# Patient Record
Sex: FEMALE | Race: BLACK OR AFRICAN AMERICAN | Employment: FULL TIME | URBAN - METROPOLITAN AREA
[De-identification: names, ages, dates, MRNs, and addresses within clinical notes are randomized per-mention and may not be internally consistent; named-entity substitution may affect disease eponyms.]

---

## 2017-07-03 ENCOUNTER — OFFICE VISIT (OUTPATIENT)
Dept: PRIMARY CARE CLINIC | Age: 37
End: 2017-07-03

## 2017-07-03 VITALS
WEIGHT: 202.6 LBS | HEART RATE: 107 BPM | OXYGEN SATURATION: 98 % | BODY MASS INDEX: 35.9 KG/M2 | HEIGHT: 63 IN | TEMPERATURE: 98.4 F | RESPIRATION RATE: 16 BRPM | SYSTOLIC BLOOD PRESSURE: 123 MMHG | DIASTOLIC BLOOD PRESSURE: 85 MMHG

## 2017-07-03 DIAGNOSIS — J01.10 ACUTE FRONTAL SINUSITIS, RECURRENCE NOT SPECIFIED: Primary | ICD-10-CM

## 2017-07-03 DIAGNOSIS — J01.00 ACUTE MAXILLARY SINUSITIS, RECURRENCE NOT SPECIFIED: ICD-10-CM

## 2017-07-03 RX ORDER — AMOXICILLIN AND CLAVULANATE POTASSIUM 875; 125 MG/1; MG/1
1 TABLET, FILM COATED ORAL EVERY 12 HOURS
Qty: 14 TAB | Refills: 0 | Status: SHIPPED | OUTPATIENT
Start: 2017-07-03 | End: 2017-07-10

## 2017-07-03 NOTE — MR AVS SNAPSHOT
Visit Information Date & Time Provider Department Dept. Phone Encounter #  
 7/3/2017  1:45 PM Eben Oneil NP [address] [facility] [id number] Follow-up Instructions Return if symptoms worsen or fail to improve. Your Appointments 9/26/2017  9:30 AM  
Any with Christine Sinha, DO [employer] (MABEL Marina) Appt Note: Routine f/up check. , weight loss, thyroid meds. (30 min.) - lp  
 [address] 
[address] [facility] [address]  
[phone]  
  
   
 [address] [facility] [address] [facility] [address] [facility] Upcoming Health Maintenance Date Due DTaP/Tdap/Td series (1 - Tdap) 1/5/2001 PAP AKA CERVICAL CYTOLOGY 2/1/2016 INFLUENZA AGE 9 TO ADULT 8/1/2017 Allergies as of 7/3/2017  Review Complete On: 7/3/2017 By: Eben Oneil NP Severity Noted Reaction Type Reactions Skelaxin [Metaxalone]  11/25/2014    Other (comments) Too groggy Current Immunizations  Reviewed on 11/28/2016 Name Date Hep B Vaccine 3/10/2014 Not reviewed this visit You Were Diagnosed With   
  
 Codes Comments Acute frontal sinusitis, recurrence not specified    -  Primary ICD-10-CM: J01.10 ICD-9-CM: 121.4 Acute maxillary sinusitis, recurrence not specified     ICD-10-CM: J01.00 ICD-9-CM: 461.0 Vitals BP Pulse Temp Resp Height(growth percentile) Weight(growth percentile) 123/85 (BP 1 Location: Left arm, BP Patient Position: Sitting) (!) 107 98.4 °F (36.9 °C) (Oral) 16 5' 3\" (1.6 m) 202 lb 9.6 oz (91.9 kg) SpO2 BMI OB Status Smoking Status 98% 35.89 kg/m2 Having regular periods Passive Smoke Exposure - Never Smoker BMI and BSA Data Body Mass Index Body Surface Area  
 35.89 kg/m 2 2.02 m 2 Preferred Pharmacy Pharmacy Name Phone [address] [place] [address], [address] [place] [address] [address] [phone] Your Updated Medication List  
 This list is accurate as of: 7/3/17  2:02 PM.  Always use your most recent med list.  
  
  
  
  
 albuterol 90 mcg/actuation inhaler Commonly known as:  PROVENTIL HFA, VENTOLIN HFA, PROAIR HFA Take 2 puffs by inhalation every four (4) hours as needed for Wheezing or Shortness of Breath. amoxicillin-clavulanate 875-125 mg per tablet Commonly known as:  AUGMENTIN Take 1 Tab by mouth every twelve (12) hours for 7 days. BIOTIN PO Take  by mouth. cholecalciferol 1,000 unit tablet Commonly known as:  VITAMIN D3 Take  by mouth daily. cyclobenzaprine 10 mg tablet Commonly known as:  FLEXERIL Take 1 Tab by mouth three (3) times daily as needed for Muscle Spasm(s). esomeprazole 40 mg capsule Commonly known as:  NexIUM Take one capsule by mouth daily for Heartburn  Indications: HEARTBURN  
  
 fluticasone 50 mcg/actuation nasal spray Commonly known as:  Marcine Infield 2 Sprays by Both Nostrils route daily. folic acid 1 mg tablet Commonly known as:  FOLVITE  
  
 linaclotide 145 mcg Cap capsule Commonly known as:  Michaela Edgarstein Take 1 Cap by mouth Daily (before breakfast). Indications: Chronic Idiopathic Constipation  
  
 phentermine 15 mg capsule Commonly known as:  ADIPEX_P Take 1 Cap by mouth every morning. Max Daily Amount: 15 mg. VITAMIN B12 PO Take  by mouth. Prescriptions Sent to Pharmacy Refills  
 amoxicillin-clavulanate (AUGMENTIN) 875-125 mg per tablet 0 Sig: Take 1 Tab by mouth every twelve (12) hours for 7 days. Class: Normal  
 Pharmacy: Alejandro Valadez at [address] #: [phone] Route: Oral  
  
Follow-up Instructions Return if symptoms worsen or fail to improve. Patient Instructions Sinusitis: Care Instructions Your Care Instructions Sinusitis is an infection of the lining of the sinus cavities in your head. Sinusitis often follows a cold. It causes pain and pressure in your head and face. In most cases, sinusitis gets better on its own in 1 to 2 weeks. But some mild symptoms may last for several weeks. Sometimes antibiotics are needed. Follow-up care is a key part of your treatment and safety. Be sure to make and go to all appointments, and call your doctor if you are having problems. It's also a good idea to know your test results and keep a list of the medicines you take. How can you care for yourself at home? · Take an over-the-counter pain medicine, such as acetaminophen (Tylenol), ibuprofen (Advil, Motrin), or naproxen (Aleve). Read and follow all instructions on the label. · If the doctor prescribed antibiotics, take them as directed. Do not stop taking them just because you feel better. You need to take the full course of antibiotics. · Be careful when taking over-the-counter cold or flu medicines and Tylenol at the same time. Many of these medicines have acetaminophen, which is Tylenol. Read the labels to make sure that you are not taking more than the recommended dose. Too much acetaminophen (Tylenol) can be harmful. · Breathe warm, moist air from a steamy shower, a hot bath, or a sink filled with hot water. Avoid cold, dry air. Using a humidifier in your home may help. Follow the directions for cleaning the machine. · Use saline (saltwater) nasal washes to help keep your nasal passages open and wash out mucus and bacteria. You can buy saline nose drops at a grocery store or drugstore. Or you can make your own at home by adding 1 teaspoon of salt and 1 teaspoon of baking soda to 2 cups of distilled water. If you make your own, fill a bulb syringe with the solution, insert the tip into your nostril, and squeeze gently. Chalo Grammes your nose. · Put a hot, wet towel or a warm gel pack on your face 3 or 4 times a day for 5 to 10 minutes each time. · Try a decongestant nasal spray like oxymetazoline (Afrin). Do not use it for more than 3 days in a row. Using it for more than 3 days can make your congestion worse. When should you call for help? Call your doctor now or seek immediate medical care if: 
· You have new or worse swelling or redness in your face or around your eyes. · You have a new or higher fever. Watch closely for changes in your health, and be sure to contact your doctor if: 
· You have new or worse facial pain. · The mucus from your nose becomes thicker (like pus) or has new blood in it. · You are not getting better as expected. Where can you learn more? Go to http://weston-karel.info/. Enter R679 in the search box to learn more about \"Sinusitis: Care Instructions. \" Current as of: July 29, 2016 Content Version: 11.3 © 5177-1271 [email or web address]. Care instructions adapted under license by [employer] (which disclaims liability or warranty for this information). If you have questions about a medical condition or this instruction, always ask your healthcare professional. [place] [address] any warranty or liability for your use of this information. Introducing [facility] & HEALTH SERVICES! Dear Patricia Gleason: Thank you for requesting a YouHelp account. Our records indicate that you already have an active YouHelp account. You can access your account anytime at [email or web address] Did you know that you can access your hospital and ER discharge instructions at any time in YouHelp? You can also review all of your test results from your hospital stay or ER visit. Additional Information If you have questions, please visit the Frequently Asked Questions section of the YouHelp website at [email or web address] Remember, YouHelp is NOT to be used for urgent needs. For medical emergencies, dial 911. Now available from your iPhone and Android! Please provide this summary of care documentation to your next provider. Your primary care clinician is listed as Vince Hill. If you have any questions after today's visit, please call [phone].

## 2017-07-03 NOTE — PATIENT INSTRUCTIONS
Sinusitis: Care Instructions  Your Care Instructions    Sinusitis is an infection of the lining of the sinus cavities in your head. Sinusitis often follows a cold. It causes pain and pressure in your head and face. In most cases, sinusitis gets better on its own in 1 to 2 weeks. But some mild symptoms may last for several weeks. Sometimes antibiotics are needed. Follow-up care is a key part of your treatment and safety. Be sure to make and go to all appointments, and call your doctor if you are having problems. It's also a good idea to know your test results and keep a list of the medicines you take. How can you care for yourself at home? · Take an over-the-counter pain medicine, such as acetaminophen (Tylenol), ibuprofen (Advil, Motrin), or naproxen (Aleve). Read and follow all instructions on the label. · If the doctor prescribed antibiotics, take them as directed. Do not stop taking them just because you feel better. You need to take the full course of antibiotics. · Be careful when taking over-the-counter cold or flu medicines and Tylenol at the same time. Many of these medicines have acetaminophen, which is Tylenol. Read the labels to make sure that you are not taking more than the recommended dose. Too much acetaminophen (Tylenol) can be harmful. · Breathe warm, moist air from a steamy shower, a hot bath, or a sink filled with hot water. Avoid cold, dry air. Using a humidifier in your home may help. Follow the directions for cleaning the machine. · Use saline (saltwater) nasal washes to help keep your nasal passages open and wash out mucus and bacteria. You can buy saline nose drops at a grocery store or drugstore. Or you can make your own at home by adding 1 teaspoon of salt and 1 teaspoon of baking soda to 2 cups of distilled water. If you make your own, fill a bulb syringe with the solution, insert the tip into your nostril, and squeeze gently. Deangelo Shy your nose.   · Put a hot, wet towel or a warm gel pack on your face 3 or 4 times a day for 5 to 10 minutes each time. · Try a decongestant nasal spray like oxymetazoline (Afrin). Do not use it for more than 3 days in a row. Using it for more than 3 days can make your congestion worse. When should you call for help? Call your doctor now or seek immediate medical care if:  · You have new or worse swelling or redness in your face or around your eyes. · You have a new or higher fever. Watch closely for changes in your health, and be sure to contact your doctor if:  · You have new or worse facial pain. · The mucus from your nose becomes thicker (like pus) or has new blood in it. · You are not getting better as expected. Where can you learn more? Go to http://weston-karel.info/. Enter B305 in the search box to learn more about \"Sinusitis: Care Instructions. \"  Current as of: July 29, 2016  Content Version: 11.3  © 5974-3121 Healthwise, Incorporated. Care instructions adapted under license by [employer] (which disclaims liability or warranty for this information). If you have questions about a medical condition or this instruction, always ask your healthcare professional. [place] [address] any warranty or liability for your use of this information.

## 2017-07-03 NOTE — PROGRESS NOTES
Subjective:   Rene Agudelo is a 40 y.o. female who complains of congestion, cough, headache, left sinus pain and yellow nasal discharge for 7 days, gradually worsening since that time. She denies a history of fevers, shortness of breath and wheezing. Evaluation to date: none. Treatment to date: OTC products - Sudafed, Mucinex, Theraflu, Flonase. Patient does not smoke cigarettes. Relevant PMH:   Past Medical History:   Diagnosis Date    Abnormal Pap smear of cervix 1990s    Breast cyst     benign. Left. 1.7 cm and multiple others. Dr. Neelima Rivera Chicken pox childhood    Chronic idiopathic constipation 2015    Dr. Micha Son.  HELM (dyspnea on exertion) 2015    Dr. Perla Appiah EBV positive mononucleosis syndrome 10/2013    chronic or reactivated    Enlarged thyroid 2010, 2015    with Right tracheal deviation. Retrosternal goiter. Dr. Pete Chavarria.  Enlarged tonsils and adenoids 2010    Iodine-deficiency related goiter     Left. Dr. Pete Chavarria.  YUNIER (obstructive sleep apnea) 02/25/15    Dr. Gypsy Devries, uses cpap. Dr. Jacobson Fearing.  Thyroid nodule 2014    Dr. Baudilio Melchor. Dr. Virgil Riddle     Past Surgical History:   Procedure Laterality Date    HX  SECTION      [facility].  HX HERNIA REPAIR  9265    umbilical.    HX OTHER SURGICAL  2014    thyroid biopsy. Dr. Baudilio Melchor.  HX THYROIDECTOMY Left 2015    due to [facility]. Dr. Pete Chavarria. benign. Allergies   Allergen Reactions    Skelaxin [Metaxalone] Other (comments)     Too groggy     PCP - Dr. Miles Melendez       Review of Systems  Pertinent items are noted in HPI.     Objective:     Visit Vitals    /85 (BP 1 Location: Left arm, BP Patient Position: Sitting)    Pulse (!) 107    Temp 98.4 °F (36.9 °C) (Oral)    Resp 16    Ht 5' 3\" (1.6 m)    Wt 202 lb 9.6 oz (91.9 kg)    SpO2 98%    BMI 35.89 kg/m2     General:  alert, cooperative, no distress   Eyes: negative   Ears: normal TM's and external ear canals AU   Sinuses: tenderness over bilateral maxillary and frontal   Mouth:  Lips, mucosa, and tongue normal. Teeth and gums normal and normal findings: oropharynx pink & moist without lesions or evidence of thrush   Neck: supple, symmetrical, trachea midline and no adenopathy. Heart: S1 and S2 normal, no murmurs noted. Lungs: clear to auscultation bilaterally        Assessment/Plan:       ICD-10-CM ICD-9-CM    1. Acute frontal sinusitis, recurrence not specified J01.10 461.1    2. Acute maxillary sinusitis, recurrence not specified J01.00 461.0      Augmentin as directed  Discussed the dx and tx of sinusitis. Suggested symptomatic OTC remedies. Nasal saline sprays for congestion. Antibiotics per orders. RTC prn. Preet Hernandez NP  This note will not be viewable in [address]

## 2017-07-03 NOTE — PROGRESS NOTES
Chief Complaint   Patient presents with    Sinus Pain     c/o sinus pressure, sinus headache and some coughing x 1 week, states she has taken flonase and sudafed to help     Headache     This note will not be viewable in MyChart.

## 2017-09-15 ENCOUNTER — OFFICE VISIT (OUTPATIENT)
Dept: SURGERY | Age: 37
End: 2017-09-15

## 2017-09-15 VITALS
SYSTOLIC BLOOD PRESSURE: 123 MMHG | HEART RATE: 91 BPM | DIASTOLIC BLOOD PRESSURE: 79 MMHG | HEIGHT: 63 IN | BODY MASS INDEX: 35.08 KG/M2 | WEIGHT: 198 LBS

## 2017-09-15 DIAGNOSIS — N60.01 CYST OF RIGHT BREAST: Primary | ICD-10-CM

## 2017-09-15 NOTE — MR AVS SNAPSHOT
Visit Information Date & Time Provider Department Dept. Phone Encounter #  
 9/15/2017  9:20  Tete Valencia  [address]  [id number] Your Appointments 9/26/2017  9:30 AM  
Any with DO Kimber Bullock 74 (MABEL Marina) Appt Note: Routine f/up check. , weight loss, thyroid meds. (30 min.) - lp  
 [address] 
[address] [place] [address]  
[phone]  
  
   
 [address]  
  
    
 3/16/2018  8:45 AM  
Follow Up with 81MD Malik Morocho 22 [facility]) Appt Note: 6 MONTH FOLLOW UP/ CP$30 9-15-17 [place] 9 [facility] 1 [address] [address]  
[address] [facility] [address] Upcoming Health Maintenance Date Due DTaP/Tdap/Td series (1 - Tdap) 1/5/2001 PAP AKA CERVICAL CYTOLOGY 2/1/2016 INFLUENZA AGE 9 TO ADULT 8/1/2017 Allergies as of 9/15/2017  Review Complete On: 9/15/2017 By: Zarina Lopez RN Severity Noted Reaction Type Reactions Skelaxin [Metaxalone]  11/25/2014    Other (comments) Too groggy Current Immunizations  Reviewed on 11/28/2016 Name Date Hep B Vaccine 3/10/2014 Not reviewed this visit Vitals BP Pulse Height(growth percentile) Weight(growth percentile) LMP BMI  
 123/79 91 5' 3\" (1.6 m) 198 lb (89.8 kg) 08/31/2017 35.07 kg/m2 OB Status Smoking Status Having regular periods Passive Smoke Exposure - Never Smoker Vitals History BMI and BSA Data Body Mass Index Body Surface Area 35.07 kg/m 2 2 m 2 Preferred Pharmacy Pharmacy Name Phone [address] [place] [address], [address] [place] [address] [address] [phone] Your Updated Medication List  
  
   
This list is accurate as of: 9/15/17 11:25 AM.  Always use your most recent med list.  
  
  
  
  
 albuterol 90 mcg/actuation inhaler Commonly known as:  PROVENTIL HFA, VENTOLIN HFA, PROAIR HFA Take 2 puffs by inhalation every four (4) hours as needed for Wheezing or Shortness of Breath. BIOTIN PO Take  by mouth. cholecalciferol 1,000 unit tablet Commonly known as:  VITAMIN D3 Take  by mouth daily. esomeprazole 40 mg capsule Commonly known as:  NexIUM Take one capsule by mouth daily for Heartburn  Indications: HEARTBURN To-Do List   
 09/28/2017 1:00 PM  
  Appointment with [facility] JENNIFER 1 at [address] [place] [address] ([phone]) Shower or bathe using soap and water. Do not use deodorant, powder, perfumes, or lotion the day of your exam.  If your prior mammograms were not performed at [place] [address] please bring films with you or forward prior images 2 days before your procedure. If patient is not a callback diagnostic, the patient must have an order/script from the physician for the diagnostic. Please bring it on the day of the mammogram or have it faxed to the department. [facility]  [phone] [facility] [phone] KARINA  [phone] [facility] [phone] [facility] [phone] [facility] [phone] [facility] [phone] Simeon Finnegan [phone]  
  
 09/28/2017 1:30 PM  
  Appointment with Vick Melchor 2 at [facility] Ultrasound ([phone]) Shower or bathe using soap and water. Do not use deodorant, powder, perfumes, or lotion. If your prior films were not performed at a local [facility] facility please bring or forward prior images 2 days before procedure. Patient Instructions Breast Lumps (Noncancerous): Care Instructions Your Care Instructions Breast lumps or changes are a common health worry for most women. Women may have many kinds of breast lumps and other breast changes throughout their lives, including ones that occur with menstrual periods, pregnancy, and aging. Most breast lumps are harmless and are not cancer. Many womens breasts feel lumpy and tender before their menstrual periods. Women also may have lumps when they are breastfeeding. Breast lumps may go away after menopause. Common noncancerous breast lumps include: · Cysts, or sacs filled with fluid. · Skin cysts in the breast area. · Fatty lumps, which may be firm. These may or may not cause pain. · Fibroadenomas, growths that are round and firm with smooth edges. · Abscesses, which are pockets of infection within the breast. 
Follow-up care is a key part of your treatment and safety. Be sure to make and go to all appointments, and call your doctor if you are having problems. It's also a good idea to know your test results and keep a list of the medicines you take. How can you care for yourself at home? · Take an over-the-counter pain medicine, such as acetaminophen (Tylenol), ibuprofen (Advil, Motrin), or naproxen (Aleve). Read and follow all instructions on the label. · Do not take two or more pain medicines at the same time unless the doctor told you to. Many pain medicines have acetaminophen, which is Tylenol. Too much acetaminophen (Tylenol) can be harmful. · If you are pregnant, do not take any medicine other than acetaminophen unless your doctor has told you to. · Wear a supportive bra, such as a sports bra or jog bra. · You may want to limit caffeine. Some women say that cutting back on caffeine reduces their breast tenderness. · A diet very low in fat (about 15% of daily diet) may reduce breast tenderness. Talk to your doctor about whether you should try a very low-fat diet. · A diet low in salt (sodium) also may reduce breast tenderness. When should you call for help? Watch closely for changes in your health, and be sure to contact your doctor if you: 
· Have a fever. · Have swelling, redness, or pain. · Have a new breast lump that does not go away with your menstrual cycle. · Notice that a breast lump has changed. Where can you learn more? Go to http://weston-karel.info/. Enter 95 [id number] in the search box to learn more about \"Breast Lumps (Noncancerous): Care Instructions. \" Current as of: October 13, 2016 Content Version: 11.3 © 6588-8144 Cosential. Care instructions adapted under license by [employer] (which disclaims liability or warranty for this information). If you have questions about a medical condition or this instruction, always ask your healthcare professional. [address] any warranty or liability for your use of this information. Introducing [facility] & HEALTH SERVICES! Dear Khoa Mukherjee: Thank you for requesting a Citizinvestor account. Our records indicate that you already have an active Citizinvestor account. You can access your account anytime at [email or web address] Did you know that you can access your hospital and ER discharge instructions at any time in Citizinvestor? You can also review all of your test results from your hospital stay or ER visit. Additional Information If you have questions, please visit the Frequently Asked Questions section of the Citizinvestor website at [email or web address] Remember, Citizinvestor is NOT to be used for urgent needs. For medical emergencies, dial 911. Now available from your iPhone and Android! Please provide this summary of care documentation to your next provider. Your primary care clinician is listed as Camelia Trevino. If you have any questions after today's visit, please call [phone].

## 2017-09-15 NOTE — PATIENT INSTRUCTIONS
Breast Lumps (Noncancerous): Care Instructions  Your Care Instructions  Breast lumps or changes are a common health worry for most women. Women may have many kinds of breast lumps and other breast changes throughout their lives, including ones that occur with menstrual periods, pregnancy, and aging. Most breast lumps are harmless and are not cancer. Many womens breasts feel lumpy and tender before their menstrual periods. Women also may have lumps when they are breastfeeding. Breast lumps may go away after menopause. Common noncancerous breast lumps include:  · Cysts, or sacs filled with fluid. · Skin cysts in the breast area. · Fatty lumps, which may be firm. These may or may not cause pain. · Fibroadenomas, growths that are round and firm with smooth edges. · Abscesses, which are pockets of infection within the breast.  Follow-up care is a key part of your treatment and safety. Be sure to make and go to all appointments, and call your doctor if you are having problems. It's also a good idea to know your test results and keep a list of the medicines you take. How can you care for yourself at home? · Take an over-the-counter pain medicine, such as acetaminophen (Tylenol), ibuprofen (Advil, Motrin), or naproxen (Aleve). Read and follow all instructions on the label. · Do not take two or more pain medicines at the same time unless the doctor told you to. Many pain medicines have acetaminophen, which is Tylenol. Too much acetaminophen (Tylenol) can be harmful. · If you are pregnant, do not take any medicine other than acetaminophen unless your doctor has told you to. · Wear a supportive bra, such as a sports bra or jog bra. · You may want to limit caffeine. Some women say that cutting back on caffeine reduces their breast tenderness. · A diet very low in fat (about 15% of daily diet) may reduce breast tenderness. Talk to your doctor about whether you should try a very low-fat diet.   · A diet low in salt (sodium) also may reduce breast tenderness. When should you call for help? Watch closely for changes in your health, and be sure to contact your doctor if you:  · Have a fever. · Have swelling, redness, or pain. · Have a new breast lump that does not go away with your menstrual cycle. · Notice that a breast lump has changed. Where can you learn more? Go to http://weston-karel.info/. Enter [id number] in the search box to learn more about \"Breast Lumps (Noncancerous): Care Instructions. \"  Current as of: October 13, 2016  Content Version: 11.3  © 6450-7120 Likelii. Care instructions adapted under license by Maimaibao (which disclaims liability or warranty for this information). If you have questions about a medical condition or this instruction, always ask your healthcare professional. [address] any warranty or liability for your use of this information.

## 2017-09-18 PROBLEM — N60.01 CYST OF RIGHT BREAST: Status: ACTIVE | Noted: 2017-09-18

## 2017-09-26 ENCOUNTER — OFFICE VISIT (OUTPATIENT)
Dept: FAMILY MEDICINE CLINIC | Age: 37
End: 2017-09-26

## 2017-09-26 VITALS
SYSTOLIC BLOOD PRESSURE: 138 MMHG | BODY MASS INDEX: 35.35 KG/M2 | HEART RATE: 83 BPM | WEIGHT: 199.5 LBS | DIASTOLIC BLOOD PRESSURE: 86 MMHG | HEIGHT: 63 IN | TEMPERATURE: 97.8 F | OXYGEN SATURATION: 97 % | RESPIRATION RATE: 16 BRPM

## 2017-09-26 DIAGNOSIS — J01.10 ACUTE NON-RECURRENT FRONTAL SINUSITIS: ICD-10-CM

## 2017-09-26 DIAGNOSIS — E66.9 OBESITY, CLASS I, BMI 30-34.9: ICD-10-CM

## 2017-09-26 DIAGNOSIS — R06.02 SOB (SHORTNESS OF BREATH) ON EXERTION: ICD-10-CM

## 2017-09-26 DIAGNOSIS — E01.0 THYROMEGALY: ICD-10-CM

## 2017-09-26 DIAGNOSIS — Z80.3 FAMILY HISTORY OF BREAST CANCER: ICD-10-CM

## 2017-09-26 DIAGNOSIS — K59.04 CHRONIC IDIOPATHIC CONSTIPATION: ICD-10-CM

## 2017-09-26 DIAGNOSIS — J45.990 EXERCISE-INDUCED ASTHMA: ICD-10-CM

## 2017-09-26 DIAGNOSIS — Z83.3 FAMILY HISTORY OF DIABETES MELLITUS (DM): ICD-10-CM

## 2017-09-26 DIAGNOSIS — Z86.39 HISTORY OF THYROID NODULE: ICD-10-CM

## 2017-09-26 DIAGNOSIS — G47.33 OSA (OBSTRUCTIVE SLEEP APNEA): ICD-10-CM

## 2017-09-26 DIAGNOSIS — Z82.49 FAMILY HISTORY OF HEART DISEASE: ICD-10-CM

## 2017-09-26 DIAGNOSIS — R53.82 CHRONIC FATIGUE: Primary | ICD-10-CM

## 2017-09-26 DIAGNOSIS — E55.9 VITAMIN D DEFICIENCY: ICD-10-CM

## 2017-09-26 DIAGNOSIS — E66.9 OBESITY, CLASS II, BMI 35-39.9: ICD-10-CM

## 2017-09-26 DIAGNOSIS — N60.01 CYST OF RIGHT BREAST: ICD-10-CM

## 2017-09-26 RX ORDER — PHENTERMINE HYDROCHLORIDE 15 MG/1
15 CAPSULE ORAL
Qty: 30 CAP | Refills: 0 | Status: SHIPPED | OUTPATIENT
Start: 2017-09-26 | End: 2017-11-28 | Stop reason: SDUPTHER

## 2017-09-26 NOTE — PATIENT INSTRUCTIONS
Starting a Weight Loss Plan: Care Instructions  Your Care Instructions  If you are thinking about losing weight, it can be hard to know where to start. Your doctor can help you set up a weight loss plan that best meets your needs. You may want to take a class on nutrition or exercise, or join a weight loss support group. If you have questions about how to make changes to your eating or exercise habits, ask your doctor about seeing a registered dietitian or an exercise specialist.  It can be a big challenge to lose weight. But you do not have to make huge changes at once. Make small changes, and stick with them. When those changes become habit, add a few more changes. If you do not think you are ready to make changes right now, try to pick a date in the future. Make an appointment to see your doctor to discuss whether the time is right for you to start a plan. Follow-up care is a key part of your treatment and safety. Be sure to make and go to all appointments, and call your doctor if you are having problems. Its also a good idea to know your test results and keep a list of the medicines you take. How can you care for yourself at home? · Set realistic goals. Many people expect to lose much more weight than is likely. A weight loss of 5% to 10% of your body weight may be enough to improve your health. · Get family and friends involved to provide support. Talk to them about why you are trying to lose weight, and ask them to help. They can help by participating in exercise and having meals with you, even if they may be eating something different. · Find what works best for you. If you do not have time or do not like to cook, a program that offers meal replacement bars or shakes may be better for you. Or if you like to prepare meals, finding a plan that includes daily menus and recipes may be best.  · Ask your doctor about other health professionals who can help you achieve your weight loss goals.   ¨ A dietitian can help you make healthy changes in your diet. ¨ An exercise specialist or  can help you develop a safe and effective exercise program.  ¨ A counselor or psychiatrist can help you cope with issues such as depression, anxiety, or family problems that can make it hard to focus on weight loss. · Consider joining a support group for people who are trying to lose weight. Your doctor can suggest groups in your area. Where can you learn more? Go to http://weston-karel.info/. Enter R967 in the search box to learn more about \"Starting a Weight Loss Plan: Care Instructions. \"  Current as of: October 13, 2016  Content Version: 11.3  © 9581-6981 Barafon. Care instructions adapted under license by norin.tv (which disclaims liability or warranty for this information). If you have questions about a medical condition or this instruction, always ask your healthcare professional. [place] [address] any warranty or liability for your use of this information. Starting a Weight Loss Plan: Care Instructions  Your Care Instructions  If you are thinking about losing weight, it can be hard to know where to start. Your doctor can help you set up a weight loss plan that best meets your needs. You may want to take a class on nutrition or exercise, or join a weight loss support group. If you have questions about how to make changes to your eating or exercise habits, ask your doctor about seeing a registered dietitian or an exercise specialist.  It can be a big challenge to lose weight. But you do not have to make huge changes at once. Make small changes, and stick with them. When those changes become habit, add a few more changes. If you do not think you are ready to make changes right now, try to pick a date in the future. Make an appointment to see your doctor to discuss whether the time is right for you to start a plan.   Follow-up care is a key part of your treatment and safety. Be sure to make and go to all appointments, and call your doctor if you are having problems. Its also a good idea to know your test results and keep a list of the medicines you take. How can you care for yourself at home? · Set realistic goals. Many people expect to lose much more weight than is likely. A weight loss of 5% to 10% of your body weight may be enough to improve your health. · Get family and friends involved to provide support. Talk to them about why you are trying to lose weight, and ask them to help. They can help by participating in exercise and having meals with you, even if they may be eating something different. · Find what works best for you. If you do not have time or do not like to cook, a program that offers meal replacement bars or shakes may be better for you. Or if you like to prepare meals, finding a plan that includes daily menus and recipes may be best.  · Ask your doctor about other health professionals who can help you achieve your weight loss goals. ¨ A dietitian can help you make healthy changes in your diet. ¨ An exercise specialist or  can help you develop a safe and effective exercise program.  ¨ A counselor or psychiatrist can help you cope with issues such as depression, anxiety, or family problems that can make it hard to focus on weight loss. · Consider joining a support group for people who are trying to lose weight. Your doctor can suggest groups in your area. Where can you learn more? Go to http://weston-karel.info/. Enter Y663 in the search box to learn more about \"Starting a Weight Loss Plan: Care Instructions. \"  Current as of: October 13, 2016  Content Version: 11.3  © 8972-8483 Healthwise, Incorporated. Care instructions adapted under license by Reviews42 (which disclaims liability or warranty for this information).  If you have questions about a medical condition or this instruction, always ask your healthcare professional. [place] [address] any warranty or liability for your use of this information.

## 2017-09-26 NOTE — MR AVS SNAPSHOT
Visit Information Date & Time Provider Department Dept. Phone Encounter #  
 9/26/2017  9:30 AM Anaya Schilling DO [facility] 74 [phone] [id number] Follow-up Instructions Return in about 1 month (around 10/26/2017) for weight, results, bp. Routing History Your Appointments 3/16/2018  8:45 AM  
Follow Up with Yamil Ansari MD  
[address] Appt Note: 6 MONTH FOLLOW UP/ CP$30 9-15-17 [address] [address]  
[address] [place] [address] Upcoming Health Maintenance Date Due  
 PAP AKA CERVICAL CYTOLOGY 10/13/2017* DTaP/Tdap/Td series (1 - Tdap) 9/30/2021* *Topic was postponed. The date shown is not the original due date. Allergies as of 9/26/2017  Review Complete On: 9/26/2017 By: Jese Morgan Severity Noted Reaction Type Reactions Skelaxin [Metaxalone]  11/25/2014    Other (comments) Too groggy Current Immunizations  Reviewed on 9/26/2017 Name Date Hep B Vaccine 3/10/2014 Influenza Vaccine 9/21/2017 TB Skin Test (PPD) 9/19/2017 Reviewed by Anaya Schilling DO on 9/26/2017 at 11:13 AM  
You Were Diagnosed With   
  
 Codes Comments Chronic fatigue    -  Primary ICD-10-CM: R53.82 
ICD-9-CM: 780.79   
 YUNIER (obstructive sleep apnea)     ICD-10-CM: G47.33 
ICD-9-CM: 327.23 stable on CPAP Vitamin D deficiency     ICD-10-CM: E55.9 ICD-9-CM: 268.9 Chronic idiopathic constipation     ICD-10-CM: K59.04 
ICD-9-CM: 564.00 stable History of thyroid nodule     ICD-10-CM: Z86.39 
ICD-9-CM: V12.29 resolved after L thyroidectomy Cyst of right breast     ICD-10-CM: N60.01 
ICD-9-CM: 610.0 resolved since drained by Dr. Candy Birmingham  
 SOB (shortness of breath) on exertion     ICD-10-CM: R06.02 
ICD-9-CM: 786.05 resolved Obesity, Class II, BMI 35-39.9     ICD-10-CM: E66.9 ICD-9-CM: 278.00 Exercise-induced asthma     ICD-10-CM: J45.990 ICD-9-CM: 493.81 stable Family history of breast cancer     ICD-10-CM: Z80.3 ICD-9-CM: V16.3 Family history of diabetes mellitus (DM)     ICD-10-CM: Z83.3 ICD-9-CM: V18.0 Acute non-recurrent frontal sinusitis     ICD-10-CM: J01.10 ICD-9-CM: 461.1 and maxillary, resolved after Augmentin Family history of heart disease     ICD-10-CM: Z82.49 
ICD-9-CM: V17.49 Obesity, Class I, BMI 30-34.9     ICD-10-CM: E66.9 ICD-9-CM: 278.00 Vitals BP Pulse Temp Resp Height(growth percentile) Weight(growth percentile) (!) 125/92 (BP 1 Location: Right arm, BP Patient Position: Sitting) 83 97.8 °F (36.6 °C) (Oral) 16 5' 3\" (1.6 m) 199 lb 8 oz (90.5 kg) LMP SpO2 BMI OB Status Smoking Status 08/31/2017 97% 35.34 kg/m2 Having regular periods Passive Smoke Exposure - Never Smoker Vitals History BMI and BSA Data Body Mass Index Body Surface Area  
 35.34 kg/m 2 2.01 m 2 Preferred Pharmacy Pharmacy Name Phone [address] [place] [address], [address] [place] [address] Bhumi Botello Said [phone] Your Updated Medication List  
  
   
This list is accurate as of: 9/26/17 11:30 AM.  Always use your most recent med list.  
  
  
  
  
 albuterol 90 mcg/actuation inhaler Commonly known as:  PROVENTIL HFA, VENTOLIN HFA, PROAIR HFA Take 2 puffs by inhalation every four (4) hours as needed for Wheezing or Shortness of Breath. BIOTIN PO Take  by mouth. cholecalciferol 1,000 unit tablet Commonly known as:  VITAMIN D3 Take  by mouth daily. esomeprazole 40 mg capsule Commonly known as:  NexIUM Take one capsule by mouth daily for Heartburn  Indications: HEARTBURN phentermine 15 mg capsule Commonly known as:  ADIPEX_P Take 1 Cap by mouth every morning. Max Daily Amount: 15 mg.   
  
 VITAMIN C PO  
 Take  by mouth. Prescriptions Printed Refills  
 phentermine (ADIPEX_P) 15 mg capsule 0 Sig: Take 1 Cap by mouth every morning. Max Daily Amount: 15 mg.  
 Class: Print Route: Oral  
  
We Performed the Following CBC W/O DIFF [04784 CPT(R)] HCG QL SERUM [49578 CPT(R)] HEMOGLOBIN A1C WITH EAG [85257 CPT(R)] LIPID PANEL [65418 CPT(R)] METABOLIC PANEL, COMPREHENSIVE [84075 CPT(R)] REFERRAL TO PULMONARY DISEASE [AVK61 Custom] T4, FREE [id number] CPT(R)] TSH 3RD GENERATION [87608 CPT(R)] URINALYSIS W/ RFLX MICROSCOPIC [39434 CPT(R)] VITAMIN D, 25 HYDROXY [id number] CPT(R)] Follow-up Instructions Return in about 1 month (around 10/26/2017) for weight, results, bp. To-Do List   
 09/28/2017 1:00 PM  
  Appointment with [facility] JENNIFER 1 at [address] [place] [address] ([phone]) Shower or bathe using soap and water. Do not use deodorant, powder, perfumes, or lotion the day of your exam.  If your prior mammograms were not performed at [place] [address] please bring films with you or forward prior images 2 days before your procedure. If patient is not a callback diagnostic, the patient must have an order/script from the physician for the diagnostic. Please bring it on the day of the mammogram or have it faxed to the department. [facility] [place]  [phone] [facility] [phone] [facility]  [phone] [facility] [phone] [facility] [phone] [address] [place] [address] [phone] [facility] [phone] [facility] [phone]  
  
 09/28/2017 1:30 PM  
  Appointment with Vick Melchor 2 at [facility] Ultrasound ([phone]) Shower or bathe using soap and water. Do not use deodorant, powder, perfumes, or lotion. If your prior films were not performed at a local [facility] facility please bring or forward prior images 2 days before procedure. Referral Information  Referral ID Referred By Referred To  
  
 [id number] [facility].   
 [address], [address] [facility] Visits Status Start Date End Date 1 New Request 9/26/17 9/26/18 If your referral has a status of pending review or denied, additional information will be sent to support the outcome of this decision. Patient Instructions Starting a Weight Loss Plan: Care Instructions Your Care Instructions If you are thinking about losing weight, it can be hard to know where to start. Your doctor can help you set up a weight loss plan that best meets your needs. You may want to take a class on nutrition or exercise, or join a weight loss support group. If you have questions about how to make changes to your eating or exercise habits, ask your doctor about seeing a registered dietitian or an exercise specialist. 
It can be a big challenge to lose weight. But you do not have to make huge changes at once. Make small changes, and stick with them. When those changes become habit, add a few more changes. If you do not think you are ready to make changes right now, try to pick a date in the future. Make an appointment to see your doctor to discuss whether the time is right for you to start a plan. Follow-up care is a key part of your treatment and safety. Be sure to make and go to all appointments, and call your doctor if you are having problems. Its also a good idea to know your test results and keep a list of the medicines you take. How can you care for yourself at home? · Set realistic goals. Many people expect to lose much more weight than is likely. A weight loss of 5% to 10% of your body weight may be enough to improve your health. · Get family and friends involved to provide support. Talk to them about why you are trying to lose weight, and ask them to help. They can help by participating in exercise and having meals with you, even if they may be eating something different. · Find what works best for you. If you do not have time or do not like to cook, a program that offers meal replacement bars or shakes may be better for you. Or if you like to prepare meals, finding a plan that includes daily menus and recipes may be best. 
· Ask your doctor about other health professionals who can help you achieve your weight loss goals. ¨ A dietitian can help you make healthy changes in your diet. ¨ An exercise specialist or  can help you develop a safe and effective exercise program. 
¨ A counselor or psychiatrist can help you cope with issues such as depression, anxiety, or family problems that can make it hard to focus on weight loss. · Consider joining a support group for people who are trying to lose weight. Your doctor can suggest groups in your area. Where can you learn more? Go to http://westonTechnoVaxkarel.info/. Enter J781 in the search box to learn more about \"Starting a Weight Loss Plan: Care Instructions. \" Current as of: October 13, 2016 Content Version: 11.3 © 6920-9681 WordSentry. Care instructions adapted under license by Aiming (which disclaims liability or warranty for this information). If you have questions about a medical condition or this instruction, always ask your healthcare professional. [address] any warranty or liability for your use of this information. Starting a Weight Loss Plan: Care Instructions Your Care Instructions If you are thinking about losing weight, it can be hard to know where to start. Your doctor can help you set up a weight loss plan that best meets your needs. You may want to take a class on nutrition or exercise, or join a weight loss support group.  If you have questions about how to make changes to your eating or exercise habits, ask your doctor about seeing a registered dietitian or an exercise specialist. 
 It can be a big challenge to lose weight. But you do not have to make huge changes at once. Make small changes, and stick with them. When those changes become habit, add a few more changes. If you do not think you are ready to make changes right now, try to pick a date in the future. Make an appointment to see your doctor to discuss whether the time is right for you to start a plan. Follow-up care is a key part of your treatment and safety. Be sure to make and go to all appointments, and call your doctor if you are having problems. Its also a good idea to know your test results and keep a list of the medicines you take. How can you care for yourself at home? · Set realistic goals. Many people expect to lose much more weight than is likely. A weight loss of 5% to 10% of your body weight may be enough to improve your health. · Get family and friends involved to provide support. Talk to them about why you are trying to lose weight, and ask them to help. They can help by participating in exercise and having meals with you, even if they may be eating something different. · Find what works best for you. If you do not have time or do not like to cook, a program that offers meal replacement bars or shakes may be better for you. Or if you like to prepare meals, finding a plan that includes daily menus and recipes may be best. 
· Ask your doctor about other health professionals who can help you achieve your weight loss goals. ¨ A dietitian can help you make healthy changes in your diet. ¨ An exercise specialist or  can help you develop a safe and effective exercise program. 
¨ A counselor or psychiatrist can help you cope with issues such as depression, anxiety, or family problems that can make it hard to focus on weight loss. · Consider joining a support group for people who are trying to lose weight. Your doctor can suggest groups in your area. Where can you learn more? Go to http://weston-karel.info/. Enter Q017 in the search box to learn more about \"Starting a Weight Loss Plan: Care Instructions. \" Current as of: October 13, 2016 Content Version: 11.3 © 1915-5586 Fromlab. Care instructions adapted under license by HearMeOut (which disclaims liability or warranty for this information). If you have questions about a medical condition or this instruction, always ask your healthcare professional. [address] any warranty or liability for your use of this information. Introducing [address]! Dear Olive Mckay: Thank you for requesting a AthleteTrax account. Our records indicate that you already have an active AthleteTrax account. You can access your account anytime at [email or web address] Did you know that you can access your hospital and ER discharge instructions at any time in AthleteTrax? You can also review all of your test results from your hospital stay or ER visit. Additional Information If you have questions, please visit the Frequently Asked Questions section of the AthleteTrax website at [email or web address] Remember, AthleteTrax is NOT to be used for urgent needs. For medical emergencies, dial 911. Now available from your iPhone and Android! Please provide this summary of care documentation to your next provider. Your primary care clinician is listed as Chayo Casarez. If you have any questions after today's visit, please call [phone].

## 2017-09-26 NOTE — ACP (ADVANCE CARE PLANNING)
Discussed ACP with patient. Gave pt an Honoring Choices folder. Accepts referral to Honoring Choices team.  ACP team notified by staff message for follow up.

## 2017-09-26 NOTE — PROGRESS NOTES
HISTORY OF PRESENT ILLNESS  Andree Kanner is a 40 y.o. female presents with Weight Management; Blood Pressure Check; Medication Refill; Referral Follow Up; and Referral Request    Agree with nurse note. Pt with Vit D deficiency, chronic fatigue, and family hx of DM and heart disease presents to the office with a BP of 125/92. She drinks 16 oz of coffee every morning. 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 Ed Parker. She previously followed with endocrinologist, Dr. Ginny Michael but has not seen her recently. Previous labs ordered by Dr. Deneen Rivera do not show evidence of thyroid disease. She weighs 199 lbs, lost 2 lbs since 11/2016. She cut out sodas and was drinking up to 60 oz of Pepsi daily. She and her son are participating in a weight loss class. She drinks at least 64 oz of water daily and sometimes mixes water with flavoring packets. She took Adipex-P 37.5 mg in 2016 and would like to restart. Pt with family hx of breast cancer. She saw breast surgeon, Dr. Avery Duron on 09/15/17 for painful R breast cyst. Previously had seen Dr. Theresa Brock in 2016 for L breast cyst which was aspirated. Dr. Eric Vargas aspirated the R breast cyst. Resolved. She was dx'd with YUNIER and exercise induced asthma by pulmonologist, Dr. Jerris Homans and has missed the last couple of appointments with him. HELM has resolved. She does tolerate and use CPAP. Pt with chronic idiopathic constipation. She has previously seen GI, Dr. Elinor Opitz but has not needed to see him recently. She saw NP Swapna Rolon on 07/03/17 for congestion, cough, headache, L sinus pain, and yellow nasal discharge x7 days. Dx'd acute frontal sinusitis and acute maxillary sinusitis. Tx'd with Augmentin 875-125 mg BID x7 days. Resolved. She saw optometrist, Dr. Kacey Mays on 01/09/17. Renewed Rx for contact lenses.     Health Maintenance    She sees GYN,  Melissa Alicia at [facility] for paps and reports last pap was in 01/2017 or 12/2016. Written by tali Elliott, as dictated by Dr. Dedra Guardado DO.    ROS    Review of Systems negative except as noted above in HPI. ALLERGIES:    Allergies   Allergen Reactions    Skelaxin [Metaxalone] Other (comments)     Too groggy       CURRENT MEDICATIONS:    Outpatient Prescriptions Marked as Taking for the 9/26/17 encounter (Office Visit) with Humberto Farley DO   Medication Sig Dispense Refill    ASCORBATE CALCIUM (VITAMIN C PO) Take  by mouth.  phentermine (ADIPEX_P) 15 mg capsule Take 1 Cap by mouth every morning. Max Daily Amount: 15 mg. 30 Cap 0    BIOTIN PO Take  by mouth.  cholecalciferol (VITAMIN D3) 1,000 unit tablet Take  by mouth daily.  esomeprazole (NEXIUM) 40 mg capsule Take one capsule by mouth daily for Heartburn  Indications: HEARTBURN 30 Cap 5    albuterol (PROVENTIL HFA, VENTOLIN HFA, PROAIR HFA) 90 mcg/actuation inhaler Take 2 puffs by inhalation every four (4) hours as needed for Wheezing or Shortness of Breath. 1 Inhaler 1       PAST MEDICAL HISTORY:    Past Medical History:   Diagnosis Date    Abnormal Pap smear of cervix 1990s    Breast cyst 2009    benign. Left. 1.7 cm and multiple others. Dr. Joleen Ma Chicken pox childhood    Chronic idiopathic constipation 07/2015    Dr. Lev Garcia.  HELM (dyspnea on exertion) 02/2015    Dr. Ritika Fonseca EBV positive mononucleosis syndrome 10/2013, 2014    chronic or reactivated    Enlarged thyroid 09/2010, 06/2015    with Right tracheal deviation. Retrosternal goiter. Dr. Rolan Villalobos.  Enlarged tonsils and adenoids 09/2010    Iodine-deficiency related goiter     Left. Dr. Rolan Villalobos.  YUNIER (obstructive sleep apnea) 02/25/15    Dr. Rishi Maldonado, uses cpap. Dr. Charmaine Melendrez.  Thyroid nodule 5/2014    Dr. Kain Blevins.   Dr. Michelle Gao       PAST SURGICAL HISTORY:    Past Surgical History:   Procedure Laterality Date    HX  SECTION      [facility].  HX CYST INCISION AND DRAINAGE Left 2016    benign. Dr. Maude Galvan Right 09/15/2017    benign. Dr. Quang Ferguson.  HX HERNIA REPAIR  3329    umbilical.    HX OTHER SURGICAL  2014    thyroid biopsy. Dr. Myrna Arshad.  HX THYROIDECTOMY Left 2015    due to [address] Dr. Meli Muñoz. benign. FAMILY HISTORY:    Family History   Problem Relation Age of Onset    Hypertension Mother     Lung Disease Mother      chronic bronchitis    Sleep Apnea Mother     Hypertension Father     Diabetes Paternal Grandmother     Breast Cancer Maternal Aunt     Other Sister      severe scoliosis. txd with back brace.     Breast Cancer Maternal Aunt     Breast Cancer Maternal Aunt        SOCIAL HISTORY:    Social History     Social History    Marital status: SINGLE     Spouse name: N/A    Number of children: 1    Years of education: N/A     Occupational History     [employer]     Social History Main Topics    Smoking status: Passive Smoke Exposure - Never Smoker    Smokeless tobacco: Never Used      Comment: lives with a smoker dad x 18 yrs and now with smoker boyfriend x 2 years    Alcohol use No    Drug use: No    Sexual activity: Yes     Partners: Male     Birth control/ protection: None     Other Topics Concern    None     Social History Narrative    ** Merged History Encounter **            IMMUNIZATIONS:    Immunization History   Administered Date(s) Administered    Hep B Vaccine 03/10/2014    Influenza Vaccine 2017    TB Skin Test (PPD) 2017         PHYSICAL EXAMINATION    Vital Signs    Visit Vitals    /86    Pulse 83    Temp 97.8 °F (36.6 °C) (Oral)    Resp 16    Ht 5' 3\" (1.6 m)    Wt 199 lb 8 oz (90.5 kg)    LMP 2017    SpO2 97%    BMI 35.34 kg/m2       Weight Metrics 2017 9/15/2017 7/3/2017 2016 4/25/2016 4/14/2016 2/29/2016   Weight 199 lb 8 oz 198 lb 202 lb 9.6 oz 201 lb 189 lb 191 lb 8 oz -   Waist Measure Inches - - - - - - 40   Body Fat % - - - - - - -   BMI 35.34 kg/m2 35.07 kg/m2 35.89 kg/m2 35.61 kg/m2 33.49 kg/m2 33.93 kg/m2 -       General appearance - Well nourished. Well appearing. Well developed. No acute distress. Obese. Head - Normocephalic. Atraumatic. Eyes - pupils equal and reactive. Extraocular eye movements intact. Sclera anicteric. Mildly injected sclera. Ears - Hearing is grossly normal bilaterally. Nose - normal and patent. No polyps noted. No erythema. No discharge. Mouth - mucous membranes with adequate moisture. Posterior pharynx normal with cobblestone appearance. No erythema, white exudate or obstruction. Neck - supple. Midline trachea. No carotid bruits noted bilaterally. Linear incision in her crease at base of neck. Palpable L thyroid. Nontender. Chest - clear to auscultation bilaterally anteriorly and posteriorly. No wheezes. No rales or rhonchi. Breath sounds are symmetrical bilaterally. Unlabored respirations. Heart - normal rate. Regular rhythm. Normal S1, S2. No murmur noted. No rubs, clicks or gallops noted. Abdomen - soft and distended. No masses or organomegaly. No rebound, rigidity or guarding. Bowel sounds normal x 4 quadrants. No tenderness noted. Neurological - awake, alert and oriented to person, place, and time and event. Cranial nerves II through XII intact. Clear speech. Muscle strength is +5/5 x 4 extremities. Sensation is intact to light touch bilaterally. Steady gait. Heme/Lymph - peripheral pulses normal x 4 extremities. No peripheral edema is noted. Musculoskeletal - Intact x 4 extremities. Full ROM x 4 extremities. No pain with movement. Back exam - normal range of motion. No pain on palpation of the spinous processes in the cervical, thoracic, lumbar, sacral regions. No CVA tenderness. Skin - no rashes, erythema, ecchymosis, lacerations, abrasions, suspicious moles noted. Psychological -   normal behavior, dress and thought processes. Good insight. Good eye contact. Normal affect. Appropriate mood. Normal speech. DATA REVIEWED    Lab Results   Component Value Date/Time    WBC 6.5 10/04/2013 10:44 AM    Hemoglobin (POC) 12.3 06/08/2015 11:44 AM    HGB 12.1 10/04/2013 10:44 AM    HCT 39.1 10/04/2013 10:44 AM    PLATELET 760 68/60/6647 10:44 AM    MCV 84 10/04/2013 10:44 AM     Lab Results   Component Value Date/Time    Sodium 134 10/04/2013 10:44 AM    Potassium 4.3 10/04/2013 10:44 AM    Chloride 99 10/04/2013 10:44 AM    CO2 22 10/04/2013 10:44 AM    Anion gap 2 09/18/2010 02:50 AM    Glucose 86 10/04/2013 10:44 AM    BUN 16 10/04/2013 10:44 AM    Creatinine 0.72 10/04/2013 10:44 AM    BUN/Creatinine ratio 22 10/04/2013 10:44 AM    GFR est  10/04/2013 10:44 AM    GFR est non- 10/04/2013 10:44 AM    Calcium 9.8 10/04/2013 10:44 AM    Bilirubin, total 0.3 10/04/2013 10:44 AM    AST (SGOT) 13 10/04/2013 10:44 AM    Alk. phosphatase 89 10/04/2013 10:44 AM    Protein, total 7.3 10/04/2013 10:44 AM    Albumin 4.3 10/04/2013 10:44 AM    Globulin 3.7 09/18/2010 02:50 AM    A-G Ratio 1.4 10/04/2013 10:44 AM    ALT (SGPT) 11 10/04/2013 10:44 AM     Lab Results   Component Value Date/Time    Cholesterol, total 104 10/04/2013 10:44 AM    HDL Cholesterol 51 10/04/2013 10:44 AM    LDL, calculated 47 10/04/2013 10:44 AM    VLDL, calculated 6 10/04/2013 10:44 AM    Triglyceride 30 10/04/2013 10:44 AM     Lab Results   Component Value Date/Time    VITAMIN D, 25-HYDROXY 11.0 10/04/2013 10:44 AM       Lab Results   Component Value Date/Time    Hemoglobin A1c 6.0 10/04/2013 10:44 AM     Lab Results   Component Value Date/Time    TSH 1.680 10/04/2013 10:44 AM       ASSESSMENT and PLAN      ICD-10-CM ICD-9-CM    1.  Chronic fatigue A46.24 239.44 METABOLIC PANEL, COMPREHENSIVE      URINALYSIS W/ RFLX MICROSCOPIC      HEMOGLOBIN A1C WITH EAG      CBC W/O DIFF      HCG QL SERUM   2. YUNIER (obstructive sleep apnea) G47.33 327.23 REFERRAL TO PULMONARY DISEASE    stable on CPAP   3. Vitamin D deficiency E55.9 268.9 VITAMIN D, 25 HYDROXY   4. Chronic idiopathic constipation K59.04 564.00     stable   5. History of thyroid nodule Z86.39 V12.29 T4, FREE      TSH 3RD GENERATION      US THYROID/PARATHYROID/SOFT TISS    resolved after L thyroidectomy   6. Cyst of right breast N60.01 610.0     resolved since drained by Dr. Rakel Zuñiga   7. SOB (shortness of breath) on exertion R06.02 786.05     resolved   8. Obesity, Class II, BMI 35-39.9 E66.9 278.00 phentermine (ADIPEX_P) 15 mg capsule   9. Exercise-induced asthma J45.990 493.81 REFERRAL TO PULMONARY DISEASE    stable   10. Family history of breast cancer Z80.3 V16.3    11. Family history of diabetes mellitus (DM) Z83.3 V18.0 HEMOGLOBIN A1C WITH EAG   12. Acute non-recurrent frontal sinusitis J01.10 461.1     and maxillary, resolved after Augmentin   13. Family history of heart disease Z82.49 V17.49 LIPID PANEL   14. Obesity, Class I, BMI 30-34.9 E66.9 278.00    15. Thyromegaly E01.0 240.9 US THYROID/PARATHYROID/SOFT TISS       Discussed the patient's BMI with her. The BMI follow up plan is as follows: I have counseled this patient on diet and exercise regimens. Decrease carbohydrates (white foods, sweet foods, sweet drinks and alcohol), increase green leafy vegetables and protein (lean meats and beans) with each meal.  Avoid fried foods. Eat 3-5 small meals daily. Do not skip meals. Increase water intake. Increase physical activity to 30 minutes daily for health benefit or 60 minutes daily to prevent weight regain, as tolerated. Get 7-8 hours uninterrupted sleep nightly. Chart reviewed and updated. Continue current medications and care. Prescriptions given to patient today. Adipex-P 37.5 mg, month 1 of 3. Thyroid US ordered.    Most recent tests reviewed. Recheck pertinent labs when fasting. Recent office visit notes from Dr. Rosa Chapman, Dr. Espinosa Ba, NP Jaimee Urbina, and Dr. Anuja Rojas reviewed. Get recent office visit notes from Dr. Durga Hernandez. Advised pt to sign release. Referrals given; patient urged to keep appointments with specialists. Pulmonology. Counseled patient on health concerns:  BP, weight management, Vit D deficiency, and YUNIER. Relevant handouts given and discussed with patient. Immunizations noted. Offered empathy, support, legitimation, prayers, partnership to patient. Praised patient for progress. Advance Care Booklet given at office visit. Discussed with pt today. Referred to [facility]. Staff message sent to [facility] facilitor. Follow-up Disposition:  Return in about 1 month (around 10/26/2017) for weight, results, bp. Patient was offered a choice/choices in the treatment plan today. Patient expresses understanding of the plan and agrees with recommendations. Written by tali Melvin, as dictated by Dr. Yao Denney DO. Documentation True and Accepted by Kennedi Patel. Bruno Standard. Patient Instructions        Starting a Weight Loss Plan: Care Instructions  Your Care Instructions  If you are thinking about losing weight, it can be hard to know where to start. Your doctor can help you set up a weight loss plan that best meets your needs. You may want to take a class on nutrition or exercise, or join a weight loss support group. If you have questions about how to make changes to your eating or exercise habits, ask your doctor about seeing a registered dietitian or an exercise specialist.  It can be a big challenge to lose weight. But you do not have to make huge changes at once. Make small changes, and stick with them. When those changes become habit, add a few more changes. If you do not think you are ready to make changes right now, try to pick a date in the future.  Make an appointment to see your doctor to discuss whether the time is right for you to start a plan. Follow-up care is a key part of your treatment and safety. Be sure to make and go to all appointments, and call your doctor if you are having problems. Its also a good idea to know your test results and keep a list of the medicines you take. How can you care for yourself at home? · Set realistic goals. Many people expect to lose much more weight than is likely. A weight loss of 5% to 10% of your body weight may be enough to improve your health. · Get family and friends involved to provide support. Talk to them about why you are trying to lose weight, and ask them to help. They can help by participating in exercise and having meals with you, even if they may be eating something different. · Find what works best for you. If you do not have time or do not like to cook, a program that offers meal replacement bars or shakes may be better for you. Or if you like to prepare meals, finding a plan that includes daily menus and recipes may be best.  · Ask your doctor about other health professionals who can help you achieve your weight loss goals. ¨ A dietitian can help you make healthy changes in your diet. ¨ An exercise specialist or  can help you develop a safe and effective exercise program.  ¨ A counselor or psychiatrist can help you cope with issues such as depression, anxiety, or family problems that can make it hard to focus on weight loss. · Consider joining a support group for people who are trying to lose weight. Your doctor can suggest groups in your area. Where can you learn more? Go to http://weston-karel.info/. Enter A812 in the search box to learn more about \"Starting a Weight Loss Plan: Care Instructions. \"  Current as of: October 13, 2016  Content Version: 11.3  © 2094-9179 [employer].  Care instructions adapted under license by [employer] (which disclaims liability or warranty for this information). If you have questions about a medical condition or this instruction, always ask your healthcare professional. [address] any warranty or liability for your use of this information. Starting a Weight Loss Plan: Care Instructions  Your Care Instructions  If you are thinking about losing weight, it can be hard to know where to start. Your doctor can help you set up a weight loss plan that best meets your needs. You may want to take a class on nutrition or exercise, or join a weight loss support group. If you have questions about how to make changes to your eating or exercise habits, ask your doctor about seeing a registered dietitian or an exercise specialist.  It can be a big challenge to lose weight. But you do not have to make huge changes at once. Make small changes, and stick with them. When those changes become habit, add a few more changes. If you do not think you are ready to make changes right now, try to pick a date in the future. Make an appointment to see your doctor to discuss whether the time is right for you to start a plan. Follow-up care is a key part of your treatment and safety. Be sure to make and go to all appointments, and call your doctor if you are having problems. Its also a good idea to know your test results and keep a list of the medicines you take. How can you care for yourself at home? · Set realistic goals. Many people expect to lose much more weight than is likely. A weight loss of 5% to 10% of your body weight may be enough to improve your health. · Get family and friends involved to provide support. Talk to them about why you are trying to lose weight, and ask them to help. They can help by participating in exercise and having meals with you, even if they may be eating something different. · Find what works best for you.  If you do not have time or do not like to cook, a program that offers meal replacement bars or shakes may be better for you. Or if you like to prepare meals, finding a plan that includes daily menus and recipes may be best.  · Ask your doctor about other health professionals who can help you achieve your weight loss goals. ¨ A dietitian can help you make healthy changes in your diet. ¨ An exercise specialist or  can help you develop a safe and effective exercise program.  ¨ A counselor or psychiatrist can help you cope with issues such as depression, anxiety, or family problems that can make it hard to focus on weight loss. · Consider joining a support group for people who are trying to lose weight. Your doctor can suggest groups in your area. Where can you learn more? Go to http://weston-karel.info/. Enter Z089 in the search box to learn more about \"Starting a Weight Loss Plan: Care Instructions. \"  Current as of: October 13, 2016  Content Version: 11.3  © 3072-9595 [employer]. Care instructions adapted under license by [employer] (which disclaims liability or warranty for this information). If you have questions about a medical condition or this instruction, always ask your healthcare professional. [address] any warranty or liability for your use of this information.

## 2017-09-26 NOTE — PROGRESS NOTES
Chief Complaint   Patient presents with    Weight Management    Blood Pressure Check    Medication Refill     1. Have you been to the ER, urgent care clinic since your last visit? Hospitalized since your last visit? No    2. Have you seen or consulted any other health care providers outside of the [address] [place] [address] since your last visit? Include any pap smears or colon screening. Yes, pt saw OBGYN for annual at [facility]    In the event something were to happen to you and you were unable to speak on your behalf, do you have an Advance Directive/ Living Will in place stating your wishes? NO    If yes, do we have a copy on file NO    If no, would you like information:   Pt offered and accepted.

## 2017-09-28 ENCOUNTER — HOSPITAL ENCOUNTER (OUTPATIENT)
Dept: MAMMOGRAPHY | Age: 37
Discharge: HOME | End: 2017-09-28
Attending: SURGERY
Payer: COMMERCIAL

## 2017-09-28 ENCOUNTER — HOSPITAL ENCOUNTER (OUTPATIENT)
Dept: ULTRASOUND IMAGING | Age: 37
Discharge: HOME | End: 2017-09-28
Attending: SURGERY
Payer: COMMERCIAL

## 2017-09-28 DIAGNOSIS — N63.10 MASSES OF BOTH BREASTS: ICD-10-CM

## 2017-09-28 DIAGNOSIS — N63.20 MASSES OF BOTH BREASTS: ICD-10-CM

## 2017-09-28 PROCEDURE — 77066 DX MAMMO INCL CAD BI: CPT

## 2017-09-28 PROCEDURE — 76642 ULTRASOUND BREAST LIMITED: CPT

## 2017-10-26 ENCOUNTER — OFFICE VISIT (OUTPATIENT)
Dept: FAMILY MEDICINE CLINIC | Age: 37
End: 2017-10-26

## 2017-10-26 VITALS
OXYGEN SATURATION: 99 % | SYSTOLIC BLOOD PRESSURE: 122 MMHG | HEART RATE: 78 BPM | BODY MASS INDEX: 35.44 KG/M2 | RESPIRATION RATE: 18 BRPM | TEMPERATURE: 98.6 F | DIASTOLIC BLOOD PRESSURE: 83 MMHG | HEIGHT: 63 IN | WEIGHT: 200 LBS

## 2017-10-26 VITALS
OXYGEN SATURATION: 99 % | DIASTOLIC BLOOD PRESSURE: 83 MMHG | BODY MASS INDEX: 35.44 KG/M2 | HEIGHT: 63 IN | WEIGHT: 200 LBS | HEART RATE: 78 BPM | RESPIRATION RATE: 18 BRPM | TEMPERATURE: 98.6 F | SYSTOLIC BLOOD PRESSURE: 122 MMHG

## 2017-10-26 DIAGNOSIS — K59.04 CHRONIC IDIOPATHIC CONSTIPATION: Primary | ICD-10-CM

## 2017-10-26 DIAGNOSIS — E55.9 VITAMIN D DEFICIENCY: ICD-10-CM

## 2017-10-26 DIAGNOSIS — R73.9 HYPERGLYCEMIA: ICD-10-CM

## 2017-10-26 DIAGNOSIS — G47.33 OSA (OBSTRUCTIVE SLEEP APNEA): ICD-10-CM

## 2017-10-26 DIAGNOSIS — E66.9 OBESITY, CLASS II, BMI 35-39.9: ICD-10-CM

## 2017-10-26 DIAGNOSIS — J45.990 EXERCISE-INDUCED ASTHMA: ICD-10-CM

## 2017-10-26 RX ORDER — PHENTERMINE HYDROCHLORIDE 15 MG/1
15 CAPSULE ORAL
Qty: 30 CAP | Refills: 0 | Status: CANCELLED | OUTPATIENT
Start: 2017-10-26

## 2017-10-26 NOTE — MR AVS SNAPSHOT
Visit Information Date & Time Provider Department Dept. Phone Encounter #  
 10/26/2017 10:30 AM Tala Lovell DO [facility] [phone] [id number] Follow-up Instructions Return in about 1 month (around 11/26/2017) for weight, bp. Your Appointments 3/16/2018  8:45 AM  
Follow Up with MD Malik Burnett [address] [place] [address] Appt Note: 6 MONTH FOLLOW UP/ CP$30 9-15-17 [phone]  
[address] Upcoming Health Maintenance Date Due Pneumococcal 19-64 Medium Risk (1 of 1 - PPSV23) 12/29/2017* PAP AKA CERVICAL CYTOLOGY 2/22/2018* DTaP/Tdap/Td series (1 - Tdap) 9/30/2021* *Topic was postponed. The date shown is not the original due date. Allergies as of 10/26/2017  Review Complete On: 10/26/2017 By: Tala Lovell DO Severity Noted Reaction Type Reactions Skelaxin [Metaxalone]  11/25/2014    Other (comments) Too groggy Current Immunizations  Reviewed on 9/26/2017 Name Date Hep B Vaccine 3/10/2014 Influenza Vaccine 9/21/2017 TB Skin Test (PPD) 9/19/2017 Not reviewed this visit You Were Diagnosed With   
  
 Codes Comments Chronic idiopathic constipation    -  Primary ICD-10-CM: K59.04 
ICD-9-CM: 564.00 stable YUNIER (obstructive sleep apnea)     ICD-10-CM: G47.33 
ICD-9-CM: 327.23 stable off CPAP Exercise-induced asthma     ICD-10-CM: J45.990 ICD-9-CM: 493.81 stable Obesity, Class II, BMI 35-39.9     ICD-10-CM: E66.9 ICD-9-CM: 278.00 Vitamin D deficiency     ICD-10-CM: E55.9 ICD-9-CM: 268.9 Hyperglycemia     ICD-10-CM: R73.9 ICD-9-CM: 790.29 Vitals BP Pulse Temp Resp Height(growth percentile) Weight(growth percentile) 122/83 78 98.6 °F (37 °C) (Oral) 18 5' 3\" (1.6 m) 200 lb (90.7 kg) LMP SpO2 BMI OB Status Smoking Status 10/12/2017 99% 35.43 kg/m2 Having regular periods Passive Smoke Exposure - Never Smoker BMI and BSA Data Body Mass Index Body Surface Area  
 35.43 kg/m 2 2.01 m 2 Preferred Pharmacy Pharmacy Name Phone [facility], [address] [place] [address] Bhumi Botello Said [phone] Your Updated Medication List  
  
   
This list is accurate as of: 10/26/17 11:56 AM.  Always use your most recent med list.  
  
  
  
  
 albuterol 90 mcg/actuation inhaler Commonly known as:  PROVENTIL HFA, VENTOLIN HFA, PROAIR HFA Take 2 puffs by inhalation every four (4) hours as needed for Wheezing or Shortness of Breath. BIOTIN PO Take  by mouth. cholecalciferol 1,000 unit tablet Commonly known as:  VITAMIN D3 Take  by mouth daily. esomeprazole 40 mg capsule Commonly known as:  NexIUM Take one capsule by mouth daily for Heartburn  Indications: HEARTBURN phentermine 15 mg capsule Commonly known as:  ADIPEX_P Take 1 Cap by mouth every morning. Max Daily Amount: 15 mg. VITAMIN C PO Take  by mouth. Follow-up Instructions Return in about 1 month (around 11/26/2017) for weight, bp. Patient Instructions Starting a Weight Loss Plan: Care Instructions Your Care Instructions If you are thinking about losing weight, it can be hard to know where to start. Your doctor can help you set up a weight loss plan that best meets your needs. You may want to take a class on nutrition or exercise, or join a weight loss support group. If you have questions about how to make changes to your eating or exercise habits, ask your doctor about seeing a registered dietitian or an exercise specialist. 
It can be a big challenge to lose weight. But you do not have to make huge changes at once. Make small changes, and stick with them.  When those changes become habit, add a few more changes. If you do not think you are ready to make changes right now, try to pick a date in the future. Make an appointment to see your doctor to discuss whether the time is right for you to start a plan. Follow-up care is a key part of your treatment and safety. Be sure to make and go to all appointments, and call your doctor if you are having problems. It's also a good idea to know your test results and keep a list of the medicines you take. How can you care for yourself at home? · Set realistic goals. Many people expect to lose much more weight than is likely. A weight loss of 5% to 10% of your body weight may be enough to improve your health. · Get family and friends involved to provide support. Talk to them about why you are trying to lose weight, and ask them to help. They can help by participating in exercise and having meals with you, even if they may be eating something different. · Find what works best for you. If you do not have time or do not like to cook, a program that offers meal replacement bars or shakes may be better for you. Or if you like to prepare meals, finding a plan that includes daily menus and recipes may be best. 
· Ask your doctor about other health professionals who can help you achieve your weight loss goals. ¨ A dietitian can help you make healthy changes in your diet. ¨ An exercise specialist or  can help you develop a safe and effective exercise program. 
¨ A counselor or psychiatrist can help you cope with issues such as depression, anxiety, or family problems that can make it hard to focus on weight loss. · Consider joining a support group for people who are trying to lose weight. Your doctor can suggest groups in your area. Where can you learn more? Go to http://weston-karel.info/. Enter W432 in the search box to learn more about \"Starting a Weight Loss Plan: Care Instructions. \" 
 Current as of: October 13, 2016 Content Version: 11.4 © 4354-3810 Healthwise, [employer]. Care instructions adapted under license by [employer] (which disclaims liability or warranty for this information). If you have questions about a medical condition or this instruction, always ask your healthcare professional. [address] any warranty or liability for your use of this information. WEIGHT LOSS PLAN 1. Read food labels and count calories. Goal 4616-5641 calories daily for women and 5810-0430 calories daily for men. Achieve this by decreasing caloric intake by 500 calories by weekly increments. NOTE:  1 pound = 3500 calories! Www.loseit. [employer]Art[employer] Www.Jobrnesspal.GenArts Www.Own Products. GenArts Www.NewHound. gov Weight watchers mobile Calories needed to lose 1-2 pounds a week = 10 x your weight in pounds 2. Increase water intake. Per [facility] Weight loss Program, it is important to drink 1/2 your body weight in ounces of water daily. Decrease your water consumption 2-3 hours before bedtime to prevent sleep disturbance from frequent urination. 3.  Decrease sugary beverages. Each can or glass of soda increases your risk of obesity by 60%. Can lose 10 pounds in a month by avoiding any soda. 12 oz can of soda = 140 calories, 16 oz cup of sweet tea = 200 calories 16 oz orange juice = 200 calories, 10 oz apple juice = 150 calories 32 oz sports drink = 200 calories, 16 oz punch = 240 calories 3.5 oz alcohol = 100-150 calories 4. Avoid High Fructose Corn Syrup products. This ingredient makes products highly addictive! 5. Exercise 30 minutes daily 5 days weekly, minimally. If you burn 3500 calories that equals a pound! Use a pedometer to count steps. Visit www. [employer]. GenArts for a free pedometer and diet recommendations. Your maximum heart rate = 220 - your age Never exercise at your maximum heart rate. See handout for target heart rate. 6.  Decrease carbohydrates (white bread, pasta, rice, potatoes, sweet foods and sweet drinks like soda, tea, coffee, juice and sports drinks). Increase fiber and protein. Goal:   calories daily of carbohydrates Try CHROMIUM PICONATE 200 MG THREE TIMES DAILY,  
 to decrease Premenstrual carbohydrate cravings. 7.  Eat 3-5 small meals daily, include lots of protein (beans/legumes, nuts, lean meat, eggs) and green vegetables with each. (Breakfast, lunch, dinner with 2 healthy snacks) 8. Get proper rest 7-8 hours uninterrupted. When you get less than 6 hours, it triggers hunger by affecting your Grehlin:Leptin ratio and this results in weight gain. 9.  Watch your food portions. Green leafy vegetables should cover 1/2 of the plate, lean meat 1/4 of the plate, starchy vegetable 1/4 of the plate. Use smaller plates. 10.  Do not eat until you are full. Eat until you are no longer hungry. If you are not sure, try drinking a glass of water before getting your second serving of food. 11.  Do not weigh yourself daily. Wait until your next office visit. Use how you feel and  how your clothes fit as measurements of success. 12.  Address your spirituality to draw strength from above during your journey. Remember \"I am fearfully and wonderfully made. Marvelous in His eyes. \" 
 
13. Set realistic, appropriate and achievable weight loss goals: 
 
 RECOMMENDED TARGET WEIGHT LOSS:  Initial weight loss of 5-10% of your initial body weight achieved over 6 months or a decrease of 2 BMI units. MINIMUM GOAL OF WEIGHT LOSS:  Reduce body weight and maintain a lower body weight. Prevent weight gain. RELATED WEBSITES:     
Www.obesityaction. org 
(and consider joining the Obesity Action Coalition for $25/year. The [address] mission is to elevated and empower those affected by obesity through education, advocacy and support.   Quarterly journals included in membership fee. ) Www.Vidatronic 
www.[employer] RELATED DIETS:  Dr. Perla De Anda Weight loss challenge, Mediterranean diet, [place] Diet, [employer] Watchers, Paleo Diet (anti-inflammatory diet) EXERCISE 150 MINUTES WEEKLY. THIS CAN BE ACHIEVED BY WORKING OUT OR WALKING A MINIMUM OF 30 MINUTES FOR 5 DAYS WEEKLY. YOU CAN EXERCISE IN INCREMENTS OF 10-15 MINUTES UP TO 3 TIMES A DAY. Consider performing \"brainless exercise. \"  Choose your favorite tv program.  Five minutes before and for 5 minutes after the tv program, stretch your body. While the program is on, walk in place watching the show. When commercials come on, rest or walk around the house to do other things. When the program begins, return to walking in place. When you are able keep walking during the commercials and add light weights to your ankles or hands. By the end of the show, you would have walked 30 minutes. If you need shorter spurts of exercise, walk during the commercials and rest during the show. Drink to glasses of water prior to any exercise to prevent dehydration and to improve the results of the work out. Your RESTING HEART RATE is the number of times your heart beats per minute when you are not exerting yourself. The more fit you are, the lower your resting heart rate will be. Your MAXIMUM HEART RATE is the number of times per minute your heart pumps when it is working at 100% capacity. NEVER EXERCISE AT YOUR MAXIMUM HEART RATE. MAX HEART RATE = 220 - your age For example, in a 48year old, the maximum heart rate is 220-50 = 170 beats per minute Your TARGET HEART RATE is the number of beats per minute our heart should pump during aerobic exercise. Reaching your target heart rate indicates that your body is receiving maximum cardiovascular and fat burning benefits. If you are fit, your TARGET HEART RATE = 70-85% of your maximum Heart rate For a 48year old with vigorous intensity physical activity, Target heart rate= 170 bpm x .70 = 119 bpm 
   Target heart rate = 170 bpm x .85=  145 bpm 
 
    Therefore, your target heart rate during physical activity is 119-145 If you are not fit, TARGET HEART RATE = 50-70% of your maximum Heart rate MODERATE CONSISTENT AEROBIC EXERCISE OR WALKING FOR AT LEAST 150 MINUTES WEEKLY IS AN ESSENTIAL PART OF ANY WEIGHT LOSS OF WEIGHT MAINTENANCE PROGRAM. During WEIGHT LOSS PHASE, at least 75% of your exercise needs to be walking or moderate aerobic activity and 25% or 0% can be Isometric or Resistance type exercises (the latter can be deferred to the weight maintenance phase.) During WEIGHT MAINTENANCE PHASE, at least 50% of your exercise needs to be aerobic and 50% Isometric or Resistance type exercises. BENEFITS OF MODERATE INTENSITY EXERCISE MOST DAYS OF THE WEEK: 
 
 1. Insulin Resistance improves 30-85% 2. Abdominal Fat decreases by 30% 3. Inflammatory Markers decrease by 30% (therefore pain decreases) 4. Systolic and Diastolic Blood Pressure decrease by 4 mmHg 5. HDL improves by 5% 6. Triglycerides decrease by 15% 7. Shift from small dense LDL to large dense LDL (therefore decrease Insulin Resistance) 8. Decrease coagulability Introducing [facility]! Dear Lisa Hathaway: Thank you for requesting a Mibio account. Our records indicate that you already have an active Mibio account. You can access your account anytime at [email or web address] [email or web address] Did you know that you can access your hospital and ER discharge instructions at any time in Mibio? You can also review all of your test results from your hospital stay or ER visit. Additional Information If you have questions, please visit the Frequently Asked Questions section of the Mibio website at [email or web address] [email or web address] Remember, iota Computinghart is NOT to be used for urgent needs. For medical emergencies, dial 911. Now available from your iPhone and Android! Please provide this summary of care documentation to your next provider. Your primary care clinician is listed as Kale Parker. If you have any questions after today's visit, please call [phone].

## 2017-10-26 NOTE — PROGRESS NOTES
Chief Complaint   Patient presents with    Weight Management    Hypertension    Results     1. Have you been to the ER, urgent care clinic since your last visit? Hospitalized since your last visit? No    2. Have you seen or consulted any other health care providers outside of the [address] [place] [address] since your last visit? Include any pap smears or colon screening.  No

## 2017-10-26 NOTE — PROGRESS NOTES
HISTORY OF PRESENT ILLNESS  Anna Silverio is a 40 y.o. female presents with Weight Management; Blood Pressure Check; Labs; Thyroid Problem; and Referral Follow Up      Agree with nurse note. Hyperglycemic pt with chronic constipation, Vit D deficiency, and exercise induced asthma presents to the office with a BP of 122/83. She lost the Rx for Adipex-P 15 mg prescribed on 09/26/17. She just found it and asks if she can still fill it. She gained 1 pound since the last visit. She either skips breakfast or eats toast with PB. For lunch, she eats [employer] fried chicken nugget meal, [employer] spicy chicken sandwich combo or other fast foods. For dinner, she eats baked pork chops, chicken, potatoes, rice, or broccoli. She drinks at least 64 oz of water daily. She typically drinks a medium size soda when she eats fast food. She has not been able to add exercise to her routine. She had her Biometric Screening at work dated 10/23/17. BMI was 29.8. Waist 36\". /69. HDL 51. Trigs 62. LDL 58. Fasting glucose 114. She has a hx of YUNIER and does not use CPAP. Written by tali Kinney, as dictated by Dr. Evelin Todd DO.    ROS    Review of Systems negative except as noted above in HPI. ALLERGIES:    Allergies   Allergen Reactions    Skelaxin [Metaxalone] Other (comments)     Too groggy       CURRENT MEDICATIONS:    Outpatient Prescriptions Marked as Taking for the 10/26/17 encounter (Office Visit) with Alma Ashby DO   Medication Sig Dispense Refill    ASCORBATE CALCIUM (VITAMIN C PO) Take  by mouth.  BIOTIN PO Take  by mouth.  cholecalciferol (VITAMIN D3) 1,000 unit tablet Take  by mouth daily.       esomeprazole (NEXIUM) 40 mg capsule Take one capsule by mouth daily for Heartburn  Indications: HEARTBURN 30 Cap 5    albuterol (PROVENTIL HFA, VENTOLIN HFA, PROAIR HFA) 90 mcg/actuation inhaler Take 2 puffs by inhalation every four (4) hours as needed for Wheezing or Shortness of Breath. 1 Inhaler 1       PAST MEDICAL HISTORY:    Past Medical History:   Diagnosis Date    Abnormal Pap smear of cervix 1990s    Breast cyst     benign. Left. 1.7 cm and multiple others. Dr. Uyen Weaver Chicken pox childhood    Chronic idiopathic constipation 2015    Dr. Denise Holloway.  HELM (dyspnea on exertion) 2015    Dr. Sravan Quan EBV positive mononucleosis syndrome 10/2013,     chronic or reactivated    Enlarged thyroid 2010, 2015    with Right tracheal deviation. Retrosternal goiter. Dr. Rhina Reyes.  Enlarged tonsils and adenoids 2010    Iodine-deficiency related goiter     Left. Dr. Rhina Reyes.  YUNIER (obstructive sleep apnea) 02/25/15    Dr. Max Main, uses cpap. Dr. Yoni Erwin.  Thyroid nodule 2014    Dr. Brayden Rosenthal. Dr. Mckayla Aguilar HISTORY:    Past Surgical History:   Procedure Laterality Date    HX  SECTION  2004    [facility].  HX CYST INCISION AND DRAINAGE Left 2016    benign. Dr. Serrano Carrier Right 09/15/2017    benign. Dr. Bruno Goodell.  HX HERNIA REPAIR      umbilical.    HX OTHER SURGICAL  2014    thyroid biopsy. Dr. Brayden Rosenthal.  HX THYROIDECTOMY Left 2015    due to [address] Dr. Rhina Reyes. benign. FAMILY HISTORY:    Family History   Problem Relation Age of Onset    Hypertension Mother     Lung Disease Mother      chronic bronchitis    Sleep Apnea Mother     Hypertension Father     Diabetes Paternal Grandmother     Breast Cancer Maternal Aunt     Other Sister      severe scoliosis. txd with back brace.     Breast Cancer Maternal Aunt     Breast Cancer Maternal Aunt        SOCIAL HISTORY:    Social History     Social History    Marital status: SINGLE     Spouse name: N/A    Number of children: 1    Years of education: N/A     Occupational History     [facility] Social History Main Topics    Smoking status: Passive Smoke Exposure - Never Smoker    Smokeless tobacco: Never Used      Comment: lives with a smoker dad x 18 yrs and now with smoker boyfriend x 2 years    Alcohol use No    Drug use: No    Sexual activity: Yes     Partners: Male     Birth control/ protection: None     Other Topics Concern    None     Social History Narrative    ** Merged History Encounter **            IMMUNIZATIONS:    Immunization History   Administered Date(s) Administered    Hep B Vaccine 03/10/2014    Influenza Vaccine 09/21/2017    TB Skin Test (PPD) 09/19/2017       PHYSICAL EXAMINATION    Vital Signs    Visit Vitals    /83    Pulse 78    Temp 98.6 °F (37 °C) (Oral)    Resp 18    Ht 5' 3\" (1.6 m)    Wt 200 lb (90.7 kg)    LMP 10/12/2017    SpO2 99%    BMI 35.43 kg/m2       Weight Metrics 10/26/2017 10/26/2017 9/26/2017 9/15/2017 7/3/2017 11/28/2016 4/25/2016   Weight 200 lb 200 lb 199 lb 8 oz 198 lb 202 lb 9.6 oz 201 lb 189 lb   Waist Measure Inches - - - - - - -   Body Fat % - - - - - - -   BMI 35.43 kg/m2 35.43 kg/m2 35.34 kg/m2 35.07 kg/m2 35.89 kg/m2 35.61 kg/m2 33.49 kg/m2       General appearance - Well nourished. Well appearing. Well developed. No acute distress. Obese. Head - Normocephalic. Atraumatic. Eyes - pupils equal and reactive, extraocular eye movements intact, sclera anicteric  Ears - Hearing is grossly normal bilaterally. Nose - normal and patent, no erythema, discharge or polyps   Mouth - mucous membranes dry, pharynx normal with cobblestone appearance. No erythema, white exudate or obstruction. Neck - supple. Midline trachea. No carotid bruits are noted. No thyromegaly noted. Chest - clear to auscultation bilaterally anterriorly and posteriorly. No wheezes, rales or rhonchi. Breath sounds are symmetrical and unlabored bilaterally. Heart - normal rate. Regular rhythm, normal S1, S2. No murmur.   No rubs, clicks or gallops noted.  Abdomen - soft and distended. No masses or organomegaly. No rebound, rigidity or guarding. Bowel sounds normal x 4 quadrants. No tenderness noted. Neurological - awake, alert and oriented to person, place, and time and event. Cranial nerves II through XII intact. Muscle strength is +5/5 x 4 extremities. Sensation is intact to light touch bilaterally. Steady gait. Heme/Lymph - peripheral pulses normal x 4 extremities. No peripheral edema is noted. No cervical adenopathy noted. Skin - no rashes, erythema, ecchymosis, lacerations, abrasions, suspicious moles noted. No skin tags. No acanthosis nigricans noted in the axilla or neck. Psychological -   normal behavior, speech, dress and thought processes. Good insight. Good eye contact. Normal affect. Appropriate mood. DATA REVIEWED  Lab Results   Component Value Date/Time    WBC 6.5 10/04/2013 10:44 AM    Hemoglobin (POC) 12.3 06/08/2015 11:44 AM    HGB 12.1 10/04/2013 10:44 AM    HCT 39.1 10/04/2013 10:44 AM    PLATELET 610 76/24/4758 10:44 AM    MCV 84 10/04/2013 10:44 AM     Lab Results   Component Value Date/Time    Sodium 134 10/04/2013 10:44 AM    Potassium 4.3 10/04/2013 10:44 AM    Chloride 99 10/04/2013 10:44 AM    CO2 22 10/04/2013 10:44 AM    Anion gap 2 09/18/2010 02:50 AM    Glucose 86 10/04/2013 10:44 AM    BUN 16 10/04/2013 10:44 AM    Creatinine 0.72 10/04/2013 10:44 AM    BUN/Creatinine ratio 22 10/04/2013 10:44 AM    GFR est  10/04/2013 10:44 AM    GFR est non- 10/04/2013 10:44 AM    Calcium 9.8 10/04/2013 10:44 AM    Bilirubin, total 0.3 10/04/2013 10:44 AM    AST (SGOT) 13 10/04/2013 10:44 AM    Alk.  phosphatase 89 10/04/2013 10:44 AM    Protein, total 7.3 10/04/2013 10:44 AM    Albumin 4.3 10/04/2013 10:44 AM    Globulin 3.7 09/18/2010 02:50 AM    A-G Ratio 1.4 10/04/2013 10:44 AM    ALT (SGPT) 11 10/04/2013 10:44 AM     Lab Results   Component Value Date/Time    Cholesterol, total 104 10/04/2013 10:44 AM HDL Cholesterol 51 10/04/2013 10:44 AM    LDL, calculated 47 10/04/2013 10:44 AM    VLDL, calculated 6 10/04/2013 10:44 AM    Triglyceride 30 10/04/2013 10:44 AM     Lab Results   Component Value Date/Time    VITAMIN D, 25-HYDROXY 11.0 10/04/2013 10:44 AM       Lab Results   Component Value Date/Time    Hemoglobin A1c 6.0 10/04/2013 10:44 AM     Lab Results   Component Value Date/Time    TSH 1.680 10/04/2013 10:44 AM       ASSESSMENT and PLAN    ICD-10-CM ICD-9-CM    1. Chronic idiopathic constipation K59.04 564.00     stable   2. YUNIER (obstructive sleep apnea) G47.33 327.23     stable off CPAP   3. Exercise-induced asthma J45.990 493.81     stable   4. Obesity, Class II, BMI 35-39.9 E66.9 278.00    5. Vitamin D deficiency E55.9 268.9    6. Hyperglycemia R73.9 790.29        Continue current medications and care. Prescriptions written and given to patient (Adipex-P 15 mg, month 1 of 3.)  Medication side effects discussed. Discussed the patient's BMI with her. The BMI follow up plan is as follows: I have counseled this patient on diet and exercise regimens. Diet recommendations:  Decrease carbohydrates (white foods including flour, white bread, white rice, white pasta, white potatoes; corn, sweet foods, sweet drinks and alcohol), increase green leafy vegetables and protein with each meal.  Avoid fried foods. Eat 3-5 small meals daily. Do not skip meals. Ok to use meal replacements up to twice daily with protein goal of 60 mg per meal.   Recommend OTC Premiere Protein bars or shakes instead of skipping breakfast.  Increase water intake. Increase physical activity to 30 minutes daily for health benefit or 60 minutes daily to prevent weight regain, as tolerated. Physical Activity prescription:  A goal of 30 minutes physical activity daily is recommended for health benefit and at least 60 minutes daily to prevent weight regain.   For weight loss, no less than 75% needs to be aerobic (i.e. Walking) and no more than 25% resistance exercising (i.e. Weight lifting). For weight maintenance phase, 50% aerobic and 50% resistance exercises. Sleep prescription:    A goal of 7-8 hours of uninterrupted sleep is recommended to turn off the Grehlin hormone to be released from the stomach and triggers appetite while promoting weight gain. Proper rest turns on Leptin hormone to be released from white adipose tissue and promotes weight loss. Counseled patient on: weight loss goals, emphasizing a 5-10% weight loss in 6-12 months and strategies. Relevant handouts given and discussed with patient. Immunizations noted. Praised pt for weight loss efforts and progress. Patient was offered a choice/choices in the treatment plan today. Patient expresses understanding of the plan and agrees with recommendations. Follow-up Disposition:  Return in about 1 month (around 11/26/2017) for weight, bp. Written by tali Fuller, as dictated by Dr. Patti Love DO. Documentation True and Accepted by Alexander Cortes. Veverly Lock. Patient Instructions        Starting a Weight Loss Plan: Care Instructions  Your Care Instructions    If you are thinking about losing weight, it can be hard to know where to start. Your doctor can help you set up a weight loss plan that best meets your needs. You may want to take a class on nutrition or exercise, or join a weight loss support group. If you have questions about how to make changes to your eating or exercise habits, ask your doctor about seeing a registered dietitian or an exercise specialist.  It can be a big challenge to lose weight. But you do not have to make huge changes at once. Make small changes, and stick with them. When those changes become habit, add a few more changes. If you do not think you are ready to make changes right now, try to pick a date in the future.  Make an appointment to see your doctor to discuss whether the time is right for you to start a plan.  Follow-up care is a key part of your treatment and safety. Be sure to make and go to all appointments, and call your doctor if you are having problems. It's also a good idea to know your test results and keep a list of the medicines you take. How can you care for yourself at home? · Set realistic goals. Many people expect to lose much more weight than is likely. A weight loss of 5% to 10% of your body weight may be enough to improve your health. · Get family and friends involved to provide support. Talk to them about why you are trying to lose weight, and ask them to help. They can help by participating in exercise and having meals with you, even if they may be eating something different. · Find what works best for you. If you do not have time or do not like to cook, a program that offers meal replacement bars or shakes may be better for you. Or if you like to prepare meals, finding a plan that includes daily menus and recipes may be best.  · Ask your doctor about other health professionals who can help you achieve your weight loss goals. ¨ A dietitian can help you make healthy changes in your diet. ¨ An exercise specialist or  can help you develop a safe and effective exercise program.  ¨ A counselor or psychiatrist can help you cope with issues such as depression, anxiety, or family problems that can make it hard to focus on weight loss. · Consider joining a support group for people who are trying to lose weight. Your doctor can suggest groups in your area. Where can you learn more? Go to http://weston-karel.info/. Enter V332 in the search box to learn more about \"Starting a Weight Loss Plan: Care Instructions. \"  Current as of: October 13, 2016  Content Version: 11.4  © 2005-1558 Healthwise, Incorporated. Care instructions adapted under license by [employer] (which disclaims liability or warranty for this information).  If you have questions about a medical condition or this instruction, always ask your healthcare professional. [place] [address] any warranty or liability for your use of this information. WEIGHT LOSS PLAN    1. Read food labels and count calories. Goal 8958-8014 calories daily for women and 2244-8734 calories daily for men. Achieve this by decreasing caloric intake by 500 calories by weekly increments. NOTE:  1 pound = 3500 calories! Www.loseit. [employer]  Www.Walmoopal.[employer]  Www.RaveMobileSafety.com  Www.Ratify. gov  Weight watchers mobile    Calories needed to lose 1-2 pounds a week = 10 x your weight in pounds    2. Increase water intake. Per [facility], it is important to drink 1/2 your body weight in ounces of water daily. Decrease your water consumption 2-3 hours before bedtime to prevent sleep disturbance from frequent urination. 3.  Decrease sugary beverages. Each can or glass of soda increases your risk of obesity by 60%. Can lose 10 pounds in a month by avoiding any soda. 12 oz can of soda = 140 calories, 16 oz cup of sweet tea = 200 calories    16 oz orange juice = 200 calories, 10 oz apple juice = 150 calories   32 oz sports drink = 200 calories, 16 oz punch = 240 calories   3.5 oz alcohol = 100-150 calories     4. Avoid High Fructose Corn Syrup products. This ingredient makes products highly addictive! 5. Exercise 30 minutes daily 5 days weekly, minimally. If you burn 3500 calories that equals a pound! Use a pedometer to count steps. Visit www. WolfGIS. [employer] for a free pedometer and diet recommendations. Your maximum heart rate = 220 - your age    Never exercise at your maximum heart rate. See handout for target heart rate. 6.  Decrease carbohydrates (white bread, pasta, rice, potatoes, sweet foods and sweet drinks like soda, tea, coffee, juice and sports drinks). Increase fiber and protein.     Goal:   calories daily of carbohydrates     Try CHROMIUM PICONATE 200 MG THREE TIMES DAILY,    to decrease Premenstrual carbohydrate cravings. 7.  Eat 3-5 small meals daily, include lots of protein (beans/legumes, nuts, lean meat, eggs) and green vegetables with each. (Breakfast, lunch, dinner with 2 healthy snacks)    8. Get proper rest 7-8 hours uninterrupted. When you get less than 6 hours, it triggers hunger by affecting your Grehlin:Leptin ratio and this results in weight gain. 9.  Watch your food portions. Green leafy vegetables should cover 1/2 of the plate, lean meat 1/4 of the plate, starchy vegetable 1/4 of the plate. Use smaller plates. 10.  Do not eat until you are full. Eat until you are no longer hungry. If you are not sure, try drinking a glass of water before getting your second serving of food. 11.  Do not weigh yourself daily. Wait until your next office visit. Use how you feel and  how your clothes fit as measurements of success. 12.  Address your spirituality to draw strength from above during your journey. Remember \"I am fearfully and wonderfully made. Marvelous in His eyes. \"    13. Set realistic, appropriate and achievable weight loss goals:     RECOMMENDED TARGET WEIGHT LOSS:  Initial weight loss of 5-10% of your initial body weight achieved over 6 months or a decrease of 2 BMI units. MINIMUM GOAL OF WEIGHT LOSS:  Reduce body weight and maintain a lower body weight. Prevent weight gain. RELATED WEBSITES:      Www.obesityaction. org  (and consider joining the Obesity Action Coalition for $25/year. The [facility] mission is to elevated and empower those affected by obesity through education, advocacy and support. Quarterly journals included in membership fee. )    Www.Patronpath. [employer]  www.Justin.TV.com     RELATED DIETS:  Dr. Ta Pickard Weight loss challenge, Mediterranean diet, [address] [place] [address] Watchers, Beto Diet (anti-inflammatory diet)        EXERCISE 150 MINUTES WEEKLY.   THIS CAN BE ACHIEVED BY WORKING OUT OR WALKING A MINIMUM OF 30 MINUTES FOR 5 DAYS WEEKLY. YOU CAN EXERCISE IN INCREMENTS OF 10-15 MINUTES UP TO 3 TIMES A DAY. Consider performing \"brainless exercise. \"  Choose your favorite tv program.  Five minutes before and for 5 minutes after the tv program, stretch your body. While the program is on, walk in place watching the show. When commercials come on, rest or walk around the house to do other things. When the program begins, return to walking in place. When you are able keep walking during the commercials and add light weights to your ankles or hands. By the end of the show, you would have walked 30 minutes. If you need shorter spurts of exercise, walk during the commercials and rest during the show. Drink to glasses of water prior to any exercise to prevent dehydration and to improve the results of the work out. Your RESTING HEART RATE is the number of times your heart beats per minute when you are not exerting yourself. The more fit you are, the lower your resting heart rate will be. Your MAXIMUM HEART RATE is the number of times per minute your heart pumps when it is working at 100% capacity. NEVER EXERCISE AT YOUR MAXIMUM HEART RATE. MAX HEART RATE = 220 - your age    For example, in a 48year old, the maximum heart rate is 220-50 = 170 beats per minute    Your [place] is the number of beats per minute our heart should pump during aerobic exercise. Reaching your target heart rate indicates that your body is receiving maximum cardiovascular and fat burning benefits.      If you are fit, your TARGET HEART RATE = 70-85% of your maximum Heart rate      For a 48year old with vigorous intensity physical activity,         Target heart rate= 170 bpm x .70 = 119 bpm     Target heart rate = 170 bpm x .85=  145 bpm        Therefore, your target heart rate during physical activity is 119-145      If you are not fit, TARGET HEART RATE = 50-70% of your maximum Heart rate    MODERATE CONSISTENT AEROBIC EXERCISE OR WALKING FOR AT LEAST 150 MINUTES WEEKLY IS AN ESSENTIAL PART OF ANY WEIGHT LOSS OF WEIGHT MAINTENANCE PROGRAM.     During WEIGHT LOSS PHASE, at least 75% of your exercise needs to be walking or moderate aerobic activity and 25% or 0% can be Isometric or Resistance type exercises (the latter can be deferred to the weight maintenance phase.)     During WEIGHT MAINTENANCE PHASE, at least 50% of your exercise needs to be aerobic and 50% Isometric or Resistance type exercises. BENEFITS OF MODERATE INTENSITY EXERCISE MOST DAYS OF THE WEEK:     1. Insulin Resistance improves 30-85%   2. Abdominal Fat decreases by 30%   3. Inflammatory Markers decrease by 30% (therefore pain decreases)   4. Systolic and Diastolic Blood Pressure decrease by 4 mmHg   5. HDL improves by 5%   6. Triglycerides decrease by 15%   7. Shift from small dense LDL to large dense LDL (therefore decrease Insulin Resistance)   8.   Decrease coagulability

## 2017-10-26 NOTE — PROGRESS NOTES
Chief Complaint   Patient presents with    Weight Management    Hypertension    Results     1. Have you been to the ER, urgent care clinic since your last visit? Hospitalized since your last visit? No    2. Have you seen or consulted any other health care providers outside of the [address] [place] [address] since your last visit? Include any pap smears or colon screening.  No

## 2017-10-26 NOTE — PATIENT INSTRUCTIONS
Starting a Weight Loss Plan: Care Instructions  Your Care Instructions    If you are thinking about losing weight, it can be hard to know where to start. Your doctor can help you set up a weight loss plan that best meets your needs. You may want to take a class on nutrition or exercise, or join a weight loss support group. If you have questions about how to make changes to your eating or exercise habits, ask your doctor about seeing a registered dietitian or an exercise specialist.  It can be a big challenge to lose weight. But you do not have to make huge changes at once. Make small changes, and stick with them. When those changes become habit, add a few more changes. If you do not think you are ready to make changes right now, try to pick a date in the future. Make an appointment to see your doctor to discuss whether the time is right for you to start a plan. Follow-up care is a key part of your treatment and safety. Be sure to make and go to all appointments, and call your doctor if you are having problems. It's also a good idea to know your test results and keep a list of the medicines you take. How can you care for yourself at home? · Set realistic goals. Many people expect to lose much more weight than is likely. A weight loss of 5% to 10% of your body weight may be enough to improve your health. · Get family and friends involved to provide support. Talk to them about why you are trying to lose weight, and ask them to help. They can help by participating in exercise and having meals with you, even if they may be eating something different. · Find what works best for you. If you do not have time or do not like to cook, a program that offers meal replacement bars or shakes may be better for you. Or if you like to prepare meals, finding a plan that includes daily menus and recipes may be best.  · Ask your doctor about other health professionals who can help you achieve your weight loss goals.   ¨ A dietitian can help you make healthy changes in your diet. ¨ An exercise specialist or  can help you develop a safe and effective exercise program.  ¨ A counselor or psychiatrist can help you cope with issues such as depression, anxiety, or family problems that can make it hard to focus on weight loss. · Consider joining a support group for people who are trying to lose weight. Your doctor can suggest groups in your area. Where can you learn more? Go to http://weston-karel.info/. Enter F960 in the search box to learn more about \"Starting a Weight Loss Plan: Care Instructions. \"  Current as of: October 13, 2016  Content Version: 11.4  © 6898-2457 [employer]. Care instructions adapted under license by [employer] (which disclaims liability or warranty for this information). If you have questions about a medical condition or this instruction, always ask your healthcare professional. [place] [address] any warranty or liability for your use of this information. WEIGHT LOSS PLAN    1. Read food labels and count calories. Goal 2939-0040 calories daily for women and 4882-6500 calories daily for men. Achieve this by decreasing caloric intake by 500 calories by weekly increments. NOTE:  1 pound = 3500 calories! Www.loseit. com  Www.myBook of Oddsnesspal.com  Www.Twelve. [employer]  Www.Welltokder. gov  Weight watchers mobile    Calories needed to lose 1-2 pounds a week = 10 x your weight in pounds    2. Increase water intake. Per [employer] Weight loss Program, it is important to drink 1/2 your body weight in ounces of water daily. Decrease your water consumption 2-3 hours before bedtime to prevent sleep disturbance from frequent urination. 3.  Decrease sugary beverages. Each can or glass of soda increases your risk of obesity by 60%. Can lose 10 pounds in a month by avoiding any soda.      12 oz can of soda = 140 calories, 16 oz cup of sweet tea = 200 calories    16 oz orange juice = 200 calories, 10 oz apple juice = 150 calories   32 oz sports drink = 200 calories, 16 oz punch = 240 calories   3.5 oz alcohol = 100-150 calories     4. Avoid High Fructose Corn Syrup products. This ingredient makes products highly addictive! 5. Exercise 30 minutes daily 5 days weekly, minimally. If you burn 3500 calories that equals a pound! Use a pedometer to count steps. Visit www. InSample for a free pedometer and diet recommendations. Your maximum heart rate = 220 - your age    Never exercise at your maximum heart rate. See handout for target heart rate. 6.  Decrease carbohydrates (white bread, pasta, rice, potatoes, sweet foods and sweet drinks like soda, tea, coffee, juice and sports drinks). Increase fiber and protein. Goal:   calories daily of carbohydrates     Try CHROMIUM PICONATE 200 MG THREE TIMES DAILY,    to decrease Premenstrual carbohydrate cravings. 7.  Eat 3-5 small meals daily, include lots of protein (beans/legumes, nuts, lean meat, eggs) and green vegetables with each. (Breakfast, lunch, dinner with 2 healthy snacks)    8. Get proper rest 7-8 hours uninterrupted. When you get less than 6 hours, it triggers hunger by affecting your Grehlin:Leptin ratio and this results in weight gain. 9.  Watch your food portions. Green leafy vegetables should cover 1/2 of the plate, lean meat 1/4 of the plate, starchy vegetable 1/4 of the plate. Use smaller plates. 10.  Do not eat until you are full. Eat until you are no longer hungry. If you are not sure, try drinking a glass of water before getting your second serving of food. 11.  Do not weigh yourself daily. Wait until your next office visit. Use how you feel and  how your clothes fit as measurements of success. 12.  Address your spirituality to draw strength from above during your journey. Remember \"I am fearfully and wonderfully made. Jose in His eyes. \"    13. Set realistic, appropriate and achievable weight loss goals:     RECOMMENDED TARGET WEIGHT LOSS:  Initial weight loss of 5-10% of your initial body weight achieved over 6 months or a decrease of 2 BMI units. MINIMUM GOAL OF WEIGHT LOSS:  Reduce body weight and maintain a lower body weight. Prevent weight gain. RELATED WEBSITES:      Www.obesityaction. org  (and consider joining the Obesity Action Coalition for $25/year. The [address] mission is to elevated and empower those affected by obesity through education, advocacy and support. Quarterly journals included in membership fee. )    Www.[employer]  www.MoneyMan.com     RELATED DIETS:  Dr. Perla De Anda Weight loss challenge, Mediterranean diet, [address] [place] [address] Watchers, Paleo Diet (anti-inflammatory diet)        EXERCISE 150 MINUTES WEEKLY. THIS CAN BE ACHIEVED BY WORKING OUT OR WALKING A MINIMUM OF 30 MINUTES FOR 5 DAYS WEEKLY. YOU CAN EXERCISE IN INCREMENTS OF 10-15 MINUTES UP TO 3 TIMES A DAY. Consider performing \"brainless exercise. \"  Choose your favorite tv program.  Five minutes before and for 5 minutes after the tv program, stretch your body. While the program is on, walk in place watching the show. When commercials come on, rest or walk around the house to do other things. When the program begins, return to walking in place. When you are able keep walking during the commercials and add light weights to your ankles or hands. By the end of the show, you would have walked 30 minutes. If you need shorter spurts of exercise, walk during the commercials and rest during the show. Drink to glasses of water prior to any exercise to prevent dehydration and to improve the results of the work out. Your RESTING HEART RATE is the number of times your heart beats per minute when you are not exerting yourself. The more fit you are, the lower your resting heart rate will be.     Your MAXIMUM HEART RATE is the number of times per minute your heart pumps when it is working at 100% capacity. NEVER EXERCISE AT YOUR MAXIMUM HEART RATE. MAX HEART RATE = 220 - your age    For example, in a 48year old, the maximum heart rate is 220-50 = 170 beats per minute    Your [place] is the number of beats per minute our heart should pump during aerobic exercise. Reaching your target heart rate indicates that your body is receiving maximum cardiovascular and fat burning benefits. If you are fit, your TARGET HEART RATE = 70-85% of your maximum Heart rate      For a 48year old with vigorous intensity physical activity,         Target heart rate= 170 bpm x .70 = 119 bpm     Target heart rate = 170 bpm x .85=  145 bpm        Therefore, your target heart rate during physical activity is 119-145      If you are not fit, TARGET HEART RATE = 50-70% of your maximum Heart rate    MODERATE CONSISTENT AEROBIC EXERCISE OR WALKING FOR AT LEAST 150 MINUTES WEEKLY IS AN ESSENTIAL PART OF ANY WEIGHT LOSS OF WEIGHT MAINTENANCE PROGRAM.     During WEIGHT LOSS PHASE, at least 75% of your exercise needs to be walking or moderate aerobic activity and 25% or 0% can be Isometric or Resistance type exercises (the latter can be deferred to the weight maintenance phase.)     During WEIGHT MAINTENANCE PHASE, at least 50% of your exercise needs to be aerobic and 50% Isometric or Resistance type exercises. BENEFITS OF MODERATE INTENSITY EXERCISE MOST DAYS OF THE WEEK:     1. Insulin Resistance improves 30-85%   2. Abdominal Fat decreases by 30%   3. Inflammatory Markers decrease by 30% (therefore pain decreases)   4. Systolic and Diastolic Blood Pressure decrease by 4 mmHg   5. HDL improves by 5%   6. Triglycerides decrease by 15%   7. Shift from small dense LDL to large dense LDL (therefore decrease Insulin Resistance)   8.   Decrease coagulability

## 2017-10-27 LAB
25(OH)D3+25(OH)D2 SERPL-MCNC: 27.5 NG/ML (ref 30–100)
ALBUMIN SERPL-MCNC: 4 G/DL (ref 3.5–5.5)
ALBUMIN/GLOB SERPL: 1.4 {RATIO} (ref 1.2–2.2)
ALP SERPL-CCNC: 84 IU/L (ref 39–117)
ALT SERPL-CCNC: 9 IU/L (ref 0–32)
APPEARANCE UR: CLEAR
AST SERPL-CCNC: 10 IU/L (ref 0–40)
B-HCG SERPL QL: NEGATIVE MIU/ML
BACTERIA #/AREA URNS HPF: ABNORMAL /[HPF]
BILIRUB SERPL-MCNC: 0.4 MG/DL (ref 0–1.2)
BILIRUB UR QL STRIP: NEGATIVE
BUN SERPL-MCNC: 10 MG/DL (ref 6–20)
BUN/CREAT SERPL: 14 (ref 9–23)
CALCIUM SERPL-MCNC: 9.4 MG/DL (ref 8.7–10.2)
CASTS URNS QL MICRO: ABNORMAL /LPF
CHLORIDE SERPL-SCNC: 102 MMOL/L (ref 96–106)
CHOLEST SERPL-MCNC: 114 MG/DL (ref 100–199)
CO2 SERPL-SCNC: 27 MMOL/L (ref 18–29)
COLOR UR: YELLOW
CREAT SERPL-MCNC: 0.69 MG/DL (ref 0.57–1)
EPI CELLS #/AREA URNS HPF: >10 /HPF
ERYTHROCYTE [DISTWIDTH] IN BLOOD BY AUTOMATED COUNT: 13.1 % (ref 12.3–15.4)
EST. AVERAGE GLUCOSE BLD GHB EST-MCNC: 111 MG/DL
GFR SERPLBLD CREATININE-BSD FMLA CKD-EPI: 112 ML/MIN/1.73
GFR SERPLBLD CREATININE-BSD FMLA CKD-EPI: 129 ML/MIN/1.73
GLOBULIN SER CALC-MCNC: 2.9 G/DL (ref 1.5–4.5)
GLUCOSE SERPL-MCNC: 102 MG/DL (ref 65–99)
GLUCOSE UR QL: NEGATIVE
HBA1C MFR BLD: 5.5 % (ref 4.8–5.6)
HCT VFR BLD AUTO: 36.1 % (ref 34–46.6)
HDLC SERPL-MCNC: 44 MG/DL
HGB BLD-MCNC: 11.9 G/DL (ref 11.1–15.9)
HGB UR QL STRIP: NEGATIVE
INTERPRETATION, 910389: NORMAL
KETONES UR QL STRIP: NEGATIVE
LDLC SERPL CALC-MCNC: 61 MG/DL (ref 0–99)
LEUKOCYTE ESTERASE UR QL STRIP: ABNORMAL
MCH RBC QN AUTO: 26.6 PG (ref 26.6–33)
MCHC RBC AUTO-ENTMCNC: 33 G/DL (ref 31.5–35.7)
MCV RBC AUTO: 81 FL (ref 79–97)
MICRO URNS: ABNORMAL
MUCOUS THREADS URNS QL MICRO: PRESENT
NITRITE UR QL STRIP: NEGATIVE
PH UR STRIP: 7 [PH] (ref 5–7.5)
PLATELET # BLD AUTO: 389 X10E3/UL (ref 150–379)
POTASSIUM SERPL-SCNC: 4.6 MMOL/L (ref 3.5–5.2)
PROT SERPL-MCNC: 6.9 G/DL (ref 6–8.5)
PROT UR QL STRIP: NEGATIVE
RBC # BLD AUTO: 4.47 X10E6/UL (ref 3.77–5.28)
RBC #/AREA URNS HPF: ABNORMAL /HPF
SODIUM SERPL-SCNC: 140 MMOL/L (ref 134–144)
SP GR UR: 1.02 (ref 1–1.03)
T4 FREE SERPL-MCNC: 1.21 NG/DL (ref 0.82–1.77)
TRIGL SERPL-MCNC: 47 MG/DL (ref 0–149)
TSH SERPL DL<=0.005 MIU/L-ACNC: 1.77 UIU/ML (ref 0.45–4.5)
UROBILINOGEN UR STRIP-MCNC: 1 MG/DL (ref 0.2–1)
VLDLC SERPL CALC-MCNC: 9 MG/DL (ref 5–40)
WBC # BLD AUTO: 6.4 X10E3/UL (ref 3.4–10.8)
WBC #/AREA URNS HPF: ABNORMAL /HPF

## 2017-11-28 ENCOUNTER — OFFICE VISIT (OUTPATIENT)
Dept: FAMILY MEDICINE CLINIC | Age: 37
End: 2017-11-28

## 2017-11-28 VITALS
WEIGHT: 199 LBS | DIASTOLIC BLOOD PRESSURE: 84 MMHG | HEART RATE: 85 BPM | TEMPERATURE: 98 F | HEIGHT: 63 IN | SYSTOLIC BLOOD PRESSURE: 121 MMHG | OXYGEN SATURATION: 97 % | BODY MASS INDEX: 35.26 KG/M2 | RESPIRATION RATE: 16 BRPM

## 2017-11-28 DIAGNOSIS — G47.33 OSA (OBSTRUCTIVE SLEEP APNEA): ICD-10-CM

## 2017-11-28 DIAGNOSIS — K59.04 CHRONIC IDIOPATHIC CONSTIPATION: Primary | ICD-10-CM

## 2017-11-28 DIAGNOSIS — E55.9 VITAMIN D DEFICIENCY: ICD-10-CM

## 2017-11-28 DIAGNOSIS — E66.9 OBESITY, CLASS II, BMI 35-39.9: ICD-10-CM

## 2017-11-28 RX ORDER — PHENTERMINE HYDROCHLORIDE 15 MG/1
15 CAPSULE ORAL
Qty: 30 CAP | Refills: 0 | Status: SHIPPED | OUTPATIENT
Start: 2017-11-28 | End: 2018-04-26 | Stop reason: SDUPTHER

## 2017-11-28 NOTE — PROGRESS NOTES
HISTORY OF PRESENT ILLNESS  Cruzito Appiah is a 40 y.o. female presents with Weight Management; Blood Pressure Check; and Medication Refill      Agree with nurse note. Pt with vit d deficiency and YUNIER presents to the office with a BP of 121/84. She does not use CPAP and sleeps about 8 hours nightly. She lost the Rx for Adipex-P 15 mg prescribed on 10/26/17. She lost 1 pound since the last visit. Percent body fat is 41.6%, waist circumference measurement is 36.5\". Requests new Rx for Adipex-P. Constipation has been stable. Patient denies fatigue, sleep disturbance, chest pain, heart palpitations, nausea, dry mouth, signs of depression. Written by tali Branch, as dictated by Dr. Karl Germain DO.    ROS    Review of Systems negative except as noted above in HPI. ALLERGIES:    Allergies   Allergen Reactions    Skelaxin [Metaxalone] Other (comments)     Too groggy       CURRENT MEDICATIONS:    Outpatient Prescriptions Marked as Taking for the 11/28/17 encounter (Office Visit) with Gini Campos DO   Medication Sig Dispense Refill    phentermine (ADIPEX_P) 15 mg capsule Take 1 Cap by mouth every morning. Max Daily Amount: 15 mg. 30 Cap 0    ASCORBATE CALCIUM (VITAMIN C PO) Take  by mouth.  BIOTIN PO Take  by mouth.  cholecalciferol (VITAMIN D3) 1,000 unit tablet Take  by mouth daily.  esomeprazole (NEXIUM) 40 mg capsule Take one capsule by mouth daily for Heartburn  Indications: HEARTBURN 30 Cap 5    albuterol (PROVENTIL HFA, VENTOLIN HFA, PROAIR HFA) 90 mcg/actuation inhaler Take 2 puffs by inhalation every four (4) hours as needed for Wheezing or Shortness of Breath. 1 Inhaler 1       PAST MEDICAL HISTORY:    Past Medical History:   Diagnosis Date    Abnormal Pap smear of cervix 1990s    Breast cyst 2009    benign. Left. 1.7 cm and multiple others.   Dr. Gael Munroe Chicken pox childhood    Chronic idiopathic constipation 07/2015    Dr. Celso Dao Atul Nowak HELM (dyspnea on exertion) 2015    Dr. 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 Retrosternal goiter. Dr. Kate Talley.  Enlarged tonsils and adenoids 2010    Iodine-deficiency related goiter     Left. Dr. Kate Talley.  YUNIER (obstructive sleep apnea) 02/25/15    Dr. Ryley Brooks, uses cpap. Dr. Malathi Jasso.  Thyroid nodule 2014    Dr. Xin Girard. Dr. Luis Angel Larkin HISTORY:    Past Surgical History:   Procedure Laterality Date    HX  SECTION  2004    [facility].  HX CYST INCISION AND DRAINAGE Left 2016    benign. Dr. Charlene Fuller Right 09/15/2017    benign. Dr. Bernice Haley.  HX HERNIA REPAIR  1295    umbilical.    HX OTHER SURGICAL  2014    thyroid biopsy. Dr. Xin Girard.  HX THYROIDECTOMY Left 2015    due to [address] Dr. Kate Talley. benign. FAMILY HISTORY:    Family History   Problem Relation Age of Onset    Hypertension Mother     Lung Disease Mother      chronic bronchitis    Sleep Apnea Mother     Hypertension Father     Diabetes Paternal Grandmother     Breast Cancer Maternal Aunt     Other Sister      severe scoliosis. txd with back brace.     Breast Cancer Maternal Aunt     Breast Cancer Maternal Aunt        SOCIAL HISTORY:    Social History     Social History    Marital status: SINGLE     Spouse name: N/A    Number of children: 1    Years of education: N/A     Occupational History     [employer]     Social History Main Topics    Smoking status: Passive Smoke Exposure - Never Smoker    Smokeless tobacco: Never Used      Comment: lives with a smoker dad x 18 yrs and now with smoker boyfriend x 2 years    Alcohol use No    Drug use: No    Sexual activity: Yes     Partners: Male     Birth control/ protection: None     Other Topics Concern    None     Social History Narrative    ** Merged History Encounter **            IMMUNIZATIONS:    Immunization History   Administered Date(s) Administered    Hep B Vaccine 03/10/2014    Influenza Vaccine 09/21/2017    TB Skin Test (PPD) 09/19/2017       PHYSICAL EXAMINATION    Vital Signs    Visit Vitals    /84 (BP 1 Location: Left arm, BP Patient Position: Sitting)    Pulse 85    Temp 98 °F (36.7 °C) (Oral)    Resp 16    Ht 5' 3\" (1.6 m)    Wt 199 lb (90.3 kg)    SpO2 97%    BMI 35.25 kg/m2       Weight Metrics 11/28/2017 11/28/2017 10/26/2017 10/26/2017 9/26/2017 9/15/2017 7/3/2017   Weight - 199 lb 200 lb 200 lb 199 lb 8 oz 198 lb 202 lb 9.6 oz   Waist Measure Inches 36.5 - - - - - -   Body Fat % 41.6 - - - - - -   BMI - 35.25 kg/m2 35.43 kg/m2 35.43 kg/m2 35.34 kg/m2 35.07 kg/m2 35.89 kg/m2       General appearance - Well nourished. Well appearing. Well developed. No acute distress. Obese. Head - Normocephalic. Atraumatic. Eyes - pupils equal and reactive, extraocular eye movements intact, sclera anicteric  Ears - Hearing is grossly normal bilaterally. Nose - normal and patent, no erythema, discharge or polyps   Mouth - mucous membranes moist, pharynx normal with cobblestone appearance. No erythema, white exudate or obstruction. Neck - supple. Midline trachea. No carotid bruits are noted. No thyromegaly noted. Chest - clear to auscultation bilaterally anterriorly and posteriorly. No wheezes, rales or rhonchi. Breath sounds are symmetrical and unlabored bilaterally. Heart - normal rate. Regular rhythm, normal S1, S2. No murmur. No rubs, clicks or gallops noted. Abdomen - soft and distended. No masses or organomegaly. No rebound, rigidity or guarding. Bowel sounds normal x 4 quadrants. No tenderness noted. Neurological - awake, alert and oriented to person, place, and time and event. Cranial nerves II through XII intact. Muscle strength is +5/5 x 4 extremities. Sensation is intact to light touch bilaterally. Steady gait. Heme/Lymph - peripheral pulses normal x 4 extremities. No peripheral edema is noted. No cervical adenopathy noted. Skin - no rashes, erythema, ecchymosis, lacerations, abrasions, suspicious moles noted. No skin tags. No acanthosis nigricans noted in the axilla or neck. Psychological -   normal behavior, speech, dress and thought processes. Good insight. Good eye contact. Normal affect. Appropriate mood. DATA REVIEWED  Lab Results   Component Value Date/Time    WBC 6.4 10/26/2017 10:22 AM    Hemoglobin (POC) 12.3 06/08/2015 11:44 AM    HGB 11.9 10/26/2017 10:22 AM    HCT 36.1 10/26/2017 10:22 AM    PLATELET 133 20/87/0969 10:22 AM    MCV 81 10/26/2017 10:22 AM     Lab Results   Component Value Date/Time    Sodium 140 10/26/2017 10:22 AM    Potassium 4.6 10/26/2017 10:22 AM    Chloride 102 10/26/2017 10:22 AM    CO2 27 10/26/2017 10:22 AM    Anion gap 2 09/18/2010 02:50 AM    Glucose 102 10/26/2017 10:22 AM    BUN 10 10/26/2017 10:22 AM    Creatinine 0.69 10/26/2017 10:22 AM    BUN/Creatinine ratio 14 10/26/2017 10:22 AM    GFR est  10/26/2017 10:22 AM    GFR est non- 10/26/2017 10:22 AM    Calcium 9.4 10/26/2017 10:22 AM    Bilirubin, total 0.4 10/26/2017 10:22 AM    AST (SGOT) 10 10/26/2017 10:22 AM    Alk.  phosphatase 84 10/26/2017 10:22 AM    Protein, total 6.9 10/26/2017 10:22 AM    Albumin 4.0 10/26/2017 10:22 AM    Globulin 3.7 09/18/2010 02:50 AM    A-G Ratio 1.4 10/26/2017 10:22 AM    ALT (SGPT) 9 10/26/2017 10:22 AM     Lab Results   Component Value Date/Time    Cholesterol, total 114 10/26/2017 10:22 AM    HDL Cholesterol 44 10/26/2017 10:22 AM    LDL, calculated 61 10/26/2017 10:22 AM    VLDL, calculated 9 10/26/2017 10:22 AM    Triglyceride 47 10/26/2017 10:22 AM     Lab Results   Component Value Date/Time    VITAMIN D, 25-HYDROXY 27.5 10/26/2017 10:22 AM       Lab Results Component Value Date/Time    Hemoglobin A1c 5.5 10/26/2017 10:22 AM     Lab Results   Component Value Date/Time    TSH 1.770 10/26/2017 10:22 AM       ASSESSMENT and PLAN    ICD-10-CM ICD-9-CM    1. Chronic idiopathic constipation K59.04 564.00     stable   2. Vitamin D deficiency E55.9 268.9    3. YUNIER (obstructive sleep apnea) G47.33 327.23     stable off CPAP   4. Obesity, Class II, BMI 35-39.9 E66.9 278.00 phentermine (ADIPEX_P) 15 mg capsule       Continue current medications and care. Prescriptions written and given to patient (Adipex-P 15 mg, month 1 of 3.)  Medication side effects discussed. Discussed the patient's BMI with her. The BMI follow up plan is as follows: I have counseled this patient on diet and exercise regimens. Diet recommendations:  Decrease carbohydrates (white foods including flour, white bread, white rice, white pasta, white potatoes; corn, sweet foods, sweet drinks and alcohol), increase green leafy vegetables and protein with each meal.  Avoid fried foods. Eat 3-5 small meals daily. Do not skip meals. Ok to use meal replacements up to twice daily with protein goal of 60 mg per meal.   Recommend OTC Premiere Protein bars or shakes. Increase water intake. Increase physical activity to 30 minutes daily for health benefit or 60 minutes daily to prevent weight regain, as tolerated. Physical Activity prescription:  A goal of 30 minutes physical activity daily is recommended for health benefit and at least 60 minutes daily to prevent weight regain. For weight loss, no less than 75% needs to be aerobic (i.e. Walking) and no more than 25% resistance exercising (i.e. Weight lifting). For weight maintenance phase, 50% aerobic and 50% resistance exercises. Sleep prescription:    A goal of 7-8 hours of uninterrupted sleep is recommended to turn off the Grehlin hormone to be released from the stomach and triggers appetite while promoting weight gain.   Proper rest turns on Leptin hormone to be released from white adipose tissue and promotes weight loss. Counseled patient on: weight loss goals, emphasizing a 5-10% weight loss in 6-12 months and strategies. Immunizations noted. Praised pt for weight loss efforts and progress. Patient was offered a choice/choices in the treatment plan today. Patient expresses understanding of the plan and agrees with recommendations. Patient declines any additional handouts. Patient is satisfied with previous handouts received from our office    Follow-up Disposition:  Return in about 1 month (around 12/28/2017) for weight . Written by tali Hewitt, as dictated by Dr. Jyoti Obrien DO. Documentation True and Accepted by [employer] [place].  Iram Pagan.

## 2017-11-28 NOTE — MR AVS SNAPSHOT
Visit Information Date & Time Provider Department Dept. Phone Encounter #  
 11/28/2017  7:50 AM [phone] [id number] Follow-up Instructions Return in about 1 month (around 12/28/2017) for weight . Your Appointments 3/16/2018  8:45 AM  
Follow Up with Denny Arreola MD  
[address] Appt Note: 6 MONTH FOLLOW UP/ CP$30 9-15-17 [address] [address]  
[address] Upcoming Health Maintenance Date Due Pneumococcal 19-64 Medium Risk (1 of 1 - PPSV23) 12/29/2017* PAP AKA CERVICAL CYTOLOGY 2/22/2018* DTaP/Tdap/Td series (1 - Tdap) 9/30/2021* *Topic was postponed. The date shown is not the original due date. Allergies as of 11/28/2017  Review Complete On: 11/28/2017 By: Angelica Tsang DO Severity Noted Reaction Type Reactions Skelaxin [Metaxalone]  11/25/2014    Other (comments) Too groggy Current Immunizations  Reviewed on 11/28/2017 Name Date Hep B Vaccine 3/10/2014 Influenza Vaccine 9/21/2017 TB Skin Test (PPD) 9/19/2017 Reviewed by Angelica Tsang DO on 11/28/2017 at  8:25 AM  
You Were Diagnosed With   
  
 Codes Comments Chronic idiopathic constipation    -  Primary ICD-10-CM: K59.04 
ICD-9-CM: 564.00 stable Vitamin D deficiency     ICD-10-CM: E55.9 ICD-9-CM: 268.9 YUNIER (obstructive sleep apnea)     ICD-10-CM: G47.33 
ICD-9-CM: 327.23 stable off CPAP Obesity, Class II, BMI 35-39.9     ICD-10-CM: E66.9 ICD-9-CM: 278.00 Vitals BP Pulse Temp Resp Height(growth percentile) Weight(growth percentile) 121/84 (BP 1 Location: Left arm, BP Patient Position: Sitting) 85 98 °F (36.7 °C) (Oral) 16 5' 3\" (1.6 m) 199 lb (90.3 kg) SpO2 BMI OB Status Smoking Status 97% 35.25 kg/m2 Having regular periods Passive Smoke Exposure - Never Smoker Vitals History BMI and BSA Data Body Mass Index Body Surface Area  
 35.25 kg/m 2 2 m 2 Preferred Pharmacy Pharmacy Name Phone [facility] [address] [place] [address] Bhumi Botello Said [phone] Your Updated Medication List  
  
   
This list is accurate as of: 11/28/17  8:29 AM.  Always use your most recent med list.  
  
  
  
  
 albuterol 90 mcg/actuation inhaler Commonly known as:  PROVENTIL HFA, VENTOLIN HFA, PROAIR HFA Take 2 puffs by inhalation every four (4) hours as needed for Wheezing or Shortness of Breath. BIOTIN PO Take  by mouth. cholecalciferol 1,000 unit tablet Commonly known as:  VITAMIN D3 Take  by mouth daily. esomeprazole 40 mg capsule Commonly known as:  NexIUM Take one capsule by mouth daily for Heartburn  Indications: HEARTBURN phentermine 15 mg capsule Commonly known as:  ADIPEX_P Take 1 Cap by mouth every morning. Max Daily Amount: 15 mg. VITAMIN C PO Take  by mouth. Prescriptions Printed Refills  
 phentermine (ADIPEX_P) 15 mg capsule 0 Sig: Take 1 Cap by mouth every morning. Max Daily Amount: 15 mg.  
 Class: Print Route: Oral  
  
Follow-up Instructions Return in about 1 month (around 12/28/2017) for weight . Introducing [facility]! Dear Ivelisse Hein: Thank you for requesting a Scrip-t account. Our records indicate that you already have an active Scrip-t account. You can access your account anytime at [email or web address] Did you know that you can access your hospital and ER discharge instructions at any time in Scrip-t? You can also review all of your test results from your hospital stay or ER visit. Additional Information If you have questions, please visit the Frequently Asked Questions section of the [employer] website at [email or web address] Remember, [employer]Hog is NOT to be used for urgent needs. For medical emergencies, dial 911. Now available from your iPhone and Android! Please provide this summary of care documentation to your next provider. Your primary care clinician is listed as Vinicius Levin. If you have any questions after today's visit, please call [phone].

## 2017-11-28 NOTE — PROGRESS NOTES
Chief Complaint   Patient presents with    Weight Management    Blood Pressure Check    Medication Refill     1. Have you been to the ER, urgent care clinic since your last visit? Hospitalized since your last visit? No    2. Have you seen or consulted any other health care providers outside of the [address] [place] [address] since your last visit? Include any pap smears or colon screening. No    In the event something were to happen to you and you were unable to speak on your behalf, do you have an Advance Directive/ Living Will in place stating your wishes? NO    If yes, do we have a copy on file NO    If no, would you like information:    Pt offered and declined.

## 2017-12-14 ENCOUNTER — OFFICE VISIT (OUTPATIENT)
Dept: PRIMARY CARE CLINIC | Age: 37
End: 2017-12-14

## 2017-12-14 VITALS
DIASTOLIC BLOOD PRESSURE: 83 MMHG | SYSTOLIC BLOOD PRESSURE: 133 MMHG | OXYGEN SATURATION: 97 % | WEIGHT: 201.4 LBS | TEMPERATURE: 98.1 F | BODY MASS INDEX: 35.68 KG/M2 | RESPIRATION RATE: 17 BRPM | HEIGHT: 63 IN | HEART RATE: 91 BPM

## 2017-12-14 DIAGNOSIS — J01.00 ACUTE MAXILLARY SINUSITIS, RECURRENCE NOT SPECIFIED: Primary | ICD-10-CM

## 2017-12-14 NOTE — MR AVS SNAPSHOT
Visit Information Date & Time Provider Department Dept. Phone Encounter #  
 12/14/2017  6:00  Mike Viveros [id number] Your Appointments 1/30/2018  7:45 AM  
Any with Vena Blush, DO [address] ([facility] [place]) Appt Note: 1 MO Weight & BP  
 [address] 
[address] [place] [address] [facility] [address]  
[phone]  
  
   
 [address] [place] [address]  
  
    
 2/27/2018  7:45 AM  
Any with Vena Blush, DO [address] ([facility] [place]) Appt Note: 1 MO Weight & BP  
 [address] 
[address] [place] [address]  
[phone]  
  
    
 3/16/2018  8:45 AM  
Follow Up with MD Malik Richey [address] ([facility]) Appt Note: 6 MONTH FOLLOW UP/ CP$30 9-15-17 [address] [place] [address]  
  
    
 3/27/2018  7:45 AM  
Any with Vena Blush, DO [address] ([facility] [place]) Appt Note: 1 MO Weight & BP  
 [address] 
[address] [place] [address]  
[phone]  
  
    
 4/24/2018  7:45 AM  
Any with Vena Blush, DO [address] ([facility] [place]) Appt Note: 1 MO Weight & BP  
 [address] 
[address] [place] [address]  
[phone]  
  
    
 5/22/2018  7:45 AM  
Any with Vena Blush, DO [address] ([facility] [place]) Appt Note: 1 MO Weight & BP  
 [address] 
[address] [place] [address]  
[phone]  
  
    
 6/19/2018  7:45 AM  
Any with Vena Blush, DO [address] ([facility] [place]) Appt Note: 1 MO Weight & BP  
 [address] 
[address] [place] [address]  
[phone] Upcoming Health Maintenance  Date Due  
 Pneumococcal 19-64 Medium Risk (1 of 1 - PPSV23) 12/29/2017* PAP AKA CERVICAL CYTOLOGY 2/22/2018* DTaP/Tdap/Td series (1 - Tdap) 9/30/2021* *Topic was postponed. The date shown is not the original due date. Allergies as of 12/14/2017  Review Complete On: 12/14/2017 By: Adrien Clark MD  
  
 Severity Noted Reaction Type Reactions Skelaxin [Metaxalone]  11/25/2014    Other (comments) Too groggy Current Immunizations  Reviewed on 11/28/2017 Name Date Hep B Vaccine 3/10/2014 Influenza Vaccine 9/21/2017 TB Skin Test (PPD) 9/19/2017 Not reviewed this visit You Were Diagnosed With   
  
 Codes Comments Acute maxillary sinusitis, recurrence not specified    -  Primary ICD-10-CM: J01.00 ICD-9-CM: 461.0 Vitals BP Pulse Temp Resp Height(growth percentile) Weight(growth percentile) 133/83 (BP 1 Location: Left arm, BP Patient Position: Sitting) 91 98.1 °F (36.7 °C) (Oral) 17 5' 3\" (1.6 m) 201 lb 6.4 oz (91.4 kg) SpO2 BMI OB Status Smoking Status 97% 35.68 kg/m2 Having regular periods Passive Smoke Exposure - Never Smoker BMI and BSA Data Body Mass Index Body Surface Area  
 35.68 kg/m 2 2.02 m 2 Preferred Pharmacy Pharmacy Name Phone [address] [place] [address], [address] [place] [address] Bhumi Botello Said [phone] Your Updated Medication List  
  
   
This list is accurate as of: 12/14/17  6:16 PM.  Always use your most recent med list.  
  
  
  
  
 albuterol 90 mcg/actuation inhaler Commonly known as:  PROVENTIL HFA, VENTOLIN HFA, PROAIR HFA Take 2 puffs by inhalation every four (4) hours as needed for Wheezing or Shortness of Breath. BIOTIN PO Take  by mouth. cholecalciferol 1,000 unit tablet Commonly known as:  VITAMIN D3 Take  by mouth daily. esomeprazole 40 mg capsule Commonly known as:  NexIUM Take one capsule by mouth daily for Heartburn  Indications: HEARTBURN  
  
 phentermine 15 mg capsule Commonly known as:  ADIPEX_P Take 1 Cap by mouth every morning. Max Daily Amount: 15 mg. VITAMIN C PO Take  by mouth. Patient Instructions Discharge instructions: 1. Combination cough and could medicine such as Mucinex DM 2. Salt water gargle. 3. Plenty of fluids. 4. Ibuprofen (Motrin, Advil):  200mg - take 1-4 tables three times as needed for pain and fever 
 -OR-  
 Naproxin (Aleve):  220mg 1-2 tablets twice as needed for pain and fever 5. Acetaminophen (Tylenol):  500mg 1-2 tablets every 6 hours as needed for pain and fever. 6. Throat lozenges such as Halls as needed. 7. Humidifier as needed. Follow Up:  Get re-examined if not improved in  5-7 days or if symptoms worsen. If you get suddenly worse, go to the nearest hospital Emergency Room Sinusitis: Care Instructions Your Care Instructions Sinusitis is an infection of the lining of the sinus cavities in your head. Sinusitis often follows a cold. It causes pain and pressure in your head and face. In most cases, sinusitis gets better on its own in 1 to 2 weeks. But some mild symptoms may last for several weeks. Sometimes antibiotics are needed. Follow-up care is a key part of your treatment and safety. Be sure to make and go to all appointments, and call your doctor if you are having problems. It's also a good idea to know your test results and keep a list of the medicines you take. How can you care for yourself at home? · Take an over-the-counter pain medicine, such as acetaminophen (Tylenol), ibuprofen (Advil, Motrin), or naproxen (Aleve). Read and follow all instructions on the label. · If the doctor prescribed antibiotics, take them as directed. Do not stop taking them just because you feel better. You need to take the full course of antibiotics.  
· Be careful when taking over-the-counter cold or flu medicines and Tylenol at the same time. Many of these medicines have acetaminophen, which is Tylenol. Read the labels to make sure that you are not taking more than the recommended dose. Too much acetaminophen (Tylenol) can be harmful. · Breathe warm, moist air from a steamy shower, a hot bath, or a sink filled with hot water. Avoid cold, dry air. Using a humidifier in your home may help. Follow the directions for cleaning the machine. · Use saline (saltwater) nasal washes to help keep your nasal passages open and wash out mucus and bacteria. You can buy saline nose drops at a grocery store or drugstore. Or you can make your own at home by adding 1 teaspoon of salt and 1 teaspoon of baking soda to 2 cups of distilled water. If you make your own, fill a bulb syringe with the solution, insert the tip into your nostril, and squeeze gently. Spencer Claudia your nose. · Put a hot, wet towel or a warm gel pack on your face 3 or 4 times a day for 5 to 10 minutes each time. · Try a decongestant nasal spray like oxymetazoline (Afrin). Do not use it for more than 3 days in a row. Using it for more than 3 days can make your congestion worse. When should you call for help? Call your doctor now or seek immediate medical care if: 
? · You have new or worse swelling or redness in your face or around your eyes. ? · You have a new or higher fever. ? Watch closely for changes in your health, and be sure to contact your doctor if: 
? · You have new or worse facial pain. ? · The mucus from your nose becomes thicker (like pus) or has new blood in it. ? · You are not getting better as expected. Where can you learn more? Go to http://weston-karel.info/. Enter P868 in the search box to learn more about \"Sinusitis: Care Instructions. \" Current as of: May 12, 2017 Content Version: 11.4 © 4991-4758 [employer].  Care instructions adapted under license by [employer] (which disclaims liability or warranty for this information). If you have questions about a medical condition or this instruction, always ask your healthcare professional. [address] any warranty or liability for your use of this information. Introducing [facility]! Dear Jason Benítez: Thank you for requesting a [employer] account. Our records indicate that you already have an active [employer] account. You can access your account anytime at [email or web address]. [email or web address] Did you know that you can access your hospital and ER discharge instructions at any time in [employer]? You can also review all of your test results from your hospital stay or ER visit. Additional Information If you have questions, please visit the Frequently Asked Questions section of the [employer] website at [email or web address] Remember, [employer] is NOT to be used for urgent needs. For medical emergencies, dial 911. Now available from your iPhone and Android! Please provide this summary of care documentation to your next provider. Your primary care clinician is listed as Faizan Davis. If you have any questions after today's visit, please call [phone].

## 2017-12-14 NOTE — PATIENT INSTRUCTIONS
Discharge instructions:  1. Combination cough and could medicine such as Mucinex DM  2. Salt water gargle. 3. Plenty of fluids. 4. Ibuprofen (Motrin, Advil):  200mg - take 1-4 tables three times as needed for pain and fever   -OR-    Naproxin (Aleve):  220mg 1-2 tablets twice as needed for pain and fever  5. Acetaminophen (Tylenol):  500mg 1-2 tablets every 6 hours as needed for pain and fever. 6. Throat lozenges such as Halls as needed. 7. Humidifier as needed. Follow Up:  Get re-examined if not improved in  5-7 days or if symptoms worsen. If you get suddenly worse, go to the nearest hospital Emergency Room         Sinusitis: Care Instructions  Your Care Instructions    Sinusitis is an infection of the lining of the sinus cavities in your head. Sinusitis often follows a cold. It causes pain and pressure in your head and face. In most cases, sinusitis gets better on its own in 1 to 2 weeks. But some mild symptoms may last for several weeks. Sometimes antibiotics are needed. Follow-up care is a key part of your treatment and safety. Be sure to make and go to all appointments, and call your doctor if you are having problems. It's also a good idea to know your test results and keep a list of the medicines you take. How can you care for yourself at home? · Take an over-the-counter pain medicine, such as acetaminophen (Tylenol), ibuprofen (Advil, Motrin), or naproxen (Aleve). Read and follow all instructions on the label. · If the doctor prescribed antibiotics, take them as directed. Do not stop taking them just because you feel better. You need to take the full course of antibiotics. · Be careful when taking over-the-counter cold or flu medicines and Tylenol at the same time. Many of these medicines have acetaminophen, which is Tylenol. Read the labels to make sure that you are not taking more than the recommended dose. Too much acetaminophen (Tylenol) can be harmful.   · Breathe warm, moist air from a steamy shower, a hot bath, or a sink filled with hot water. Avoid cold, dry air. Using a humidifier in your home may help. Follow the directions for cleaning the machine. · Use saline (saltwater) nasal washes to help keep your nasal passages open and wash out mucus and bacteria. You can buy saline nose drops at a grocery store or drugstore. Or you can make your own at home by adding 1 teaspoon of salt and 1 teaspoon of baking soda to 2 cups of distilled water. If you make your own, fill a bulb syringe with the solution, insert the tip into your nostril, and squeeze gently. Duana Glow your nose. · Put a hot, wet towel or a warm gel pack on your face 3 or 4 times a day for 5 to 10 minutes each time. · Try a decongestant nasal spray like oxymetazoline (Afrin). Do not use it for more than 3 days in a row. Using it for more than 3 days can make your congestion worse. When should you call for help? Call your doctor now or seek immediate medical care if:  ? · You have new or worse swelling or redness in your face or around your eyes. ? · You have a new or higher fever. ? Watch closely for changes in your health, and be sure to contact your doctor if:  ? · You have new or worse facial pain. ? · The mucus from your nose becomes thicker (like pus) or has new blood in it. ? · You are not getting better as expected. Where can you learn more? Go to http://weston-karel.info/. Enter O456 in the search box to learn more about \"Sinusitis: Care Instructions. \"  Current as of: May 12, 2017  Content Version: 11.4  © 9599-4751 [employer]. Care instructions adapted under license by [employer] (which disclaims liability or warranty for this information). If you have questions about a medical condition or this instruction, always ask your healthcare professional. [address] any warranty or liability for your use of this information.

## 2017-12-14 NOTE — PROGRESS NOTES
Chief Complaint   Patient presents with    Sinus Pain   c/o sinus pressure and headache x 3 days, pt states worsening,pt states she has taken Excedrin to help with discomfort. This note will not be viewable in [address]

## 2017-12-14 NOTE — PROGRESS NOTES
Subjective:      Enmanuel Lazaro is a 40 y.o. female who presents for evaluation of possible sinus infection. Sinus pressure and nasal congestion started 3 days ago. She has tried excedrin yesterday. Bilateral ear pressure. Symptoms worsening since onset. Mild cough, no sore throat. Chills at home. Past Medical History:   Diagnosis Date    Abnormal Pap smear of cervix 1990s    Breast cyst 2009    benign. Left. 1.7 cm and multiple others. Dr. Danilo Toussaint Chicken pox childhood    Chronic idiopathic constipation 07/2015    Dr. David Fung.  HELM (dyspnea on exertion) 02/2015    Dr. 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 Dr. Ponce Weaver.  Enlarged tonsils and adenoids 09/2010    Iodine-deficiency related goiter     Left. Dr. Ponce Weaver.  YUNIER (obstructive sleep apnea) 02/25/15    Dr. Nasima Rosas, uses cpap. Dr. Jenny Chong.  Thyroid nodule 5/2014    Dr. Marni Milan. Dr. Pompa Stands     Family History   Problem Relation Age of Onset    Hypertension Mother     Lung Disease Mother      chronic bronchitis    Sleep Apnea Mother     Hypertension Father     Diabetes Paternal Grandmother     Breast Cancer Maternal Aunt     Other Sister      severe scoliosis. txd with back brace.  Breast Cancer Maternal Aunt     Breast Cancer Maternal Aunt      Current Outpatient Prescriptions   Medication Sig Dispense Refill    ASCORBATE CALCIUM (VITAMIN C PO) Take  by mouth.  BIOTIN PO Take  by mouth.  cholecalciferol (VITAMIN D3) 1,000 unit tablet Take  by mouth daily.  esomeprazole (NEXIUM) 40 mg capsule Take one capsule by mouth daily for Heartburn  Indications: HEARTBURN 30 Cap 5    phentermine (ADIPEX_P) 15 mg capsule Take 1 Cap by mouth every morning.  Max Daily Amount: 15 mg. 30 Cap 0    albuterol (PROVENTIL HFA, VENTOLIN HFA, PROAIR HFA) 90 mcg/actuation inhaler Take 2 puffs by inhalation every four (4) hours as needed for Wheezing or Shortness of Breath. 1 Inhaler 1     Allergies   Allergen Reactions    Skelaxin [Metaxalone] Other (comments)     Too groggy     Social History     Social History    Marital status: SINGLE     Spouse name: N/A    Number of children: 1    Years of education: N/A     Occupational History     [employer]     Social History Main Topics    Smoking status: Passive Smoke Exposure - Never Smoker    Smokeless tobacco: Never Used      Comment: lives with a smoker dad x 18 yrs and now with smoker boyfriend x 2 years    Alcohol use No    Drug use: No    Sexual activity: Yes     Partners: Male     Birth control/ protection: None     Other Topics Concern    Not on file     Social History Narrative    ** Merged History Encounter **          Review of Systems   Constitutional: Negative for chills and fever. HENT: Positive for congestion and sinus pain. Negative for ear pain and sore throat. Eyes: Negative for blurred vision, double vision, photophobia and pain. Respiratory: Negative for cough, shortness of breath and stridor. Cardiovascular: Negative for chest pain. Gastrointestinal: Negative for abdominal pain, diarrhea, nausea and vomiting. Skin: Negative for rash. Neurological: Negative for tingling, sensory change, focal weakness and headaches. Objective:     Visit Vitals    /83 (BP 1 Location: Left arm, BP Patient Position: Sitting)    Pulse 91    Temp 98.1 °F (36.7 °C) (Oral)    Resp 17    Ht 5' 3\" (1.6 m)    Wt 201 lb 6.4 oz (91.4 kg)    SpO2 97%    BMI 35.68 kg/m2     General: Alert and oriented and in no acute distress. Responds to all questions appropriately. SKIN: No rash. HEAD: no maxillary sinus tenderness. EYES: Sclera and conjunctiva clear; pupils round and reactive to light.   EARS: External normal, canals clear, tympanic membranes normal.     NOSE: Edema and erythema of the turbinates with clear mucous drainage. OROPHARYNX: Slight tonsil edema and erythema with no exudate. NECK: Supple; no masses; normal lymphadenopathy. LUNGS: Clear to auscultation bilaterally, no wheeze, rales and rhonchi. CARDIOVASCULAR: Regular, rate, and rhythm without murmurs, gallops or rubs. NEUROLOGIC: Speech intact; face symmetrical; moves all extremities equally. Assessment:       ICD-10-CM ICD-9-CM    1. Acute maxillary sinusitis, recurrence not specified J01.00 461.0      Likely viral. OTCs as below. Return if no improvement in 4-5 days. Plan:     1. Nasal saline rinses as needed for congestion. 2. Follow-up  in 5 days  if symptoms worsen or persist.  3. Over-the-counter mediciations:    1. Ibuprofen (Motrin, Advil): 200mg  take 1-4 three times a day for 5 days. -OR-    Naproxin (Aleve): 220mg 1-2 tablets twice a day for 5 days. 2. Acetaminophen (Tylenol): 500mg 1-2 tablets every 6 hours as needed for pain. 3. Combination cough and decongestant medicine such as Mucinex D.  4. Sudafed    This note will not be viewable in [facility].

## 2018-03-16 ENCOUNTER — OFFICE VISIT (OUTPATIENT)
Dept: SURGERY | Age: 38
End: 2018-03-16

## 2018-03-16 VITALS
DIASTOLIC BLOOD PRESSURE: 85 MMHG | BODY MASS INDEX: 35.44 KG/M2 | WEIGHT: 200 LBS | HEART RATE: 93 BPM | HEIGHT: 63 IN | SYSTOLIC BLOOD PRESSURE: 138 MMHG

## 2018-03-16 DIAGNOSIS — Z80.3 FAMILY HISTORY OF BREAST CANCER: ICD-10-CM

## 2018-03-16 DIAGNOSIS — N60.01 CYST OF RIGHT BREAST: ICD-10-CM

## 2018-03-16 DIAGNOSIS — N60.02 CYST OF LEFT BREAST: Primary | ICD-10-CM

## 2018-03-16 NOTE — PATIENT INSTRUCTIONS
Breast Lumps (Noncancerous): Care Instructions  Your Care Instructions  Breast lumps or changes are a common health worry for most women. Women may have many kinds of breast lumps and other breast changes throughout their lives, including ones that occur with menstrual periods, pregnancy, and aging. Most breast lumps are harmless and are not cancer. Many women's breasts feel lumpy and tender before their menstrual periods. Women also may have lumps when they are breastfeeding. Breast lumps may go away after menopause. Common noncancerous breast lumps include:  · Cysts, or sacs filled with fluid. · Skin cysts in the breast area. · Fatty lumps, which may be firm. These may or may not cause pain. · Fibroadenomas, growths that are round and firm with smooth edges. · Abscesses, which are pockets of infection within the breast.  Follow-up care is a key part of your treatment and safety. Be sure to make and go to all appointments, and call your doctor if you are having problems. It's also a good idea to know your test results and keep a list of the medicines you take. How can you care for yourself at home? · Take an over-the-counter pain medicine, such as acetaminophen (Tylenol), ibuprofen (Advil, Motrin), or naproxen (Aleve). Read and follow all instructions on the label. · Do not take two or more pain medicines at the same time unless the doctor told you to. Many pain medicines have acetaminophen, which is Tylenol. Too much acetaminophen (Tylenol) can be harmful. · If you are pregnant, do not take any medicine other than acetaminophen unless your doctor has told you to. · Wear a supportive bra, such as a sports bra or jog bra. · You may want to limit caffeine. Some women say that cutting back on caffeine reduces their breast tenderness. · A diet very low in fat (about 15% of daily diet) may reduce breast tenderness. Talk to your doctor about whether you should try a very low-fat diet.   · A diet low in salt (sodium) also may reduce breast tenderness. When should you call for help? Watch closely for changes in your health, and be sure to contact your doctor if you:  ? · You do not get better as expected. ? · Your breast has changed. ? · You have pain in your breast.   ? · You have a discharge from your nipple. ? · A breast lump changes or does not go away. Where can you learn more? Go to http://weston-karel.info/. Enter 95 [id number] in the search box to learn more about \"Breast Lumps (Noncancerous): Care Instructions. \"  Current as of: October 13, 2016  Content Version: 11.4  © 6029-4436 [employer]. Care instructions adapted under license by [employer] (which disclaims liability or warranty for this information). If you have questions about a medical condition or this instruction, always ask your healthcare professional. [address] any warranty or liability for your use of this information.

## 2018-03-16 NOTE — MR AVS SNAPSHOT
[address] [facility] 
[phone] Patient: Janay Song MRN: [id number] EVC:[id number] Visit Information Date & Time Provider Department Dept. Phone Encounter #  
 3/16/2018  8:45 AM Suraj Gilman  [address] [id number] Your Appointments 3/27/2018  7:45 AM  
Any with Delilah Breebe, DO [address] (MABEL Crockett) Appt Note: 1 MO Weight & BP  
 [address] 
[address] [facility] [address]  
[phone]  
  
   
 [address]  
  
    
 4/24/2018  7:45 AM  
Any with Delilahjenifer Romero, DO [address] (MABEL Crockett) Appt Note: 1 MO Weight & BP  
 [address] 
[address] [facility] [address]  
[phone]  
  
    
 5/22/2018  7:45 AM  
Any with Delilahjenifer Romero, DO [address] (MABEL Crockett) Appt Note: 1 MO Weight & BP  
 [address] 
[address] [facility] [address]  
[phone]  
  
    
 6/19/2018  7:45 AM  
Any with Delilahjenifer Romero, DO [address] (MABEL Crockett) Appt Note: 1 MO Weight & BP  
 [address] 
[address] [facility] [address]  
[phone]  
  
    
 9/19/2018  1:30 PM  
Follow Up with Suraj Gilman MD  
[address] ([facility]) Appt Note: 6 MONTH FOLLOW UP/ CP$30 3-16-18 [address] [phone]  
[address] [place] [address] Upcoming Health Maintenance Date Due Pneumococcal 19-64 Medium Risk (1 of 1 - PPSV23) 1/5/1999 PAP AKA CERVICAL CYTOLOGY 2/1/2016 DTaP/Tdap/Td series (1 - Tdap) 9/30/2021* *Topic was postponed. The date shown is not the original due date. Allergies as of 3/16/2018  Review Complete On: 3/16/2018 By: Nickolas Rankin MD  
  
 Severity Noted Reaction Type Reactions Skelaxin [Metaxalone]  11/25/2014    Other (comments) Too groggy Current Immunizations  Reviewed on 11/28/2017 Name Date Hep B Vaccine 3/10/2014 Influenza Vaccine 9/21/2017 TB Skin Test (PPD) 9/19/2017 Not reviewed this visit You Were Diagnosed With   
  
 Codes Comments Cyst of left breast    -  Primary ICD-10-CM: N60.02 
ICD-9-CM: 610.0 Cyst of right breast     ICD-10-CM: N60.01 
ICD-9-CM: 610.0 Family history of breast cancer     ICD-10-CM: Z80.3 ICD-9-CM: V16.3 Vitals BP Pulse Height(growth percentile) Weight(growth percentile) LMP BMI  
 138/85 93 5' 3\" (1.6 m) 200 lb (90.7 kg) 03/01/2018 35.43 kg/m2 OB Status Smoking Status Having regular periods Passive Smoke Exposure - Never Smoker Vitals History BMI and BSA Data Body Mass Index Body Surface Area  
 35.43 kg/m 2 2.01 m 2 Preferred Pharmacy Pharmacy Name Phone [address] [place] [address], [address] [place] [address] Bhumi Botello Said [phone] Your Updated Medication List  
  
   
This list is accurate as of 3/16/18  1:43 PM.  Always use your most recent med list.  
  
  
  
  
 albuterol 90 mcg/actuation inhaler Commonly known as:  PROVENTIL HFA, VENTOLIN HFA, PROAIR HFA Take 2 puffs by inhalation every four (4) hours as needed for Wheezing or Shortness of Breath. BIOTIN PO Take  by mouth. cholecalciferol 1,000 unit tablet Commonly known as:  VITAMIN D3 Take  by mouth daily. esomeprazole 40 mg capsule Commonly known as:  NexIUM Take one capsule by mouth daily for Heartburn  Indications: HEARTBURN phentermine 15 mg capsule Commonly known as:  ADIPEX_P Take 1 Cap by mouth every morning. Max Daily Amount: 15 mg. VITAMIN C PO Take  by mouth.   
  
  
  
  
To-Do List   
 03/16/2018 Imaging:  MRI BREAST BI W WO CONT Patient Instructions Breast Lumps (Noncancerous): Care Instructions Your Care Instructions Breast lumps or changes are a common health worry for most women. Women may have many kinds of breast lumps and other breast changes throughout their lives, including ones that occur with menstrual periods, pregnancy, and aging. Most breast lumps are harmless and are not cancer. Many women's breasts feel lumpy and tender before their menstrual periods. Women also may have lumps when they are breastfeeding. Breast lumps may go away after menopause. Common noncancerous breast lumps include: · Cysts, or sacs filled with fluid. · Skin cysts in the breast area. · Fatty lumps, which may be firm. These may or may not cause pain. · Fibroadenomas, growths that are round and firm with smooth edges. · Abscesses, which are pockets of infection within the breast. 
Follow-up care is a key part of your treatment and safety. Be sure to make and go to all appointments, and call your doctor if you are having problems. It's also a good idea to know your test results and keep a list of the medicines you take. How can you care for yourself at home? · Take an over-the-counter pain medicine, such as acetaminophen (Tylenol), ibuprofen (Advil, Motrin), or naproxen (Aleve). Read and follow all instructions on the label. · Do not take two or more pain medicines at the same time unless the doctor told you to. Many pain medicines have acetaminophen, which is Tylenol. Too much acetaminophen (Tylenol) can be harmful. · If you are pregnant, do not take any medicine other than acetaminophen unless your doctor has told you to. · Wear a supportive bra, such as a sports bra or jog bra. · You may want to limit caffeine. Some women say that cutting back on caffeine reduces their breast tenderness.  
· A diet very low in fat (about 15% of daily diet) may reduce breast tenderness. Talk to your doctor about whether you should try a very low-fat diet. · A diet low in salt (sodium) also may reduce breast tenderness. When should you call for help? Watch closely for changes in your health, and be sure to contact your doctor if you: 
? · You do not get better as expected. ? · Your breast has changed. ? · You have pain in your breast.  
? · You have a discharge from your nipple. ? · A breast lump changes or does not go away. Where can you learn more? Go to http://weston-akrel.info/. Enter [phone] [id number] in the search box to learn more about \"Breast Lumps (Noncancerous): Care Instructions. \" Current as of: October 13, 2016 Content Version: 11.4 © 6011-6168 [employer]. Care instructions adapted under license by [employer] (which disclaims liability or warranty for this information). If you have questions about a medical condition or this instruction, always ask your healthcare professional. [place] [address] any warranty or liability for your use of this information. Introducing [facility]! Dear Michael Benitez: Thank you for requesting a [employer] account. Our records indicate that you already have an active [employer] account. You can access your account anytime at [email or web address]. [email or web address] Did you know that you can access your hospital and ER discharge instructions at any time in [employer]? You can also review all of your test results from your hospital stay or ER visit. Additional Information If you have questions, please visit the Frequently Asked Questions section of the [employer] website at [email or web address] Remember, [employer] is NOT to be used for urgent needs. For medical emergencies, dial 911. Now available from your iPhone and Android! Please provide this summary of care documentation to your next provider. Your primary care clinician is listed as Binu Marsh. If you have any questions after today's visit, please call [phone].

## 2018-03-16 NOTE — PROGRESS NOTES
HISTORY OF PRESENT ILLNESS  Ko Bustos is a 45 y.o. female. HPI ESTABLISHED patient here for 6 month follow up and also symptomatic breast cysts. She is having pain on bilateral breasts, LEFT > RIGHT. She is reporting a painful cyst in her LEFT breast x 2 weeks. Rates pain 5/10. Also feels some warmth. Last office visit 9/15/2017 RIGHT breast cyst 3 OC aspirated (8 cc). FH includes 3 maternal aunts with breast cancer, all in their 52's. 1 , 2 living. Bilateral diagnostic mammogram and bilateral breast US done 2017: BI-RADS 2 multiple cysts bilaterally. Review of Systems   All other systems reviewed and are negative. Physical Exam   Pulmonary/Chest: Right breast exhibits mass (9:00 2.5 x 2.5 mobile mass ) and tenderness. Right breast exhibits no inverted nipple, no nipple discharge and no skin change. Left breast exhibits mass (9:00 in retroareolar region a 4 x 3 cm mass, mobile) and tenderness. Left breast exhibits no inverted nipple, no nipple discharge and no skin change. Breasts are symmetrical.   Lymphadenopathy:     She has no cervical adenopathy. She has no axillary adenopathy. Nursing note and vitals reviewed. BREAST ULTRASOUND  Indication: right breast mass 9:00 6 cfn  Technique: The area was scanned using a high-frequency linear-array near-field transducer  Findings: 2.1 x 2.1 x 1.0 cm mass, oval, hypoechoic, through transmission  Impression: Benign cyst  Disposition: Discussed aspiration or observation. Pt has elected for aspiration    BREAST ULTRASOUND  Indication: left breast mass 9:00 retroareolar  Technique: The area was scanned using a high-frequency linear-array near-field transducer  Findings: 4.1 x 3.6 x 3.6 cm mass, oval, hypoechoic, through transmission  Impression: Benign cyst  Disposition: Discussed aspiration or observation.   Pt has elected for aspiration    ASPIRATION OF BREAST CYST  Indication : CYST:  right  9:00 6 cfn  Ultrasound Findings: see above  Prep : Alcohol. Anesthesia : Lidocaine with epinephrine, 5 cc  Guidance : Ultrasound guidance. Yield :  7 cc clear fluid was aspirated with an 18 gauge needle. Effect : Cyst completely aspirated. Diposition:  Follow up if new mass occurs    ASPIRATION OF BREAST CYST  Indication : CYST:  left  9:00 retroareolar  Ultrasound Findings: see above  Prep : Alcohol. Anesthesia : Lidocaine with epinephrine, 5 cc  Guidance : Ultrasound guidance. Yield : 22 cc clear fluid was aspirated with an 18 gauge needle. Effect : Cyst completely aspirated. Diposition:  Follow up if new mass occurs    [address] [place] [address]  OFFICE PROCEDURE PROGRESS NOTE        Chart reviewed for the following:   Mela Weiner MD, have reviewed the History, Physical and updated the Allergic reactions for [address] performed immediately prior to start of procedure:   Mela Weiner MD, have performed the following reviews on Leandra Manningad prior to the start of the procedure:            * Patient was identified by name and date of birth   * Agreement on procedure being performed was verified  * Risks and Benefits explained to the patient  * Procedure site verified and marked as necessary  * Patient was positioned for comfort  * Consent was signed and verified     Time: 9:05      Date of procedure: 3/16/2018    Procedure performed by:  Malini Purcell MD    Provider assisted by: Addis Riley RN    Patient assisted by: self    How tolerated by patient: tolerated the procedure well with no complications    Post Procedural Pain Scale: 0 - No Hurt    Comments: none          ASSESSMENT and PLAN    ICD-10-CM ICD-9-CM    1. Cyst of left breast N60.02 610.0    2. Cyst of right breast N60.01 610.0    3. Family history of breast cancer Z80.3 V16.3      46 yo female with bilateral breast cysts. Tolerated aspiration well. She has a lifetime risk of 20.4%. Will schedule screening mri. F/u in 6 months.

## 2018-04-06 ENCOUNTER — HOSPITAL ENCOUNTER (OUTPATIENT)
Dept: MRI IMAGING | Age: 38
Discharge: HOME | End: 2018-04-06
Attending: SURGERY
Payer: COMMERCIAL

## 2018-04-06 DIAGNOSIS — Z80.3 FAMILY HISTORY OF BREAST CANCER: ICD-10-CM

## 2018-04-06 PROCEDURE — 77059 MRI BREAST BI W WO CONT: CPT

## 2018-04-06 PROCEDURE — A9585 GADOBUTROL INJECTION: HCPCS | Performed by: SURGERY

## 2018-04-06 PROCEDURE — 74011250636 HC RX REV CODE- 250/636: Performed by: SURGERY

## 2018-04-06 RX ADMIN — GADOBUTROL 9 ML: 604.72 INJECTION INTRAVENOUS at 10:37

## 2018-04-09 NOTE — PROGRESS NOTES
Routed Dr. Inge Barrett MRI Breast report to Dr. Inge Barrett and emailed DR KALEB FRANKLIN [facility] nurses of recommendations - will schedule as VBC request as Dr. Inge Barrett will be advising Ms. Radha Singh.

## 2018-04-10 ENCOUNTER — TELEPHONE (OUTPATIENT)
Dept: MAMMOGRAPHY | Age: 38
End: 2018-04-10

## 2018-04-10 NOTE — TELEPHONE ENCOUNTER
Called coordination of care and spoke with Jb Bacon to place pt. On schedule for 4/24/2018 at 0900 for U/S guided left breast biopsy with post clip insertion -Dr. Kaci Ellis here this day. Pt noted on schedule.

## 2018-04-10 NOTE — TELEPHONE ENCOUNTER
4/10/2018 @ 4370 a.m. Spoke with Dr. Bar Alvarez in reference to scheduling pt. For U/S guided Left Breast Biopsy procedure and after viewing pt.'s MRI, Dr. Bar Alvarez request pt. To be scheduled on Dr. Gabriel Negron day r/t complexity of biopsy and Dr. Gabriel Negron is familiar with case.

## 2018-04-12 ENCOUNTER — TELEPHONE (OUTPATIENT)
Dept: MAMMOGRAPHY | Age: 38
End: 2018-04-12

## 2018-04-12 NOTE — TELEPHONE ENCOUNTER
4/9/18 - Ms. Precious Jordan called back advising that she is unable to get off work for ultrasound breast biopsy that is scheduled for 4/24/18 w/Dr. Pat Peña. I attempted to reschedule with different dates between [address] however her supervisor would not commit to a date until she would evaluate the schedule. I follow up w/Ms. Precious Jordan on her work number this morning and she has not heard from her supervisor advising a date she could be off for biopsy. Ms. Precious Jordan said that she notified her supervisor and informed me that she will call me by the end of the day when she is given the date to schedule a biopsy.

## 2018-04-12 NOTE — TELEPHONE ENCOUNTER
Ms. Kwesi Colbert called me back advising that her supervisor gave her the date of 4/25 and 4/26 to get her breast biopsy scheduled but she needed to return work those dates. Ms. Kwesi Colbert confirmed that she has been communicating this with her supervisor that they are aware this appointment was a breast biopsy. An appointment was made for 4/25/18 @ 9am.  I asked if an earlier appointment for next week of 4/16 - 4/23 would be better for [facility] and Ms. Kwesi Colbert said that those dates were declined by her supervisor.

## 2018-04-23 ENCOUNTER — TELEPHONE (OUTPATIENT)
Dept: MAMMOGRAPHY | Age: 38
End: 2018-04-23

## 2018-04-23 NOTE — TELEPHONE ENCOUNTER
Friday - 4/20/18  Talked w/Ms. Mary Jones and confirmed that her appointment was moved to [facility] for same day, 4/25/18, but to arrive earlier @ 08am for 0830 appointment. Directions, location including address w/suite, parking and contact number were given. Dr. Christean Gowers is aware of MRI report and recommendation for biopsy and gave approval - [facility] aware.

## 2018-04-24 ENCOUNTER — HOSPITAL ENCOUNTER (OUTPATIENT)
Dept: ULTRASOUND IMAGING | Age: 38
Discharge: HOME | End: 2018-04-24
Attending: SURGERY

## 2018-04-25 ENCOUNTER — HOSPITAL ENCOUNTER (OUTPATIENT)
Dept: MAMMOGRAPHY | Age: 38
Discharge: HOME | End: 2018-04-25
Attending: SURGERY
Payer: COMMERCIAL

## 2018-04-25 DIAGNOSIS — N63.20 BREAST MASS, LEFT: ICD-10-CM

## 2018-04-25 PROCEDURE — 88305 TISSUE EXAM BY PATHOLOGIST: CPT | Performed by: RADIOLOGY

## 2018-04-25 PROCEDURE — 74011000250 HC RX REV CODE- 250: Performed by: RADIOLOGY

## 2018-04-25 PROCEDURE — 77030020004 US BX BREAST VAC LT 1ST LESION W/CLIP AND SPECIMEN

## 2018-04-25 PROCEDURE — 76642 ULTRASOUND BREAST LIMITED: CPT

## 2018-04-25 PROCEDURE — 77065 DX MAMMO INCL CAD UNI: CPT

## 2018-04-25 RX ORDER — LIDOCAINE HYDROCHLORIDE AND EPINEPHRINE 10; 10 MG/ML; UG/ML
10 INJECTION, SOLUTION INFILTRATION; PERINEURAL ONCE
Status: COMPLETED | OUTPATIENT
Start: 2018-04-25 | End: 2018-04-25

## 2018-04-25 RX ORDER — LIDOCAINE HYDROCHLORIDE 10 MG/ML
5 INJECTION INFILTRATION; PERINEURAL
Status: COMPLETED | OUTPATIENT
Start: 2018-04-25 | End: 2018-04-25

## 2018-04-25 RX ADMIN — LIDOCAINE HYDROCHLORIDE AND EPINEPHRINE 100 MG: 10; 10 INJECTION, SOLUTION INFILTRATION; PERINEURAL at 09:25

## 2018-04-25 RX ADMIN — LIDOCAINE HYDROCHLORIDE 5 ML: 10 INJECTION, SOLUTION INFILTRATION; PERINEURAL at 09:25

## 2018-04-25 NOTE — DISCHARGE INSTRUCTIONS
Breast Biopsy Discharge Instructions    PAIN CONTROL     You may have mild discomfort; take 1-2 Tylenol every 4-6 hours as needed.  Do not take aspirin containing products or anti-inflammatory medications (Advil, Aleve, Motrin, Ibuprofen, etc.) for 24 hours.  Wearing a comfortable bra for support may help with discomfort. WOUND CARE      A small amount of bleeding or bruising at the biopsy site is normal. Watch for signs and symptoms of infections: skin warm to touch, yellowish drainage from wound, fever or severe pain.  Place an ice pack over the site hourly, 20-30 at a time for a few hours today.  The clear dressing is water resistant (you may shower, but do not allow the dressing to become saturated). You may remove the dressing in 48 hours. The steri-strips (small pieces of tape covering the incision) will fall off on their own in a few days. If the clear dressing irritates your skin or begins to peel off, you may remove it. Remember, if you remove the clear dressing before 48 hours, you should not get the site wet.  If at any point you have EXCESSIVE BLEEDING (saturating the gauze under the clear dressing) OR pain please call:    Daytime 7:30am-5:00pm: [facility] [phone]    After Hours:    [phone] (ask to speak to a radiologist)              or [facility] [phone]    ACTIVITY     Do not participate in any strenuous activities for 24 hours (exercise, sports, housework, etc.).  You may resume work (light duty only) for the first 24 hours.  No heavy lifting with the arm on the affected side of your body. ADDITIONAL INSTRUCTIONS    We will call you with results on Friday April 27 in the afternoon.       YOUR TREATMENT TEAM TODAY WAS    MD: Jorge  RN: Larri Dubin  Radiology Tech: Rhina Downey

## 2018-04-25 NOTE — PROGRESS NOTES
Patient tolerated left breast biopsy well with minimal bleeding. Dressing was applied in the usual fashion and discharge instructions were reviewed with the patient. A written copy was given to the patient as well. Advised patient that pathology results should be available by Friday afternoon and she would receive a call.  [phone]

## 2018-04-26 ENCOUNTER — OFFICE VISIT (OUTPATIENT)
Dept: FAMILY MEDICINE CLINIC | Age: 38
End: 2018-04-26

## 2018-04-26 VITALS
OXYGEN SATURATION: 97 % | DIASTOLIC BLOOD PRESSURE: 86 MMHG | HEART RATE: 87 BPM | SYSTOLIC BLOOD PRESSURE: 127 MMHG | RESPIRATION RATE: 12 BRPM | BODY MASS INDEX: 35.84 KG/M2 | HEIGHT: 63 IN | TEMPERATURE: 97.6 F | WEIGHT: 202.3 LBS

## 2018-04-26 DIAGNOSIS — E66.9 OBESITY, CLASS II, BMI 35-39.9: ICD-10-CM

## 2018-04-26 DIAGNOSIS — G47.33 OSA (OBSTRUCTIVE SLEEP APNEA): ICD-10-CM

## 2018-04-26 DIAGNOSIS — Z72.821 INADEQUATE SLEEP HYGIENE: Primary | ICD-10-CM

## 2018-04-26 DIAGNOSIS — K59.04 CHRONIC IDIOPATHIC CONSTIPATION: ICD-10-CM

## 2018-04-26 DIAGNOSIS — E55.9 VITAMIN D DEFICIENCY: ICD-10-CM

## 2018-04-26 RX ORDER — LANOLIN ALCOHOL/MO/W.PET/CERES
1000 CREAM (GRAM) TOPICAL DAILY
COMMUNITY

## 2018-04-26 RX ORDER — PHENTERMINE HYDROCHLORIDE 15 MG/1
15 CAPSULE ORAL
Qty: 30 CAP | Refills: 0 | Status: SHIPPED | OUTPATIENT
Start: 2018-04-26 | End: 2018-09-12

## 2018-04-26 NOTE — PROGRESS NOTES
Lam Albrecht  Identified pt with two pt identifiers(name and ). Chief Complaint   Patient presents with    Weight Management    Blood Pressure Check    Medication Refill       1. Have you been to the ER, urgent care clinic since your last visit? Hospitalized since your last visit? No    2. Have you seen or consulted any other health care providers outside of the [address] [place] [address] since your last visit? Include any pap smears or colon screening. Yes, had pap smear done at [facility]    In the event something were to happen to you and you were unable to speak on your behalf, do you have an Advance Directive/ Living Will in place stating your wishes? NO    If yes, do we have a copy on file NO    If no, would you like information:   Pt accepted. Medication reconciliation up to date and corrected with patient at this time. Today's provider has been notified of reason for visit, vitals and flowsheets obtained on patients. Reviewed record in preparation for visit, huddled with provider and have obtained necessary documentation.       Health Maintenance Due   Topic    Pneumococcal 19-64 Medium Risk (1 of 1 - PPSV23)    PAP AKA CERVICAL CYTOLOGY        Wt Readings from Last 3 Encounters:   18 202 lb 4.8 oz (91.8 kg)   18 200 lb (90.7 kg)   17 201 lb 6.4 oz (91.4 kg)     Temp Readings from Last 3 Encounters:   17 98.1 °F (36.7 °C) (Oral)   17 98 °F (36.7 °C) (Oral)   10/26/17 98.6 °F (37 °C) (Oral)     BP Readings from Last 3 Encounters:   18 138/85   17 133/83   17 121/84     Pulse Readings from Last 3 Encounters:   18 93   17 91   17 85     Vitals:    18 0752   BP: 127/86   Pulse: 87   Resp: 12   Temp: 97.6 °F (36.4 °C)   TempSrc: Oral   SpO2: 97%   Weight: 202 lb 4.8 oz (91.8 kg)   Height: 5' 3\" (1.6 m)   PainSc:   5   PainLoc: Breast   LMP: 2018         Learning Assessment:  :     Learning Assessment 3/16/2018 9/15/2017 4/25/2016 6/27/2014   PRIMARY LEARNER Patient Patient Patient Patient   HIGHEST LEVEL OF EDUCATION - PRIMARY LEARNER  - - - 2 YEARS OF COLLEGE   BARRIERS PRIMARY LEARNER - - - NONE   PRIMARY LANGUAGE ENGLISH ENGLISH ENGLISH ENGLISH   LEARNER PREFERENCE PRIMARY OTHER (COMMENT) OTHER (COMMENT) OTHER (COMMENT) DEMONSTRATION     - - - READING   ANSWERED BY patient patient patient patient   RELATIONSHIP SELF SELF SELF SELF       Depression Screening:  :     PHQ over the last two weeks 12/14/2017   Little interest or pleasure in doing things Not at all   Feeling down, depressed or hopeless Not at all   Total Score PHQ 2 0       Fall Risk Assessment:  :     Fall Risk Assessment, last 12 mths 4/26/2018   Able to walk? Yes   Fall in past 12 months? No       Abuse Screening:  :     Abuse Screening Questionnaire 4/26/2018   Do you ever feel afraid of your partner? N   Are you in a relationship with someone who physically or mentally threatens you? N   Is it safe for you to go home?  Y       ADL Screening:  :     ADL Assessment 4/26/2018   Feeding yourself No Help Needed   Getting from bed to chair No Help Needed   Getting dressed No Help Needed   Bathing or showering No Help Needed   Walk across the room (includes cane/walker) No Help Needed   Using the telphone No Help Needed   Taking your medications No Help Needed   Preparing meals No Help Needed   Managing money (expenses/bills) No Help Needed   Moderately strenuous housework (laundry) No Help Needed   Shopping for personal items (toiletries/medicines) No Help Needed   Shopping for groceries No Help Needed   Driving No Help Needed   Climbing a flight of stairs No Help Needed   Getting to places beyond walking distances No Help Needed

## 2018-04-26 NOTE — PATIENT INSTRUCTIONS
Learning About Low-Carbohydrate Diets for Weight Loss  What is a low-carbohydrate diet? Low-carb diets avoid foods that are high in carbohydrate. These high-carb foods include pasta, bread, rice, cereal, fruits, and starchy vegetables. Instead, these diets usually have you eat foods that are high in fat and protein. Many people lose weight quickly on a low-carb diet. But the early weight loss is water. People on this diet often gain the weight back after they start eating carbs again. Not all diet plans are safe or work well. A lot of the evidence shows that low-carb diets aren't healthy. That's because these diets often don't include healthy foods like fruits and vegetables. Losing weight safely means balancing protein, fat, and carbs with every meal and snack. And low-carb diets don't always provide the vitamins, minerals, and fiber you need. If you have a serious medical condition, talk to your doctor before you try any diet. These conditions include kidney disease, heart disease, type 2 diabetes, high cholesterol, and high blood pressure. If you are pregnant, it may not be safe for your baby if you are on a low-carb diet. How can you lose weight safely? You might have heard that a diet plan helped another person lose weight. But that doesn't mean that it will work for you. It is very hard to stay on a diet that includes lots of big changes in your eating habits. If you want to get to a healthy weight and stay there, making healthy lifestyle changes will often work better than dieting. These steps can help. · Make a plan for change. Work with your doctor to create a plan that is right for you. · See a dietitian. He or she can show you how to make healthy changes in your eating habits. · Manage stress. If you have a lot of stress in your life, it can be hard to focus on making healthy changes to your daily habits. · Track your food and activity.  You are likely to do better at losing weight if you keep track of what you eat and what you do. Follow-up care is a key part of your treatment and safety. Be sure to make and go to all appointments, and call your doctor if you are having problems. It's also a good idea to know your test results and keep a list of the medicines you take. Where can you learn more? Go to http://weston-karel.info/. Enter A121 in the search box to learn more about \"Learning About Low-Carbohydrate Diets for Weight Loss. \"  Current as of: May 12, 2017  Content Version: 11.4  © 7171-3253 [employer]. Care instructions adapted under license by [employer] (which disclaims liability or warranty for this information). If you have questions about a medical condition or this instruction, always ask your healthcare professional. [address] any warranty or liability for your use of this information. WEIGHT LOSS PLAN    1. Read food labels and count calories. Goal 1927-7882 calories daily for women and 0102-6366 calories daily for men. Achieve this by decreasing caloric intake by 500 calories by weekly increments. NOTE:  1 pound = 3500 calories! Www.loseit. com  Www.myfitnesspal.com  Www.[employer]  Www.Ykonefinder. gov  Weight watchers mobile    Calories needed to lose 1-2 pounds a week = 10 x your weight in pounds    2. Increase water intake. Per [facility], it is important to drink 1/2 your body weight in ounces of water daily. Decrease your water consumption 2-3 hours before bedtime to prevent sleep disturbance from frequent urination. 3.  Decrease sugary beverages. Each can or glass of soda increases your risk of obesity by 60%. Can lose 10 pounds in a month by avoiding any soda.      12 oz can of soda = 140 calories, 16 oz cup of sweet tea = 200 calories    16 oz orange juice = 200 calories, 10 oz apple juice = 150 calories   32 oz sports drink = 200 calories, 16 oz punch = 240 calories   3.5 oz alcohol = 100-150 calories     4. Avoid High Fructose Corn Syrup products. This ingredient makes products highly addictive! 5. Exercise 30 minutes daily 5 days weekly, minimally. If you burn 3500 calories that equals a pound! Use a pedometer to count steps. Visit www. [employer] for a free pedometer and diet recommendations. Your maximum heart rate = 220 - your age    Never exercise at your maximum heart rate. See handout for target heart rate. 6.  Decrease carbohydrates (white bread, pasta, rice, potatoes, sweet foods and sweet drinks like soda, tea, coffee, juice and sports drinks). Increase fiber and protein. Goal:   calories daily of carbohydrates     Try CHROMIUM PICONATE 200 MG THREE TIMES DAILY,    to decrease Premenstrual carbohydrate cravings. 7.  Eat 3-5 small meals daily, include lots of protein (beans/legumes, nuts, lean meat, eggs) and green vegetables with each. (Breakfast, lunch, dinner with 2 healthy snacks)    8. Get proper rest 7-8 hours uninterrupted. When you get less than 6 hours, it triggers hunger by affecting your Grehlin:Leptin ratio and this results in weight gain. 9.  Watch your food portions. Green leafy vegetables should cover 1/2 of the plate, lean meat 1/4 of the plate, starchy vegetable 1/4 of the plate. Use smaller plates. 10.  Do not eat until you are full. Eat until you are no longer hungry. If you are not sure, try drinking a glass of water before getting your second serving of food. 11.  Do not weigh yourself daily. Wait until your next office visit. Use how you feel and  how your clothes fit as measurements of success. 12.  Address your spirituality to draw strength from above during your journey. Remember \"I am fearfully and wonderfully made. Marvelous in His eyes. \"    13.   Set realistic, appropriate and achievable weight loss goals:     RECOMMENDED TARGET WEIGHT LOSS:  Initial weight loss of 5-10% of your initial body weight achieved over 6 months or a decrease of 2 BMI units. MINIMUM GOAL OF WEIGHT LOSS:  Reduce body weight and maintain a lower body weight. Prevent weight gain. RELATED WEBSITES:      Www.obesityaction. org  (and consider joining the Ob[employer] for $25/year. The [facility] mission is to elevated and empower those affected by obesity through education, advocacy and support. Quarterly journals included in membership fee. )    Www.[employer]  www.[employer]     RELATED DIETS:  Dr. Cruz [place] Weight loss challenge, Mediterranean diet, [address] [place] [address] Watchers, Paleo Diet (anti-inflammatory diet)      EXERCISE 150 MINUTES WEEKLY. THIS CAN BE ACHIEVED BY WORKING OUT OR WALKING A MINIMUM OF 30 MINUTES FOR 5 DAYS WEEKLY. YOU CAN EXERCISE IN INCREMENTS OF 10-15 MINUTES UP TO 3 TIMES A DAY. Consider performing \"brainless exercise. \"  Choose your favorite tv program.  Five minutes before and for 5 minutes after the tv program, stretch your body. While the program is on, walk in place watching the show. When commercials come on, rest or walk around the house to do other things. When the program begins, return to walking in place. When you are able keep walking during the commercials and add light weights to your ankles or hands. By the end of the show, you would have walked 30 minutes. If you need shorter spurts of exercise, walk during the commercials and rest during the show. Drink to glasses of water prior to any exercise to prevent dehydration and to improve the results of the work out. Your RESTING HEART RATE is the number of times your heart beats per minute when you are not exerting yourself. The more fit you are, the lower your resting heart rate will be. Your MAXIMUM HEART RATE is the number of times per minute your heart pumps when it is working at 100% capacity. NEVER EXERCISE AT YOUR MAXIMUM HEART RATE.     MAX HEART RATE = 220 - your age    For example, in a 48year old, the maximum heart rate is 220-50 = 170 beats per minute    Your TARGET HEART RATE is the number of beats per minute our heart should pump during aerobic exercise. Reaching your target heart rate indicates that your body is receiving maximum cardiovascular and fat burning benefits. If you are fit, your TARGET HEART RATE = 70-85% of your maximum Heart rate      For a 48year old with vigorous intensity physical activity,         Target heart rate= 170 bpm x .70 = 119 bpm     Target heart rate = 170 bpm x .85=  145 bpm        Therefore, your target heart rate during physical activity is 119-145      If you are not fit, TARGET HEART RATE = 50-70% of your maximum Heart rate    MODERATE CONSISTENT AEROBIC EXERCISE OR WALKING FOR AT LEAST 150 MINUTES WEEKLY IS AN ESSENTIAL PART OF ANY WEIGHT LOSS OF WEIGHT MAINTENANCE PROGRAM.     During WEIGHT LOSS PHASE, at least 75% of your exercise needs to be walking or moderate aerobic activity and 25% or 0% can be Isometric or Resistance type exercises (the latter can be deferred to the weight maintenance phase.)     During WEIGHT MAINTENANCE PHASE, at least 50% of your exercise needs to be aerobic and 50% Isometric or Resistance type exercises. BENEFITS OF MODERATE INTENSITY EXERCISE MOST DAYS OF THE WEEK:     1. Insulin Resistance improves 30-85%   2. Abdominal Fat decreases by 30%   3. Inflammatory Markers decrease by 30% (therefore pain decreases)   4. Systolic and Diastolic Blood Pressure decrease by 4 mmHg   5. HDL improves by 5%   6. Triglycerides decrease by 15%   7. Shift from small dense LDL to large dense LDL (therefore decrease Insulin Resistance)   8. Decrease coagulability                  Learning About Low-Carbohydrate Diets for Weight Loss  What is a low-carbohydrate diet? Low-carb diets avoid foods that are high in carbohydrate.  These high-carb foods include pasta, bread, rice, cereal, fruits, and starchy vegetables. Instead, these diets usually have you eat foods that are high in fat and protein. Many people lose weight quickly on a low-carb diet. But the early weight loss is water. People on this diet often gain the weight back after they start eating carbs again. Not all diet plans are safe or work well. A lot of the evidence shows that low-carb diets aren't healthy. That's because these diets often don't include healthy foods like fruits and vegetables. Losing weight safely means balancing protein, fat, and carbs with every meal and snack. And low-carb diets don't always provide the vitamins, minerals, and fiber you need. If you have a serious medical condition, talk to your doctor before you try any diet. These conditions include kidney disease, heart disease, type 2 diabetes, high cholesterol, and high blood pressure. If you are pregnant, it may not be safe for your baby if you are on a low-carb diet. How can you lose weight safely? You might have heard that a diet plan helped another person lose weight. But that doesn't mean that it will work for you. It is very hard to stay on a diet that includes lots of big changes in your eating habits. If you want to get to a healthy weight and stay there, making healthy lifestyle changes will often work better than dieting. These steps can help. · Make a plan for change. Work with your doctor to create a plan that is right for you. · See a dietitian. He or she can show you how to make healthy changes in your eating habits. · Manage stress. If you have a lot of stress in your life, it can be hard to focus on making healthy changes to your daily habits. · Track your food and activity. You are likely to do better at losing weight if you keep track of what you eat and what you do. Follow-up care is a key part of your treatment and safety. Be sure to make and go to all appointments, and call your doctor if you are having problems.  It's also a good idea to know your test results and keep a list of the medicines you take. Where can you learn more? Go to http://weston-karel.info/. Enter A121 in the search box to learn more about \"Learning About Low-Carbohydrate Diets for Weight Loss. \"  Current as of: May 12, 2017  Content Version: 11.4  © 0765-3726 [employer], [employer]. Care instructions adapted under license by [employer] (which disclaims liability or warranty for this information). If you have questions about a medical condition or this instruction, always ask your healthcare professional. [address] any warranty or liability for your use of this information.

## 2018-04-26 NOTE — MR AVS SNAPSHOT
[facility] 
 
 
 [address] 
[address] [place] [address] 
[phone] Patient: Amina Canchola MRN: [id number] Visit Information Date & Time Provider Department Dept. Phone Encounter #  
 4/26/2018  7:45 AM Marella Client, DO [facility] [phone] [id number] Your Appointments 5/22/2018  7:45 AM  
Any with Marella Client, DO [facility] ([facility] [place]) Appt Note: 1 [place] [facility]  
 [address] 
[address] [address]  
[phone]  
  
   
 [address]  
  
    
 6/19/2018  7:45 AM  
Any with Marella Client, DO [facility] ([facility] [place]) Appt Note: 1 [place] Weight & BP  
 [address] 
[address] [place] [address]  
[phone]  
  
    
 9/19/2018  1:30 PM  
Follow Up [facility] MD Malik Sin [facility] Appt Note: 6 MONTH FOLLOW UP/ CP$30 3-16-18 [address] [address]  
[address] Upcoming Health Maintenance Date Due Pneumococcal 19-64 Medium Risk (1 of 1 - PPSV23) 8/24/2018* PAP AKA CERVICAL CYTOLOGY 10/26/2018* DTaP/Tdap/Td series (1 - Tdap) 9/30/2021* *Topic was postponed. The date shown is not the original due date. Allergies as of 4/26/2018  Review Complete On: 4/26/2018 By: Marella Client, DO Severity Noted Reaction Type Reactions Skelaxin [Metaxalone]  11/25/2014    Other (comments) Too groggy Current Immunizations  Reviewed on 11/28/2017 Name Date Hep B Vaccine 3/10/2014 Influenza Vaccine 9/21/2017 TB Skin Test (PPD) 9/19/2017 Not reviewed this visit You Were Diagnosed With   
  
 Codes Comments Inadequate sleep hygiene    -  Primary ICD-10-CM: X15.071 ICD-9-CM: 307.49   
 YUNIER (obstructive sleep apnea)     ICD-10-CM: G47.33 
ICD-9-CM: 327.23 stable without CPAP x 1 year Vitamin D deficiency     ICD-10-CM: E55.9 ICD-9-CM: 268.9 Obesity, Class II, BMI 35-39.9     ICD-10-CM: E66.9 ICD-9-CM: 278.00 Chronic idiopathic constipation     ICD-10-CM: K59.04 
ICD-9-CM: 564.00 resolved Vitals BP Pulse Temp Resp Height(growth percentile) Weight(growth percentile) 127/86 (BP 1 Location: Right arm, BP Patient Position: Sitting) 87 97.6 °F (36.4 °C) (Oral) 12 5' 3\" (1.6 m) 202 lb 4.8 oz (91.8 kg) LMP SpO2 BMI OB Status Smoking Status 04/24/2018 (Exact Date) 97% 35.84 kg/m2 Having regular periods Passive Smoke Exposure - Never Smoker Vitals History BMI and BSA Data Body Mass Index Body Surface Area  
 35.84 kg/m 2 2.02 m 2 Preferred Pharmacy Pharmacy Name Phone [address] [place] [address] Bhumi Botello Said [phone] Your Updated Medication List  
  
   
This list is accurate as of 4/26/18  8:13 AM.  Always use your most recent med list.  
  
  
  
  
 albuterol 90 mcg/actuation inhaler Commonly known as:  PROVENTIL HFA, VENTOLIN HFA, PROAIR HFA Take 2 puffs by inhalation every four (4) hours as needed for Wheezing or Shortness of Breath. BIOTIN PO Take  by mouth. cholecalciferol 1,000 unit tablet Commonly known as:  VITAMIN D3 Take  by mouth daily. esomeprazole 40 mg capsule Commonly known as:  NexIUM Take one capsule by mouth daily for Heartburn  Indications: HEARTBURN phentermine 15 mg capsule Commonly known as:  ADIPEX_P Take 1 Cap by mouth every morning. Max Daily Amount: 15 mg. VITAMIN B-12 1,000 mcg tablet Generic drug:  cyanocobalamin Take 1,000 mcg by mouth daily. VITAMIN C PO Take  by mouth. Prescriptions Printed  Refills  
 phentermine (ADIPEX_P) 15 mg capsule 0  
 Sig: Take 1 Cap by mouth every morning. Max Daily Amount: 15 mg.  
 Class: Print Route: Oral  
  
Patient Instructions Learning About Low-Carbohydrate Diets for Weight Loss What is a low-carbohydrate diet? Low-carb diets avoid foods that are high in carbohydrate. These high-carb foods include pasta, bread, rice, cereal, fruits, and starchy vegetables. Instead, these diets usually have you eat foods that are high in fat and protein. Many people lose weight quickly on a low-carb diet. But the early weight loss is water. People on this diet often gain the weight back after they start eating carbs again. Not all diet plans are safe or work well. A lot of the evidence shows that low-carb diets aren't healthy. That's because these diets often don't include healthy foods like fruits and vegetables. Losing weight safely means balancing protein, fat, and carbs with every meal and snack. And low-carb diets don't always provide the vitamins, minerals, and fiber you need. If you have a serious medical condition, talk to your doctor before you try any diet. These conditions include kidney disease, heart disease, type 2 diabetes, high cholesterol, and high blood pressure. If you are pregnant, it may not be safe for your baby if you are on a low-carb diet. How can you lose weight safely? You might have heard that a diet plan helped another person lose weight. But that doesn't mean that it will work for you. It is very hard to stay on a diet that includes lots of big changes in your eating habits. If you want to get to a healthy weight and stay there, making healthy lifestyle changes will often work better than dieting. These steps can help. · Make a plan for change. Work with your doctor to create a plan that is right for you. · See a dietitian. He or she can show you how to make healthy changes in your eating habits. · Manage stress.  If you have a lot of stress in your life, it can be hard to focus on making healthy changes to your daily habits. · Track your food and activity. You are likely to do better at losing weight if you keep track of what you eat and what you do. Follow-up care is a key part of your treatment and safety. Be sure to make and go to all appointments, and call your doctor if you are having problems. It's also a good idea to know your test results and keep a list of the medicines you take. Where can you learn more? Go to http://weston-karel.info/. Enter A121 in the search box to learn more about \"Learning About Low-Carbohydrate Diets for Weight Loss. \" Current as of: May 12, 2017 Content Version: 11.4 © 9416-4006 [employer]. Care instructions adapted under license by [employer] (which disclaims liability or warranty for this information). If you have questions about a medical condition or this instruction, always ask your healthcare professional. [place] [address] any warranty or liability for your use of this information. WEIGHT LOSS PLAN 1. Read food labels and count calories. Goal 1917-4820 calories daily for women and 7045-7626 calories daily for men. Achieve this by decreasing caloric intake by 500 calories by weekly increments. NOTE:  1 pound = 3500 calories! Www.loseit. com Www.myClinversenesspal.com Www.Centrobit Agora Www.healthfinder. gov Weight watchers mobile Calories needed to lose 1-2 pounds a week = 10 x your weight in pounds 2. Increase water intake. Per [facility], it is important to drink 1/2 your body weight in ounces of water daily. Decrease your water consumption 2-3 hours before bedtime to prevent sleep disturbance from frequent urination. 3.  Decrease sugary beverages. Each can or glass of soda increases your risk of obesity by 60%. Can lose 10 pounds in a month by avoiding any soda. 12 oz can of soda = 140 calories, 16 oz cup of sweet tea = 200 calories 16 oz orange juice = 200 calories, 10 oz apple juice = 150 calories 32 oz sports drink = 200 calories, 16 oz punch = 240 calories 3.5 oz alcohol = 100-150 calories 4. Avoid High Fructose Corn Syrup products. This ingredient makes products highly addictive! 5. Exercise 30 minutes daily 5 days weekly, minimally. If you burn 3500 calories that equals a pound! Use a pedometer to count steps. Visit www. Uepaa for a free pedometer and diet recommendations. Your maximum heart rate = 220 - your age Never exercise at your maximum heart rate. See handout for target heart rate. 6.  Decrease carbohydrates (white bread, pasta, rice, potatoes, sweet foods and sweet drinks like soda, tea, coffee, juice and sports drinks). Increase fiber and protein. Goal:   calories daily of carbohydrates Try CHROMIUM PICONATE 200 MG THREE TIMES DAILY,  
 to decrease Premenstrual carbohydrate cravings. 7.  Eat 3-5 small meals daily, include lots of protein (beans/legumes, nuts, lean meat, eggs) and green vegetables with each. (Breakfast, lunch, dinner with 2 healthy snacks) 8. Get proper rest 7-8 hours uninterrupted. When you get less than 6 hours, it triggers hunger by affecting your Grehlin:Leptin ratio and this results in weight gain. 9.  Watch your food portions. Green leafy vegetables should cover 1/2 of the plate, lean meat 1/4 of the plate, starchy vegetable 1/4 of the plate. Use smaller plates. 10.  Do not eat until you are full. Eat until you are no longer hungry. If you are not sure, try drinking a glass of water before getting your second serving of food. 11.  Do not weigh yourself daily. Wait until your next office visit. Use how you feel and  how your clothes fit as measurements of success.  
 
12.  Address your spirituality to draw strength from above during your journey. Remember \"I am fearfully and wonderfully made. Marvelous in His eyes. \" 
 
13. Set realistic, appropriate and achievable weight loss goals: 
 
 RECOMMENDED TARGET WEIGHT LOSS:  Initial weight loss of 5-10% of your initial body weight achieved over 6 months or a decrease of 2 BMI units. MINIMUM GOAL OF WEIGHT LOSS:  Reduce body weight and maintain a lower body weight. Prevent weight gain. RELATED WEBSITES:     
Www.obesityaction. org 
(and consider joining the Obesity Action Coalition for $25/year. The [address] [employer] mission is to elevated and empower those affected by obesity through education, advocacy and support. Quarterly journals included in membership fee. ) Www.Agworld Pty Ltd 
www.Chirpify.[employer]River Technology Solutions RELATED DIETS:  Dr. Rosey Almaguer Weight loss challenge, Mediterranean diet, [place] Diet, [employer] AS Watchers, Paleo Diet (anti-inflammatory diet) EXERCISE 150 MINUTES WEEKLY. THIS CAN BE ACHIEVED BY WORKING OUT OR WALKING A MINIMUM OF 30 MINUTES FOR 5 DAYS WEEKLY. YOU CAN EXERCISE IN INCREMENTS OF 10-15 MINUTES UP TO 3 TIMES A DAY. Consider performing \"brainless exercise. \"  Choose your favorite tv program.  Five minutes before and for 5 minutes after the tv program, stretch your body. While the program is on, walk in place watching the show. When commercials come on, rest or walk around the house to do other things. When the program begins, return to walking in place. When you are able keep walking during the commercials and add light weights to your ankles or hands. By the end of the show, you would have walked 30 minutes. If you need shorter spurts of exercise, walk during the commercials and rest during the show. Drink to glasses of water prior to any exercise to prevent dehydration and to improve the results of the work out.    
 
Your RESTING HEART RATE is the number of times your heart beats per minute when you are not exerting yourself. The more fit you are, the lower your resting heart rate will be. Your MAXIMUM HEART RATE is the number of times per minute your heart pumps when it is working at 100% capacity. NEVER EXERCISE AT YOUR MAXIMUM HEART RATE. MAX HEART RATE = 220 - your age For example, in a 48year old, the maximum heart rate is 220-50 = 170 beats per minute Your TARGET HEART RATE is the number of beats per minute our heart should pump during aerobic exercise. Reaching your target heart rate indicates that your body is receiving maximum cardiovascular and fat burning benefits. If you are fit, your TARGET HEART RATE = 70-85% of your maximum Heart rate For a 48year old with vigorous intensity physical activity, Target heart rate= 170 bpm x .70 = 119 bpm 
   Target heart rate = 170 bpm x .85=  145 bpm 
 
    Therefore, your target heart rate during physical activity is 119-145 If you are not fit, TARGET HEART RATE = 50-70% of your maximum Heart rate MODERATE CONSISTENT AEROBIC EXERCISE OR WALKING FOR AT LEAST 150 MINUTES WEEKLY IS AN ESSENTIAL PART OF ANY WEIGHT LOSS OF WEIGHT MAINTENANCE PROGRAM. During WEIGHT LOSS PHASE, at least 75% of your exercise needs to be walking or moderate aerobic activity and 25% or 0% can be Isometric or Resistance type exercises (the latter can be deferred to the weight maintenance phase.) During WEIGHT MAINTENANCE PHASE, at least 50% of your exercise needs to be aerobic and 50% Isometric or Resistance type exercises. BENEFITS OF MODERATE INTENSITY EXERCISE MOST DAYS OF THE WEEK: 
 
 1. Insulin Resistance improves 30-85% 2. Abdominal Fat decreases by 30% 3. Inflammatory Markers decrease by 30% (therefore pain decreases) 4. Systolic and Diastolic Blood Pressure decrease by 4 mmHg 5. HDL improves by 5% 6. Triglycerides decrease by 15% 7.   Shift from small dense LDL to large dense LDL (therefore decrease Insulin Resistance) 8. Decrease coagulability Learning About Low-Carbohydrate Diets for Weight Loss What is a low-carbohydrate diet? Low-carb diets avoid foods that are high in carbohydrate. These high-carb foods include pasta, bread, rice, cereal, fruits, and starchy vegetables. Instead, these diets usually have you eat foods that are high in fat and protein. Many people lose weight quickly on a low-carb diet. But the early weight loss is water. People on this diet often gain the weight back after they start eating carbs again. Not all diet plans are safe or work well. A lot of the evidence shows that low-carb diets aren't healthy. That's because these diets often don't include healthy foods like fruits and vegetables. Losing weight safely means balancing protein, fat, and carbs with every meal and snack. And low-carb diets don't always provide the vitamins, minerals, and fiber you need. If you have a serious medical condition, talk to your doctor before you try any diet. These conditions include kidney disease, heart disease, type 2 diabetes, high cholesterol, and high blood pressure. If you are pregnant, it may not be safe for your baby if you are on a low-carb diet. How can you lose weight safely? You might have heard that a diet plan helped another person lose weight. But that doesn't mean that it will work for you. It is very hard to stay on a diet that includes lots of big changes in your eating habits. If you want to get to a healthy weight and stay there, making healthy lifestyle changes will often work better than dieting. These steps can help. · Make a plan for change. Work with your doctor to create a plan that is right for you. · See a dietitian. He or she can show you how to make healthy changes in your eating habits. · Manage stress. If you have a lot of stress in your life, it can be hard to focus on making healthy changes to your daily habits. · Track your food and activity. You are likely to do better at losing weight if you keep track of what you eat and what you do. Follow-up care is a key part of your treatment and safety. Be sure to make and go to all appointments, and call your doctor if you are having problems. It's also a good idea to know your test results and keep a list of the medicines you take. Where can you learn more? Go to http://weston-karel.info/. Enter A121 in the search box to learn more about \"Learning About Low-Carbohydrate Diets for Weight Loss. \" Current as of: May 12, 2017 Content Version: 11.4 © 6829-1646 [employer]. Care instructions adapted under license by [employer] (which disclaims liability or warranty for this information). If you have questions about a medical condition or this instruction, always ask your healthcare professional. [address] any warranty or liability for your use of this information. Introducing [facility]! Dear Ryan Beverly: Thank you for requesting a Sharetivity account. Our records indicate that you already have an active Sharetivity account. You can access your account anytime at [email or web address] Did you know that you can access your hospital and ER discharge instructions at any time in Sharetivity? You can also review all of your test results from your hospital stay or ER visit. Additional Information If you have questions, please visit the Frequently Asked Questions section of the Sharetivity website at [email or web address] Remember, Sharetivity is NOT to be used for urgent needs. For medical emergencies, dial 911. Now available from your iPhone and Android! Please provide this summary of care documentation to your next provider. Your primary care clinician is listed as Holger Al.  If you have any questions after today's visit, please call [phone].

## 2018-04-26 NOTE — PROGRESS NOTES
HISTORY OF PRESENT ILLNESS  Chavez Aguilera is a 45 y.o. female presents with Weight Management; Blood Pressure Check; and Medication Refill      Agree with nurse note. Pt with vit d deficiency presents to the office with a BP of 127/86. She tolerated Phentermine 15 mg well, month 1 of 3. Last prescribed on 11/28/17. She has been out of the medication x months. Breakfast is honey nut [employer]. Lunch is [employer]. Dinner is steak with potato and broccoli. She gained 3 pounds since the last visit. Percent body fat has changed from 41.6% to 36.4%, waist circumference measurement has changed from 36.5\" to 36\". Overall, patient is pleased and requests a refill of the medication today. Pt with chronic idiopathic constipation. She has not been constipated recently and is unsure what helped to resolve it. Pt with YUNIER. She has been off CPAP x1+ year. She averages 6 hours of sleep nightly. Patient denies fatigue, chest pain, heart palpitations, nausea, dry mouth, signs of depression. Written by tali Hong, as dictated by Dr. Sanket Richter DO.    ROS    Review of Systems negative except as noted above in HPI. ALLERGIES:    Allergies   Allergen Reactions    Skelaxin [Metaxalone] Other (comments)     Too groggy       CURRENT MEDICATIONS:    Outpatient Prescriptions Marked as Taking for the 4/26/18 encounter (Office Visit) with Nilam Hart DO   Medication Sig Dispense Refill    cyanocobalamin (VITAMIN B-12) 1,000 mcg tablet Take 1,000 mcg by mouth daily.  phentermine (ADIPEX_P) 15 mg capsule Take 1 Cap by mouth every morning. Max Daily Amount: 15 mg. 30 Cap 0    ASCORBATE CALCIUM (VITAMIN C PO) Take  by mouth.  BIOTIN PO Take  by mouth.  cholecalciferol (VITAMIN D3) 1,000 unit tablet Take  by mouth daily.       esomeprazole (NEXIUM) 40 mg capsule Take one capsule by mouth daily for Heartburn  Indications: HEARTBURN 30 Cap 5    albuterol (PROVENTIL HFA, VENTOLIN HFA, PROAIR HFA) 90 mcg/actuation inhaler Take 2 puffs by inhalation every four (4) hours as needed for Wheezing or Shortness of Breath. 1 Inhaler 1       PAST MEDICAL HISTORY:    Past Medical History:   Diagnosis Date    Abnormal Pap smear of cervix 1990s    Breast cyst     benign. Left. 1.7 cm and multiple others. Dr. Neno Aguayo Chicken pox childhood    Chronic idiopathic constipation 2015    Dr. Allison Meza.  HELM (dyspnea on exertion) 2015    Dr. Wenceslao Schilling EBV positive mononucleosis syndrome 10/2013,     chronic or reactivated    Enlarged thyroid 2010, 2015    with Right tracheal deviation. Retrosternal goiter. Dr. Monty Winn.  Enlarged tonsils and adenoids 2010    Iodine-deficiency related goiter     Left. Dr. Monty Winn.  YUNIER (obstructive sleep apnea) 02/25/15    Dr. Meche Barros, uses cpap. Dr. Aram Church.  Thyroid nodule 2014    Dr. Wendi Tolentino. Dr. Valdes Red HISTORY:    Past Surgical History:   Procedure Laterality Date    HX BREAST BIOPSY Left 2018    due to mass     HX  SECTION  2004    [facility].  HX CYST INCISION AND DRAINAGE Left 2016    benign. Dr. Nguyen Daily Right 09/15/2017    benign. Dr. Laura Krishnamurthy.  HX HERNIA REPAIR  4707    umbilical.    HX OTHER SURGICAL  2014    thyroid biopsy. Dr. Wendi Tolentino.  HX THYROIDECTOMY Left 2015    due to [address] Dr. Monty Winn. benign. FAMILY HISTORY:    Family History   Problem Relation Age of Onset    Hypertension Mother     Lung Disease Mother      chronic bronchitis    Sleep Apnea Mother     Hypertension Father     Diabetes Paternal Grandmother     Breast Cancer Maternal Aunt     Other Sister      severe scoliosis. txd with back brace.     Breast Cancer Maternal Aunt     Breast Cancer Maternal Aunt        SOCIAL HISTORY:    Social History Social History    Marital status: SINGLE     Spouse name: N/A    Number of children: 1    Years of education: N/A     Occupational History     [employer]     Social History Main Topics    Smoking status: Passive Smoke Exposure - Never Smoker    Smokeless tobacco: Never Used      Comment: lives with a smoker dad x 18 yrs and now with smoker boyfriend x 2 years    Alcohol use No    Drug use: No    Sexual activity: Yes     Partners: Male     Birth control/ protection: None     Other Topics Concern    None     Social History Narrative    ** Merged History Encounter **            IMMUNIZATIONS:    Immunization History   Administered Date(s) Administered    Hep B Vaccine 03/10/2014    Influenza Vaccine 09/21/2017    TB Skin Test (PPD) 09/19/2017       PHYSICAL EXAMINATION    Vital Signs    Visit Vitals    /86 (BP 1 Location: Right arm, BP Patient Position: Sitting)    Pulse 87    Temp 97.6 °F (36.4 °C) (Oral)    Resp 12    Ht 5' 3\" (1.6 m)    Wt 202 lb 4.8 oz (91.8 kg)    LMP 04/24/2018 (Exact Date)    SpO2 97%    BMI 35.84 kg/m2       Weight Metrics 4/26/2018 4/26/2018 3/16/2018 12/14/2017 11/28/2017 11/28/2017 10/26/2017   Weight - 202 lb 4.8 oz 200 lb 201 lb 6.4 oz - 199 lb 200 lb   Waist Measure Inches 36 - - - 36.5 - -   Body Fat % 36.4 - - - 41.6 - -   BMI - 35.84 kg/m2 35.43 kg/m2 35.68 kg/m2 - 35.25 kg/m2 35.43 kg/m2       General appearance - Well nourished. Well appearing. Well developed. No acute distress. Obese. Head - Normocephalic. Atraumatic. Eyes - pupils equal and reactive, extraocular eye movements intact, sclera anicteric  Ears - Hearing is grossly normal bilaterally. Nose - normal and patent, no erythema, discharge or polyps   Mouth - mucous membranes moist, pharynx normal with cobblestone appearance. No erythema, white exudate or obstruction. Neck - supple. Midline trachea. No carotid bruits are noted. No thyromegaly noted.   Chest - clear to auscultation bilaterally anterriorly and posteriorly. No wheezes, rales or rhonchi. Breath sounds are symmetrical and unlabored bilaterally. Heart - normal rate. Regular rhythm, normal S1, S2. No murmur. No rubs, clicks or gallops noted. Abdomen - soft and distended. No masses or organomegaly. No rebound, rigidity or guarding. Bowel sounds normal x 4 quadrants. No tenderness noted. Neurological - awake, alert and oriented to person, place, and time and event. Cranial nerves II through XII intact. Muscle strength is +5/5 x 4 extremities. Sensation is intact to light touch bilaterally. Steady gait. Heme/Lymph - peripheral pulses normal x 4 extremities. No peripheral edema is noted. No cervical adenopathy noted. Skin - no rashes, erythema, ecchymosis, lacerations, abrasions, suspicious moles noted. No skin tags. No acanthosis nigricans noted in the axilla or neck. Psychological -   normal behavior, speech, dress and thought processes. Good insight. Good eye contact. Normal affect. Appropriate mood. DATA REVIEWED  Lab Results   Component Value Date/Time    WBC 6.4 10/26/2017 10:22 AM    Hemoglobin (POC) 12.3 06/08/2015 11:44 AM    HGB 11.9 10/26/2017 10:22 AM    HCT 36.1 10/26/2017 10:22 AM    PLATELET 034 (H) 47/13/9727 10:22 AM    MCV 81 10/26/2017 10:22 AM     Lab Results   Component Value Date/Time    Sodium 140 10/26/2017 10:22 AM    Potassium 4.6 10/26/2017 10:22 AM    Chloride 102 10/26/2017 10:22 AM    CO2 27 10/26/2017 10:22 AM    Anion gap 2 (L) 09/18/2010 02:50 AM    Glucose 102 (H) 10/26/2017 10:22 AM    BUN 10 10/26/2017 10:22 AM    Creatinine 0.69 10/26/2017 10:22 AM    BUN/Creatinine ratio 14 10/26/2017 10:22 AM    GFR est  10/26/2017 10:22 AM    GFR est non- 10/26/2017 10:22 AM    Calcium 9.4 10/26/2017 10:22 AM    Bilirubin, total 0.4 10/26/2017 10:22 AM    AST (SGOT) 10 10/26/2017 10:22 AM    Alk.  phosphatase 84 10/26/2017 10:22 AM    Protein, total 6.9 10/26/2017 10:22 AM    Albumin 4.0 10/26/2017 10:22 AM    Globulin 3.7 09/18/2010 02:50 AM    A-G Ratio 1.4 10/26/2017 10:22 AM    ALT (SGPT) 9 10/26/2017 10:22 AM     Lab Results   Component Value Date/Time    Cholesterol, total 114 10/26/2017 10:22 AM    HDL Cholesterol 44 10/26/2017 10:22 AM    LDL, calculated 61 10/26/2017 10:22 AM    VLDL, calculated 9 10/26/2017 10:22 AM    Triglyceride 47 10/26/2017 10:22 AM     Lab Results   Component Value Date/Time    VITAMIN D, 25-HYDROXY 27.5 (L) 10/26/2017 10:22 AM       Lab Results   Component Value Date/Time    Hemoglobin A1c 5.5 10/26/2017 10:22 AM     Lab Results   Component Value Date/Time    TSH 1.770 10/26/2017 10:22 AM       ASSESSMENT and PLAN    ICD-10-CM ICD-9-CM    1. Inadequate sleep hygiene Z72.821 307.49    2. YUNIER (obstructive sleep apnea) G47.33 327.23     stable without CPAP x 1 year   3. Vitamin D deficiency E55.9 268.9    4. Obesity, Class II, BMI 35-39.9 E66.9 278.00 phentermine (ADIPEX_P) 15 mg capsule   5. Chronic idiopathic constipation K59.04 564.00     resolved       Continue current medications and care. Prescriptions written and given to patient (Phentermine 15 mg, month 1 of 3.)  Medication side effects discussed. [facility]  reviewed and pt is compliant. Discussed the patient's BMI with her. The BMI follow up plan is as follows: I have counseled this patient on diet and exercise regimens. Diet recommendations:  Decrease carbohydrates (white foods including flour, white bread, white rice, white pasta, white potatoes; corn, sweet foods, sweet drinks and alcohol), increase green leafy vegetables and protein with each meal.  Avoid fried foods. Eat 3-5 small meals daily. Do not skip meals. Ok to use meal replacements up to twice daily with protein goal of 60 mg per meal.   Recommend OTC Premiere Protein bars or shakes. Increase water intake.   Increase physical activity to 30 minutes daily for health benefit or 60 minutes daily to prevent weight regain, as tolerated. Physical Activity prescription:  A goal of 30 minutes physical activity daily is recommended for health benefit and at least 60 minutes daily to prevent weight regain. For weight loss, no less than 75% needs to be aerobic (i.e. Walking) and no more than 25% resistance exercising (i.e. Weight lifting). For weight maintenance phase, 50% aerobic and 50% resistance exercises. Sleep prescription:    A goal of 7-8 hours of uninterrupted sleep is recommended to turn off the Grehlin hormone to be released from the stomach and triggers appetite while promoting weight gain. Proper rest turns on Leptin hormone to be released from white adipose tissue and promotes weight loss. Counseled patient on: weight loss goals, emphasizing a 5-10% weight loss in 6-12 months and strategies. Relevant handouts given and discussed with patient. Immunizations noted. Praised pt for weight loss efforts and progress. Patient was offered a choice/choices in the treatment plan today. Patient expresses understanding of the plan and agrees with recommendations. Follow-up Disposition:  Return in about 1 month (around 5/26/2018) for bp, weight, med refill. Written by tali Giron, as dictated by Dr. Jennifer Marina DO. Documentation True and Accepted by Hayes Gómez. Patient Instructions        Learning About Low-Carbohydrate Diets for Weight Loss  What is a low-carbohydrate diet? Low-carb diets avoid foods that are high in carbohydrate. These high-carb foods include pasta, bread, rice, cereal, fruits, and starchy vegetables. Instead, these diets usually have you eat foods that are high in fat and protein. Many people lose weight quickly on a low-carb diet. But the early weight loss is water. People on this diet often gain the weight back after they start eating carbs again. Not all diet plans are safe or work well.  A lot of the evidence shows that low-carb diets aren't healthy. That's because these diets often don't include healthy foods like fruits and vegetables. Losing weight safely means balancing protein, fat, and carbs with every meal and snack. And low-carb diets don't always provide the vitamins, minerals, and fiber you need. If you have a serious medical condition, talk to your doctor before you try any diet. These conditions include kidney disease, heart disease, type 2 diabetes, high cholesterol, and high blood pressure. If you are pregnant, it may not be safe for your baby if you are on a low-carb diet. How can you lose weight safely? You might have heard that a diet plan helped another person lose weight. But that doesn't mean that it will work for you. It is very hard to stay on a diet that includes lots of big changes in your eating habits. If you want to get to a healthy weight and stay there, making healthy lifestyle changes will often work better than dieting. These steps can help. · Make a plan for change. Work with your doctor to create a plan that is right for you. · See a dietitian. He or she can show you how to make healthy changes in your eating habits. · Manage stress. If you have a lot of stress in your life, it can be hard to focus on making healthy changes to your daily habits. · Track your food and activity. You are likely to do better at losing weight if you keep track of what you eat and what you do. Follow-up care is a key part of your treatment and safety. Be sure to make and go to all appointments, and call your doctor if you are having problems. It's also a good idea to know your test results and keep a list of the medicines you take. Where can you learn more? Go to http://weston-karel.info/. Enter A121 in the search box to learn more about \"Learning About Low-Carbohydrate Diets for Weight Loss. \"  Current as of: May 12, 2017  Content Version: 11.4  © 9483-2020 Healthwise, Incorporated.  Care instructions adapted under license by [employer] (which disclaims liability or warranty for this information). If you have questions about a medical condition or this instruction, always ask your healthcare professional. [address] any warranty or liability for your use of this information. WEIGHT LOSS PLAN    1. Read food labels and count calories. Goal 3089-3046 calories daily for women and 8161-2173 calories daily for men. Achieve this by decreasing caloric intake by 500 calories by weekly increments. NOTE:  1 pound = 3500 calories! Www.loseit. Mobivity  Www.[employer].nesspal.Mobivity  Www.[employer]riaplex. Mobivity  Www.CrowdZone. gov  Weight watchers mobile    Calories needed to lose 1-2 pounds a week = 10 x your weight in pounds    2. Increase water intake. Per [facility], it is important to drink 1/2 your body weight in ounces of water daily. Decrease your water consumption 2-3 hours before bedtime to prevent sleep disturbance from frequent urination. 3.  Decrease sugary beverages. Each can or glass of soda increases your risk of obesity by 60%. Can lose 10 pounds in a month by avoiding any soda. 12 oz can of soda = 140 calories, 16 oz cup of sweet tea = 200 calories    16 oz orange juice = 200 calories, 10 oz apple juice = 150 calories   32 oz sports drink = 200 calories, 16 oz punch = 240 calories   3.5 oz alcohol = 100-150 calories     4. Avoid High Fructose Corn Syrup products. This ingredient makes products highly addictive! 5. Exercise 30 minutes daily 5 days weekly, minimally. If you burn 3500 calories that equals a pound! Use a pedometer to count steps. Visit www. The [employer]ish[employer] website. Mob[employer] for a free pedometer and diet recommendations. Your maximum heart rate = 220 - your age    Never exercise at your maximum heart rate. See handout for target heart rate.     6.  Decrease carbohydrates (white bread, pasta, rice, potatoes, sweet foods and sweet drinks like soda, tea, coffee, juice and sports drinks). Increase fiber and protein. Goal:   calories daily of carbohydrates     Try CHROMIUM PICONATE 200 MG THREE TIMES DAILY,    to decrease Premenstrual carbohydrate cravings. 7.  Eat 3-5 small meals daily, include lots of protein (beans/legumes, nuts, lean meat, eggs) and green vegetables with each. (Breakfast, lunch, dinner with 2 healthy snacks)    8. Get proper rest 7-8 hours uninterrupted. When you get less than 6 hours, it triggers hunger by affecting your Grehlin:Leptin ratio and this results in weight gain. 9.  Watch your food portions. Green leafy vegetables should cover 1/2 of the plate, lean meat 1/4 of the plate, starchy vegetable 1/4 of the plate. Use smaller plates. 10.  Do not eat until you are full. Eat until you are no longer hungry. If you are not sure, try drinking a glass of water before getting your second serving of food. 11.  Do not weigh yourself daily. Wait until your next office visit. Use how you feel and  how your clothes fit as measurements of success. 12.  Address your spirituality to draw strength from above during your journey. Remember \"I am fearfully and wonderfully made. Marvelous in His eyes. \"    13. Set realistic, appropriate and achievable weight loss goals:     RECOMMENDED TARGET WEIGHT LOSS:  Initial weight loss of 5-10% of your initial body weight achieved over 6 months or a decrease of 2 BMI units. MINIMUM GOAL OF WEIGHT LOSS:  Reduce body weight and maintain a lower body weight. Prevent weight gain. RELATED WEBSITES:      Www.obesityaction. org  (and consider joining the Obesity Action Coalition for $25/year. The [facility] mission is to elevated and empower those affected by obesity through education, advocacy and support. Quarterly journals included in membership fee. )    Www.Extend Health. OneMedNet  www.[address]     RELATED DIETS:  Dr. Ayad Mcknight Weight loss challenge, Mediterranean diet, [place] Diet, [employer] Watchers, PaleGelato Fiasco Diet (anti-inflammatory diet)      EXERCISE 150 MINUTES WEEKLY. THIS CAN BE ACHIEVED BY WORKING OUT OR WALKING A MINIMUM OF 30 MINUTES FOR 5 DAYS WEEKLY. YOU CAN EXERCISE IN INCREMENTS OF 10-15 MINUTES UP TO 3 TIMES A DAY. Consider performing \"brainless exercise. \"  Choose your favorite tv program.  Five minutes before and for 5 minutes after the tv program, stretch your body. While the program is on, walk in place watching the show. When commercials come on, rest or walk around the house to do other things. When the program begins, return to walking in place. When you are able keep walking during the commercials and add light weights to your ankles or hands. By the end of the show, you would have walked 30 minutes. If you need shorter spurts of exercise, walk during the commercials and rest during the show. Drink to glasses of water prior to any exercise to prevent dehydration and to improve the results of the work out. Your RESTING HEART RATE is the number of times your heart beats per minute when you are not exerting yourself. The more fit you are, the lower your resting heart rate will be. Your MAXIMUM HEART RATE is the number of times per minute your heart pumps when it is working at 100% capacity. NEVER EXERCISE AT YOUR MAXIMUM HEART RATE. MAX HEART RATE = 220 - your age    For example, in a 48year old, the maximum heart rate is 220-50 = 170 beats per minute    Your [place] is the number of beats per minute our heart should pump during aerobic exercise. Reaching your target heart rate indicates that your body is receiving maximum cardiovascular and fat burning benefits.      If you are fit, your TARGET HEART RATE = 70-85% of your maximum Heart rate      For a 48year old with vigorous intensity physical activity,         Target heart rate= 170 bpm x .70 = 119 bpm     Target heart rate = 170 bpm x .85=  145 bpm Therefore, your target heart rate during physical activity is 119-145      If you are not fit, TARGET HEART RATE = 50-70% of your maximum Heart rate    MODERATE CONSISTENT AEROBIC EXERCISE OR WALKING FOR AT LEAST 150 MINUTES WEEKLY IS AN ESSENTIAL PART OF ANY WEIGHT LOSS OF WEIGHT MAINTENANCE PROGRAM.     During WEIGHT LOSS PHASE, at least 75% of your exercise needs to be walking or moderate aerobic activity and 25% or 0% can be Isometric or Resistance type exercises (the latter can be deferred to the weight maintenance phase.)     During WEIGHT MAINTENANCE PHASE, at least 50% of your exercise needs to be aerobic and 50% Isometric or Resistance type exercises. BENEFITS OF MODERATE INTENSITY EXERCISE MOST DAYS OF THE WEEK:     1. Insulin Resistance improves 30-85%   2. Abdominal Fat decreases by 30%   3. Inflammatory Markers decrease by 30% (therefore pain decreases)   4. Systolic and Diastolic Blood Pressure decrease by 4 mmHg   5. HDL improves by 5%   6. Triglycerides decrease by 15%   7. Shift from small dense LDL to large dense LDL (therefore decrease Insulin Resistance)   8. Decrease coagulability                  Learning About Low-Carbohydrate Diets for Weight Loss  What is a low-carbohydrate diet? Low-carb diets avoid foods that are high in carbohydrate. These high-carb foods include pasta, bread, rice, cereal, fruits, and starchy vegetables. Instead, these diets usually have you eat foods that are high in fat and protein. Many people lose weight quickly on a low-carb diet. But the early weight loss is water. People on this diet often gain the weight back after they start eating carbs again. Not all diet plans are safe or work well. A lot of the evidence shows that low-carb diets aren't healthy. That's because these diets often don't include healthy foods like fruits and vegetables. Losing weight safely means balancing protein, fat, and carbs with every meal and snack.  And low-carb diets don't always provide the vitamins, minerals, and fiber you need. If you have a serious medical condition, talk to your doctor before you try any diet. These conditions include kidney disease, heart disease, type 2 diabetes, high cholesterol, and high blood pressure. If you are pregnant, it may not be safe for your baby if you are on a low-carb diet. How can you lose weight safely? You might have heard that a diet plan helped another person lose weight. But that doesn't mean that it will work for you. It is very hard to stay on a diet that includes lots of big changes in your eating habits. If you want to get to a healthy weight and stay there, making healthy lifestyle changes will often work better than dieting. These steps can help. · Make a plan for change. Work with your doctor to create a plan that is right for you. · See a dietitian. He or she can show you how to make healthy changes in your eating habits. · Manage stress. If you have a lot of stress in your life, it can be hard to focus on making healthy changes to your daily habits. · Track your food and activity. You are likely to do better at losing weight if you keep track of what you eat and what you do. Follow-up care is a key part of your treatment and safety. Be sure to make and go to all appointments, and call your doctor if you are having problems. It's also a good idea to know your test results and keep a list of the medicines you take. Where can you learn more? Go to http://weston-karel.info/. Enter A121 in the search box to learn more about \"Learning About Low-Carbohydrate Diets for Weight Loss. \"  Current as of: May 12, 2017  Content Version: 11.4  © 1930-6873 [employer]. Care instructions adapted under license by [employer] (which disclaims liability or warranty for this information).  If you have questions about a medical condition or this instruction, always ask your healthcare professional. [address] any warranty or liability for your use of this information.

## 2018-04-27 ENCOUNTER — TELEPHONE (OUTPATIENT)
Dept: SURGERY | Age: 38
End: 2018-04-27

## 2018-04-27 NOTE — TELEPHONE ENCOUNTER
Patient returned my call and we discussed breast biopsy result of fibroadenoma, benign. She is aware to f/u in office in 6 months, or sooner prn.

## 2018-04-27 NOTE — TELEPHONE ENCOUNTER
Called patient with biopsy results which are benign:    FINAL PATHOLOGIC DIAGNOSIS   Left breast, needle core biopsy:   Fibroadenoma     No answer. Left message per hipaa that results are \"good. \" Provided office call back number if she has questions or concerns.

## 2018-04-27 NOTE — PROGRESS NOTES
I spoke with Ms. Trista Stevens and confirmed she had received the benign left breast biopsy results from Dr. Sammy Larios office. She stated she had gotten the voice mail. The biopsy site is a little tender and she has not removed the bandage. She is not aware of any bruising or hemotoma- and  I told her she could remove it tonight  I answered her questions about the benign diagnosis. She has a followup appt. with Dr Adriano Villalobos in September.

## 2018-09-12 ENCOUNTER — OFFICE VISIT (OUTPATIENT)
Dept: PRIMARY CARE CLINIC | Age: 38
End: 2018-09-12

## 2018-09-12 VITALS
TEMPERATURE: 97.9 F | RESPIRATION RATE: 17 BRPM | HEIGHT: 63 IN | DIASTOLIC BLOOD PRESSURE: 86 MMHG | BODY MASS INDEX: 36.14 KG/M2 | SYSTOLIC BLOOD PRESSURE: 135 MMHG | OXYGEN SATURATION: 98 % | WEIGHT: 204 LBS | HEART RATE: 79 BPM

## 2018-09-12 VITALS
DIASTOLIC BLOOD PRESSURE: 86 MMHG | WEIGHT: 204.6 LBS | HEART RATE: 79 BPM | RESPIRATION RATE: 17 BRPM | HEIGHT: 63 IN | TEMPERATURE: 97.9 F | SYSTOLIC BLOOD PRESSURE: 135 MMHG | OXYGEN SATURATION: 97 % | BODY MASS INDEX: 36.25 KG/M2

## 2018-09-12 DIAGNOSIS — B37.2 CANDIDAL DERMATITIS: Primary | ICD-10-CM

## 2018-09-12 RX ORDER — KETOCONAZOLE 20 MG/G
CREAM TOPICAL DAILY
Qty: 60 G | Refills: 1 | Status: SHIPPED | OUTPATIENT
Start: 2018-09-12 | End: 2019-03-05 | Stop reason: ALTCHOICE

## 2018-09-12 NOTE — PROGRESS NOTES
Chief Complaint Patient presents with  Rash  
pt states she noticed a rash on her upper lip last Tuesday after being at the dentist and receiving a deep cleaning, pt states another rash appeared 3 days ago on the inside of both thighs, pt states she has used otc calamine lotion and otc rubbing alcohol on her L thigh to help with discomfort, pt denies any pain at this time, This note will not be viewable in [address]

## 2018-09-12 NOTE — MR AVS SNAPSHOT
[address] [facility] 
 
 
 [address] [place] [address] 
[phone] Patient: Jamas Alpers MRN: [id number] [facility]:[birth year] Visit Information Date & Time Provider Department Dept. Phone Encounter #  
 9/12/2018  8:30 AM Dylon Trevino, [address] [phone] [id number] Follow-up Instructions Return if symptoms worsen or fail to improve. Your Appointments 9/26/2018  2:00 PM  
Follow Up with MD Malik Subramanian [address] [place] [address] [facility]: 6 MONTH FOLLOW UP/ CP$30 3-16-18 LS; 6 MONTH FOLLOW UP/ CP$30 3-16-18 LS/provider rescheduled/ST 9/5/18  
 [address] [id number]  
[address] [facility] Upcoming Health Maintenance Date Due Pneumococcal 19-64 Medium Risk (1 of 1 - PPSV23) 1/5/1999 Influenza Age 5 to Adult 10/1/2018* PAP AKA CERVICAL CYTOLOGY 10/26/2018* DTaP/Tdap/Td series (1 - Tdap) 9/30/2021* *Topic was postponed. The date shown is not the original due date. Allergies as of 9/12/2018  Review Complete On: 9/12/2018 By: Dylon Trevino MD  
  
 Severity Noted Reaction Type Reactions Skelaxin [Metaxalone]  11/25/2014    Other (comments) Too groggy Current Immunizations  Reviewed on 11/28/2017 Name Date Hep B Vaccine 3/10/2014 Influenza Vaccine 9/21/2017 TB Skin Test (PPD) 9/19/2017 Not reviewed this visit You Were Diagnosed With   
  
 Codes Comments Candidal dermatitis    -  Primary ICD-10-CM: B37.2 ICD-9-CM: 112.3 Vitals BP Pulse Temp Resp Height(growth percentile) Weight(growth percentile) 135/86 (BP 1 Location: Left arm, BP Patient Position: Sitting) 79 97.9 °F (36.6 °C) (Oral) 17 5' 3\" (1.6 m) 204 lb (92.5 kg) SpO2 BMI OB Status Smoking Status 98% 36.14 kg/m2 Having regular periods Passive Smoke Exposure - Never Smoker BMI and BSA Data Body Mass Index Body Surface Area  
 36.14 kg/m 2 2.03 m 2 [facility] [address] [phone] Your Updated Medication List  
  
   
This list is accurate as of 9/12/18  9:03 AM.  Always use your most recent med list.  
  
  
  
  
 BIOTIN PO Take  by mouth. cholecalciferol 1,000 unit tablet Commonly known as:  VITAMIN D3 Take  by mouth daily. ketoconazole 2 % topical cream  
Commonly known as:  NIZORAL Apply  to affected area daily. VITAMIN B-12 1,000 mcg tablet Generic drug:  cyanocobalamin Take 1,000 mcg by mouth daily. VITAMIN C PO Take  by mouth. Prescriptions Sent to Pharmacy Refills  
 ketoconazole (NIZORAL) 2 % topical cream 1 Sig: Apply  to affected area daily. Class: Normal  
 Pharmacy: [facility]  at [address] #: [phone] Route: Topical  
  
Follow-up Instructions Return if symptoms worsen or fail to improve. Patient Instructions Yeast Skin Infection: Care Instructions Your Care Instructions Yeast normally lives on your skin. Sometimes too much yeast can overgrow in certain areas of the skin and cause an infection. The infection causes red, scaly, moist patches on your skin that may itch. Common areas for skin yeast infections are skin folds under the breasts or belly area. The warm and moist areas in the skin folds can make it easier for yeast to overgrow. Yeast infections also can be found on other parts of the body such as the groin or armpits. You will probably get a cream or ointment that contains an antifungal medicine. Examples of these are miconazole and clotrimazole.  You put it on your skin to treat the infection. Your doctor may give you a prescription for the cream or ointment. Or you may be able to buy it without a prescription at most drugstores. If the infection is severe, the doctor will prescribe antifungal pills. A yeast infection usually goes away after about a week of treatment. But it's important to use the medicine for as long as your doctor tells you to. Follow-up care is a key part of your treatment and safety. Be sure to make and go to all appointments, and call your doctor if you are having problems. It's also a good idea to know your test results and keep a list of the medicines you take. How can you care for yourself at home? · Be safe with medicines. Take your medicines exactly as prescribed. Call your doctor if you think you are having a problem with your medicine. · Keep your skin clean and dry. Your doctor may suggest using powder that contains an antifungal medicine in the skin folds. · Wear loose clothing. When should you call for help? Call your doctor now or seek immediate medical care if: 
  · You have symptoms of infection, such as: 
¨ Increased pain, swelling, warmth, or redness. ¨ Red streaks leading from the area. ¨ Pus draining from the area. ¨ A fever.  
 Watch closely for changes in your health, and be sure to contact your doctor if: 
  · You do not get better as expected. Where can you learn more? Go to http://weston-karel.info/. Enter M550 in the search box to learn more about \"Yeast Skin Infection: Care Instructions. \" Current as of: October 5, 2017 Content Version: 11.7 © 3381-3022 [employer]. Care instructions adapted under license by [employer] (which disclaims liability or warranty for this information).  If you have questions about a medical condition or this instruction, always ask your healthcare professional. [employer] disclaims any warranty or liability for your use of this information. Introducing [facility]! Dear Zuleima Morgan: Thank you for requesting a Bringme account. Our records indicate that you already have an active Bringme account. You can access your account anytime at [email or web address] Did you know that you can access your hospital and ER discharge instructions at any time in Bringme? You can also review all of your test results from your hospital stay or ER visit. Additional Information If you have questions, please visit the Frequently Asked Questions section of the Bringme website at [email or web address] Remember, Bringme is NOT to be used for urgent needs. For medical emergencies, dial 911. Now available from your iPhone and Android! Please provide this summary of care documentation to your next provider. Your primary care clinician is listed as Jacob Pena. If you have any questions after today's visit, please call [phone].

## 2018-09-12 NOTE — PATIENT INSTRUCTIONS
Yeast Skin Infection: Care Instructions Your Care Instructions Yeast normally lives on your skin. Sometimes too much yeast can overgrow in certain areas of the skin and cause an infection. The infection causes red, scaly, moist patches on your skin that may itch. Common areas for skin yeast infections are skin folds under the breasts or belly area. The warm and moist areas in the skin folds can make it easier for yeast to overgrow. Yeast infections also can be found on other parts of the body such as the groin or armpits. You will probably get a cream or ointment that contains an antifungal medicine. Examples of these are miconazole and clotrimazole. You put it on your skin to treat the infection. Your doctor may give you a prescription for the cream or ointment. Or you may be able to buy it without a prescription at most drugstores. If the infection is severe, the doctor will prescribe antifungal pills. A yeast infection usually goes away after about a week of treatment. But it's important to use the medicine for as long as your doctor tells you to. Follow-up care is a key part of your treatment and safety. Be sure to make and go to all appointments, and call your doctor if you are having problems. It's also a good idea to know your test results and keep a list of the medicines you take. How can you care for yourself at home? · Be safe with medicines. Take your medicines exactly as prescribed. Call your doctor if you think you are having a problem with your medicine. · Keep your skin clean and dry. Your doctor may suggest using powder that contains an antifungal medicine in the skin folds. · Wear loose clothing. When should you call for help? Call your doctor now or seek immediate medical care if: 
  · You have symptoms of infection, such as: 
¨ Increased pain, swelling, warmth, or redness. ¨ Red streaks leading from the area. ¨ Pus draining from the area. ¨ A fever.  Watch closely for changes in your health, and be sure to contact your doctor if: 
  · You do not get better as expected. Where can you learn more? Go to http://weston-karel.info/. Enter M602 in the search box to learn more about \"Yeast Skin Infection: Care Instructions. \" Current as of: October 5, 2017 Content Version: 11.7 © 0246-5398 [employer]. Care instructions adapted under license by [facility] (which disclaims liability or warranty for this information). If you have questions about a medical condition or this instruction, always ask your healthcare professional. [address] any warranty or liability for your use of this information.

## 2018-09-12 NOTE — PROGRESS NOTES
Chief Complaint Patient presents with  Rash  
pt c/o rash on upper lip and L thigh x 2 days ago, pt states she has been putting calamine lotion and rubbing alcohol on leg to help with discomfort, pt denies any pain, she states there is some irritation. This note will not be viewable in [address]

## 2018-09-12 NOTE — PROGRESS NOTES
HISTORY OF PRESENT ILLNESS Abdifatah Bowen is a 45 y.o. female. Rash The history is provided by the patient. This is a new problem. The current episode started 2 days ago. The problem has been gradually worsening. The problem is associated with an unknown (Saw dentist 6 days ago for cleaning. Developed tiny papules on lips whcih were asymptomatic. . These are resolving but patient wonders if connected to inner thigh rash.) factor. There has been no fever. The rash is present on the groin (No associated vaginal itching or dischrge. ). The patient is experiencing no pain. Associated symptoms include itching. Pertinent negatives include no blisters, no pain and no weeping. She has tried anti-itch cream for the symptoms. The treatment provided no relief. Review of Systems Genitourinary: Negative for dysuria. Skin: Positive for itching and rash. Physical Exam  
Constitutional: She is oriented to person, place, and time. She appears well-developed and well-nourished. HENT:  
Mouth/Throat: Oropharynx is clear and moist.  
Few scattered 1-2 mm papules on external lips. Neurological: She is alert and oriented to person, place, and time. Skin:  
Dusky confluent \"kissing\" macular eruption on both inner thighs. ASSESSMENT and PLAN Diagnoses and all orders for this visit: 1. Candidal dermatitis 
-     ketoconazole (NIZORAL) 2 % topical cream; Apply  to affected area daily. Patient advised to use cornstarch powder through the daytime and ketoconazole at night.

## 2019-03-05 ENCOUNTER — TELEPHONE (OUTPATIENT)
Dept: FAMILY MEDICINE CLINIC | Age: 39
End: 2019-03-05

## 2019-03-05 ENCOUNTER — OFFICE VISIT (OUTPATIENT)
Dept: FAMILY MEDICINE CLINIC | Age: 39
End: 2019-03-05

## 2019-03-05 VITALS
HEART RATE: 81 BPM | WEIGHT: 209.1 LBS | TEMPERATURE: 98.6 F | OXYGEN SATURATION: 99 % | RESPIRATION RATE: 18 BRPM | DIASTOLIC BLOOD PRESSURE: 84 MMHG | SYSTOLIC BLOOD PRESSURE: 133 MMHG | HEIGHT: 63 IN | BODY MASS INDEX: 37.05 KG/M2

## 2019-03-05 DIAGNOSIS — R11.15 NON-INTRACTABLE CYCLICAL VOMITING WITH NAUSEA: ICD-10-CM

## 2019-03-05 DIAGNOSIS — R10.11 RUQ ABDOMINAL PAIN: ICD-10-CM

## 2019-03-05 DIAGNOSIS — R12 HEARTBURN: Primary | ICD-10-CM

## 2019-03-05 DIAGNOSIS — R10.31 RLQ ABDOMINAL PAIN: ICD-10-CM

## 2019-03-05 DIAGNOSIS — E55.9 VITAMIN D DEFICIENCY: ICD-10-CM

## 2019-03-05 DIAGNOSIS — K59.09 CHRONIC CONSTIPATION: ICD-10-CM

## 2019-03-05 DIAGNOSIS — G47.33 OSA (OBSTRUCTIVE SLEEP APNEA): ICD-10-CM

## 2019-03-05 DIAGNOSIS — Z86.39 HISTORY OF THYROID NODULE: ICD-10-CM

## 2019-03-05 DIAGNOSIS — R73.9 HYPERGLYCEMIA: ICD-10-CM

## 2019-03-05 DIAGNOSIS — Z23 ENCOUNTER FOR IMMUNIZATION: ICD-10-CM

## 2019-03-05 RX ORDER — ESOMEPRAZOLE MAGNESIUM 40 MG/1
CAPSULE, DELAYED RELEASE ORAL
Qty: 30 CAP | Refills: 5 | Status: SHIPPED | OUTPATIENT
Start: 2019-03-05 | End: 2020-02-27 | Stop reason: SDUPTHER

## 2019-03-05 RX ORDER — DICYCLOMINE HYDROCHLORIDE 20 MG/1
20 TABLET ORAL EVERY 6 HOURS
Qty: 30 TAB | Refills: 2 | Status: SHIPPED | OUTPATIENT
Start: 2019-03-05 | End: 2020-02-27 | Stop reason: SDUPTHER

## 2019-03-05 NOTE — TELEPHONE ENCOUNTER
[facility] called to inform the provider that the Bentyl 20 mg tablets were not available. Prescription change for the patient. Pharmacy had 10mg tablets.  Pt  Receiving 10mg tabs to take 2 tabs daily with # 60 @ 2 refills  Nothing further

## 2019-03-05 NOTE — PATIENT INSTRUCTIONS
Indigestion (Dyspepsia or Heartburn): Care Instructions  Your Care Instructions  Sometimes it can be hard to pinpoint the cause of indigestion. (It is also called dyspepsia or heartburn.) Most cases of an upset stomach with bloating, burning, burping, and nausea are minor and go away within several hours. Home treatment and over-the-counter medicine often are able to control symptoms. But if you take medicine to relieve your indigestion without making diet and lifestyle changes, your symptoms are likely to return again and again. If you get indigestion often, it may be a sign of a more serious medical problem. Be sure to follow up with your doctor, who may want to do tests to be sure of the cause of your indigestion. Follow-up care is a key part of your treatment and safety. Be sure to make and go to all appointments, and call your doctor if you are having problems. It's also a good idea to know your test results and keep a list of the medicines you take. How can you care for yourself at home? · Your doctor may recommend over-the-counter medicine. For mild or occasional indigestion, antacids such as Gaviscon, Mylanta, Maalox, or Tums, may help. Be safe with medicines. Be careful when you take over-the-counter antacid medicines. Many of these medicines have aspirin in them. Read the label to make sure that you are not taking more than the recommended dose. Too much aspirin can be harmful. · Your doctor also may recommend over-the-counter acid reducers, such as Pepcid AC, Tagamet HB, Zantac 75, or Prilosec. Read and follow all instructions on the label. If you use these medicines often, talk with your doctor. · Change your eating habits. ? It's best to eat several small meals instead of two or three large meals. ? After you eat, wait 2 to 3 hours before you lie down. ? Chocolate, mint, and alcohol can make GERD worse. ?  Spicy foods, foods that have a lot of acid (like tomatoes and oranges), and coffee can make GERD symptoms worse in some people. If your symptoms are worse after you eat a certain food, you may want to stop eating that food to see if your symptoms get better. · Do not smoke or chew tobacco. Smoking can make GERD worse. If you need help quitting, talk to your doctor about stop-smoking programs and medicines. These can increase your chances of quitting for good. · If you have GERD symptoms at night, raise the head of your bed 6 to 8 inches. You can do this by putting the frame on blocks or placing a foam wedge under the head of your mattress. (Adding extra pillows does not work.)  · Do not wear tight clothing around your middle. · Lose weight if you need to. Losing just 5 to 10 pounds can help. · Do not take anti-inflammatory medicines, such as aspirin, ibuprofen (Advil, Motrin), or naproxen (Aleve). These can irritate the stomach. If you need a pain medicine, try acetaminophen (Tylenol), which does not cause stomach upset. When should you call for help? Call your doctor now or seek immediate medical care if:    · You have new or worse belly pain.     · You are vomiting.    Watch closely for changes in your health, and be sure to contact your doctor if:    · You have new or worse symptoms of indigestion.     · You have trouble or pain swallowing.     · You are losing weight.     · You do not get better as expected. Where can you learn more? Go to http://weston-karel.info/. Enter T276 in the search box to learn more about \"Indigestion (Dyspepsia or Heartburn): Care Instructions. \"  Current as of: March 27, 2018  Content Version: 11.9  © 2442-8239 [employer]. Care instructions adapted under license by [employer] (which disclaims liability or warranty for this information).  If you have questions about a medical condition or this instruction, always ask your healthcare professional. [address] any warranty or liability for your use of this information. Vincent Sung jninhea     Constipation: Care Instructions  Your Care Instructions    Constipation means that you have a hard time passing stools (bowel movements). People pass stools from 3 times a day to once every 3 days. What is normal for you may be different. Constipation may occur with pain in the rectum and cramping. The pain may get worse when you try to pass stools. Sometimes there are small amounts of bright red blood on toilet paper or the surface of stools. This is because of enlarged veins near the rectum (hemorrhoids). A few changes in your diet and lifestyle may help you avoid ongoing constipation. Your doctor may also prescribe medicine to help loosen your stool. Some medicines can cause constipation. These include pain medicines and antidepressants. Tell your doctor about all the medicines you take. Your doctor may want to make a medicine change to ease your symptoms. Follow-up care is a key part of your treatment and safety. Be sure to make and go to all appointments, and call your doctor if you are having problems. It's also a good idea to know your test results and keep a list of the medicines you take. How can you care for yourself at home? · Drink plenty of fluids, enough so that your urine is light yellow or clear like water. If you have kidney, heart, or liver disease and have to limit fluids, talk with your doctor before you increase the amount of fluids you drink. · Include high-fiber foods in your diet each day. These include fruits, vegetables, beans, and whole grains. · Get at least 30 minutes of exercise on most days of the week. Walking is a good choice. You also may want to do other activities, such as running, swimming, cycling, or playing tennis or team sports. · Take a fiber supplement, such as Citrucel or Metamucil, every day. Read and follow all instructions on the label. · Schedule time each day for a bowel movement. A daily routine may help.  Take your time having your bowel movement. · Support your feet with a small step stool when you sit on the toilet. This helps flex your hips and places your pelvis in a squatting position. · Your doctor may recommend an over-the-counter laxative to relieve your constipation. Examples are Milk of Magnesia and MiraLax. Read and follow all instructions on the label. Do not use laxatives on a long-term basis. When should you call for help? Call your doctor now or seek immediate medical care if:    · You have new or worse belly pain.     · You have new or worse nausea or vomiting.     · You have blood in your stools.    Watch closely for changes in your health, and be sure to contact your doctor if:    · Your constipation is getting worse.     · You do not get better as expected. Where can you learn more? Go to http://westonEverZerokarel.info/. Enter [id number] in the search box to learn more about \"Constipation: Care Instructions. \"  Current as of: September 23, 2018  Content Version: 11.9  © 7317-2142 [employer]. Care instructions adapted under license by [employer] (which disclaims liability or warranty for this information). If you have questions about a medical condition or this instruction, always ask your healthcare professional. [address] any warranty or liability for your use of this information. To relieve or prevent constipation, increase your water intake, fiber, and walking to prevent constipation. Use over the counter Smooth Move Tea ([employer] tea section),  fiber or stool softener as needed. Consider OTC Miralax or Milk of Magnesia if no bowel movement in 3 days. HEARTBURN occurs when stomach acid moves back up through your esophagus. Several conditions and foods can contribute to this process. Common causes include:    Hiatal Hernia  Pregnancy  Alcohol use  Overweight or Obesity  Smoking.     Consuming certain types of foods or drinks can increase the acid content of the stomach and cause heartburn. AVOID THESE TRIGGERS:     Stress  Alcoholic or carbonated beverages  Caffeine (coffee, tea, chocolate, soda)  Acidic foods or drinks (Oranges or orange juice, apples, apple juice, grapefruit or grapefruit juice, bananas, grape juice)  Tomatoes or tomato based foods (pizza, spaghetti, chili)  Greasy, Fatty or Fried foods  Spicy Foods (including hot sauce)  Garlic  Onions  Mint flavoring (peppermint, speramint, cinnamon). PREVENTIVE MEASURES    Do not wear tight, restrictive clothing. Lose weight. Elevate the head of the bed 4 to 6 inches with blocks or a wedge pillow. Wait 2 to 3 hours after a meal before lying down. Avoid the triggers. MEDICATIONS     Try over the counter medications if needed first.  These include:  TUMS, ROLAIDS, Mylanta, Prilosec 20 mg, Pepcid 20 mg, Tagamet, Zantac up to 150 mg twice daily or 300 mg daily, Nexium 20 mg up to 40 mg daily and Prevacid up to 30 mg daily. Ideally, take for 2 weeks after symptoms consistently appear. NOTIFY OUR OFFICE    If symptoms worsen or persist.   If breathing or swallowing becomes difficult. If you regurgitate blood. DIAL 911 IF THE HEART BURN SYMPTOMS ARE ACCOMPANIED BY   Shortness of breath, sweating, pain in the jaw, neck or arm, cold clammy feeling with nausea or vomiting. This could be a HEART ATTACK. Starting a Weight Loss Plan: Care Instructions  Your Care Instructions    If you are thinking about losing weight, it can be hard to know where to start. Your doctor can help you set up a weight loss plan that best meets your needs. You may want to take a class on nutrition or exercise, or join a weight loss support group. If you have questions about how to make changes to your eating or exercise habits, ask your doctor about seeing a registered dietitian or an exercise specialist.  It can be a big challenge to lose weight.  But you do not have to make huge changes at once. Make small changes, and stick with them. When those changes become habit, add a few more changes. If you do not think you are ready to make changes right now, try to pick a date in the future. Make an appointment to see your doctor to discuss whether the time is right for you to start a plan. Follow-up care is a key part of your treatment and safety. Be sure to make and go to all appointments, and call your doctor if you are having problems. It's also a good idea to know your test results and keep a list of the medicines you take. How can you care for yourself at home? · Set realistic goals. Many people expect to lose much more weight than is likely. A weight loss of 5% to 10% of your body weight may be enough to improve your health. · Get family and friends involved to provide support. Talk to them about why you are trying to lose weight, and ask them to help. They can help by participating in exercise and having meals with you, even if they may be eating something different. · Find what works best for you. If you do not have time or do not like to cook, a program that offers meal replacement bars or shakes may be better for you. Or if you like to prepare meals, finding a plan that includes daily menus and recipes may be best.  · Ask your doctor about other health professionals who can help you achieve your weight loss goals. ? A dietitian can help you make healthy changes in your diet. ? An exercise specialist or  can help you develop a safe and effective exercise program.  ? A counselor or psychiatrist can help you cope with issues such as depression, anxiety, or family problems that can make it hard to focus on weight loss. · Consider joining a support group for people who are trying to lose weight. Your doctor can suggest groups in your area. Where can you learn more? Go to http://weston-karel.info/.   Enter L368 in the search box to learn more about \"Starting a Weight Loss Plan: Care Instructions. \"  Current as of: June 25, 2018  Content Version: 11.9  © 1173-2923 [employer]. Care instructions adapted under license by [employer] (which disclaims liability or warranty for this information). If you have questions about a medical condition or this instruction, always ask your healthcare professional. [address] any warranty or liability for your use of this information.

## 2019-03-05 NOTE — PROGRESS NOTES
Kathy Reeves  Identified pt with two pt identifiers(name and ). Chief Complaint   Patient presents with    Results     Caio Whitten pt has concernes with have a lot of acid reflux, pt has sharp pain and stomach swells off and on for 3 months          1. Have you been to the ER, urgent care clinic since your last visit? Hospitalized since your last visit? NO    2. Have you seen or consulted any other health care providers outside of the [facility] since your last visit? Include any pap smears or colon screening. NO PTGOES TO THE GYN (PAP SMEAR) ON ,      Advance Care Planning    In the event something were to happen to you and you were unable to speak on your behalf, do you have an Advance Directive/ Living Will in place stating your wishes? NO    If yes, do we have a copy on file NO    If no, would you like information YES    My Chart     My chart gives you direct online access to portions of the electronic medical record (EMR) where your doctor stores your health information (ie, lab results, appointment information, medications, immunizations, and more. It is free. Would you like to set up your my chart? YES    [unfilled]    Weight Metrics 3/5/2019 2018 2018 2018 2018 3/16/2018 2017   Weight 209 lb 1.6 oz 204 lb 204 lb 9.6 oz - 202 lb 4.8 oz 200 lb 201 lb 6.4 oz   Waist Measure Inches - - - 36 - - -   Body Fat % - - - 36.4 - - -   BMI 37.04 kg/m2 36.14 kg/m2 36.24 kg/m2 - 35.84 kg/m2 35.43 kg/m2 35.68 kg/m2           Medication reconciliation up to date and corrected with patient at this time. Today's provider has been notified of reason for visit, vitals and flowsheets obtained on patients. Reviewed record in preparation for visit, huddled with provider and have obtained necessary documentation.       Health Maintenance Due   Topic    PAP AKA CERVICAL CYTOLOGY        Wt Readings from Last 3 Encounters:   19 209 lb 1.6 oz (94.8 kg)   18 204 lb (92.5 kg)   09/12/18 204 lb 9.6 oz (92.8 kg)     Temp Readings from Last 3 Encounters:   03/05/19 98.6 °F (37 °C) (Oral)   09/12/18 97.9 °F (36.6 °C) (Oral)   09/12/18 97.9 °F (36.6 °C) (Oral)     BP Readings from Last 3 Encounters:   03/05/19 133/84   09/12/18 135/86   09/12/18 135/86     Pulse Readings from Last 3 Encounters:   03/05/19 81   09/12/18 79   09/12/18 79     Vitals:    03/05/19 1346   BP: 133/84   Pulse: 81   Resp: 18   Temp: 98.6 °F (37 °C)   TempSrc: Oral   SpO2: 99%   Weight: 209 lb 1.6 oz (94.8 kg)   Height: 5' 3\" (1.6 m)   PainSc:   0 - No pain   LMP: 02/26/2019         Learning Assessment:  :     Learning Assessment 9/12/2018 3/16/2018 9/15/2017 4/25/2016 6/27/2014   PRIMARY LEARNER Patient Patient Patient Patient Patient   HIGHEST LEVEL OF EDUCATION - PRIMARY LEARNER  SOME COLLEGE - - - [address] LEARNER NONE - - - [address] CAREGIVER No - - - -   PRIMARY LANGUAGE ENGLISH ENGLISH ENGLISH ENGLISH ENGLISH   LEARNER PREFERENCE PRIMARY DEMONSTRATION OTHER (COMMENT) OTHER (COMMENT) OTHER (COMMENT) DEMONSTRATION     - - - - READING   ANSWERED BY Octavio Crook patient patient patient patient   RELATIONSHIP SELF SELF SELF SELF SELF       Depression Screening:  :     3 most recent PHQ Screens 3/5/2019   Little interest or pleasure in doing things Not at all   Feeling down, depressed, irritable, or hopeless Not at all   Total Score PHQ 2 0   Trouble falling or staying asleep, or sleeping too much Not at all   Feeling tired or having little energy Not at all   Poor appetite, weight loss, or overeating Not at all   Feeling bad about yourself - or that you are a failure or have let yourself or your family down Not at all   Trouble concentrating on things such as school, work, reading, or watching TV Not at all   Moving or speaking so slowly that other people could have noticed; or the opposite being so fidgety that others notice Not at all   Thoughts of being better off dead, or hurting yourself in some way Not at all   PHQ 9 Score 0       Fall Risk Assessment:  :     Fall Risk Assessment, last 12 mths 4/26/2018   Able to walk? Yes   Fall in past 12 months? No       Abuse Screening:  :     Abuse Screening Questionnaire 3/5/2019 4/26/2018   Do you ever feel afraid of your partner? N N   Are you in a relationship with someone who physically or mentally threatens you? N N   Is it safe for you to go home?  Y Y       ADL Screening:  :     ADL Assessment 3/5/2019   Feeding yourself No Help Needed   Getting from bed to chair No Help Needed   Getting dressed No Help Needed   Bathing or showering No Help Needed   Walk across the room (includes cane/walker) No Help Needed   Using the telphone No Help Needed   Taking your medications No Help Needed   Preparing meals No Help Needed   Managing money (expenses/bills) No Help Needed   Moderately strenuous housework (laundry) No Help Needed   Shopping for personal items (toiletries/medicines) No Help Needed   Shopping for groceries No Help Needed   Driving No Help Needed   Climbing a flight of stairs No Help Needed   Getting to places beyond walking distances No Help Needed

## 2019-03-05 NOTE — PROGRESS NOTES
HISTORY OF PRESENT ILLNESS  Kasandra Arteaga is a 44 y.o. female presents with Heartburn and Abdominal Pain (Results (Rm14 pt has concernes with have a lot of acid reflux, pt has sharp pain and stomach swells off and on for 3 months ))    Agree with nurse note. Pt with hyperglycemia, vit d deficiency, YUNIER, and hx of thyroid nodule presents to the office with a BP of 133/84. Weight is 209 lbs, up 7 lbs since 4/2018. Pt requested to review results from 10/26/2017. TSH 1.77, FT4 1.21, Platelets 708, Hgb Z4K 5.5, Vit D 27.5, LDL 61, HDL 44, Trig 47. Pt complains of heartburn like sxs and abd pain x3 months. She experiences burning in chest, vomiting, nausea, worse at night. She wakes up in the night coughing. Sxs improve with vomiting. Triggers include spicy foods and grapefruit juice. She has tried OTC heartburn medication (cannot recall the name without relief). Denies chest pain, chest tightness, fever, chills, swelling today, fatigue, diaphoresis, heart racing or skipping beats. Her abd pain is in RLQ and is worse the week before and during her menstrual cycle. Pain is intermittent and improves with Advil. The patient denies blood in the urine, pain with urination, increased frequency, back pain or other associated symptoms. She is having regular, easy BM 1-2 times a week. Denies black or bloody stools. Abd pain does not seem to be associated with BM. She has previously seen GI, Dr. Bella Okeefe for constipation and used Amitiza without relief. Written by tali Singleton, as dictated by Dr. Eusebia Helton DO.    ROS    Review of Systems negative except as noted above in HPI.     ALLERGIES:    Allergies   Allergen Reactions    Skelaxin [Metaxalone] Other (comments)     Too groggy       CURRENT MEDICATIONS:    Outpatient Medications Marked as Taking for the 3/5/19 encounter (Office Visit) with Charis Beavers DO   Medication Sig Dispense Refill    esomeprazole (NEXIUM) 40 mg capsule Take one capsule by mouth daily for Heartburn 30 Cap 5    dicyclomine (BENTYL) 20 mg tablet Take 1 Tab by mouth every six (6) hours. 30 Tab 2    cyanocobalamin (VITAMIN B-12) 1,000 mcg tablet Take 1,000 mcg by mouth daily.  ASCORBATE CALCIUM (VITAMIN C PO) Take  by mouth.  BIOTIN PO Take  by mouth.  cholecalciferol (VITAMIN D3) 1,000 unit tablet Take  by mouth daily. PAST MEDICAL HISTORY:    Past Medical History:   Diagnosis Date    Abnormal Pap smear of cervix 1990s    Breast cyst     benign. Left. 1.7 cm and multiple others. Dr. Diaz Alert Chicken pox childhood    Chronic idiopathic constipation 2015    Dr. Lisa Patiño.  HELM (dyspnea on exertion) 2015    Dr. Fabricio Cannon EBV positive mononucleosis syndrome 10/2013,     chronic or reactivated    Enlarged thyroid 2010, 2015    with Right tracheal deviation. Retrosternal goiter. Dr. Maxwell Blunt.  Enlarged tonsils and adenoids 2010    Iodine-deficiency related goiter     Left. Dr. Maxwell Blunt.  YUNIER (obstructive sleep apnea) 02/25/15    Dr. Dioni Hinds, uses cpap. Dr. Sandoval Seen.  Thyroid nodule 2014    Dr. Sonia Lu. Dr. Kourtney Malloy HISTORY:    Past Surgical History:   Procedure Laterality Date    HX BREAST BIOPSY Left 2018    due to mass     HX  SECTION  2004    [facility].  HX CYST INCISION AND DRAINAGE Left 2016    benign. Dr. Sanaz Garces Right 09/15/2017    benign. Dr. Devi Tobias.  HX HERNIA REPAIR  8819    umbilical.    HX OTHER SURGICAL  2014    thyroid biopsy. Dr. Sonia Lu.  HX THYROIDECTOMY Left 2015    due to [address] Dr. Maxwell Blunt. benign.        FAMILY HISTORY:    Family History   Problem Relation Age of Onset    Hypertension Mother     Lung Disease Mother         chronic bronchitis    Sleep Apnea Mother     Hypertension Father     Diabetes Paternal Grandmother     Breast Cancer Maternal Aunt     Other Sister         severe scoliosis. txd with back brace.  Breast Cancer Maternal Aunt     Breast Cancer Maternal Aunt        SOCIAL HISTORY:    Social History     Socioeconomic History    Marital status: SINGLE     Spouse name: Not on file    Number of children: 1    Years of education: Not on file    Highest education level: Not on file   Occupational History    Occupation:      Employer: [employer]   Tobacco Use    Smoking status: Passive Smoke Exposure - Never Smoker    Smokeless tobacco: Never Used    Tobacco comment: lives with a smoker dad x 18 yrs and now with smoker boyfriend x 2 years   Substance and Sexual Activity    Alcohol use: No     Alcohol/week: 0.6 oz     Types: 1 Standard drinks or equivalent per week    Drug use: No    Sexual activity: Yes     Partners: Male     Birth control/protection: None   Social History Narrative    ** Merged History Encounter **            IMMUNIZATIONS:    Immunization History   Administered Date(s) Administered    Hep B Vaccine 03/10/2014    Influenza Vaccine 09/21/2017    TB Skin Test (PPD) 09/19/2017         PHYSICAL EXAMINATION    Vital Signs    Visit Vitals  /84   Pulse 81   Temp 98.6 °F (37 °C) (Oral)   Resp 18   Ht 5' 3\" (1.6 m)   Wt 209 lb 1.6 oz (94.8 kg)   LMP 02/26/2019 (Exact Date)   SpO2 99%   BMI 37.04 kg/m²       Weight Metrics 3/5/2019 9/12/2018 9/12/2018 4/26/2018 4/26/2018 3/16/2018 12/14/2017   Weight 209 lb 1.6 oz 204 lb 204 lb 9.6 oz - 202 lb 4.8 oz 200 lb 201 lb 6.4 oz   Waist Measure Inches - - - 36 - - -   Body Fat % - - - 36.4 - - -   BMI 37.04 kg/m2 36.14 kg/m2 36.24 kg/m2 - 35.84 kg/m2 35.43 kg/m2 35.68 kg/m2       General appearance - Well nourished. Well appearing. Well developed. No acute distress. Obese. Head - Normocephalic. Atraumatic. Eyes - pupils equal and reactive. Extraocular eye movements intact. Sclera anicteric.   Mildly injected sclera. Ears - Hearing is grossly normal bilaterally. Nose - normal and patent. No polyps noted. No erythema. No discharge. Mouth - mucous membranes with adequate moisture. Posterior pharynx normal with cobblestone appearance. No erythema, white exudate or obstruction. Neck - supple. Midline trachea. No carotid bruits noted bilaterally. No thyromegaly noted. Chest - clear to auscultation bilaterally anteriorly and posteriorly. No wheezes. No rales or rhonchi. Breath sounds are symmetrical bilaterally. Unlabored respirations. Heart - normal rate. Regular rhythm. Normal S1, S2. No murmur noted. No rubs, clicks or gallops noted. Abdomen - soft and distended. No masses or organomegaly. No rebound, rigidity or guarding. Bowel sounds normal x 4 quadrants. No tenderness noted. Pain on palpation at RUQ. Suprapubic tenderness with RLQ tenderness and questionable positive Sanders's. Neurological - awake, alert and oriented to person, place, and time and event. Cranial nerves II through XII intact. Clear speech. Muscle strength is +5/5 x 4 extremities. Sensation is intact to light touch bilaterally. Steady gait. Heme/Lymph - peripheral pulses normal x 4 extremities. No peripheral edema is noted. Musculoskeletal - Intact x 4 extremities. Full ROM x 4 extremities. No pain with movement. Back exam - normal range of motion. No pain on palpation of the spinous processes in the cervical, thoracic, lumbar, sacral regions. No CVA tenderness. Skin - no rashes, erythema, ecchymosis, lacerations, abrasions, suspicious moles noted  Psychological -   normal behavior, dress and thought processes. Good insight. Good eye contact. Normal affect. Appropriate mood. Normal speech.       DATA REVIEWED    Lab Results   Component Value Date/Time    WBC 6.4 10/26/2017 10:22 AM    Hemoglobin (POC) 12.3 06/08/2015 11:44 AM    HGB 11.9 10/26/2017 10:22 AM    HCT 36.1 10/26/2017 10:22 AM PLATELET 758 (H) 34/56/6967 10:22 AM    MCV 81 10/26/2017 10:22 AM     Lab Results   Component Value Date/Time    Sodium 140 10/26/2017 10:22 AM    Potassium 4.6 10/26/2017 10:22 AM    Chloride 102 10/26/2017 10:22 AM    CO2 27 10/26/2017 10:22 AM    Anion gap 2 (L) 09/18/2010 02:50 AM    Glucose 102 (H) 10/26/2017 10:22 AM    BUN 10 10/26/2017 10:22 AM    Creatinine 0.69 10/26/2017 10:22 AM    BUN/Creatinine ratio 14 10/26/2017 10:22 AM    GFR est  10/26/2017 10:22 AM    GFR est non- 10/26/2017 10:22 AM    Calcium 9.4 10/26/2017 10:22 AM    Bilirubin, total 0.4 10/26/2017 10:22 AM    AST (SGOT) 10 10/26/2017 10:22 AM    Alk. phosphatase 84 10/26/2017 10:22 AM    Protein, total 6.9 10/26/2017 10:22 AM    Albumin 4.0 10/26/2017 10:22 AM    Globulin 3.7 09/18/2010 02:50 AM    A-G Ratio 1.4 10/26/2017 10:22 AM    ALT (SGPT) 9 10/26/2017 10:22 AM     Lab Results   Component Value Date/Time    Cholesterol, total 114 10/26/2017 10:22 AM    HDL Cholesterol 44 10/26/2017 10:22 AM    LDL, calculated 61 10/26/2017 10:22 AM    VLDL, calculated 9 10/26/2017 10:22 AM    Triglyceride 47 10/26/2017 10:22 AM     Lab Results   Component Value Date/Time    VITAMIN D, 25-HYDROXY 27.5 (L) 10/26/2017 10:22 AM       Lab Results   Component Value Date/Time    Hemoglobin A1c 5.5 10/26/2017 10:22 AM     Lab Results   Component Value Date/Time    TSH 1.770 10/26/2017 10:22 AM         ASSESSMENT and PLAN      ICD-10-CM ICD-9-CM    1. Heartburn R12 787.1 esomeprazole (NEXIUM) 40 mg capsule      H PYLORI AB, IGM    uncontrolled due to diet vs other   2. Chronic constipation K59.09 564.00    3. RLQ abdominal pain W84.87 372.75 METABOLIC PANEL, COMPREHENSIVE      URINALYSIS W/ RFLX MICROSCOPIC      CBC WITH AUTOMATED DIFF      HCG QL SERUM      SED RATE (ESR)      CULTURE, URINE      dicyclomine (BENTYL) 20 mg tablet    due to GYN vs constipation vs other   4.  Hyperglycemia X97.8 523.40 METABOLIC PANEL, COMPREHENSIVE      HEMOGLOBIN A1C WITH EAG   5. Non-intractable cyclical vomiting with nausea G43. A0 536.2 US ABD COMP   6. Vitamin D deficiency E55.9 268.9 VITAMIN D, 25 HYDROXY   7. YUNIER (obstructive sleep apnea) G47.33 327.23    8. Encounter for immunization Z23 V03.89 PNEUMOCOCCAL CONJ VACCINE 13 VALENT IM   9. History of thyroid nodule Z86.39 V12.29 TSH 3RD GENERATION      T4, FREE   10. RUQ abdominal pain R10.11 789.01 US ABD COMP      dicyclomine (BENTYL) 20 mg tablet       Discussed the patient's BMI with her. The BMI follow up plan is as follows: I have counseled this patient on diet and exercise regimens. Decrease carbohydrates (white foods, sweet foods, sweet drinks and alcohol), increase green leafy vegetables and protein (lean meats and beans) with each meal.  Avoid fried foods and heart burn triggers. Eat 3-5 small meals daily. Do not skip meals. Increase water intake. Increase physical activity to 30 minutes daily for health benefit or 60 minutes daily to prevent weight regain, as tolerated. Get 7-8 hours uninterrupted sleep nightly. Avoid heartburn triggers. Chart reviewed and updated. Continue current medications and care. Stop Vit C and B12 for the next 2 weeks then use with food. Start Nexium 40 mg qam x2 weeks for heartburn, then prn. Use Bentyl prn for abd spasms. Follow heartburn diet, avoiding dairy, spicy, and greasy foods. Prescriptions written and sent to pharmacy; medication side effects discussed. Nexium 40 mg. Bentyl 20 mg.   Recheck pertinent labs today. Advised pt to f/u with GYN regarding RLQ abd pain, will defer lower abd us to GYN. Ordered upper abd US. Advised pt to go to ER if abd pain worsens. Counseled patient on health concerns:  Heartburn, abd pain, vit d deficiency, hyperglycemia, YUNIER, thyroid nodule, constipation, nausea. Relevant handouts given and discussed with patient. Immunizations noted. Offered empathy, support, legitimation, prayers, partnership to patient.   Praised patient for progress. Follow-up Disposition:  Return in about 1 month (around 4/5/2019) for results, heartburn, RLQ abd pain. Patient was offered a choice/choices in the treatment plan today. Patient expresses understanding of the plan and agrees with recommendations. Written by tali Gupta, as dictated by Dr. Marleen De Leon DO. Documentation True and Accepted by Papi Caraballo. Rupesh Jarrell. Patient Instructions          Indigestion (Dyspepsia or Heartburn): Care Instructions  Your Care Instructions  Sometimes it can be hard to pinpoint the cause of indigestion. (It is also called dyspepsia or heartburn.) Most cases of an upset stomach with bloating, burning, burping, and nausea are minor and go away within several hours. Home treatment and over-the-counter medicine often are able to control symptoms. But if you take medicine to relieve your indigestion without making diet and lifestyle changes, your symptoms are likely to return again and again. If you get indigestion often, it may be a sign of a more serious medical problem. Be sure to follow up with your doctor, who may want to do tests to be sure of the cause of your indigestion. Follow-up care is a key part of your treatment and safety. Be sure to make and go to all appointments, and call your doctor if you are having problems. It's also a good idea to know your test results and keep a list of the medicines you take. How can you care for yourself at home? · Your doctor may recommend over-the-counter medicine. For mild or occasional indigestion, antacids such as Gaviscon, Mylanta, Maalox, or Tums, may help. Be safe with medicines. Be careful when you take over-the-counter antacid medicines. Many of these medicines have aspirin in them. Read the label to make sure that you are not taking more than the recommended dose. Too much aspirin can be harmful.   · Your doctor also may recommend over-the-counter acid reducers, such as Pepcid AC, Tagamet HB, Zantac 75, or Prilosec. Read and follow all instructions on the label. If you use these medicines often, talk with your doctor. · Change your eating habits. ? It's best to eat several small meals instead of two or three large meals. ? After you eat, wait 2 to 3 hours before you lie down. ? Chocolate, mint, and alcohol can make GERD worse. ? Spicy foods, foods that have a lot of acid (like tomatoes and oranges), and coffee can make GERD symptoms worse in some people. If your symptoms are worse after you eat a certain food, you may want to stop eating that food to see if your symptoms get better. · Do not smoke or chew tobacco. Smoking can make GERD worse. If you need help quitting, talk to your doctor about stop-smoking programs and medicines. These can increase your chances of quitting for good. · If you have GERD symptoms at night, raise the head of your bed 6 to 8 inches. You can do this by putting the frame on blocks or placing a foam wedge under the head of your mattress. (Adding extra pillows does not work.)  · Do not wear tight clothing around your middle. · Lose weight if you need to. Losing just 5 to 10 pounds can help. · Do not take anti-inflammatory medicines, such as aspirin, ibuprofen (Advil, Motrin), or naproxen (Aleve). These can irritate the stomach. If you need a pain medicine, try acetaminophen (Tylenol), which does not cause stomach upset. When should you call for help? Call your doctor now or seek immediate medical care if:    · You have new or worse belly pain.     · You are vomiting.    Watch closely for changes in your health, and be sure to contact your doctor if:    · You have new or worse symptoms of indigestion.     · You have trouble or pain swallowing.     · You are losing weight.     · You do not get better as expected. Where can you learn more? Go to http://weston-karel.info/.   Enter S035 in the search box to learn more about \"Indigestion (Dyspepsia or Heartburn): Care Instructions. \"  Current as of: March 27, 2018  Content Version: 11.9  © 1517-3330 [employer]. Care instructions adapted under license by [employer] (which disclaims liability or warranty for this information). If you have questions about a medical condition or this instruction, always ask your healthcare professional. [address] any warranty or liability for your use of this information. Sumi Peaks jninhea     Constipation: Care Instructions  Your Care Instructions    Constipation means that you have a hard time passing stools (bowel movements). People pass stools from 3 times a day to once every 3 days. What is normal for you may be different. Constipation may occur with pain in the rectum and cramping. The pain may get worse when you try to pass stools. Sometimes there are small amounts of bright red blood on toilet paper or the surface of stools. This is because of enlarged veins near the rectum (hemorrhoids). A few changes in your diet and lifestyle may help you avoid ongoing constipation. Your doctor may also prescribe medicine to help loosen your stool. Some medicines can cause constipation. These include pain medicines and antidepressants. Tell your doctor about all the medicines you take. Your doctor may want to make a medicine change to ease your symptoms. Follow-up care is a key part of your treatment and safety. Be sure to make and go to all appointments, and call your doctor if you are having problems. It's also a good idea to know your test results and keep a list of the medicines you take. How can you care for yourself at home? · Drink plenty of fluids, enough so that your urine is light yellow or clear like water. If you have kidney, heart, or liver disease and have to limit fluids, talk with your doctor before you increase the amount of fluids you drink. · Include high-fiber foods in your diet each day.  These include fruits, vegetables, beans, and whole grains. · Get at least 30 minutes of exercise on most days of the week. Walking is a good choice. You also may want to do other activities, such as running, swimming, cycling, or playing tennis or team sports. · Take a fiber supplement, such as Citrucel or Metamucil, every day. Read and follow all instructions on the label. · Schedule time each day for a bowel movement. A daily routine may help. Take your time having your bowel movement. · Support your feet with a small step stool when you sit on the toilet. This helps flex your hips and places your pelvis in a squatting position. · Your doctor may recommend an over-the-counter laxative to relieve your constipation. Examples are Milk of Magnesia and MiraLax. Read and follow all instructions on the label. Do not use laxatives on a long-term basis. When should you call for help? Call your doctor now or seek immediate medical care if:    · You have new or worse belly pain.     · You have new or worse nausea or vomiting.     · You have blood in your stools.    Watch closely for changes in your health, and be sure to contact your doctor if:    · Your constipation is getting worse.     · You do not get better as expected. Where can you learn more? Go to http://weston-karel.info/. Enter [id number] in the search box to learn more about \"Constipation: Care Instructions. \"  Current as of: September 23, 2018  Content Version: 11.9  © 5327-6498 [employer]. Care instructions adapted under license by [employer] (which disclaims liability or warranty for this information). If you have questions about a medical condition or this instruction, always ask your healthcare professional. [place] [address] any warranty or liability for your use of this information. To relieve or prevent constipation, increase your water intake, fiber, and walking to prevent constipation.   Use over the counter Smooth Move Tea ([address] tea section),  fiber or stool softener as needed. Consider OTC Miralax or Milk of Magnesia if no bowel movement in 3 days. HEARTBURN occurs when stomach acid moves back up through your esophagus. Several conditions and foods can contribute to this process. Common causes include:    Hiatal Hernia  Pregnancy  Alcohol use  Overweight or Obesity  Smoking. Consuming certain types of foods or drinks can increase the acid content of the stomach and cause heartburn. AVOID THESE TRIGGERS:     Stress  Alcoholic or carbonated beverages  Caffeine (coffee, tea, chocolate, soda)  Acidic foods or drinks (Oranges or orange juice, apples, apple juice, grapefruit or grapefruit juice, bananas, grape juice)  Tomatoes or tomato based foods (pizza, spaghetti, chili)  Greasy, Fatty or Fried foods  Spicy Foods (including hot sauce)  Garlic  Onions  Mint flavoring (peppermint, speramint, cinnamon). PREVENTIVE MEASURES    Do not wear tight, restrictive clothing. Lose weight. Elevate the head of the bed 4 to 6 inches with blocks or a wedge pillow. Wait 2 to 3 hours after a meal before lying down. Avoid the triggers. MEDICATIONS     Try over the counter medications if needed first.  These include:  TUMS, ROLAIDS, Mylanta, Prilosec 20 mg, Pepcid 20 mg, Tagamet, Zantac up to 150 mg twice daily or 300 mg daily, Nexium 20 mg up to 40 mg daily and Prevacid up to 30 mg daily. Ideally, take for 2 weeks after symptoms consistently appear. NOTIFY OUR OFFICE    If symptoms worsen or persist.   If breathing or swallowing becomes difficult. If you regurgitate blood. DIAL 911 IF THE HEART BURN SYMPTOMS ARE ACCOMPANIED BY   Shortness of breath, sweating, pain in the jaw, neck or arm, cold clammy feeling with nausea or vomiting. This could be a HEART ATTACK.        Starting a Weight Loss Plan: Care Instructions  Your Care Instructions    If you are thinking about losing weight, it can be hard to know where to start. Your doctor can help you set up a weight loss plan that best meets your needs. You may want to take a class on nutrition or exercise, or join a weight loss support group. If you have questions about how to make changes to your eating or exercise habits, ask your doctor about seeing a registered dietitian or an exercise specialist.  It can be a big challenge to lose weight. But you do not have to make huge changes at once. Make small changes, and stick with them. When those changes become habit, add a few more changes. If you do not think you are ready to make changes right now, try to pick a date in the future. Make an appointment to see your doctor to discuss whether the time is right for you to start a plan. Follow-up care is a key part of your treatment and safety. Be sure to make and go to all appointments, and call your doctor if you are having problems. It's also a good idea to know your test results and keep a list of the medicines you take. How can you care for yourself at home? · Set realistic goals. Many people expect to lose much more weight than is likely. A weight loss of 5% to 10% of your body weight may be enough to improve your health. · Get family and friends involved to provide support. Talk to them about why you are trying to lose weight, and ask them to help. They can help by participating in exercise and having meals with you, even if they may be eating something different. · Find what works best for you. If you do not have time or do not like to cook, a program that offers meal replacement bars or shakes may be better for you. Or if you like to prepare meals, finding a plan that includes daily menus and recipes may be best.  · Ask your doctor about other health professionals who can help you achieve your weight loss goals. ? A dietitian can help you make healthy changes in your diet. ?  An exercise specialist or  can help you develop a safe and effective exercise program.  ? A counselor or psychiatrist can help you cope with issues such as depression, anxiety, or family problems that can make it hard to focus on weight loss. · Consider joining a support group for people who are trying to lose weight. Your doctor can suggest groups in your area. Where can you learn more? Go to http://weston-karel.info/. Enter P113 in the search box to learn more about \"Starting a Weight Loss Plan: Care Instructions. \"  Current as of: June 25, 2018  Content Version: 11.9  © 6583-5913 [employer]. Care instructions adapted under license by [employer] (which disclaims liability or warranty for this information). If you have questions about a medical condition or this instruction, always ask your healthcare professional. [address] any warranty or liability for your use of this information.

## 2019-03-06 LAB
25(OH)D3+25(OH)D2 SERPL-MCNC: 20 NG/ML (ref 30–100)
ALBUMIN SERPL-MCNC: 4.2 G/DL (ref 3.5–5.5)
ALBUMIN/GLOB SERPL: 1.3 {RATIO} (ref 1.2–2.2)
ALP SERPL-CCNC: 96 IU/L (ref 39–117)
ALT SERPL-CCNC: 8 IU/L (ref 0–32)
APPEARANCE UR: CLEAR
AST SERPL-CCNC: 11 IU/L (ref 0–40)
B-HCG SERPL QL: NEGATIVE MIU/ML
BASOPHILS # BLD AUTO: 0 X10E3/UL (ref 0–0.2)
BASOPHILS NFR BLD AUTO: 0 %
BILIRUB SERPL-MCNC: 0.4 MG/DL (ref 0–1.2)
BILIRUB UR QL STRIP: NEGATIVE
BUN SERPL-MCNC: 10 MG/DL (ref 6–20)
BUN/CREAT SERPL: 12 (ref 9–23)
CALCIUM SERPL-MCNC: 10.1 MG/DL (ref 8.7–10.2)
CHLORIDE SERPL-SCNC: 100 MMOL/L (ref 96–106)
CO2 SERPL-SCNC: 25 MMOL/L (ref 20–29)
COLOR UR: YELLOW
CREAT SERPL-MCNC: 0.85 MG/DL (ref 0.57–1)
EOSINOPHIL # BLD AUTO: 0.1 X10E3/UL (ref 0–0.4)
EOSINOPHIL NFR BLD AUTO: 1 %
ERYTHROCYTE [DISTWIDTH] IN BLOOD BY AUTOMATED COUNT: 12.9 % (ref 12.3–15.4)
ERYTHROCYTE [SEDIMENTATION RATE] IN BLOOD BY WESTERGREN METHOD: 13 MM/HR (ref 0–32)
EST. AVERAGE GLUCOSE BLD GHB EST-MCNC: 108 MG/DL
GLOBULIN SER CALC-MCNC: 3.2 G/DL (ref 1.5–4.5)
GLUCOSE SERPL-MCNC: 87 MG/DL (ref 65–99)
GLUCOSE UR QL: NEGATIVE
H PYLORI IGM SER-ACNC: <9 UNITS (ref 0–8.9)
HBA1C MFR BLD: 5.4 % (ref 4.8–5.6)
HCT VFR BLD AUTO: 37.9 % (ref 34–46.6)
HGB BLD-MCNC: 11.6 G/DL (ref 11.1–15.9)
HGB UR QL STRIP: NEGATIVE
IMM GRANULOCYTES # BLD AUTO: 0 X10E3/UL (ref 0–0.1)
IMM GRANULOCYTES NFR BLD AUTO: 0 %
KETONES UR QL STRIP: NEGATIVE
LEUKOCYTE ESTERASE UR QL STRIP: NEGATIVE
LYMPHOCYTES # BLD AUTO: 2.8 X10E3/UL (ref 0.7–3.1)
LYMPHOCYTES NFR BLD AUTO: 36 %
MCH RBC QN AUTO: 25.8 PG (ref 26.6–33)
MCHC RBC AUTO-ENTMCNC: 30.6 G/DL (ref 31.5–35.7)
MCV RBC AUTO: 84 FL (ref 79–97)
MICRO URNS: NORMAL
MONOCYTES # BLD AUTO: 0.5 X10E3/UL (ref 0.1–0.9)
MONOCYTES NFR BLD AUTO: 6 %
NEUTROPHILS # BLD AUTO: 4.4 X10E3/UL (ref 1.4–7)
NEUTROPHILS NFR BLD AUTO: 57 %
NITRITE UR QL STRIP: NEGATIVE
PH UR STRIP: 6 [PH] (ref 5–7.5)
PLATELET # BLD AUTO: 417 X10E3/UL (ref 150–379)
POTASSIUM SERPL-SCNC: 4.6 MMOL/L (ref 3.5–5.2)
PROT SERPL-MCNC: 7.4 G/DL (ref 6–8.5)
PROT UR QL STRIP: NEGATIVE
RBC # BLD AUTO: 4.5 X10E6/UL (ref 3.77–5.28)
SODIUM SERPL-SCNC: 138 MMOL/L (ref 134–144)
SP GR UR: 1.01 (ref 1–1.03)
T4 FREE SERPL-MCNC: 1.43 NG/DL (ref 0.82–1.77)
TSH SERPL DL<=0.005 MIU/L-ACNC: 3.12 UIU/ML (ref 0.45–4.5)
UROBILINOGEN UR STRIP-MCNC: 1 MG/DL (ref 0.2–1)
WBC # BLD AUTO: 7.8 X10E3/UL (ref 3.4–10.8)

## 2019-03-07 LAB — BACTERIA UR CULT: NO GROWTH

## 2019-03-15 ENCOUNTER — HOSPITAL ENCOUNTER (OUTPATIENT)
Dept: ULTRASOUND IMAGING | Age: 39
Discharge: HOME | End: 2019-03-15
Attending: FAMILY MEDICINE
Payer: COMMERCIAL

## 2019-03-15 DIAGNOSIS — R10.11 RUQ ABDOMINAL PAIN: ICD-10-CM

## 2019-03-15 DIAGNOSIS — R11.15 NON-INTRACTABLE CYCLICAL VOMITING WITH NAUSEA: ICD-10-CM

## 2019-03-15 PROCEDURE — 76700 US EXAM ABDOM COMPLETE: CPT

## 2019-04-12 ENCOUNTER — OFFICE VISIT (OUTPATIENT)
Dept: FAMILY MEDICINE CLINIC | Age: 39
End: 2019-04-12

## 2019-04-12 VITALS
SYSTOLIC BLOOD PRESSURE: 135 MMHG | OXYGEN SATURATION: 98 % | DIASTOLIC BLOOD PRESSURE: 86 MMHG | RESPIRATION RATE: 18 BRPM | BODY MASS INDEX: 37.47 KG/M2 | TEMPERATURE: 98.6 F | WEIGHT: 211.5 LBS | HEIGHT: 63 IN | HEART RATE: 87 BPM

## 2019-04-12 DIAGNOSIS — E66.9 OBESITY, CLASS II, BMI 35-39.9: ICD-10-CM

## 2019-04-12 DIAGNOSIS — R10.11 RUQ ABDOMINAL PAIN: ICD-10-CM

## 2019-04-12 DIAGNOSIS — K59.09 CHRONIC CONSTIPATION: ICD-10-CM

## 2019-04-12 DIAGNOSIS — R73.9 HYPERGLYCEMIA: ICD-10-CM

## 2019-04-12 DIAGNOSIS — Z82.49 FAMILY HISTORY OF HEART DISEASE: ICD-10-CM

## 2019-04-12 DIAGNOSIS — R12 HEARTBURN: ICD-10-CM

## 2019-04-12 DIAGNOSIS — E55.9 VITAMIN D DEFICIENCY: Primary | ICD-10-CM

## 2019-04-12 DIAGNOSIS — R10.31 RLQ ABDOMINAL PAIN: ICD-10-CM

## 2019-04-12 DIAGNOSIS — G47.33 OSA (OBSTRUCTIVE SLEEP APNEA): ICD-10-CM

## 2019-04-12 DIAGNOSIS — Z86.39 HISTORY OF THYROID NODULE: ICD-10-CM

## 2019-04-12 RX ORDER — ERGOCALCIFEROL 1.25 MG/1
50000 CAPSULE ORAL
Qty: 5 CAP | Refills: 2 | Status: SHIPPED | OUTPATIENT
Start: 2019-04-12 | End: 2020-02-27 | Stop reason: ALTCHOICE

## 2019-04-12 NOTE — PROGRESS NOTES
Carol Mascorro  Identified pt with two pt identifiers(name and ). Chief Complaint Patient presents with  
 Heartburn Rm 14/ fasting, lab results  Abdominal Pain  
  RLQ 1. Have you been to the ER, urgent care clinic since your last visit? NO  Hospitalized since your last visit? no 
 
2. Have you seen or consulted any other health care providers outside of the [address] [place] [address] since your last visit?no  Include any pap smears or colon screening. Yes, [facility], pap Would you like to sign up for MyChart today, if you have not already done so? yes If not, would you like information on MyChart, and how to sign up at a later time? no 
 
Living Will- no 
Has information - has Medication reconciliation up to date and corrected with patient at this time. Today's provider has been notified of reason for visit, vitals and flowsheets obtained on patients. Reviewed record in preparation for visit, huddled with provider and have obtained necessary documentation. Health Maintenance Due Topic  Pneumococcal 0-64 years (1 of 1 - PPSV23) Wt Readings from Last 3 Encounters:  
19 211 lb 8 oz (95.9 kg) 19 209 lb 1.6 oz (94.8 kg) 18 204 lb (92.5 kg) Temp Readings from Last 3 Encounters:  
19 98.6 °F (37 °C) (Oral) 18 97.9 °F (36.6 °C) (Oral) 18 97.9 °F (36.6 °C) (Oral) BP Readings from Last 3 Encounters:  
19 133/84  
18 135/86  
18 135/86 Pulse Readings from Last 3 Encounters:  
19 81  
18 79  
18 79 Vitals:  
 19 1767 Weight: 211 lb 8 oz (95.9 kg) Height: 5' 3\" (1.6 m) PainSc:   3 PainLoc: Abdomen LMP: 2019 Learning Assessment: 
:  
 
Learning Assessment 2018 3/16/2018 9/15/2017 2016 2014 PRIMARY LEARNER Patient Patient Patient Patient Patient HIGHEST LEVEL OF EDUCATION - PRIMARY LEARNER  SOME COLLEGE - - - Silvia Mantilla  
 BARRIERS PRIMARY LEARNER NONE - - - NONE  
CO-LEARNER CAREGIVER No - - - - PRIMARY LANGUAGE ENGLISH ENGLISH ENGLISH ENGLISH ENGLISH  
LEARNER PREFERENCE PRIMARY DEMONSTRATION OTHER (COMMENT) OTHER (COMMENT) OTHER (COMMENT) DEMONSTRATION  
  - - - - READING  
ANSWERED BY Nataliya Baptiste patient patient patient patient RELATIONSHIP SELF SELF SELF SELF SELF Depression Screening: 
:  
 
3 most recent PHQ Screens 3/5/2019 Little interest or pleasure in doing things Not at all Feeling down, depressed, irritable, or hopeless Not at all Total Score PHQ 2 0 Trouble falling or staying asleep, or sleeping too much Not at all Feeling tired or having little energy Not at all Poor appetite, weight loss, or overeating Not at all Feeling bad about yourself - or that you are a failure or have let yourself or your family down Not at all Trouble concentrating on things such as school, work, reading, or watching TV Not at all Moving or speaking so slowly that other people could have noticed; or the opposite being so fidgety that others notice Not at all Thoughts of being better off dead, or hurting yourself in some way Not at all PHQ 9 Score 0 Fall Risk Assessment: 
:  
 
Fall Risk Assessment, last 12 mths 4/26/2018 Able to walk? Yes Fall in past 12 months? No  
 
 
Abuse Screening: 
:  
 
Abuse Screening Questionnaire 3/5/2019 4/26/2018 Do you ever feel afraid of your partner? Drinda Buerger Are you in a relationship with someone who physically or mentally threatens you? Drinda Buerger Is it safe for you to go home? Y Y  
 
 
ADL Screening: 
:  
 

## 2019-04-12 NOTE — PROGRESS NOTES
HISTORY OF PRESENT ILLNESS David Garcia is a 44 y.o. female presents with Heartburn (Rm 14/ fasting); Abdominal Pain (RLQ); Results; and Documentation Agree with nurse note. Pt has biometric screening form for work to be completed today. Pt with vit d deficiency, hyperglycemia, YUNIER, hx of thyroid nodule, obesity, and family hx of heart disease presents to the office with a BP of 135/86. Pt requested to review results from 3/5/2019. H. Pylori neg, SED rate wnl, urine culture neg, TSH 3.12,  FT4 1.43, Platelet 944, Hgb A3Y 5.4, Vit D 20, CMP wnl. To recall, at last ov, pt complained of burning in chest, vomiting, nausea, nighttime cough, and RLQ abd pain. It was recommended she start Nexium 40 mg every day and follow a heart burn diet. Abd us on 3/15/2019 showed no acute findings. Today, her R sided abd pain has improved but it is still uncomfortable. She stopped using vit c and b12 which helped her heartburn sxs. Denies nausea, burping, constipation, SOB, chest pain, chest tightness. She is followed by breast surgeon, Dr. Emelia Jack and is due for her follow up. Pt reports she saw a GYN at [facility] for STD screen and pap smear. Written by tali Driscoll, as dictated by Dr. Hesham Maria DO. 
ROS Review of Systems negative except as noted above in HPI. ALLERGIES:   
Allergies Allergen Reactions  Skelaxin [Metaxalone] Other (comments) Too groggy CURRENT MEDICATIONS:   
Outpatient Medications Marked as Taking for the 4/12/19 encounter (Office Visit) with Gi Mckeon DO Medication Sig Dispense Refill  ergocalciferol (ERGOCALCIFEROL) 50,000 unit capsule Take 1 Cap by mouth every seven (7) days. 5 Cap 2  
 esomeprazole (NEXIUM) 40 mg capsule Take one capsule by mouth daily for Heartburn 30 Cap 5  
 dicyclomine (BENTYL) 20 mg tablet Take 1 Tab by mouth every six (6) hours. 30 Tab 2 PAST MEDICAL HISTORY:   
Past Medical History: Diagnosis Date  Abnormal Pap smear of cervix 1990s  Breast cyst   
 benign. Left. 1.7 cm and multiple others. Dr. Adryan Morrell  Chicken pox childhood  Chronic idiopathic constipation 2015 Dr. Natty Bonilla.  HELM (dyspnea on exertion) 2015 Dr. Francois Cheney  EBV positive mononucleosis syndrome 10/2013,   
 chronic or reactivated  Enlarged thyroid 2010, 2015  
 with Right tracheal deviation. Retrosternal goiter. Dr. Marilin Fuller.  Enlarged tonsils and adenoids 2010  Iodine-deficiency related goiter Left. Dr. Marilin Fuller.  YUNIER (obstructive sleep apnea) 02/25/15  Mary Henriquez, uses cpap. Dr. Abel Crane.  Thyroid nodule 2014 Dr. Lazarus Chiles. Dr. Josie Starr PAST SURGICAL HISTORY:   
Past Surgical History:  
Procedure Laterality Date  HX BREAST BIOPSY Left 2018  
 due to mass  HX  SECTION  2004 [facility].  HX CYST INCISION AND DRAINAGE Left 2016  
 benign. Dr. Lissett Green  HX CYST INCISION AND DRAINAGE Right 09/15/2017  
 benign. Dr. Matias Horton.  HX HERNIA REPAIR  7562  
 umbilical.  
 HX OTHER SURGICAL  2014 thyroid biopsy. Dr. Lazarus Chiles.  HX THYROIDECTOMY Left 2015  
 due to [address] Dr. Marilin Fuller. benign. FAMILY HISTORY:   
Family History Problem Relation Age of Onset  Hypertension Mother  Lung Disease Mother   
     chronic bronchitis  Sleep Apnea Mother  Hypertension Father  Diabetes Paternal Grandmother  Breast Cancer Maternal Aunt  Other Sister   
     severe scoliosis. txd with back brace.  Breast Cancer Maternal Aunt  Breast Cancer Maternal Aunt SOCIAL HISTORY:   
Social History Socioeconomic History  Marital status: SINGLE Spouse name: Not on file  Number of children: 1  Years of education: Not on file  Highest education level: Not on file Occupational History  Occupation:  Employer: [employer] Tobacco Use  Smoking status: Passive Smoke Exposure - Never Smoker  Smokeless tobacco: Never Used  Tobacco comment: lives with a smoker dad x 18 yrs and now with smoker boyfriend x 2 years Substance and Sexual Activity  Alcohol use: No  
  Alcohol/week: 0.6 oz Types: 1 Standard drinks or equivalent per week  Drug use: No  
 Sexual activity: Yes  
  Partners: Male Birth control/protection: None Social History Narrative ** Merged History Encounter ** IMMUNIZATIONS:   
Immunization History Administered Date(s) Administered  Hep B Vaccine 03/10/2014  Influenza Vaccine 09/21/2017  TB Skin Test (PPD) 09/19/2017 PHYSICAL EXAMINATION Vital Signs Visit Vitals /86 (BP 1 Location: Left arm, BP Patient Position: Sitting) Pulse 87 Temp 98.6 °F (37 °C) (Oral) Resp 18 Ht 5' 3\" (1.6 m) Wt 211 lb 8 oz (95.9 kg) LMP 03/21/2019 (Approximate) SpO2 98% BMI 37.47 kg/m² Weight Metrics 4/12/2019 3/5/2019 9/12/2018 9/12/2018 4/26/2018 4/26/2018 3/16/2018 Weight 211 lb 8 oz 209 lb 1.6 oz 204 lb 204 lb 9.6 oz - 202 lb 4.8 oz 200 lb Waist Measure Inches - - - - 36 - - Body Fat % - - - - 36.4 - -  
BMI 37.47 kg/m2 37.04 kg/m2 36.14 kg/m2 36.24 kg/m2 - 35.84 kg/m2 35.43 kg/m2 General appearance - Well nourished. Well appearing. Well developed. No acute distress. Obese. Head - Normocephalic. Atraumatic. Eyes - pupils equal and reactive. Extraocular eye movements intact. Sclera anicteric. Mildly injected sclera. Ears - Hearing is grossly normal bilaterally. Nose - normal and patent. No polyps noted. No erythema. No discharge. Mouth - mucous membranes with adequate moisture. Posterior pharynx normal with cobblestone appearance. No erythema, white exudate or obstruction. Neck - supple. Midline trachea. No carotid bruits noted bilaterally.   No thyromegaly noted. Chest - clear to auscultation bilaterally anteriorly and posteriorly. No wheezes. No rales or rhonchi. Breath sounds are symmetrical bilaterally. Unlabored respirations. Heart - normal rate. Regular rhythm. Normal S1, S2. No murmur noted. No rubs, clicks or gallops noted. Abdomen - soft and distended. No masses or organomegaly. No rebound, rigidity or guarding. Bowel sounds normal x 4 quadrants. No tenderness noted. No pain with rib compression. Neurological - awake, alert and oriented to person, place, and time and event. Cranial nerves II through XII intact. Clear speech. Muscle strength is +5/5 x 4 extremities. Sensation is intact to light touch bilaterally. Steady gait. Heme/Lymph - peripheral pulses normal x 4 extremities. No peripheral edema is noted. Musculoskeletal - Intact x 4 extremities. Full ROM x 4 extremities. No pain with movement. Back exam - normal range of motion. No pain on palpation of the spinous processes in the cervical, thoracic, lumbar, sacral regions. No CVA tenderness. Skin - no rashes, erythema, ecchymosis, lacerations, abrasions, suspicious moles noted Psychological -   normal behavior, dress and thought processes. Good insight. Good eye contact. Normal affect. Appropriate mood. Normal speech. DATA REVIEWED Lab Results Component Value Date/Time WBC 7.8 03/05/2019 03:01 PM  
 Hemoglobin (POC) 12.3 06/08/2015 11:44 AM  
 HGB 11.6 03/05/2019 03:01 PM  
 HCT 37.9 03/05/2019 03:01 PM  
 PLATELET 399 (H) 69/18/0630 03:01 PM  
 MCV 84 03/05/2019 03:01 PM  
 
Lab Results Component Value Date/Time  Sodium 138 03/05/2019 03:01 PM  
 Potassium 4.6 03/05/2019 03:01 PM  
 Chloride 100 03/05/2019 03:01 PM  
 CO2 25 03/05/2019 03:01 PM  
 Anion gap 2 (L) 09/18/2010 02:50 AM  
 Glucose 87 03/05/2019 03:01 PM  
 BUN 10 03/05/2019 03:01 PM  
 Creatinine 0.85 03/05/2019 03:01 PM  
 BUN/Creatinine ratio 12 03/05/2019 03:01 PM  
 GFR est  03/05/2019 03:01 PM  
 GFR est non-AA 87 03/05/2019 03:01 PM  
 Calcium 10.1 03/05/2019 03:01 PM  
 Bilirubin, total 0.4 03/05/2019 03:01 PM  
 AST (SGOT) 11 03/05/2019 03:01 PM  
 Alk. phosphatase 96 03/05/2019 03:01 PM  
 Protein, total 7.4 03/05/2019 03:01 PM  
 Albumin 4.2 03/05/2019 03:01 PM  
 Globulin 3.7 09/18/2010 02:50 AM  
 A-G Ratio 1.3 03/05/2019 03:01 PM  
 ALT (SGPT) 8 03/05/2019 03:01 PM  
 
Lab Results Component Value Date/Time Cholesterol, total 122 04/12/2019 09:51 AM  
 HDL Cholesterol 48 04/12/2019 09:51 AM  
 LDL, calculated 66 04/12/2019 09:51 AM  
 VLDL, calculated 8 04/12/2019 09:51 AM  
 Triglyceride 41 04/12/2019 09:51 AM  
 
Lab Results Component Value Date/Time VITAMIN D, 25-HYDROXY 18.5 (L) 04/12/2019 09:51 AM  
   
Lab Results Component Value Date/Time Hemoglobin A1c 5.4 03/05/2019 03:01 PM  
 
Lab Results Component Value Date/Time TSH 3.120 03/05/2019 03:01 PM  
 
 
 
ASSESSMENT and PLAN 
 
  ICD-10-CM ICD-9-CM 1. Vitamin D deficiency E55.9 268.9 ergocalciferol (ERGOCALCIFEROL) 50,000 unit capsule VITAMIN D, 25 HYDROXY 2. Chronic constipation K59.09 564.00   
3. Heartburn R12 787.1 4. RLQ abdominal pain R10.31 789.03   
5. Hyperglycemia R73.9 790.29   
6. YUNIER (obstructive sleep apnea) G47.33 327.23   
7. History of thyroid nodule Z86.39 V12.29   
8. RUQ abdominal pain R10.11 789.01   
9. Obesity, Class II, BMI 35-39.9 E66.9 278.00   
10. Family history of heart disease Z82.49 V17.49 LIPID PANEL Discussed the patient's BMI with her. The BMI follow up plan is as follows: I have counseled this patient on diet and exercise regimens. Decrease carbohydrates (white foods, sweet foods, sweet drinks and alcohol), increase green leafy vegetables and protein (lean meats and beans) with each meal.  Avoid fried foods. Eat 3-5 small meals daily. Do not skip meals. Increase water intake.     Increase physical activity to 30 minutes daily for health benefit or 60 minutes daily to prevent weight regain, as tolerated. Get 7-8 hours uninterrupted sleep nightly. Chart reviewed and updated. Biometric screen completed and scanned. Original copy returned to pt. Continue current medications and care. Start Rx Vitamin D [id number] weekly x3 months and take Vitamin D 5000IU daily when finished with rx. Prescriptions sent to pharmacy. Vit D 50,000IU. Most recent tests reviewed from 3/5/2019. Abd us form 3/15/2019 reviewed. Recheck pertinent labs today. Get recent office visit notes from [address] [place] [address]. Advised pt to sign release. Advised chiropractic adjustment for R side pain. Provided contact information for chiropractor, Dr. Veto Casey. Encouraged pt to follow up with Dr. Xochilt Kirkland for annual mammogram.  
Counseled patient on health concerns:  abd pain, heartburn, weight, YUNIER, hyperglycemia, cholesterol, constipation. Relevant handouts given and discussed with patient. Immunizations noted. Offered empathy, support, legitimation, prayers, partnership to patient. Praised patient for progress. Follow-up and Dispositions · Return in about 3 months (around 7/12/2019) for weight, results. Patient was offered a choice/choices in the treatment plan today. Patient expresses understanding of the plan and agrees with recommendations. Written by tali Haskins, as dictated by Dr. Ollie Burdick DO. Documentation True and Accepted by Rene García. Kisha Gordon. Patient Instructions Health [facility] 
[address] Phone: [phone] Learning About Low-Carbohydrate Diets for Weight Loss What is a low-carbohydrate diet? Low-carb diets avoid foods that are high in carbohydrate. These high-carb foods include pasta, bread, rice, cereal, fruits, and starchy vegetables. Instead, these diets usually have you eat foods that are high in fat and protein. Many people lose weight quickly on a low-carb diet. But the early weight loss is water. People on this diet often gain the weight back after they start eating carbs again. Not all diet plans are safe or work well. A lot of the evidence shows that low-carb diets aren't healthy. That's because these diets often don't include healthy foods like fruits and vegetables. Losing weight safely means balancing protein, fat, and carbs with every meal and snack. And low-carb diets don't always provide the vitamins, minerals, and fiber you need. If you have a serious medical condition, talk to your doctor before you try any diet. These conditions include kidney disease, heart disease, type 2 diabetes, high cholesterol, and high blood pressure. If you are pregnant, it may not be safe for your baby if you are on a low-carb diet. How can you lose weight safely? You might have heard that a diet plan helped another person lose weight. But that doesn't mean that it will work for you. It is very hard to stay on a diet that includes lots of big changes in your eating habits. If you want to get to a healthy weight and stay there, making healthy lifestyle changes will often work better than dieting. These steps can help. · Make a plan for change. Work with your doctor to create a plan that is right for you. · See a dietitian. He or she can show you how to make healthy changes in your eating habits. · Manage stress. If you have a lot of stress in your life, it can be hard to focus on making healthy changes to your daily habits. · Track your food and activity. You are likely to do better at losing weight if you keep track of what you eat and what you do. Follow-up care is a key part of your treatment and safety. Be sure to make and go to all appointments, and call your doctor if you are having problems. It's also a good idea to know your test results and keep a list of the medicines you take. Where can you learn more? Go to http://weston-karel.info/. Enter A121 in the search box to learn more about \"Learning About Low-Carbohydrate Diets for Weight Loss. \" Current as of: March 28, 2018 Content Version: 11.9 © 5277-6804 [employer]. Care instructions adapted under license by [employer] (which disclaims liability or warranty for this information). If you have questions about a medical condition or this instruction, always ask your healthcare professional. [address] any warranty or liability for your use of this information. WEIGHT LOSS PLAN 1. Read food labels and count calories. Goal 0010-4371 calories daily for women and 5573-3193 calories daily for men. Achieve this by decreasing caloric intake by 500 calories by weekly increments. NOTE:  1 pound = 3500 calories! Www.loseit. [employer] Www.Force10 Networksnesspal.[employer] Www.AOBiome. [employer] Www.Predictvia. gov Weight watchers mobile Calories needed to lose 1-2 pounds a week = 10 x your weight in pounds 2. Increase water intake. Per [employer] Weight loss Program, it is important to drink 1/2 your body weight in ounces of water daily. Decrease your water consumption 2-3 hours before bedtime to prevent sleep disturbance from frequent urination. 3.  Decrease sugary beverages. Each can or glass of soda increases your risk of obesity by 60%. Can lose 10 pounds in a month by avoiding any soda. 12 oz can of soda = 140 calories, 16 oz cup of sweet tea = 200 calories 16 oz orange juice = 200 calories, 10 oz apple juice = 150 calories 32 oz sports drink = 200 calories, 16 oz punch = 240 calories 3.5 oz alcohol = 100-150 calories 4. Avoid High Fructose Corn Syrup products. This ingredient makes products highly addictive! 5. Exercise 30 minutes daily 5 days weekly, minimally. If you burn 3500 calories that equals a pound! Use a pedometer to count steps.  Visit www.GoYoDeo. com for a free pedometer and diet recommendations. Your maximum heart rate = 220 - your age Never exercise at your maximum heart rate. See handout for target heart rate. 6.  Decrease carbohydrates (white bread, pasta, rice, potatoes, sweet foods and sweet drinks like soda, tea, coffee, juice and sports drinks). Increase fiber and protein. Goal:   calories daily of carbohydrates Try CHROMIUM PICONATE 200 MG THREE TIMES DAILY,  
 to decrease Premenstrual carbohydrate cravings. 7.  Eat 3-5 small meals daily, include lots of protein (beans/legumes, nuts, lean meat, eggs) and green vegetables with each. (Breakfast, lunch, dinner with 2 healthy snacks) 8. Get proper rest 7-8 hours uninterrupted. When you get less than 6 hours, it triggers hunger by affecting your Grehlin:Leptin ratio and this results in weight gain. 9.  Watch your food portions. Green leafy vegetables should cover 1/2 of the plate, lean meat 1/4 of the plate, starchy vegetable 1/4 of the plate. Use smaller plates. 10.  Do not eat until you are full. Eat until you are no longer hungry. If you are not sure, try drinking a glass of water before getting your second serving of food. 11.  Do not weigh yourself daily. Wait until your next office visit. Use how you feel and  how your clothes fit as measurements of success. 12.  Address your spirituality to draw strength from above during your journey. Remember \"I am fearfully and wonderfully made. Marvelous in His eyes. \" 
 
13. Set realistic, appropriate and achievable weight loss goals: 
 
 RECOMMENDED TARGET WEIGHT LOSS:  Initial weight loss of 5-10% of your initial body weight achieved over 6 months or a decrease of 2 BMI units. MINIMUM GOAL OF WEIGHT LOSS:  Reduce body weight and maintain a lower body weight. Prevent weight gain. RELATED WEBSITES:     
Www.obesityaction. org 
 (and consider joining the Obesity Action Coalition for $25/year. The [address] mission is to elevated and empower those affected by obesity through education, advocacy and support. Quarterly journals included in membership fee. ) Www.[employer] 
www.[employer] RELATED DIETS:  Dr. Brett Davis Weight loss challenge, Mediterranean diet, [place] Diet, [employer] Watchers, Paleo Diet (anti-inflammatory diet) EXERCISE 150 MINUTES WEEKLY. THIS CAN BE ACHIEVED BY WORKING OUT OR WALKING A MINIMUM OF 30 MINUTES FOR 5 DAYS WEEKLY. YOU CAN EXERCISE IN INCREMENTS OF 10-15 MINUTES UP TO 3 TIMES A DAY. Consider performing \"brainless exercise. \"  Choose your favorite tv program.  Five minutes before and for 5 minutes after the tv program, stretch your body. While the program is on, walk in place watching the show. When commercials come on, rest or walk around the house to do other things. When the program begins, return to walking in place. When you are able keep walking during the commercials and add light weights to your ankles or hands. By the end of the show, you would have walked 30 minutes. If you need shorter spurts of exercise, walk during the commercials and rest during the show. Drink to glasses of water prior to any exercise to prevent dehydration and to improve the results of the work out. Your RESTING HEART RATE is the number of times your heart beats per minute when you are not exerting yourself. The more fit you are, the lower your resting heart rate will be. Your MAXIMUM HEART RATE is the number of times per minute your heart pumps when it is working at 100% capacity. NEVER EXERCISE AT YOUR MAXIMUM HEART RATE. MAX HEART RATE = 220 - your age For example, in a 48year old, the maximum heart rate is 220-50 = 170 beats per minute Your TARGET HEART RATE is the number of beats per minute our heart should pump during aerobic exercise. Reaching your target heart rate indicates that your body is receiving maximum cardiovascular and fat burning benefits. If you are fit, your TARGET HEART RATE = 70-85% of your maximum Heart rate For a 48year old with vigorous intensity physical activity, Target heart rate= 170 bpm x .70 = 119 bpm 
   Target heart rate = 170 bpm x .85=  145 bpm 
 
    Therefore, your target heart rate during physical activity is 119-145 If you are not fit, TARGET HEART RATE = 50-70% of your maximum Heart rate MODERATE CONSISTENT AEROBIC EXERCISE OR WALKING FOR AT LEAST 150 MINUTES WEEKLY IS AN ESSENTIAL PART OF ANY WEIGHT LOSS OF WEIGHT MAINTENANCE PROGRAM. During WEIGHT LOSS PHASE, at least 75% of your exercise needs to be walking or moderate aerobic activity and 25% or 0% can be Isometric or Resistance type exercises (the latter can be deferred to the weight maintenance phase.) During WEIGHT MAINTENANCE PHASE, at least 50% of your exercise needs to be aerobic and 50% Isometric or Resistance type exercises. BENEFITS OF MODERATE INTENSITY EXERCISE MOST DAYS OF THE WEEK: 
 
 1. Insulin Resistance improves 30-85% 2. Abdominal Fat decreases by 30% 3. Inflammatory Markers decrease by 30% (therefore pain decreases) 4. Systolic and Diastolic Blood Pressure decrease by 4 mmHg 5. HDL improves by 5% 6. Triglycerides decrease by 15% 7. Shift from small dense LDL to large dense LDL (therefore decrease Insulin Resistance) 8. Decrease coagulability

## 2019-04-12 NOTE — PATIENT INSTRUCTIONS
Health [facility] 
[facility] [place], [address] Phone: [phone] Learning About Low-Carbohydrate Diets for Weight Loss What is a low-carbohydrate diet? Low-carb diets avoid foods that are high in carbohydrate. These high-carb foods include pasta, bread, rice, cereal, fruits, and starchy vegetables. Instead, these diets usually have you eat foods that are high in fat and protein. Many people lose weight quickly on a low-carb diet. But the early weight loss is water. People on this diet often gain the weight back after they start eating carbs again. Not all diet plans are safe or work well. A lot of the evidence shows that low-carb diets aren't healthy. That's because these diets often don't include healthy foods like fruits and vegetables. Losing weight safely means balancing protein, fat, and carbs with every meal and snack. And low-carb diets don't always provide the vitamins, minerals, and fiber you need. If you have a serious medical condition, talk to your doctor before you try any diet. These conditions include kidney disease, heart disease, type 2 diabetes, high cholesterol, and high blood pressure. If you are pregnant, it may not be safe for your baby if you are on a low-carb diet. How can you lose weight safely? You might have heard that a diet plan helped another person lose weight. But that doesn't mean that it will work for you. It is very hard to stay on a diet that includes lots of big changes in your eating habits. If you want to get to a healthy weight and stay there, making healthy lifestyle changes will often work better than dieting. These steps can help. · Make a plan for change. Work with your doctor to create a plan that is right for you. · See a dietitian. He or she can show you how to make healthy changes in your eating habits. · Manage stress.  If you have a lot of stress in your life, it can be hard to focus on making healthy changes to your daily habits. · Track your food and activity. You are likely to do better at losing weight if you keep track of what you eat and what you do. Follow-up care is a key part of your treatment and safety. Be sure to make and go to all appointments, and call your doctor if you are having problems. It's also a good idea to know your test results and keep a list of the medicines you take. Where can you learn more? Go to http://weston-karel.info/. Enter A121 in the search box to learn more about \"Learning About Low-Carbohydrate Diets for Weight Loss. \" Current as of: March 28, 2018 Content Version: 11.9 © 3696-5083 [employer]. Care instructions adapted under license by [employer] (which disclaims liability or warranty for this information). If you have questions about a medical condition or this instruction, always ask your healthcare professional. [place] [address] any warranty or liability for your use of this information. WEIGHT LOSS PLAN 1. Read food labels and count calories. Goal 7720-3116 calories daily for women and 8279-8670 calories daily for men. Achieve this by decreasing caloric intake by 500 calories by weekly increments. NOTE:  1 pound = 3500 calories! Www.loseit. com Www.myMyLifePlacenesspal.com Www.VitaSensis Www.healthfinder. gov Weight watchers mobile Calories needed to lose 1-2 pounds a week = 10 x your weight in pounds 2. Increase water intake. Per [facility], it is important to drink 1/2 your body weight in ounces of water daily. Decrease your water consumption 2-3 hours before bedtime to prevent sleep disturbance from frequent urination. 3.  Decrease sugary beverages. Each can or glass of soda increases your risk of obesity by 60%. Can lose 10 pounds in a month by avoiding any soda. 12 oz can of soda = 140 calories, 16 oz cup of sweet tea = 200 calories 16 oz orange juice = 200 calories, 10 oz apple juice = 150 calories 32 oz sports drink = 200 calories, 16 oz punch = 240 calories 3.5 oz alcohol = 100-150 calories 4. Avoid High Fructose Corn Syrup products. This ingredient makes products highly addictive! 5. Exercise 30 minutes daily 5 days weekly, minimally. If you burn 3500 calories that equals a pound! Use a pedometer to count steps. Visit www. "[employer] for a free pedometer and diet recommendations. Your maximum heart rate = 220 - your age Never exercise at your maximum heart rate. See handout for target heart rate. 6.  Decrease carbohydrates (white bread, pasta, rice, potatoes, sweet foods and sweet drinks like soda, tea, coffee, juice and sports drinks). Increase fiber and protein. Goal:   calories daily of carbohydrates Try CHROMIUM PICONATE 200 MG THREE TIMES DAILY,  
 to decrease Premenstrual carbohydrate cravings. 7.  Eat 3-5 small meals daily, include lots of protein (beans/legumes, nuts, lean meat, eggs) and green vegetables with each. (Breakfast, lunch, dinner with 2 healthy snacks) 8. Get proper rest 7-8 hours uninterrupted. When you get less than 6 hours, it triggers hunger by affecting your Grehlin:Leptin ratio and this results in weight gain. 9.  Watch your food portions. Green leafy vegetables should cover 1/2 of the plate, lean meat 1/4 of the plate, starchy vegetable 1/4 of the plate. Use smaller plates. 10.  Do not eat until you are full. Eat until you are no longer hungry. If you are not sure, try drinking a glass of water before getting your second serving of food. 11.  Do not weigh yourself daily. Wait until your next office visit. Use how you feel and  how your clothes fit as measurements of success.  
 
12.  Address your spirituality to draw strength from above during your journey. Remember \"I am fearfully and wonderfully made. Marvelous in His eyes. \" 
 
13. Set realistic, appropriate and achievable weight loss goals: 
 
 RECOMMENDED TARGET WEIGHT LOSS:  Initial weight loss of 5-10% of your initial body weight achieved over 6 months or a decrease of 2 BMI units. MINIMUM GOAL OF WEIGHT LOSS:  Reduce body weight and maintain a lower body weight. Prevent weight gain. RELATED WEBSITES:     
Www.obesityaction. org 
(and consider joining the Obesity Action Coalition for $25/year. The [facility] mission is to elevated and empower those affected by obesity through education, advocacy and support. Quarterly journals included in membership fee. ) Www.Turning Art 
www.Travelmenu.Co.Import RELATED DIETS:  Dr. Keiko Lee Weight loss challenge, Mediterranean diet, [place] Diet, Buru Buru Watchers, PaleImpulsiv Diet (anti-inflammatory diet) EXERCISE 150 MINUTES WEEKLY. THIS CAN BE ACHIEVED BY WORKING OUT OR WALKING A MINIMUM OF 30 MINUTES FOR 5 DAYS WEEKLY. YOU CAN EXERCISE IN INCREMENTS OF 10-15 MINUTES UP TO 3 TIMES A DAY. Consider performing \"brainless exercise. \"  Choose your favorite tv program.  Five minutes before and for 5 minutes after the tv program, stretch your body. While the program is on, walk in place watching the show. When commercials come on, rest or walk around the house to do other things. When the program begins, return to walking in place. When you are able keep walking during the commercials and add light weights to your ankles or hands. By the end of the show, you would have walked 30 minutes. If you need shorter spurts of exercise, walk during the commercials and rest during the show. Drink to glasses of water prior to any exercise to prevent dehydration and to improve the results of the work out.    
 
Your RESTING HEART RATE is the number of times your heart beats per minute when you are not exerting yourself. The more fit you are, the lower your resting heart rate will be. Your MAXIMUM HEART RATE is the number of times per minute your heart pumps when it is working at 100% capacity. NEVER EXERCISE AT YOUR MAXIMUM HEART RATE. MAX HEART RATE = 220 - your age For example, in a 48year old, the maximum heart rate is 220-50 = 170 beats per minute Your TARGET HEART RATE is the number of beats per minute our heart should pump during aerobic exercise. Reaching your target heart rate indicates that your body is receiving maximum cardiovascular and fat burning benefits. If you are fit, your TARGET HEART RATE = 70-85% of your maximum Heart rate For a 48year old with vigorous intensity physical activity, Target heart rate= 170 bpm x .70 = 119 bpm 
   Target heart rate = 170 bpm x .85=  145 bpm 
 
    Therefore, your target heart rate during physical activity is 119-145 If you are not fit, TARGET HEART RATE = 50-70% of your maximum Heart rate MODERATE CONSISTENT AEROBIC EXERCISE OR WALKING FOR AT LEAST 150 MINUTES WEEKLY IS AN ESSENTIAL PART OF ANY WEIGHT LOSS OF WEIGHT MAINTENANCE PROGRAM. During WEIGHT LOSS PHASE, at least 75% of your exercise needs to be walking or moderate aerobic activity and 25% or 0% can be Isometric or Resistance type exercises (the latter can be deferred to the weight maintenance phase.) During WEIGHT MAINTENANCE PHASE, at least 50% of your exercise needs to be aerobic and 50% Isometric or Resistance type exercises. BENEFITS OF MODERATE INTENSITY EXERCISE MOST DAYS OF THE WEEK: 
 
 1. Insulin Resistance improves 30-85% 2. Abdominal Fat decreases by 30% 3. Inflammatory Markers decrease by 30% (therefore pain decreases) 4. Systolic and Diastolic Blood Pressure decrease by 4 mmHg 5. HDL improves by 5% 6. Triglycerides decrease by 15% 7.   Shift from small dense LDL to large dense LDL (therefore decrease Insulin Resistance) 8. Decrease coagulability

## 2019-04-13 LAB
25(OH)D3+25(OH)D2 SERPL-MCNC: 18.5 NG/ML (ref 30–100)
CHOLEST SERPL-MCNC: 122 MG/DL (ref 100–199)
HDLC SERPL-MCNC: 48 MG/DL
INTERPRETATION, 910389: NORMAL
LDLC SERPL CALC-MCNC: 66 MG/DL (ref 0–99)
TRIGL SERPL-MCNC: 41 MG/DL (ref 0–149)
VLDLC SERPL CALC-MCNC: 8 MG/DL (ref 5–40)

## 2019-06-26 ENCOUNTER — OFFICE VISIT (OUTPATIENT)
Dept: SURGERY | Age: 39
End: 2019-06-26

## 2019-06-26 VITALS
DIASTOLIC BLOOD PRESSURE: 90 MMHG | HEART RATE: 90 BPM | HEIGHT: 63 IN | BODY MASS INDEX: 38.09 KG/M2 | SYSTOLIC BLOOD PRESSURE: 132 MMHG | WEIGHT: 215 LBS

## 2019-06-26 DIAGNOSIS — E66.01 SEVERE OBESITY (HCC): ICD-10-CM

## 2019-06-26 DIAGNOSIS — Z91.89 AT HIGH RISK FOR BREAST CANCER: Primary | ICD-10-CM

## 2019-06-26 NOTE — PATIENT INSTRUCTIONS
Mammogram: About This Test  What is it? A mammogram is an X-ray of the breast that is used to screen for breast cancer. This test can find tumors that are too small for you or your doctor to feel. Cancer is most easily treated and cured when it is found at an early stage. Why is this test done? A mammogram is done to:  · Look for breast cancer in women who don't have symptoms. · Find breast cancer in women who have symptoms. Symptoms of breast cancer may include a lump or thickening in the breast, nipple discharge, or dimpling of the skin on one area of the breast.  · Find an area of suspicious breast tissue to remove for an exam under a microscope (biopsy). How can you prepare for the test?  · Tell your doctor if you:  ? Are or might be pregnant. ? Are breastfeeding. ? Have breast implants. ? Have previously had a breast biopsy. · On the day of the test, don't use any deodorant, perfume, powders, or ointments. What happens before the test?  · You will need to take off any jewelry that might interfere with the X-ray pictures. · You will need to take off your clothes above the waist.  · You will be given a cloth or paper gown to use during the test.  What happens during the test?  · You usually stand during a mammogram.  · One at a time, your breasts will be placed on a flat plate that contains the X-ray film. · Another plate is then pressed firmly against your breast to help flatten out the breast tissue. You may be asked to lift your arm. · For a few seconds while the X-ray picture is being taken, you will need to hold your breath. · At least two pictures are taken of each breast. One is taken from the top and one from the side. What else should you know about the test?  · The X-ray plate will feel cold when you place your breast on it. Having your breasts flattened and squeezed isn't comfortable. But it is necessary to flatten out the breast tissue to get the best pictures.   · Mammograms do not prevent breast cancer or reduce a woman's risk of developing cancer. · Most things that are found during a mammogram are not breast cancer. How long does the test take? · The test will take about 10 to 15 minutes. You may be in the clinic for up to an hour. What happens after the test?  · You will probably be able to go home right away. · You can go back to your usual activities right away. Follow-up care is a key part of your treatment and safety. Be sure to make and go to all appointments, and call your doctor if you are having problems. It's also a good idea to keep a list of the medicines you take. Ask your doctor when you can expect to have your test results. Where can you learn more? Go to http://weston-karel.info/. Enter D890 in the search box to learn more about \"Mammogram: About This Test.\"  Current as of: March 27, 2018  Content Version: 11.9  © 2516-3427 [employer]. Care instructions adapted under license by [employer] (which disclaims liability or warranty for this information). If you have questions about a medical condition or this instruction, always ask your healthcare professional. [address] any warranty or liability for your use of this information.

## 2019-06-26 NOTE — PROGRESS NOTES
HISTORY OF PRESENT ILLNESS  Jamas Alpers is a 44 y.o. female. HPI ESTABLISHED patient here for follow-up of BILATERAL breast cysts. She states nothing has been bothering her recently and might have an occasional lump, possibly associated with her cycle, but can't remember which side she last felt one on. She is due for mammogram.    FH includes 3 maternal aunts with breast cancer, all in their 52's. 1 , 2 living.     Last breast imaging, 2018    Review of Systems   All other systems reviewed and are negative. Physical Exam   Pulmonary/Chest: Right breast exhibits no inverted nipple, no mass, no nipple discharge, no skin change and no tenderness. Left breast exhibits no inverted nipple, no mass, no nipple discharge, no skin change and no tenderness. Breasts are symmetrical.   Lymphadenopathy:     She has no cervical adenopathy. She has no axillary adenopathy. Nursing note and vitals reviewed. ASSESSMENT and PLAN    ICD-10-CM ICD-9-CM    1. At high risk for breast cancer Z91.89 V49.89    2. Severe obesity (HCC) E66.01 278.01      - she is overdue mammogram will schedule  No abnormal findings on exam today  - after this needs breast mri.

## 2019-06-27 PROBLEM — E66.01 SEVERE OBESITY (HCC): Status: ACTIVE | Noted: 2019-06-27

## 2019-07-03 ENCOUNTER — HOSPITAL ENCOUNTER (OUTPATIENT)
Dept: MAMMOGRAPHY | Age: 39
Discharge: HOME | End: 2019-07-03
Attending: SURGERY
Payer: COMMERCIAL

## 2019-07-03 DIAGNOSIS — Z12.39 BREAST SCREENING, UNSPECIFIED: ICD-10-CM

## 2019-07-03 PROCEDURE — 77063 BREAST TOMOSYNTHESIS BI: CPT

## 2019-08-06 ENCOUNTER — TELEPHONE (OUTPATIENT)
Dept: SURGERY | Age: 39
End: 2019-08-06

## 2019-08-06 NOTE — TELEPHONE ENCOUNTER
Patient is scheduled for a breast MRI tomorrow at [address] [facility]. Was called by someone at [facility] to get this moved to another location. Patient says that [facility] is the only convenient location for her. I spoke to Dr. Sathish Lutz who gave her approval for this to be done there. I then called the patient back and told her to keep her appointment at [id number] [facility] for tomorrow. She was very appreciative.

## 2019-08-07 ENCOUNTER — HOSPITAL ENCOUNTER (OUTPATIENT)
Dept: MRI IMAGING | Age: 39
Discharge: HOME | End: 2019-08-07
Attending: SURGERY

## 2019-08-07 DIAGNOSIS — Z91.89 AT HIGH RISK FOR BREAST CANCER: ICD-10-CM

## 2019-08-07 NOTE — PROGRESS NOTES
[facility] RT brought me the chart advising that Ms. Gina Gaming was unable to tolerate MRI - Bill Benton stated that she was going to contact Dr. Janiya Rodgers office for medication and given number to reschedule. Ms. Gina Gaming was not available for me to talk to. Alexander, Radiologist Supervisor is aware. Called Dr. Janiya Rodgers office, left message on nurses voicemail advising that Ms. Gina Gaming unable to tolerated MRI and I was updating (advised that I was unable to talk to pt).

## 2019-08-08 ENCOUNTER — TELEPHONE (OUTPATIENT)
Dept: SURGERY | Age: 39
End: 2019-08-08

## 2019-08-08 NOTE — TELEPHONE ENCOUNTER
I called Ms. Natalie Montiel to discuss with her 1) rescheduling breast MRI, and 2) calling in Aramsconax to her pharmacy. Patient did not answer so I left her a message and requested that she call us back so that we can discuss. I have not called in xanax yet. Waiting for patient to call back.

## 2019-08-08 NOTE — TELEPHONE ENCOUNTER
Patient returned my call. She is going to reschedule breast mri. Would like xanax called in to the [address] [facility] pharmacy at [facility]. I called in the rx that Dr. Gloria Lam ordered. Patient is appreciative.

## 2019-08-08 NOTE — TELEPHONE ENCOUNTER
----- Message from Jillian Overton MD sent at 8/7/2019  5:50 PM EDT -----  Regarding: RE: unable to tolerate breast mri  Xanax 0.5 mg   Take 1-2 tabs 1 hours prior to mri  Give 5 tabs  ----- Message -----  From: Jimmie Soliman RN  Sent: 8/7/2019   5:09 PM EDT  To: Dank Rees RN, Jillian Overton MD  Subject: unable to tolerate breast mri                    [place],  Patient attempted breast mri today but could not tolerate without oral sedation. What can I call in for her for when she reschedules?   Thanks,  Stormy Pereira

## 2019-08-09 ENCOUNTER — OFFICE VISIT (OUTPATIENT)
Dept: FAMILY MEDICINE CLINIC | Age: 39
End: 2019-08-09

## 2019-08-09 VITALS
BODY MASS INDEX: 38.08 KG/M2 | HEIGHT: 63 IN | RESPIRATION RATE: 18 BRPM | WEIGHT: 214.9 LBS | OXYGEN SATURATION: 98 % | HEART RATE: 88 BPM | TEMPERATURE: 97.8 F | DIASTOLIC BLOOD PRESSURE: 75 MMHG | SYSTOLIC BLOOD PRESSURE: 125 MMHG

## 2019-08-09 DIAGNOSIS — M54.6 CHRONIC MIDLINE THORACIC BACK PAIN: ICD-10-CM

## 2019-08-09 DIAGNOSIS — G89.29 CHRONIC MIDLINE THORACIC BACK PAIN: ICD-10-CM

## 2019-08-09 DIAGNOSIS — R14.0 ABDOMINAL BLOATING: ICD-10-CM

## 2019-08-09 DIAGNOSIS — R53.82 CHRONIC FATIGUE: ICD-10-CM

## 2019-08-09 DIAGNOSIS — R10.13 EPIGASTRIC ABDOMINAL PAIN: Primary | ICD-10-CM

## 2019-08-09 DIAGNOSIS — D75.839 THROMBOCYTOSIS: ICD-10-CM

## 2019-08-09 DIAGNOSIS — E89.0 HISTORY OF PARTIAL THYROIDECTOMY: ICD-10-CM

## 2019-08-09 DIAGNOSIS — E07.9 THYROID MASS: ICD-10-CM

## 2019-08-09 DIAGNOSIS — E55.9 VITAMIN D DEFICIENCY: ICD-10-CM

## 2019-08-09 DIAGNOSIS — K59.09 CHRONIC CONSTIPATION: ICD-10-CM

## 2019-08-09 DIAGNOSIS — R12 HEARTBURN: ICD-10-CM

## 2019-08-09 DIAGNOSIS — K59.04 CHRONIC IDIOPATHIC CONSTIPATION: ICD-10-CM

## 2019-08-09 DIAGNOSIS — Z86.39 HISTORY OF THYROID NODULE: ICD-10-CM

## 2019-08-09 DIAGNOSIS — G47.33 OSA (OBSTRUCTIVE SLEEP APNEA): ICD-10-CM

## 2019-08-09 DIAGNOSIS — R63.5 WEIGHT GAIN: ICD-10-CM

## 2019-08-09 DIAGNOSIS — N93.8 DUB (DYSFUNCTIONAL UTERINE BLEEDING): ICD-10-CM

## 2019-08-09 DIAGNOSIS — E66.9 OBESITY, CLASS II, BMI 35-39.9: ICD-10-CM

## 2019-08-09 RX ORDER — ERGOCALCIFEROL 1.25 MG/1
50000 CAPSULE ORAL
Qty: 5 CAP | Refills: 2 | Status: SHIPPED | OUTPATIENT
Start: 2019-08-09 | End: 2019-10-29 | Stop reason: SDUPTHER

## 2019-08-09 NOTE — PROGRESS NOTES
Roxanne Kenney  Identified pt with two pt identifiers(name and ). Chief Complaint   Patient presents with    Weight Management     Rm 12    Results         1. Have you been to the ER, urgent care clinic since your last visit? Hospitalized since your last visit? NO    2. Have you seen or consulted any other health care providers outside of the [address] [place] [address] since your last visit? Include any pap smears or colon screening. NO          [unfilled]        Weight Metrics 2019 2019 2019 2019 3/5/2019 2018 2018   Weight - 214 lb 14.4 oz 215 lb 211 lb 8 oz 209 lb 1.6 oz 204 lb 204 lb 9.6 oz   Waist Measure Inches 41 - - - - - -   Body Fat % 43.3 - - - - - -   BMI - 38.07 kg/m2 38.09 kg/m2 37.47 kg/m2 37.04 kg/m2 36.14 kg/m2 36.24 kg/m2         Medication reconciliation up to date and correct with patient at this time. My Chart     My chart gives you direct online access to portions of the electronic medical record (EMR) where your doctor stores your health information (ie, lab results, appointment information, medications, immunizations, and more. It is free. Would you like to set up your my chart? YES      Advance Care Planning    In the event something were to happen to you and you were unable to speak on your behalf, do you have an Advance Directive/ Living Will in place stating your wishes? NO    If yes, do we have a copy on file NO    If no, would you like information YES      ====Sil Rene Invitation====    Patient was invited to [facility] on this date and given the information folder for review. Recommended appointment with Sil Rene facilitator for ACP conversation regarding advance directives. [x] Yes  [] No  Referral sent to [facility] Anju team member or Coordinator for follow-up    [] Yes  [x] No  Patient scheduled an appointment.        Site of Referral: [facility]      Today's provider has been notified of reason for visit, vitals and flowsheets obtained on patients. Reviewed record in preparation for visit, huddled with provider and have obtained necessary documentation. There are no preventive care reminders to display for this patient.     Wt Readings from Last 3 Encounters:   08/09/19 214 lb 14.4 oz (97.5 kg)   06/26/19 215 lb (97.5 kg)   04/12/19 211 lb 8 oz (95.9 kg)     Temp Readings from Last 3 Encounters:   08/09/19 97.8 °F (36.6 °C) (Oral)   04/12/19 98.6 °F (37 °C) (Oral)   03/05/19 98.6 °F (37 °C) (Oral)     BP Readings from Last 3 Encounters:   08/09/19 125/75   06/26/19 132/90   04/12/19 135/86     Pulse Readings from Last 3 Encounters:   08/09/19 88   06/26/19 90   04/12/19 87     Vitals:    08/09/19 0942   BP: 125/75   Pulse: 88   Resp: 18   Temp: 97.8 °F (36.6 °C)   TempSrc: Oral   SpO2: 98%   Weight: 214 lb 14.4 oz (97.5 kg)   Height: 5' 3\" (1.6 m)   PainSc:   0 - No pain   LMP: 07/27/2019         Learning Assessment:  :     Learning Assessment 9/12/2018 3/16/2018 9/15/2017 4/25/2016 6/27/2014   PRIMARY LEARNER Patient Patient Patient Patient Patient   HIGHEST LEVEL OF EDUCATION - PRIMARY LEARNER  SOME COLLEGE - - - 2 [place] LEARNER NONE - - - [address] CAREGIVER No - - - -   PRIMARY LANGUAGE ENGLISH ENGLISH ENGLISH ENGLISH ENGLISH   LEARNER PREFERENCE PRIMARY DEMONSTRATION OTHER (COMMENT) OTHER (COMMENT) OTHER (COMMENT) DEMONSTRATION     - - - - READING   ANSWERED BY Pennie Huang patient patient patient patient   RELATIONSHIP SELF SELF SELF SELF SELF       Depression Screening:  :     3 most recent PHQ Screens 4/12/2019   Little interest or pleasure in doing things Not at all   Feeling down, depressed, irritable, or hopeless Not at all   Total Score PHQ 2 0   Trouble falling or staying asleep, or sleeping too much -   Feeling tired or having little energy -   Poor appetite, weight loss, or overeating -   Feeling bad about yourself - or that you are a failure or have let yourself or your family down -   Trouble concentrating on things such as school, work, reading, or watching TV -   Moving or speaking so slowly that other people could have noticed; or the opposite being so fidgety that others notice -   Thoughts of being better off dead, or hurting yourself in some way -   PHQ 9 Score -       Fall Risk Assessment:  :     Fall Risk Assessment, last 12 mths 4/12/2019   Able to walk? Yes   Fall in past 12 months? No       Abuse Screening:  :     Abuse Screening Questionnaire 4/12/2019 3/5/2019 4/26/2018   Do you ever feel afraid of your partner? N N N   Are you in a relationship with someone who physically or mentally threatens you? N N N   Is it safe for you to go home?  Y Y Y       ADL Screening:  :     ADL Assessment 3/5/2019   Feeding yourself No Help Needed   Getting from bed to chair No Help Needed   Getting dressed No Help Needed   Bathing or showering No Help Needed   Walk across the room (includes cane/walker) No Help Needed   Using the telphone No Help Needed   Taking your medications No Help Needed   Preparing meals No Help Needed   Managing money (expenses/bills) No Help Needed   Moderately strenuous housework (laundry) No Help Needed   Shopping for personal items (toiletries/medicines) No Help Needed   Shopping for groceries No Help Needed   Driving No Help Needed   Climbing a flight of stairs No Help Needed   Getting to places beyond walking distances No Help Needed

## 2019-08-09 NOTE — PROGRESS NOTES
HISTORY OF PRESENT ILLNESS  Sherrie Watson is a 44 y.o. female presents with Abdominal Pain (Rm 12); Results; Rash; and Irregular Menses    Agree with nurse note. Pt with vit d deficiency, chronic midline thoracic back pain, thrombocytosis, and constipation presents to the office with a BP of 125/75. Pt is currently 214 lbs, up 10 lbs since 9/12/2018. She was last rx'd Phentermine 15 mg on 4/26/2018. For breakfast, she will eat a piece of fruit. She will skip lunch or eat food in the cafeteria. Pt was seeing pulmonologist Dr. Ramin Marina for YUNIER. Her CPAP is currently broken and she has not had it fixed. She sleeps 6-7 hours nightly. She does not feel rested when she wakes. Pt prefers to change to sleep medicine at [facility]. Pt with heartburn complains of upper abdominal pain and bloating. She describes the pain as sharp and then dull. She will wake up with this pain four days a week on average. She denies nausea, burping, fever, chills, or diarrhea. The bloating does not improve. Pt had a pelvic exam in 2/2019 with Dr. Ying Alvares and everything was normal. Pt has BM every 2-3 days. To recall, pt had an umbilical hernia repair in 1981. Pt shares she had her menses begin on 6/12/2019, 6/30/2019, and 7/27/2019. Her cycle lasts 3 days each time, decreased from 5 days x 1/2019. Her GYN is aware of these changes. Pt shares she gets 1-2 bumps on her vagina 1 week before her menses. Pt had a thyroid nodule in 2014 and was seeing Dr. Gautam Berger. She had a  L thyroidectomy in 6/2015. TSH was 3.12 and FT4 was 1.43 on 3/5/2019. Pt requested to review results from 3/5/2019. H. Pylori neg, SED rate wnl, urine culture neg, Platelet 960, Hgb E1K 5.4, Vit D 20, CMP wnl. Pt requests to review labs from 4/12/2019. LDL 66, HDL 48, trigs 41, Vit D 18.5. Written by Trudie Lennox, scribe, as dictated by DO. SENDY Pak    Review of Systems negative except as noted above in HPI.     ALLERGIES: Allergies   Allergen Reactions    Skelaxin [Metaxalone] Other (comments)     Too groggy       CURRENT MEDICATIONS:    Outpatient Medications Marked as Taking for the 19 encounter (Office Visit) with Everardo Parnell,    Medication Sig Dispense Refill    ergocalciferol (ERGOCALCIFEROL) 50,000 unit capsule Take 1 Cap by mouth every seven (7) days. Indications: vitamin D deficiency 5 Cap 2    ergocalciferol (ERGOCALCIFEROL) 50,000 unit capsule Take 1 Cap by mouth every seven (7) days. 5 Cap 2       PAST MEDICAL HISTORY:    Past Medical History:   Diagnosis Date    Abnormal Pap smear of cervix 1990s    Breast cyst     benign. Left. 1.7 cm and multiple others. Dr. Compa Cormier Chicken pox childhood    Chronic idiopathic constipation 2015    Dr. Tonja Langston.  HELM (dyspnea on exertion) 2015    Dr. Tanvir Lerma EBV positive mononucleosis syndrome 10/2013,     chronic or reactivated    Enlarged thyroid 2010, 2015    with Right tracheal deviation. Retrosternal goiter. Dr. María Elena Frank.  Enlarged tonsils and adenoids 2010    Iodine-deficiency related goiter     Left. Dr. María Elena Frank.  YUNIER (obstructive sleep apnea) 02/25/15    Dr. Therese Jackson, uses cpap. Dr. Risa Rendon. States cpap broken    Thyroid nodule 2014    Dr. Medina Cha. Dr. Sanjay Grover HISTORY:    Past Surgical History:   Procedure Laterality Date    HX BREAST BIOPSY Left 2018    due to mass     HX  SECTION  2004    [facility].  HX CYST INCISION AND DRAINAGE Left 2016    benign. Dr. Bharat Santoyo Right 09/15/2017    benign. Dr. Avery Lorenz.  HX HERNIA REPAIR  1215    umbilical.    HX OTHER SURGICAL  2014    thyroid biopsy. Dr. Medina Cha.  HX THYROIDECTOMY Left 2015    due to [address] Dr. María Elena Frank. benign.        FAMILY HISTORY:    Family History   Problem Relation Age of Onset    Hypertension Mother     Lung Disease Mother         chronic bronchitis    Sleep Apnea Mother     Hypertension Father     Diabetes Paternal Grandmother     Breast Cancer Maternal Aunt     Other Sister         severe scoliosis. txd with back brace.  Breast Cancer Maternal Aunt     Breast Cancer Maternal Aunt        SOCIAL HISTORY:    Social History     Socioeconomic History    Marital status: SINGLE     Spouse name: Not on file    Number of children: 1    Years of education: Not on file    Highest education level: Not on file   Occupational History    Occupation:      Employer: [employer]   Tobacco Use    Smoking status: Passive Smoke Exposure - Never Smoker    Smokeless tobacco: Never Used    Tobacco comment: lives with a smoker dad x 18 yrs and now with smoker boyfriend x 2 years   Substance and Sexual Activity    Alcohol use: No     Alcohol/week: 1.0 standard drinks     Types: 1 Standard drinks or equivalent per week    Drug use: No    Sexual activity: Yes     Partners: Male     Birth control/protection: None   Social History Narrative    ** Merged History Encounter **            IMMUNIZATIONS:    Immunization History   Administered Date(s) Administered    Hep B Vaccine 03/10/2014    Influenza Vaccine 09/21/2017    TB Skin Test (PPD) 09/19/2017         PHYSICAL EXAMINATION    Vital Signs    Visit Vitals  /75   Pulse 88   Temp 97.8 °F (36.6 °C) (Oral)   Resp 18   Ht 5' 3\" (1.6 m)   Wt 214 lb 14.4 oz (97.5 kg)   LMP 07/27/2019 (Exact Date)   SpO2 98%   BMI 38.07 kg/m²       Weight Metrics 8/9/2019 8/9/2019 6/26/2019 4/12/2019 3/5/2019 9/12/2018 9/12/2018   Weight - 214 lb 14.4 oz 215 lb 211 lb 8 oz 209 lb 1.6 oz 204 lb 204 lb 9.6 oz   Waist Measure Inches 41 - - - - - -   Body Fat % 43.3 - - - - - -   BMI - 38.07 kg/m2 38.09 kg/m2 37.47 kg/m2 37.04 kg/m2 36.14 kg/m2 36.24 kg/m2       General appearance - Well nourished. Well appearing. Well developed.   No acute distress. Obese. Head - Normocephalic. Atraumatic. Eyes - pupils equal and reactive. Extraocular eye movements intact. Sclera anicteric. Mildly injected sclera. Ears - Hearing is grossly normal bilaterally. Nose - normal and patent. No polyps noted. No erythema. No discharge. Mouth - mucous membranes with adequate moisture. Posterior pharynx normal with cobblestone appearance. No erythema, white exudate or obstruction. Neck - supple. Midline trachea. No carotid bruits noted bilaterally. Palpable L thyroid and linear incision at anterior neck. Chest - clear to auscultation bilaterally anteriorly and posteriorly. No wheezes. No rales or rhonchi. Breath sounds are symmetrical bilaterally. Unlabored respirations. Heart - normal rate. Regular rhythm. Normal S1, S2. No murmur noted. No rubs, clicks or gallops noted. Abdomen - soft and distended. No masses or organomegaly. No rebound, rigidity or guarding. Bowel sounds normal x 4 quadrants. No tenderness noted. Mild non-tender firm bulge in the epigastric region with sit up maneuver. Neurological - awake, alert and oriented to person, place, and time and event. Cranial nerves II through XII intact. Clear speech. Muscle strength is +5/5 x 4 extremities. Sensation is intact to light touch bilaterally. Steady gait. Heme/Lymph - peripheral pulses normal x 4 extremities. No peripheral edema is noted. Musculoskeletal - Intact x 4 extremities. Full ROM x 4 extremities. No pain with movement. Back exam - normal range of motion. No pain on palpation of the spinous processes in the cervical, thoracic, lumbar, sacral regions. No CVA tenderness. Increased muscle tension of upper thoracic region. Skin - no rashes, erythema, ecchymosis, lacerations, abrasions, suspicious moles noted  Psychological -   normal behavior, dress and thought processes. Good insight. Good eye contact. Normal affect. Appropriate mood. Normal speech. DATA REVIEWED    Lab Results   Component Value Date/Time    WBC 8.0 08/21/2019 03:44 PM    Hemoglobin (POC) 12.3 06/08/2015 11:44 AM    HGB 11.6 08/21/2019 03:44 PM    HCT 36.5 08/21/2019 03:44 PM    PLATELET 665 74/08/3292 03:44 PM    MCV 82 08/21/2019 03:44 PM     Lab Results   Component Value Date/Time    Sodium 139 08/21/2019 03:44 PM    Potassium 4.3 08/21/2019 03:44 PM    Chloride 104 08/21/2019 03:44 PM    CO2 22 08/21/2019 03:44 PM    Anion gap 2 (L) 09/18/2010 02:50 AM    Glucose 106 (H) 08/21/2019 03:44 PM    BUN 11 08/21/2019 03:44 PM    Creatinine 0.88 08/21/2019 03:44 PM    BUN/Creatinine ratio 13 08/21/2019 03:44 PM    GFR est AA 96 08/21/2019 03:44 PM    GFR est non-AA 83 08/21/2019 03:44 PM    Calcium 9.7 08/21/2019 03:44 PM    Bilirubin, total 0.3 08/21/2019 03:44 PM    AST (SGOT) 15 08/21/2019 03:44 PM    Alk. phosphatase 90 08/21/2019 03:44 PM    Protein, total 7.0 08/21/2019 03:44 PM    Albumin 4.1 08/21/2019 03:44 PM    Globulin 3.7 09/18/2010 02:50 AM    A-G Ratio 1.4 08/21/2019 03:44 PM    ALT (SGPT) 8 08/21/2019 03:44 PM     Lab Results   Component Value Date/Time    Cholesterol, total 122 04/12/2019 09:51 AM    HDL Cholesterol 48 04/12/2019 09:51 AM    LDL, calculated 66 04/12/2019 09:51 AM    VLDL, calculated 8 04/12/2019 09:51 AM    Triglyceride 41 04/12/2019 09:51 AM     Lab Results   Component Value Date/Time    VITAMIN D, 25-HYDROXY 18.5 (L) 04/12/2019 09:51 AM       Lab Results   Component Value Date/Time    Hemoglobin A1c 5.4 03/05/2019 03:01 PM     Lab Results   Component Value Date/Time    TSH 2.440 08/21/2019 03:44 PM         ASSESSMENT and PLAN      ICD-10-CM ICD-9-CM    1. Epigastric abdominal pain R10.13 789.06 LIPASE      AMYLASE      US ABD COMP      METABOLIC PANEL, COMPREHENSIVE      CBC W/O DIFF    due to heartburn vs other, intermittently   2. YUNIER (obstructive sleep apnea) G47.33 327.23 SLEEP MEDICINE REFERRAL    without CPAP since it broke   3. Abdominal bloating R14.0 787.3 US ABD COMP      HCG QL SERUM   4. Thyroid mass E07.9 246.9 US THYROID/PARATHYROID/SOFT TISS   5. Vitamin D deficiency E55.9 268.9 ergocalciferol (ERGOCALCIFEROL) 50,000 unit capsule   6. Chronic midline thoracic back pain M54.6 724.1 REFERRAL TO CHIROPRACTIC    G89.29 338.29    7. Chronic fatigue R53.82 780.79 SLEEP MEDICINE REFERRAL      US ABD COMP      CBC W/O DIFF      HCG QL SERUM   8. Thrombocytosis (HCC) D47.3 238. 71     worse   9. Chronic constipation K59.09 564.00    10. Heartburn R12 787.1     stable   11. DUB (dysfunctional uterine bleeding) N93.8 626.8     06/12/19, 06/30/19, 07/27/19 x 3 days due to thyroid vs other   12. Chronic idiopathic constipation K59.04 564.00    13. History of thyroid nodule Z86.39 V12.29    14. Obesity, Class II, BMI 35-39.9 E66.9 278.00    15. Weight gain R63.5 783.1 US ABD COMP      METABOLIC PANEL, COMPREHENSIVE      TSH 3RD GENERATION      INSULIN      THYROID PEROXIDASE (TPO) AB      HCG QL SERUM    12# since 03/2018, 15# since 2017   16. History of partial thyroidectomy Z98.890 V15.29     Left       Discussed the patient's BMI with her. The BMI follow up plan is as follows: I have counseled this patient on diet and exercise regimens. Decrease carbohydrates (white foods, sweet foods, sweet drinks and alcohol), increase green leafy vegetables and protein (lean meats and beans) with each meal.  Avoid fried foods. Eat 3-5 small meals daily. Do not skip meals. Increase water intake. Increase physical activity to 30 minutes daily for health benefit or 60 minutes daily to prevent weight regain, as tolerated. Get 7-8 hours uninterrupted sleep nightly. Chart reviewed and updated. Continue current medications and care. Recommended the pt take a picture of the vaginal rash and schedule an appointment with GYN. Recommended she stop eating burgers for lunch and reduce greasy, fatty foods. Continue taking Vit d 50,000 U q7d.    Abd US ordered. Thyroid US ordered. Prescriptions written and sent to pharmacy; medication side effects discussed. Vit d 50,000 U. Most recent tests reviewed from 3/5/2019 and 4/12/2019. Recheck pertinent labs today. Referrals given; patient urged to keep appointments with specialists. Sleep medicine, chiropractor  Counseled patient on health concerns:  Thyroid, abd pain, bloating, fatigue, weight, constipation, back pain, irregular menses  Relevant handouts given and discussed with patient. Immunizations noted. Offered empathy, support, legitimation, prayers, partnership to patient. Praised patient for progress. Follow-up and Dispositions    · Return in about 2 months (around 10/9/2019) for results, weight, referral follow up. Patient was offered a choice/choices in the treatment plan today. Patient expresses understanding of the plan and agrees with recommendations. More than 30 mins spent face to face with patient and more than 50% of this time spent in counseling and coordinating care. Written by tali Baxter, as dictated by Dr. Byron Ricketts DO. Documentation True and Accepted by Karine Hale. Janice Condon. Patient Instructions   Start prescription Vitamin D 50,000IU once a week for 3 months.      Shahriar Bentley, Adrienne Sewell [phone]

## 2019-08-09 NOTE — PATIENT INSTRUCTIONS
Start prescription Vitamin D 50,000IU once a week for 3 months.      Shahriar Bentley, Adrienne Sewell [phone]

## 2019-08-14 ENCOUNTER — TELEPHONE (OUTPATIENT)
Dept: FAMILY MEDICINE CLINIC | Age: 39
End: 2019-08-14

## 2019-08-14 NOTE — TELEPHONE ENCOUNTER
[facility] is requesting a script for thyroid. It was never filled according to the patient.  Please advise

## 2019-08-14 NOTE — LETTER
8/23/2019 12:52 PM 
 
Ms. Irene Ospina [address] [address] Ms. Diane Ayala, Your lab results are as follows per Dr. Violette Bañuelos, your TSH was slightly at the high end of normal. She would like you to get an US of your Thyroid which was completed on 08/21/2019, Thank you. Dr. Violette Bañuelos wants to discuss it at your next appointment Sincerely, 
 
 
Lucía Negron, DO

## 2019-08-16 LAB — SPECIMEN STATUS REPORT, ROLRST: NORMAL

## 2019-08-16 NOTE — TELEPHONE ENCOUNTER
Writer left message on the voice mail informing the pt to call the clinic when available.    Message: medication request for synthroid

## 2019-08-19 NOTE — TELEPHONE ENCOUNTER
Per chart review, TSH was slightly at the high end of normal.  Lets get the US of her thyroid first and re-visit this at her next appointment.

## 2019-08-20 NOTE — TELEPHONE ENCOUNTER
Writer left message on the voice mail informing the pt to call the clinic when available. Message: wanted to inform patient of her labs.

## 2019-08-21 ENCOUNTER — HOSPITAL ENCOUNTER (OUTPATIENT)
Dept: ULTRASOUND IMAGING | Age: 39
Discharge: HOME | End: 2019-08-21
Attending: FAMILY MEDICINE
Payer: COMMERCIAL

## 2019-08-21 DIAGNOSIS — E07.9 THYROID MASS: ICD-10-CM

## 2019-08-21 DIAGNOSIS — R63.5 WEIGHT GAIN: ICD-10-CM

## 2019-08-21 DIAGNOSIS — R53.82 CHRONIC FATIGUE: ICD-10-CM

## 2019-08-21 DIAGNOSIS — R14.0 ABDOMINAL BLOATING: ICD-10-CM

## 2019-08-21 DIAGNOSIS — R10.13 EPIGASTRIC ABDOMINAL PAIN: ICD-10-CM

## 2019-08-21 PROCEDURE — 76700 US EXAM ABDOM COMPLETE: CPT

## 2019-08-21 PROCEDURE — 76536 US EXAM OF HEAD AND NECK: CPT

## 2019-08-23 NOTE — TELEPHONE ENCOUNTER
Called patient home number, tried to leave a message and was hung up on.  Writer will send result letter in the mail

## 2019-08-26 ENCOUNTER — TELEPHONE (OUTPATIENT)
Dept: FAMILY MEDICINE CLINIC | Age: 39
End: 2019-08-26

## 2019-08-26 LAB
ALBUMIN SERPL-MCNC: 4.1 G/DL (ref 3.5–5.5)
ALBUMIN/GLOB SERPL: 1.4 {RATIO} (ref 1.2–2.2)
ALP SERPL-CCNC: 90 IU/L (ref 39–117)
ALT SERPL-CCNC: 8 IU/L (ref 0–32)
AMYLASE SERPL-CCNC: 97 U/L (ref 31–124)
AST SERPL-CCNC: 15 IU/L (ref 0–40)
B-HCG SERPL QL: NEGATIVE MIU/ML
BILIRUB SERPL-MCNC: 0.3 MG/DL (ref 0–1.2)
BUN SERPL-MCNC: 11 MG/DL (ref 6–20)
BUN/CREAT SERPL: 13 (ref 9–23)
CALCIUM SERPL-MCNC: 9.7 MG/DL (ref 8.7–10.2)
CHLORIDE SERPL-SCNC: 104 MMOL/L (ref 96–106)
CO2 SERPL-SCNC: 22 MMOL/L (ref 20–29)
CREAT SERPL-MCNC: 0.88 MG/DL (ref 0.57–1)
ERYTHROCYTE [DISTWIDTH] IN BLOOD BY AUTOMATED COUNT: 11.9 % (ref 12.3–15.4)
GLOBULIN SER CALC-MCNC: 2.9 G/DL (ref 1.5–4.5)
GLUCOSE SERPL-MCNC: 106 MG/DL (ref 65–99)
HCT VFR BLD AUTO: 36.5 % (ref 34–46.6)
HGB BLD-MCNC: 11.6 G/DL (ref 11.1–15.9)
INSULIN SERPL-ACNC: 63.1 UIU/ML (ref 2.6–24.9)
LIPASE SERPL-CCNC: 25 U/L (ref 14–72)
MCH RBC QN AUTO: 25.9 PG (ref 26.6–33)
MCHC RBC AUTO-ENTMCNC: 31.8 G/DL (ref 31.5–35.7)
MCV RBC AUTO: 82 FL (ref 79–97)
PLATELET # BLD AUTO: 356 X10E3/UL (ref 150–450)
POTASSIUM SERPL-SCNC: 4.3 MMOL/L (ref 3.5–5.2)
PROT SERPL-MCNC: 7 G/DL (ref 6–8.5)
RBC # BLD AUTO: 4.48 X10E6/UL (ref 3.77–5.28)
SODIUM SERPL-SCNC: 139 MMOL/L (ref 134–144)
THYROPEROXIDASE AB SERPL-ACNC: 9 IU/ML (ref 0–34)
TSH SERPL DL<=0.005 MIU/L-ACNC: 2.44 UIU/ML (ref 0.45–4.5)
WBC # BLD AUTO: 8 X10E3/UL (ref 3.4–10.8)

## 2019-08-26 NOTE — TELEPHONE ENCOUNTER
----- Message from Pramod Malik sent at 8/20/2019  4:40 PM EDT -----  Regarding: /Telephone   Patient return call    Caller's first and last name and relationship (if not the patient):      Best contact number(s):[phone]      Whose call is being returned:Nurse      Details to clarify the request:      Pramod Malik

## 2019-08-27 NOTE — TELEPHONE ENCOUNTER
----- Message from Akin Robles sent at 8/23/2019  2:08 PM EDT -----  Regarding: Dr. Brenton Pa Patient return call    Caller's first and last name and relationship (if not the patient):      Best contact number(s): [phone]      Whose call is being returned: Returning a missed call from practice.        Details to clarify the request:      Akin Robles

## 2019-08-27 NOTE — TELEPHONE ENCOUNTER
Writer called patient back to inform her of her labs that was resulted on the 14th. Office and patient is playing phone tag. Message: is to inform patient of her labs.

## 2019-08-27 NOTE — TELEPHONE ENCOUNTER
----- Message from Umang Art sent at 8/23/2019  2:08 PM EDT -----  Regarding: Dr. Deacon Munoz Patient return call    Caller's first and last name and relationship (if not the patient):      Best contact number(s): [phone]      Whose call is being returned: Returning a missed call from practice.        Details to clarify the request:      Umang Art

## 2019-09-26 ENCOUNTER — HOSPITAL ENCOUNTER (OUTPATIENT)
Dept: MRI IMAGING | Age: 39
Discharge: HOME | End: 2019-09-26
Attending: SURGERY
Payer: COMMERCIAL

## 2019-09-26 PROCEDURE — 77049 MRI BREAST C-+ W/CAD BI: CPT

## 2019-10-29 ENCOUNTER — OFFICE VISIT (OUTPATIENT)
Dept: FAMILY MEDICINE CLINIC | Age: 39
End: 2019-10-29

## 2019-10-29 VITALS
WEIGHT: 217 LBS | HEART RATE: 97 BPM | SYSTOLIC BLOOD PRESSURE: 140 MMHG | DIASTOLIC BLOOD PRESSURE: 85 MMHG | OXYGEN SATURATION: 96 % | TEMPERATURE: 98.5 F | RESPIRATION RATE: 16 BRPM | BODY MASS INDEX: 38.45 KG/M2 | HEIGHT: 63 IN

## 2019-10-29 DIAGNOSIS — R10.10 UPPER ABDOMINAL PAIN: ICD-10-CM

## 2019-10-29 DIAGNOSIS — E04.1 LEFT THYROID NODULE: ICD-10-CM

## 2019-10-29 DIAGNOSIS — E88.81 METABOLIC SYNDROME: Primary | ICD-10-CM

## 2019-10-29 DIAGNOSIS — E04.1 THYROID CYST: ICD-10-CM

## 2019-10-29 DIAGNOSIS — R63.5 WEIGHT GAIN: ICD-10-CM

## 2019-10-29 DIAGNOSIS — E89.0 S/P THYROIDECTOMY: ICD-10-CM

## 2019-10-29 DIAGNOSIS — E88.81 INSULIN RESISTANCE: ICD-10-CM

## 2019-10-29 RX ORDER — METFORMIN HYDROCHLORIDE 500 MG/1
1000 TABLET, EXTENDED RELEASE ORAL 2 TIMES DAILY WITH MEALS
Qty: 120 TAB | Refills: 5 | Status: SHIPPED | OUTPATIENT
Start: 2019-10-29 | End: 2020-02-27 | Stop reason: SDUPTHER

## 2019-10-29 NOTE — PROGRESS NOTES
1. Have you been to the ER, urgent care clinic since your last visit? Hospitalized since your last visit? No    2. Have you seen or consulted any other health care providers outside of the [address] [place] [address] since your last visit? Include any pap smears or colon screening.  No     Chief Complaint   Patient presents with    Weight Management     BMI:38.4  Fat:45.6  WC:44in     Visit Vitals  /85 (BP 1 Location: Right arm, BP Patient Position: Sitting)   Pulse 97   Temp 98.5 °F (36.9 °C) (Oral)   Resp 16   Ht 5' 3\" (1.6 m)   Wt 217 lb (98.4 kg)   SpO2 96%   BMI 38.44 kg/m²

## 2019-10-29 NOTE — PATIENT INSTRUCTIONS
Start with metformin 500 mg at night and work your way up to 500 mg twice a day or 1,000 mg at bedtime. Eventually will want to take 1,000 mg twice a day. Take OTC Vit B12 while on metformin. Learning About Low-Carbohydrate Diets for Weight Loss What is a low-carbohydrate diet? Low-carb diets avoid foods that are high in carbohydrate. These high-carb foods include pasta, bread, rice, cereal, fruits, and starchy vegetables. Instead, these diets usually have you eat foods that are high in fat and protein. Many people lose weight quickly on a low-carb diet. But the early weight loss is water. People on this diet often gain the weight back after they start eating carbs again. Not all diet plans are safe or work well. A lot of the evidence shows that low-carb diets aren't healthy. That's because these diets often don't include healthy foods like fruits and vegetables. Losing weight safely means balancing protein, fat, and carbs with every meal and snack. And low-carb diets don't always provide the vitamins, minerals, and fiber you need. If you have a serious medical condition, talk to your doctor before you try any diet. These conditions include kidney disease, heart disease, type 2 diabetes, high cholesterol, and high blood pressure. If you are pregnant, it may not be safe for your baby if you are on a low-carb diet. How can you lose weight safely? You might have heard that a diet plan helped another person lose weight. But that doesn't mean that it will work for you. It is very hard to stay on a diet that includes lots of big changes in your eating habits. If you want to get to a healthy weight and stay there, making healthy lifestyle changes will often work better than dieting. These steps can help. · Make a plan for change. Work with your doctor to create a plan that is right for you. · See a dietitian. He or she can show you how to make healthy changes in your eating habits. · Manage stress. If you have a lot of stress in your life, it can be hard to focus on making healthy changes to your daily habits. · Track your food and activity. You are likely to do better at losing weight if you keep track of what you eat and what you do. Follow-up care is a key part of your treatment and safety. Be sure to make and go to all appointments, and call your doctor if you are having problems. It's also a good idea to know your test results and keep a list of the medicines you take. Where can you learn more? Go to http://weston-karel.info/. Enter A121 in the search box to learn more about \"Learning About Low-Carbohydrate Diets for Weight Loss. \" Current as of: November 7, 2018 Content Version: 12.2 © 6468-9365 [employer]. Care instructions adapted under license by [employer] (which disclaims liability or warranty for this information). If you have questions about a medical condition or this instruction, always ask your healthcare professional. [address] any warranty or liability for your use of this information. EXERCISE 150 MINUTES WEEKLY. THIS CAN BE ACHIEVED BY WORKING OUT OR WALKING A MINIMUM OF 30 MINUTES FOR 5 DAYS WEEKLY. YOU CAN EXERCISE IN INCREMENTS OF 10-15 MINUTES UP TO 3 TIMES A DAY. Consider performing \"brainless exercise. \"  Choose your favorite tv program.  Five minutes before and for 5 minutes after the tv program, stretch your body. While the program is on, walk in place watching the show. When commercials come on, rest or walk around the house to do other things. When the program begins, return to walking in place. When you are able keep walking during the commercials and add light weights to your ankles or hands. By the end of the show, you would have walked 30 minutes. If you need shorter spurts of exercise, walk during the commercials and rest during the show. Drink to glasses of water prior to any exercise to prevent dehydration and to improve the results of the work out. Your RESTING HEART RATE is the number of times your heart beats per minute when you are not exerting yourself. The more fit you are, the lower your resting heart rate will be. Your MAXIMUM HEART RATE is the number of times per minute your heart pumps when it is working at 100% capacity. NEVER EXERCISE AT YOUR MAXIMUM HEART RATE. MAX HEART RATE = 220 - your age For example, in a 48year old, the maximum heart rate is 220-50 = 170 beats per minute Your TARGET HEART RATE is the number of beats per minute our heart should pump during aerobic exercise. Reaching your target heart rate indicates that your body is receiving maximum cardiovascular and fat burning benefits. If you are fit, your TARGET HEART RATE = 70-85% of your maximum Heart rate For a 48year old with vigorous intensity physical activity, Target heart rate= 170 bpm x .70 = 119 bpm 
   Target heart rate = 170 bpm x .85=  145 bpm 
 
    Therefore, your target heart rate during physical activity is 119-145 If you are not fit, TARGET HEART RATE = 50-70% of your maximum Heart rate MODERATE CONSISTENT AEROBIC EXERCISE OR WALKING FOR AT LEAST 150 MINUTES WEEKLY IS AN ESSENTIAL PART OF ANY WEIGHT LOSS OF WEIGHT MAINTENANCE PROGRAM. During WEIGHT LOSS PHASE, at least 75% of your exercise needs to be walking or moderate aerobic activity and 25% or 0% can be Isometric or Resistance type exercises (the latter can be deferred to the weight maintenance phase.) During WEIGHT MAINTENANCE PHASE, at least 50% of your exercise needs to be aerobic and 50% Isometric or Resistance type exercises. BENEFITS OF MODERATE INTENSITY EXERCISE MOST DAYS OF THE WEEK: 
 
 1. Insulin Resistance improves 30-85% 2. Abdominal Fat decreases by 30% 3. Inflammatory Markers decrease by 30% (therefore pain decreases) 4.  Systolic and Diastolic Blood Pressure decrease by 4 mmHg 5. HDL improves by 5% 6. Triglycerides decrease by 15% 7. Shift from small dense LDL to large dense LDL (therefore decrease Insulin Resistance) 8. Decrease coagulability

## 2019-10-29 NOTE — PROGRESS NOTES
HISTORY OF PRESENT ILLNESS  Parag Gil is a 44 y.o. female presents with Weight Management and Results    Agree with nurse note. Pt with insulin resistance and metabolic syndrome presents to the office with a BP of 140/85. Patient denies vision changes, headaches, dizziness, chest pain, SOB, or swelling. Pt requests to review labs from 8/21/2019. Insulin 63.1, hCG negative, TSH 2.44, thyroid peroxidase 9, lipase 25, amylase 97, CBC wnl, glucose 106, Cr 0.88, LFTs wnl. Pt with obesity is up 3 lbs since 8/9/2019. She skips breakfast or eats breakfast late. She drinks coffee with 2 tbs of sugar each morning. She drinks 64-80 oz of water daily. She gets 8 hours of sleep nightly. She eats off a big bag of chips through the week. Waist circumference changed from 41 to 44 and percent body fat changed from 43.3 to 45.6. Thyroid US on 8/21/2019 showed status post left thyroidectomy with probable residual thyroid tissue in the left thyroidectomy bed. Small thyroid nodules measuring up to 8 mm. R solid nodule in lower pole and cystic. Pt requests a referral to endo. Pt with upper abdominal pain and bloating had an Abd US on 8/21/2019. It was normal and aorta tapers normally. Written by tali Castillo, as dictated by Dr. Lilibeth Mooney DO.    SENDY    Review of Systems negative except as noted above in HPI. ALLERGIES:    Allergies   Allergen Reactions    Skelaxin [Metaxalone] Other (comments)     Too groggy       CURRENT MEDICATIONS:    Outpatient Medications Marked as Taking for the 10/29/19 encounter (Office Visit) with Arsalan Hector DO   Medication Sig Dispense Refill    metFORMIN ER (GLUCOPHAGE XR) 500 mg tablet Take 2 Tabs by mouth two (2) times daily (with meals). Indications: prevention of type 2 diabetes mellitus, insulin resistance 120 Tab 5    ergocalciferol (ERGOCALCIFEROL) 50,000 unit capsule Take 1 Cap by mouth every seven (7) days.  5 Cap 2    esomeprazole (NEXIUM) 40 mg capsule Take one capsule by mouth daily for Heartburn (Patient taking differently: as needed. Take one capsule by mouth daily for Heartburn  Indications: heartburn) 30 Cap 5    dicyclomine (BENTYL) 20 mg tablet Take 1 Tab by mouth every six (6) hours. (Patient taking differently: Take 20 mg by mouth as needed. Indications: abdominal spasm) 30 Tab 2    cyanocobalamin (VITAMIN B-12) 1,000 mcg tablet Take 1,000 mcg by mouth daily. PAST MEDICAL HISTORY:    Past Medical History:   Diagnosis Date    Abnormal Pap smear of cervix 1990s    Breast cyst 2009    benign. Left. 1.7 cm and multiple others. Dr. Jens Morales Chicken pox childhood    Chronic idiopathic constipation 2015    Dr. Lauren Malik.  HELM (dyspnea on exertion) 2015    Dr. Patricia Gilliam EBV positive mononucleosis syndrome 10/2013,     chronic or reactivated    Enlarged thyroid 2010, 2015    with Right tracheal deviation. Retrosternal goiter. Dr. Ameya Ledesma.  Enlarged tonsils and adenoids 2010    Iodine-deficiency related goiter     Left. Dr. Ameya Ledesma.  YUNIER (obstructive sleep apnea) 02/25/15    Dr. Dena Ricci, uses cpap. Dr. Candice Amaro. States cpap broken    Thyroid cyst     Right.  Thyroid nodule 2014    Dr. Mik Malone. Dr. Daly Riding Thyroid nodule 2019       PAST SURGICAL HISTORY:    Past Surgical History:   Procedure Laterality Date    HX BREAST BIOPSY Left 2018    due to mass     HX  SECTION  2004    [facility].  HX CYST INCISION AND DRAINAGE Left 2016    benign. Dr. Tahira Soriano Right 09/15/2017    benign. Dr. Angelique Ontiveros.  HX HERNIA REPAIR  3941    umbilical.    HX OTHER SURGICAL  2014    thyroid biopsy. Dr. Mik Malone.  HX THYROIDECTOMY Left 2015    due to [address] Dr. Ameya Ledesma. benign.        FAMILY HISTORY:    Family History   Problem Relation Age of Onset    Hypertension Mother     Lung Disease Mother         chronic bronchitis    Sleep Apnea Mother     Hypertension Father     Diabetes Paternal Grandmother     Breast Cancer Maternal Aunt     Other Sister         severe scoliosis. txd with back brace.  Breast Cancer Maternal Aunt     Breast Cancer Maternal Aunt        SOCIAL HISTORY:    Social History     Socioeconomic History    Marital status: SINGLE     Spouse name: Not on file    Number of children: 1    Years of education: Not on file    Highest education level: Not on file   Occupational History    Occupation:      Employer: [employer]   Tobacco Use    Smoking status: Passive Smoke Exposure - Never Smoker    Smokeless tobacco: Never Used    Tobacco comment: lives with a smoker dad x 18 yrs and now with smoker boyfriend x 2 years   Substance and Sexual Activity    Alcohol use: No     Alcohol/week: 1.0 standard drinks     Types: 1 Standard drinks or equivalent per week    Drug use: No    Sexual activity: Yes     Partners: Male     Birth control/protection: None   Social History Narrative    ** Merged History Encounter **            IMMUNIZATIONS:    Immunization History   Administered Date(s) Administered    Hep B Vaccine 03/10/2014    Influenza Vaccine 09/21/2017, 10/22/2019    TB Skin Test (PPD) 09/19/2017         PHYSICAL EXAMINATION    Vital Signs    Visit Vitals  /85 (BP 1 Location: Right arm, BP Patient Position: Sitting)   Pulse 97   Temp 98.5 °F (36.9 °C) (Oral)   Resp 16   Ht 5' 3\" (1.6 m)   Wt 217 lb (98.4 kg)   LMP 10/13/2019   SpO2 96%   BMI 38.44 kg/m²       Weight Metrics 10/29/2019 10/29/2019 8/9/2019 8/9/2019 6/26/2019 4/12/2019 3/5/2019   Weight - 217 lb - 214 lb 14.4 oz 215 lb 211 lb 8 oz 209 lb 1.6 oz   Waist Measure Inches 44 - 41 - - - -   Body Fat % 45.6 - 43.3 - - - -   BMI - 38.44 kg/m2 - 38.07 kg/m2 38.09 kg/m2 37.47 kg/m2 37.04 kg/m2       General appearance - Well nourished.  Well appearing. Well developed. No acute distress. Obese. Head - Normocephalic. Atraumatic. Eyes - pupils equal and reactive. Extraocular eye movements intact. Sclera anicteric. Mildly injected sclera. Ears - Hearing is grossly normal bilaterally. Nose - normal and patent. No polyps noted. No erythema. No discharge. Mouth - mucous membranes with adequate moisture. Posterior pharynx normal with cobblestone appearance. No erythema, white exudate or obstruction. Neck - supple. Midline trachea. No carotid bruits noted bilaterally. No thyromegaly noted. Chest - clear to auscultation bilaterally anteriorly and posteriorly. No wheezes. No rales or rhonchi. Breath sounds are symmetrical bilaterally. Unlabored respirations. Heart - normal rate. Regular rhythm. Normal S1, S2. No murmur noted. No rubs, clicks or gallops noted. Abdomen - soft and distended. No masses or organomegaly. No rebound, rigidity or guarding. Bowel sounds normal x 4 quadrants. No tenderness noted. Neurological - awake, alert and oriented to person, place, and time and event. Cranial nerves II through XII intact. Clear speech. Muscle strength is +5/5 x 4 extremities. Sensation is intact to light touch bilaterally. Steady gait. Heme/Lymph - peripheral pulses normal x 4 extremities. No peripheral edema is noted. Musculoskeletal - Intact x 4 extremities. Full ROM x 4 extremities. No pain with movement. Back exam - normal range of motion. No pain on palpation of the spinous processes in the cervical, thoracic, lumbar, sacral regions. No CVA tenderness. Skin - no rashes, erythema, ecchymosis, lacerations, abrasions, suspicious moles noted  Psychological -   normal behavior, dress and thought processes. Good insight. Good eye contact. Normal affect. Appropriate mood. Normal speech.       DATA REVIEWED    Lab Results   Component Value Date/Time    WBC 8.0 08/21/2019 03:44 PM    Hemoglobin (POC) 12.3 06/08/2015 11:44 AM    HGB 11.6 08/21/2019 03:44 PM    HCT 36.5 08/21/2019 03:44 PM    PLATELET 065 48/16/5381 03:44 PM    MCV 82 08/21/2019 03:44 PM     Lab Results   Component Value Date/Time    Sodium 139 08/21/2019 03:44 PM    Potassium 4.3 08/21/2019 03:44 PM    Chloride 104 08/21/2019 03:44 PM    CO2 22 08/21/2019 03:44 PM    Anion gap 2 (L) 09/18/2010 02:50 AM    Glucose 106 (H) 08/21/2019 03:44 PM    BUN 11 08/21/2019 03:44 PM    Creatinine 0.88 08/21/2019 03:44 PM    BUN/Creatinine ratio 13 08/21/2019 03:44 PM    GFR est AA 96 08/21/2019 03:44 PM    GFR est non-AA 83 08/21/2019 03:44 PM    Calcium 9.7 08/21/2019 03:44 PM    Bilirubin, total 0.3 08/21/2019 03:44 PM    AST (SGOT) 15 08/21/2019 03:44 PM    Alk. phosphatase 90 08/21/2019 03:44 PM    Protein, total 7.0 08/21/2019 03:44 PM    Albumin 4.1 08/21/2019 03:44 PM    Globulin 3.7 09/18/2010 02:50 AM    A-G Ratio 1.4 08/21/2019 03:44 PM    ALT (SGPT) 8 08/21/2019 03:44 PM     Lab Results   Component Value Date/Time    Cholesterol, total 122 04/12/2019 09:51 AM    HDL Cholesterol 48 04/12/2019 09:51 AM    LDL, calculated 66 04/12/2019 09:51 AM    VLDL, calculated 8 04/12/2019 09:51 AM    Triglyceride 41 04/12/2019 09:51 AM     Lab Results   Component Value Date/Time    VITAMIN D, 25-HYDROXY 18.5 (L) 04/12/2019 09:51 AM       Lab Results   Component Value Date/Time    Hemoglobin A1c 5.4 03/05/2019 03:01 PM     Lab Results   Component Value Date/Time    TSH 2.440 08/21/2019 03:44 PM       ASSESSMENT and PLAN      ICD-10-CM ICD-9-CM    1. Metabolic syndrome H28.82 410.7 metFORMIN ER (GLUCOPHAGE XR) 500 mg tablet   2. Thyroid cyst E04.1 246.2 REFERRAL TO ENDOCRINOLOGY   3. Upper abdominal pain R10.10 789.09     with abd bloating, intermittently   4. Insulin resistance E88.81 277.7 metFORMIN ER (GLUCOPHAGE XR) 500 mg tablet   5. Weight gain R63.5 783.1 REFERRAL TO ENDOCRINOLOGY    3# since 8/2019. 18# since 2017 due to diet changes, chips vs other   6.  Left thyroid nodule E04.1 241.0 REFERRAL TO ENDOCRINOLOGY   7. S/P thyroidectomy Z98.890 V45.89 REFERRAL TO ENDOCRINOLOGY       Discussed the patient's BMI with her. The BMI follow up plan is as follows: I have counseled this patient on diet and exercise regimens. Decrease carbohydrates (white foods, sweet foods, sweet drinks and alcohol), increase green leafy vegetables and protein (lean meats and beans) with each meal.  Avoid fried foods. Eat 3-5 small meals daily. Do not skip meals. Increase water intake. Increase physical activity to 30 minutes daily for health benefit or 60 minutes daily to prevent weight regain, as tolerated. Get 7-8 hours uninterrupted sleep nightly. Chart reviewed and updated. Continue current medications and care. Start metformin 500 mg daily for insulin resistance. Start OTC Vit B12 1000 mg with its use to prevent potential deficiency. Try an elimination diet with gluten and lactose due to the abdominal pain. Prescriptions written and sent to pharmacy; medication side effects discussed. Metformin 500 mg  Most recent tests reviewed from 8/21/2019. Referrals given; patient urged to keep appointments with specialists. endo  Counseled patient on health concerns:  Blood pressure, cholesterol, insulin, vit d, thyroid nodules. Relevant handouts given and discussed with patient. Immunizations noted. Offered empathy, support, legitimation, prayers, partnership to patient. Praised patient for progress. Follow-up and Dispositions    · Return in about 3 months (around 1/29/2020) for weight, blood pressure. Patient was offered a choice/choices in the treatment plan today. Patient expresses understanding of the plan and agrees with recommendations. Written by tali Trivedi, as dictated by Dr. Lisa Fowler DO. Documentation True and Accepted by Dasha Armstrong. Phylicia Magana.       Patient Instructions   Start with metformin 500 mg at night and work your way up to 500 mg twice a day or 1,000 mg at bedtime. Eventually will want to take 1,000 mg twice a day. Take OTC Vit B12 while on metformin. Learning About Low-Carbohydrate Diets for Weight Loss  What is a low-carbohydrate diet? Low-carb diets avoid foods that are high in carbohydrate. These high-carb foods include pasta, bread, rice, cereal, fruits, and starchy vegetables. Instead, these diets usually have you eat foods that are high in fat and protein. Many people lose weight quickly on a low-carb diet. But the early weight loss is water. People on this diet often gain the weight back after they start eating carbs again. Not all diet plans are safe or work well. A lot of the evidence shows that low-carb diets aren't healthy. That's because these diets often don't include healthy foods like fruits and vegetables. Losing weight safely means balancing protein, fat, and carbs with every meal and snack. And low-carb diets don't always provide the vitamins, minerals, and fiber you need. If you have a serious medical condition, talk to your doctor before you try any diet. These conditions include kidney disease, heart disease, type 2 diabetes, high cholesterol, and high blood pressure. If you are pregnant, it may not be safe for your baby if you are on a low-carb diet. How can you lose weight safely? You might have heard that a diet plan helped another person lose weight. But that doesn't mean that it will work for you. It is very hard to stay on a diet that includes lots of big changes in your eating habits. If you want to get to a healthy weight and stay there, making healthy lifestyle changes will often work better than dieting. These steps can help. · Make a plan for change. Work with your doctor to create a plan that is right for you. · See a dietitian. He or she can show you how to make healthy changes in your eating habits. · Manage stress.  If you have a lot of stress in your life, it can be hard to focus on making healthy changes to your daily habits. · Track your food and activity. You are likely to do better at losing weight if you keep track of what you eat and what you do. Follow-up care is a key part of your treatment and safety. Be sure to make and go to all appointments, and call your doctor if you are having problems. It's also a good idea to know your test results and keep a list of the medicines you take. Where can you learn more? Go to http://weston-karel.info/. Enter A121 in the search box to learn more about \"Learning About Low-Carbohydrate Diets for Weight Loss. \"  Current as of: November 7, 2018  Content Version: 12.2  © 3098-3523 [employer]. Care instructions adapted under license by [employer] (which disclaims liability or warranty for this information). If you have questions about a medical condition or this instruction, always ask your healthcare professional. [address] any warranty or liability for your use of this information. EXERCISE 150 MINUTES WEEKLY. THIS CAN BE ACHIEVED BY WORKING OUT OR WALKING A MINIMUM OF 30 MINUTES FOR 5 DAYS WEEKLY. YOU CAN EXERCISE IN INCREMENTS OF 10-15 MINUTES UP TO 3 TIMES A DAY. Consider performing \"brainless exercise. \"  Choose your favorite tv program.  Five minutes before and for 5 minutes after the tv program, stretch your body. While the program is on, walk in place watching the show. When commercials come on, rest or walk around the house to do other things. When the program begins, return to walking in place. When you are able keep walking during the commercials and add light weights to your ankles or hands. By the end of the show, you would have walked 30 minutes. If you need shorter spurts of exercise, walk during the commercials and rest during the show.     Drink to glasses of water prior to any exercise to prevent dehydration and to improve the results of the work out. Your RESTING HEART RATE is the number of times your heart beats per minute when you are not exerting yourself. The more fit you are, the lower your resting heart rate will be. Your MAXIMUM HEART RATE is the number of times per minute your heart pumps when it is working at 100% capacity. NEVER EXERCISE AT YOUR MAXIMUM HEART RATE. MAX HEART RATE = 220 - your age    For example, in a 48year old, the maximum heart rate is 220-50 = 170 beats per minute    Your [place] is the number of beats per minute our heart should pump during aerobic exercise. Reaching your target heart rate indicates that your body is receiving maximum cardiovascular and fat burning benefits. If you are fit, your TARGET HEART RATE = 70-85% of your maximum Heart rate      For a 48year old with vigorous intensity physical activity,         Target heart rate= 170 bpm x .70 = 119 bpm     Target heart rate = 170 bpm x .85=  145 bpm        Therefore, your target heart rate during physical activity is 119-145      If you are not fit, TARGET HEART RATE = 50-70% of your maximum Heart rate    MODERATE CONSISTENT AEROBIC EXERCISE OR WALKING FOR AT LEAST 150 MINUTES WEEKLY IS AN ESSENTIAL PART OF ANY WEIGHT LOSS OF WEIGHT MAINTENANCE PROGRAM.     During WEIGHT LOSS PHASE, at least 75% of your exercise needs to be walking or moderate aerobic activity and 25% or 0% can be Isometric or Resistance type exercises (the latter can be deferred to the weight maintenance phase.)     During WEIGHT MAINTENANCE PHASE, at least 50% of your exercise needs to be aerobic and 50% Isometric or Resistance type exercises. BENEFITS OF MODERATE INTENSITY EXERCISE MOST DAYS OF THE WEEK:     1. Insulin Resistance improves 30-85%   2. Abdominal Fat decreases by 30%   3. Inflammatory Markers decrease by 30% (therefore pain decreases)   4. Systolic and Diastolic Blood Pressure decrease by 4 mmHg   5. HDL improves by 5%   6.   Triglycerides decrease by 15%   7. Shift from small dense LDL to large dense LDL (therefore decrease Insulin Resistance)   8.   Decrease coagulability

## 2019-11-20 ENCOUNTER — OFFICE VISIT (OUTPATIENT)
Dept: SLEEP MEDICINE | Age: 39
End: 2019-11-20

## 2019-11-20 VITALS
OXYGEN SATURATION: 98 % | WEIGHT: 223 LBS | HEIGHT: 63 IN | DIASTOLIC BLOOD PRESSURE: 85 MMHG | HEART RATE: 94 BPM | SYSTOLIC BLOOD PRESSURE: 130 MMHG | BODY MASS INDEX: 39.51 KG/M2

## 2019-11-20 DIAGNOSIS — G47.33 OSA (OBSTRUCTIVE SLEEP APNEA): Primary | ICD-10-CM

## 2019-11-20 NOTE — PATIENT INSTRUCTIONS
[address] [place]. [place], [address]  Tel.  [phone]  Fax. 100 [facility] 60  [place], [address] [facility]  Tel.  [phone]  Fax. [phone] [address]  Tel.  [phone]  Fax. [phone]     Sleep Apnea: After Your Visit  Your Care Instructions  Sleep apnea occurs when you frequently stop breathing for 10 seconds or longer during sleep. It can be mild to severe, based on the number of times per hour that you stop breathing or have slowed breathing. Blocked or narrowed airways in your nose, mouth, or throat can cause sleep apnea. Your airway can become blocked when your throat muscles and tongue relax during sleep. Sleep apnea is common, occurring in 1 out of 20 individuals. Individuals having any of the following characteristics should be evaluated and treated right away due to high risk and detrimental consequences from untreated sleep apnea:  1. Obesity  2. Congestive Heart failure  3. Atrial Fibrillation  4. Uncontrolled Hypertension  5. Type II Diabetes  6. Night-time Arrhythmias  7. Stroke  8. Pulmonary Hypertension  9. High-risk Driving Populations (pilots, truck drivers, etc.)  10. Patients Considering Weight-loss Surgery    How do you know you have sleep apnea? You probably have sleep apnea if you answer 'yes' to 3 or more of the following questions:  S - Have you been told that you Snore? T - Are you often Tired during the day? O - Has anyone Observed you stop breathing while sleeping? P- Do you have (or are being treated for) high blood Pressure? B - Are you obese (Body Mass Index > 35)? A - Is your Age 48years old or older? N - Is your Neck size greater than 16 inches? G - Are you male Gender? A sleep physician can prescribe a breathing device that prevents tissues in the throat from blocking your airway.  Or your doctor may recommend using a dental device (oral breathing device) to help keep your airway open. In some cases, surgery may be needed to remove enlarged tissues in the throat. Follow-up care is a key part of your treatment and safety. Be sure to make and go to all appointments, and call your doctor if you are having problems. It's also a good idea to know your test results and keep a list of the medicines you take. How can you care for yourself at home? · Lose weight, if needed. It may reduce the number of times you stop breathing or have slowed breathing. · Go to bed at the same time every night. · Sleep on your side. It may stop mild apnea. If you tend to roll onto your back, sew a pocket in the back of your pajama top. Put a tennis ball into the pocket, and stitch the pocket shut. This will help keep you from sleeping on your back. · Avoid alcohol and medicines such as sleeping pills and sedatives before bed. · Do not smoke. Smoking can make sleep apnea worse. If you need help quitting, talk to your doctor about stop-smoking programs and medicines. These can increase your chances of quitting for good. · Prop up the head of your bed 4 to 6 inches by putting bricks under the legs of the bed. · Treat breathing problems, such as a stuffy nose, caused by a cold or allergies. · Use a continuous positive airway pressure (CPAP) breathing machine if lifestyle changes do not help your apnea and your doctor recommends it. The machine keeps your airway from closing when you sleep. · If CPAP does not help you, ask your doctor whether you should try other breathing machines. A bilevel positive airway pressure machine has two types of air pressureâone for breathing in and one for breathing out. Another device raises or lowers air pressure as needed while you breathe. · If your nose feels dry or bleeds when using one of these machines, talk with your doctor about increasing moisture in the air. A humidifier may help.   · If your nose is runny or stuffy from using a breathing machine, talk with your doctor about using decongestants or a corticosteroid nasal spray. When should you call for help? Watch closely for changes in your health, and be sure to contact your doctor if:  · You still have sleep apnea even though you have made lifestyle changes. · You are thinking of trying a device such as CPAP. · You are having problems using a CPAP or similar machine. Where can you learn more? Go to StopTheHacker. Enter R554 in the search box to learn more about \"Sleep Apnea: After Your Visit. \"   © 3850-1358 Healthwise, Incorporated. Care instructions adapted under license by [address] [place] [address] [employer] (which disclaims liability or warranty for this information). This care instruction is for use with your licensed healthcare professional. If you have questions about a medical condition or this instruction, always ask your healthcare professional. Ginger Kent any warranty or liability for your use of this information. PROPER SLEEP HYGIENE    What to avoid  · Do not have drinks with caffeine, such as coffee or black tea, for 8 hours before bed. · Do not smoke or use other types of tobacco near bedtime. Nicotine is a stimulant and can keep you awake. · Avoid drinking alcohol late in the evening, because it can cause you to wake in the middle of the night. · Do not eat a big meal close to bedtime. If you are hungry, eat a light snack. · Do not drink a lot of water close to bedtime, because the need to urinate may wake you up during the night. · Do not read or watch TV in bed. Use the bed only for sleeping and sexual activity. What to try  · Go to bed at the same time every night, and wake up at the same time every morning. Do not take naps during the day. · Keep your bedroom quiet, dark, and cool. · Get regular exercise, but not within 3 to 4 hours of your bedtime. .  · Sleep on a comfortable pillow and mattress.   · If watching the clock makes you anxious, turn it facing away from you so you cannot see the time. · If you worry when you lie down, start a worry book. Well before bedtime, write down your worries, and then set the book and your concerns aside. · Try meditation or other relaxation techniques before you go to bed. · If you cannot fall asleep, get up and go to another room until you feel sleepy. Do something relaxing. Repeat your bedtime routine before you go to bed again. · Make your house quiet and calm about an hour before bedtime. Turn down the lights, turn off the TV, log off the computer, and turn down the volume on music. This can help you relax after a busy day. Drowsy Driving  The [address] [place] [address] Road Traffic Safety Administration cites drowsiness as a causing factor in more than 820,656 police reported crashes annually, resulting in 76,000 injuries and 1,500 deaths. Other surveys suggest 55% of people polled have driven while drowsy in the past year, 23% had fallen asleep but not crashed, 3% crashed, and 2% had and accident due to drowsy driving. Who is at risk? Young Drivers: One study of drowsy driving accidents states that 55% of the drivers were under 25 years. Of those, 75% were male. Shift Workers and Travelers: People who work overnight or travel across time zones frequently are at higher risk of experiencing Circadian Rhythm Disorders. They are trying to work and function when their body is programed to sleep. Sleep Deprived: Lack of sleep has a serious impact on your ability to pay attention or focus on a task. Consistently getting less than the average of 8 hours your body needs creates partial or cumulative sleep deprivation. Untreated Sleep Disorders: Sleep Apnea, Narcolepsy, R.L.S., and other sleep disorders (untreated) prevent a person from getting enough restful sleep. This leads to excessive daytime sleepiness and increases the risk for drowsy driving accidents by up to 7 times.   Medications / Alcohol: Even over the counter medications can cause drowsiness. Medications that impair a drivers attention should have a warning label. Alcohol naturally makes you sleepy and on its own can cause accidents. Combined with excessive drowsiness its effects are amplified. Signs of Drowsy Driving:   * You don't remember driving the last few miles   * You may drift out of your jina   * You are unable to focus and your thoughts wander   * You may yawn more often than normal   * You have difficulty keeping your eyes open / nodding off   * Missing traffic signs, speeding, or tailgating  Prevention-   Good sleep hygiene, lifestyle and behavioral choices have the most impact on drowsy driving. There is no substitute for sleep and the average person requires 8 hours nightly. If you find yourself driving drowsy, stop and sleep. Consider the sleep hygiene tips provided during your visit as well. Medication Refill Policy: Refills for all medications require 1 week advance notice. Please have your pharmacy fax a refill request. We are unable to fax, or call in \"controled substance\" medications and you will need to pick these prescriptions up from our office. [employer] Activation    Thank you for requesting access to [employer]. Please follow the instructions below to securely access and download your online medical record. [employer] allows you to send messages to your doctor, view your test results, renew your prescriptions, schedule appointments, and more. How Do I Sign Up? 1. In your internet browser, go to [email or web address]. 2. Click on the First Time User? Click Here link in the Sign In box. You will see the New Member Sign Up page. 3. Enter your [employer] Access Code exactly as it appears below. You will not need to use this code after youve completed the sign-up process. If you do not sign up before the expiration date, you must request a new code. [employer] Access Code:  Activation code not generated  Current [employer] Status: Active (This is the date your [employer] access code will )    4. Enter the last four digits of your Social Security Number (xxxx) and Date of Birth (mm/dd/yyyy) as indicated and click Submit. You will be taken to the next sign-up page. 5. Create a [employer] ID. This will be your [employer] login ID and cannot be changed, so think of one that is secure and easy to remember. 6. Create a [employer] password. You can change your password at any time. 7. Enter your Password Reset Question and Answer. This can be used at a later time if you forget your password. 8. Enter your e-mail address. You will receive e-mail notification when new information is available in [address] 9. Click Sign Up. You can now view and download portions of your medical record. 10. Click the Download Summary menu link to download a portable copy of your medical information. Additional Information    If you have questions, please call [phone]. Remember, [employer] is NOT to be used for urgent needs. For medical emergencies, dial 911.

## 2019-11-20 NOTE — PROGRESS NOTES
[address] [facility] [place]. [place], [address]  Tel.  [phone]  Fax. 100 [facility] 60  [place], [address] [facility]  Tel.  [phone]  Fax. [phone] [address]  Tel.  [phone]  Fax. [phone]         Subjective:      Tere Fried is an 44 y.o. female referred for evaluation for a sleep disorder. She complains of snoring associated with snorting, periods of not breathing. Symptoms began several years ago, she was previously diagnosed and treated with YUNIER but her device malfunctioned and she has not been on therapy since that time. She usually can fall asleep in 30 minutes. Family or house members note snoring, periods of not breathing. She denies completely or partially paralyzed while falling asleep or waking up. Tere Fried does wake up frequently at night. She is not bothered by waking up too early and left unable to get back to sleep. She actually sleeps about 7 hours at night and wakes up about 6 times during the night. She does work shifts:  First Shift. Francheska iSmon indicates she does not get too little sleep at night. Her bedtime is 2230. She awakens at 0630. She does not take naps. She has the following observed behaviors: Loud snoring, Light snoring, Pauses in breathing;  . Other remarks: waking with gasp    Walton Sleepiness Score: 4     Allergies   Allergen Reactions    Skelaxin [Metaxalone] Other (comments)     Too groggy         Current Outpatient Medications:     metFORMIN ER (GLUCOPHAGE XR) 500 mg tablet, Take 2 Tabs by mouth two (2) times daily (with meals). Indications: prevention of type 2 diabetes mellitus, insulin resistance, Disp: 120 Tab, Rfl: 5    ergocalciferol (ERGOCALCIFEROL) 50,000 unit capsule, Take 1 Cap by mouth every seven (7) days. , Disp: 5 Cap, Rfl: 2    esomeprazole (NEXIUM) 40 mg capsule, Take one capsule by mouth daily for Heartburn (Patient taking differently: as needed.  Take one capsule by mouth daily for Heartburn  Indications: heartburn), Disp: 30 Cap, Rfl: 5    dicyclomine (BENTYL) 20 mg tablet, Take 1 Tab by mouth every six (6) hours. (Patient taking differently: Take 20 mg by mouth as needed. Indications: abdominal spasm), Disp: 30 Tab, Rfl: 2    cyanocobalamin (VITAMIN B-12) 1,000 mcg tablet, Take 1,000 mcg by mouth daily. , Disp: , Rfl:      She  has a past medical history of Abnormal Pap smear of cervix (), Breast cyst (), Chicken pox (childhood), Chronic idiopathic constipation (2015), HELM (dyspnea on exertion) (2015), EBV positive mononucleosis syndrome (10/2013, ), Enlarged thyroid (2010, 2015), Enlarged tonsils and adenoids (2010), Iodine-deficiency related goiter, YUNIER (obstructive sleep apnea) (02/25/15), Thyroid cyst (), Thyroid nodule (2014), and Thyroid nodule (2019). She  has a past surgical history that includes hx  section (); hx hernia repair (); hx other surgical (2014); hx thyroidectomy (Left, 2015); hx cyst incision and drainage (Left, 2016); hx cyst incision and drainage (Right, 09/15/2017); and hx breast biopsy (Left, 2018). She family history includes Breast Cancer in her maternal aunt, maternal aunt, and maternal aunt; Diabetes in her paternal grandmother; Hypertension in her father and mother; Lung Disease in her mother; Other in her sister; Sleep Apnea in her mother. She  reports that she is a non-smoker but has been exposed to tobacco smoke. She has never used smokeless tobacco. She reports that she does not drink alcohol or use drugs.      Review of Systems:  Constitutional:  No significant weight loss or weight gain  Eyes:  No blurred vision  CVS:  No significant chest pain  Pulm:  No significant shortness of breath  GI:  No significant nausea or vomiting  :  No significant nocturia  Musculoskeletal:  No significant joint pain at night  Skin:  No significant rashes  Neuro: No significant dizziness   Psych:  No active mood issues    Sleep Review of Systems: notable for no difficulty falling asleep; frequent awakenings at night;  regular dreaming noted; no nightmares ; no early morning headaches; no memory problems; no concentration issues; no history of any automobile or occupational accidents due to daytime drowsiness. Objective:     Visit Vitals  /85 (BP 1 Location: Left arm, BP Patient Position: Sitting)   Pulse 94   Ht 5' 3\" (1.6 m)   Wt 223 lb (101.2 kg)   SpO2 98%   BMI 39.50 kg/m²         General:   Not in acute distress   Eyes:  Anicteric sclerae, no obvious strabismus   Nose:  No obvious nasal septum deviation    Oropharynx:   Class 4 oropharyngeal outlet, thick tongue base, uvula could not be seen due to low-lying soft palate, narrow tonsilo-pharyngeal pilars   Tonsils:   tonsils are not seen due to low-lying soft palate   Neck:   Neck circ. in \"inches\": 17; midline trachea   Chest/Lungs:  Equal lung expansion, clear on auscultation    CVS:  Normal rate, regular rhythm; no JVD   Skin:  Warm to touch; no obvious rashes   Neuro:  No focal deficits ; no obvious tremor    Psych:  Normal affect,  normal countenance;          Assessment:       ICD-10-CM ICD-9-CM    1. YUNIER (obstructive sleep apnea) G47.33 327.23 POLYSOMNOGRAPHY 1 NIGHT   2. BMI 39.0-39.9,adult Z68.39 V85.39          Plan:     * The patient currently has a Moderate Risk for having sleep apnea. STOP-BANG score 4.  * Sleep testing was ordered for initial evaluation. * She was provided information on sleep apnea including coresponding risk factors and the importance of proper treatment. * Treatment options if indicated were reviewed today. Patient agrees to a trial of OAT / PAP therapy if indicated. * Counseling was provided regarding proper sleep hygiene (including effect of light on sleep),  and safe driving. * Effect of sleep disturbance on weight was reviewed.  We have recommended a dedicated weight loss through appropriate diet and an exercise regiment as significant weight reduction has been shown to reduce severity of obstructive sleep apnea. * Patient agrees to telephone [phone]  follow-up by myself or lead sleep technologist shortly after sleep study to review results and plan final management.     (patient has given permission for a message to be left regarding test results and further management if patient cannot be cannot be reached directly). Thank you for allowing us to participate in your patient's medical care. We'll keep you updated on these investigations. Tuyet Johnson MD, FAASM  Electronically signed.  11/20/19

## 2020-01-06 ENCOUNTER — TELEPHONE (OUTPATIENT)
Dept: SLEEP MEDICINE | Age: 40
End: 2020-01-06

## 2020-01-06 NOTE — TELEPHONE ENCOUNTER
Patient has a sleep study scheduled later this month and we need their new insurance information. Patient should no longer have [address]. Please update in [place] and [facility]. Left voicemail for patient.

## 2020-01-30 ENCOUNTER — OFFICE VISIT (OUTPATIENT)
Dept: FAMILY MEDICINE CLINIC | Age: 40
End: 2020-01-30

## 2020-01-30 VITALS
WEIGHT: 221.3 LBS | OXYGEN SATURATION: 94 % | TEMPERATURE: 98.1 F | SYSTOLIC BLOOD PRESSURE: 131 MMHG | BODY MASS INDEX: 39.21 KG/M2 | RESPIRATION RATE: 18 BRPM | HEART RATE: 93 BPM | DIASTOLIC BLOOD PRESSURE: 86 MMHG | HEIGHT: 63 IN

## 2020-01-30 DIAGNOSIS — E04.1 LEFT THYROID NODULE: ICD-10-CM

## 2020-01-30 DIAGNOSIS — E88.81 METABOLIC SYNDROME: Primary | ICD-10-CM

## 2020-01-30 DIAGNOSIS — R12 HEARTBURN: ICD-10-CM

## 2020-01-30 DIAGNOSIS — R06.83 SNORING: ICD-10-CM

## 2020-01-30 DIAGNOSIS — E89.0 S/P THYROIDECTOMY: ICD-10-CM

## 2020-01-30 DIAGNOSIS — R10.31 RLQ ABDOMINAL PAIN: ICD-10-CM

## 2020-01-30 DIAGNOSIS — G47.33 OSA (OBSTRUCTIVE SLEEP APNEA): ICD-10-CM

## 2020-01-30 DIAGNOSIS — E55.9 VITAMIN D DEFICIENCY: ICD-10-CM

## 2020-01-30 LAB
HCG URINE, QL. (POC): NEGATIVE
VALID INTERNAL CONTROL?: YES

## 2020-01-30 NOTE — PROGRESS NOTES
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 Pt with insulin resistance and metabolic syndrome presents to the office with a BP of 131/86. She is tolerating *** well, month *** of *** for weight management. Last prescribed on ***. She has noticed an increase of energy and decreased appetite since starting the medication. She gained 4 pounds since the last visit. Percent body fat has changed from *** to ***, waist circumference measurement has changed from *** to ***. Overall, patient is pleased and requests a refill of the medication today. Patient denies fatigue, sleep disturbance, chest pain, heart palpitations, nausea, dry mouth, constipation, signs of depression. Written by tali Oliveros, as dictated by Dr. Jyoti Obrien DO. Weight Metrics 1/30/2020 1/30/2020 11/20/2019 10/29/2019 10/29/2019 8/9/2019 8/9/2019 Weight - 221 lb 4.8 oz 223 lb - 217 lb - 214 lb 14.4 oz Neck Circ (inches) 15 - - - - - - Waist Measure Inches 45 - - 44 - 41 - Body Fat % 45.4 - - 45.6 - 43.3 - BMI - 39.2 kg/m2 39.5 kg/m2 - 38.44 kg/m2 - 38.07 kg/m2 ICD-10-CM ICD-9-CM 1. Metabolic syndrome S49.60 418.1 2. RLQ abdominal pain R10.31 789.03   
3. Left thyroid nodule E04.1 241.0   
4. S/P thyroidectomy Z98.890 V45.89   
5. YUNIER (obstructive sleep apnea) G47.33 327.23   
6. Vitamin D deficiency E55.9 268.9 7. Snoring R06.83 786.09   
 
 
  
ROS Review of Systems negative except as noted above in HPI. ALLERGIES:   
Allergies Allergen Reactions  Skelaxin [Metaxalone] Other (comments) Too groggy CURRENT MEDICATIONS:   
Outpatient Medications Marked as Taking for the 1/30/20 encounter (Office Visit) with Sonal Quinones DO Medication Sig Dispense Refill  metFORMIN ER (GLUCOPHAGE XR) 500 mg tablet Take 2 Tabs by mouth two (2) times daily (with meals). Indications: prevention of type 2 diabetes mellitus, insulin resistance 120 Tab 5  
 ergocalciferol (ERGOCALCIFEROL) 50,000 unit capsule Take 1 Cap by mouth every seven (7) days. 5 Cap 2  
 esomeprazole (NEXIUM) 40 mg capsule Take one capsule by mouth daily for Heartburn (Patient taking differently: as needed. Take one capsule by mouth daily for Heartburn  Indications: heartburn) 30 Cap 5  
 dicyclomine (BENTYL) 20 mg tablet Take 1 Tab by mouth every six (6) hours. (Patient taking differently: Take 20 mg by mouth as needed. Indications: abdominal spasm) 30 Tab 2  cyanocobalamin (VITAMIN B-12) 1,000 mcg tablet Take 1,000 mcg by mouth daily. PAST MEDICAL HISTORY:   
Past Medical History:  
Diagnosis Date  Abnormal Pap smear of cervix 1990s  Breast cyst   
 benign. Left. 1.7 cm and multiple others. Dr. Joyner Mend  Chicken pox childhood  Chronic idiopathic constipation 2015 Dr. She Barksdale.  HELM (dyspnea on exertion) 2015 Dr. Meadows Massed  EBV positive mononucleosis syndrome 10/2013,   
 chronic or reactivated  Enlarged thyroid 2010, 2015  
 with Right tracheal deviation. Retrosternal goiter. Dr. Bakari Basurto.  Enlarged tonsils and adenoids 2010  Iodine-deficiency related goiter Left. Dr. Bakari Basurto.  YUNIER (obstructive sleep apnea) 02/25/15 Dr. Mayela Forde, uses cpap. Dr. Johnson Running. States cpap broken  Thyroid cyst  Right.  Thyroid nodule 2014 Dr. Edwige Coffey. Dr. Martinez Bench  Thyroid nodule 2019 PAST SURGICAL HISTORY:   
Past Surgical History:  
Procedure Laterality Date  HX BREAST BIOPSY Left 2018  
 due to mass  HX  SECTION  2004 [facility].  HX CYST INCISION AND DRAINAGE Left 2016  
 benign. Dr. Niraj Dimas  HX CYST INCISION AND DRAINAGE Right 09/15/2017 benign. Dr. Bernice Haley.  HX HERNIA REPAIR  [id number]  
 umbilical.  
 HX OTHER SURGICAL  5/2014 thyroid biopsy. Dr. Xin Girard.  HX THYROIDECTOMY Left 06/08/2015  
 due to [address] Dr. Kate Talley. benign. FAMILY HISTORY:   
Family History Problem Relation Age of Onset  Hypertension Mother  Lung Disease Mother   
     chronic bronchitis  Sleep Apnea Mother  Hypertension Father  Diabetes Paternal Grandmother  Breast Cancer Maternal Aunt  Other Sister   
     severe scoliosis. txd with back brace.  Breast Cancer Maternal Aunt  Breast Cancer Maternal Aunt SOCIAL HISTORY:   
Social History Socioeconomic History  Marital status: SINGLE Spouse name: Not on file  Number of children: 1  Years of education: Not on file  Highest education level: Not on file Occupational History  Occupation:  Employer: [employer] Tobacco Use  Smoking status: Passive Smoke Exposure - Never Smoker  Smokeless tobacco: Never Used  Tobacco comment: lives with a smoker dad x 18 yrs and now with smoker boyfriend x 2 years Substance and Sexual Activity  Alcohol use: No  
  Alcohol/week: 1.0 standard drinks Types: 1 Standard drinks or equivalent per week  Drug use: No  
 Sexual activity: Yes  
  Partners: Male Birth control/protection: None Social History Narrative ** Merged History Encounter ** IMMUNIZATIONS:   
Immunization History Administered Date(s) Administered  Hep B Vaccine 03/10/2014  Influenza Vaccine 09/21/2017, 10/22/2019  TB Skin Test (PPD) 09/19/2017 PHYSICAL EXAMINATION Vital Signs Visit Vitals /86 Pulse 93 Temp 98.1 °F (36.7 °C) (Oral) Resp 18 Ht 5' 3\" (1.6 m) Wt 221 lb 4.8 oz (100.4 kg) SpO2 94% BMI 39.20 kg/m² Weight Metrics 1/30/2020 1/30/2020 11/20/2019 10/29/2019 10/29/2019 8/9/2019 8/9/2019 Weight - 221 lb 4.8 oz 223 lb - 217 lb - 214 lb 14.4 oz Neck Circ (inches) 15 - - - - - - Waist Measure Inches 45 - - 44 - 41 - Body Fat % 45.4 - - 45.6 - 43.3 - BMI - 39.2 kg/m2 39.5 kg/m2 - 38.44 kg/m2 - 38.07 kg/m2 General appearance - Well nourished. Well appearing. Well developed. No acute distress. Obese. Head - Normocephalic. Atraumatic. Eyes - pupils equal and reactive, extraocular eye movements intact, sclera anicteric Ears - Hearing is grossly normal bilaterally. Nose - normal and patent, no erythema, discharge or polyps Mouth - mucous membranes moist, pharynx normal with cobblestone appearance. No erythema, white exudate or obstruction. Neck - supple. Midline trachea. No carotid bruits are noted. No thyromegaly noted. Chest - clear to auscultation bilaterally anterriorly and posteriorly. No wheezes, rales or rhonchi. Breath sounds are symmetrical and unlabored bilaterally. Heart - normal rate. Regular rhythm, normal S1, S2. No murmur. No rubs, clicks or gallops noted. Abdomen - soft and distended. No masses or organomegaly. No rebound, rigidity or guarding. Bowel sounds normal x 4 quadrants. RUQ pain on palpation. Left ASIS > right. Neurological - awake, alert and oriented to person, place, and time and event. Cranial nerves II through XII intact. Muscle strength is +5/5 x 4 extremities. Sensation is intact to light touch bilaterally. Steady gait. Musculoskeletal - Intact x 4 extremities. Full ROM x 4 extremities. No pain with movement. Back exam - normal range of motion. No pain on palpation of the spinous processes in the cervical, thoracic, lumbar, sacral regions. No CVA tenderness. Heme/Lymph - peripheral pulses normal x 4 extremities. No peripheral edema is noted. No cervical adenopathy noted.  
Skin - no rashes, erythema, ecchymosis, lacerations, abrasions, suspicious moles noted. No skin tags. No acanthosis nigricans noted in the axilla or neck. Psychological -   normal behavior, speech, dress and thought processes. Good insight. Good eye contact. Normal affect. Appropriate mood. DATA REVIEWED Lab Results Component Value Date/Time WBC 8.0 08/21/2019 03:44 PM  
 Hemoglobin (POC) 12.3 06/08/2015 11:44 AM  
 HGB 11.6 08/21/2019 03:44 PM  
 HCT 36.5 08/21/2019 03:44 PM  
 PLATELET 540 67/57/1858 03:44 PM  
 MCV 82 08/21/2019 03:44 PM  
 
Lab Results Component Value Date/Time Sodium 139 08/21/2019 03:44 PM  
 Potassium 4.3 08/21/2019 03:44 PM  
 Chloride 104 08/21/2019 03:44 PM  
 CO2 22 08/21/2019 03:44 PM  
 Anion gap 2 (L) 09/18/2010 02:50 AM  
 Glucose 106 (H) 08/21/2019 03:44 PM  
 BUN 11 08/21/2019 03:44 PM  
 Creatinine 0.88 08/21/2019 03:44 PM  
 BUN/Creatinine ratio 13 08/21/2019 03:44 PM  
 GFR est AA 96 08/21/2019 03:44 PM  
 GFR est non-AA 83 08/21/2019 03:44 PM  
 Calcium 9.7 08/21/2019 03:44 PM  
 Bilirubin, total 0.3 08/21/2019 03:44 PM  
 AST (SGOT) 15 08/21/2019 03:44 PM  
 Alk. phosphatase 90 08/21/2019 03:44 PM  
 Protein, total 7.0 08/21/2019 03:44 PM  
 Albumin 4.1 08/21/2019 03:44 PM  
 Globulin 3.7 09/18/2010 02:50 AM  
 A-G Ratio 1.4 08/21/2019 03:44 PM  
 ALT (SGPT) 8 08/21/2019 03:44 PM  
 
Lab Results Component Value Date/Time Cholesterol, total 122 04/12/2019 09:51 AM  
 HDL Cholesterol 48 04/12/2019 09:51 AM  
 LDL, calculated 66 04/12/2019 09:51 AM  
 VLDL, calculated 8 04/12/2019 09:51 AM  
 Triglyceride 41 04/12/2019 09:51 AM  
 
Lab Results Component Value Date/Time VITAMIN D, 25-HYDROXY 18.5 (L) 04/12/2019 09:51 AM  
   
Lab Results Component Value Date/Time Hemoglobin A1c 5.4 03/05/2019 03:01 PM  
 
Lab Results Component Value Date/Time TSH 2.440 08/21/2019 03:44 PM  
 
No results found for: MCACR, MCA1, MCA2, MCA3, MCAU, MCAU2, MCALPOCT 
 
 
ASSESSMENT and PLAN 
  ICD-10-CM ICD-9-CM 1. Metabolic syndrome V70.14 057.0 2. Left thyroid nodule E04.1 241.0 3. S/P thyroidectomy Z98.890 V45.89   
4. YUNIER (obstructive sleep apnea) G47.33 327.23   
5. Vitamin D deficiency E55.9 268.9 6. Snoring R06.83 786.09 Continue current medications and care. Prescriptions written and given to patient: *** (month *** of 3.)  Medication side effects discussed. [facility]  reviewed and pt is compliant. Discussed the patient's BMI with her. The BMI follow up plan is as follows: {Document your plan here:71036}. Diet recommendations:  Decrease carbohydrates (white foods including flour, white bread, white rice, white pasta, white potatoes; corn, sweet foods, sweet drinks and alcohol), increase green leafy vegetables and protein with each meal.  Avoid fried foods. Eat 3-5 small meals daily. Do not skip meals. Ok to use meal replacements up to twice daily with protein goal of 20 g per meal.   Recommend OTC Premiere Protein bars or shakes. Increase water intake. Increase physical activity to 30 minutes daily for health benefit or 60 minutes daily to prevent weight regain, as tolerated. Physical Activity prescription:  A goal of 30 minutes physical activity daily is recommended for health benefit and at least 60 minutes daily to prevent weight regain. For weight loss, no less than 75% needs to be aerobic (i.e. Walking) and no more than 25% resistance exercising (i.e. Weight lifting). For weight maintenance phase, 50% aerobic and 50% resistance exercises. Sleep prescription:    A goal of 7-8 hours of uninterrupted sleep is recommended to turn off the Grehlin hormone to be released from the stomach and triggers appetite while promoting weight gain. Proper rest turns on Leptin hormone to be released from white adipose tissue and promotes weight loss. Counseled patient on: weight loss goals, emphasizing a 5-10% weight loss in 6-12 months and strategies, ADRs and benefits of metformin, weight Relevant handouts given and discussed with patient. Immunizations noted. Praised pt for weight loss efforts and progress. ACP handout given. *** referral to honoring choices. Patient was offered a choice/choices in the treatment plan today. Patient expresses understanding of the plan and agrees with recommendations. More than *** mins spent face to face with patient and more than 50% of this time spent in counseling and coordinating care. Written by tali Méndez, as dictated by Dr. Juan Kent DO. Documentation True and Accepted by Gwendolyn Ahuja. Evaristo Lynn. There are no Patient Instructions on file for this visit.

## 2020-01-30 NOTE — PROGRESS NOTES
HISTORY OF PRESENT ILLNESS  Tasha Paige is a 36 y.o. female presents with Weight Management; Blood Pressure Check; Medication Evaluation (Wants to discuss Metformin medication); Snoring; and Referral Follow Up      Agree with nurse note. Pt with metabolic syndrome,YUNIER with snoring, left thyroid nodule, thyroidectomy, and Vitamin D deficiency presents to the office with a BP of 131/86. She gained 4 pounds since the last visit over the holidays. Percent body fat has changed from 45.6% to 45.4%, waist circumference measurement has changed from 44 in to 45 in. Patient states that she is concerned that she keeps gaining weight. She also complains of some acid reflux. Meals:  Breakfast - skips usually but if she doesn't skip, she eats oatmeal  Lunch - [employer] A sandwich and fries  Dinner - chicken and rice, potatoes, or ribs; does not eat dessert    Beverages:  Water - recognizes that she does not drink enough water  Sugar-filled drinks - has cut down on soda from 20 oz. Pepsi bottle 2-3x a day to 1 small bottle once or twice a week  Other drinks - juices, green teas, coffee, glass of wine occassionally    Sleep:  She averages 8-9 hours of sleep a night. She now sleeps at around 10-10:30pm every night (used to sleep at 1 am) and wakes up at 7 am. She states that she snores when sleeping and at times, wakes up gasping for air. She does not feel tired when she wakes up in the morning. She has a sleep study scheduled with Dr. Margie Pickard in March. Patient also states that she started Metformin Extended Release but had heard some \"concerning things\" so stopped taking it. She would like some clarification on this. She heard it can cause cancer. Patient denies fatigue, chest pain, heart palpitations, nausea, dry mouth, constipation, signs of depression.     Written by tali Williamson, as dictated by Dr. Joyce Ortega DO.     SENDY    Review of Systems negative except as noted above in HPI.    ALLERGIES:    Allergies   Allergen Reactions    Skelaxin [Metaxalone] Other (comments)     Too groggy       CURRENT MEDICATIONS:    Outpatient Medications Marked as Taking for the 20 encounter (Office Visit) with Ashley Mccoy,    Medication Sig Dispense Refill    ergocalciferol (ERGOCALCIFEROL) 50,000 unit capsule Take 1 Cap by mouth every seven (7) days. 5 Cap 2    esomeprazole (NEXIUM) 40 mg capsule Take one capsule by mouth daily for Heartburn (Patient taking differently: as needed. Take one capsule by mouth daily for Heartburn  Indications: heartburn) 30 Cap 5    cyanocobalamin (VITAMIN B-12) 1,000 mcg tablet Take 1,000 mcg by mouth daily. PAST MEDICAL HISTORY:    Past Medical History:   Diagnosis Date    Abnormal Pap smear of cervix 1990s    Breast cyst     benign. Left. 1.7 cm and multiple others. Dr. Brandy Montero Chicken pox childhood    Chronic idiopathic constipation 2015    Dr. Sherif Bello.  HELM (dyspnea on exertion) 2015    Dr. Goyo Caballero EBV positive mononucleosis syndrome 10/2013,     chronic or reactivated    Enlarged thyroid 2010, 2015    with Right tracheal deviation. Retrosternal goiter. Dr. Andrew Palma.  Enlarged tonsils and adenoids 2010    Iodine-deficiency related goiter     Left. Dr. Andrew Palma.  YUNIER (obstructive sleep apnea) 2015    Dr. Cat Farrar, uses cpap. Dr. Pipo Evangelista. States cpap broken. Dr. Osmani Poole.  Thyroid cyst     Right.  Thyroid nodule 2019       PAST SURGICAL HISTORY:    Past Surgical History:   Procedure Laterality Date    HX BREAST BIOPSY Left 2018    due to mass     HX  SECTION      [facility].  HX CYST INCISION AND DRAINAGE Left 2016    benign. Dr. Nba Christie Right 09/15/2017    benign. Dr. Sanjay Juan.  HX HERNIA REPAIR  [id number]    umbilical.    HX OTHER SURGICAL  2014    thyroid biopsy.    Xin Girard.  HX THYROIDECTOMY Left 06/08/2015    due to [address] Dr. Kate Talley. benign. FAMILY HISTORY:    Family History   Problem Relation Age of Onset    Hypertension Mother     Lung Disease Mother         chronic bronchitis    Sleep Apnea Mother     Hypertension Father     Diabetes Paternal Grandmother     Breast Cancer Maternal Aunt     Other Sister         severe scoliosis. txd with back brace.     Breast Cancer Maternal Aunt     Breast Cancer Maternal Aunt        SOCIAL HISTORY:    Social History     Socioeconomic History    Marital status: SINGLE     Spouse name: Not on file    Number of children: 1    Years of education: Not on file    Highest education level: Not on file   Occupational History    Occupation:      Employer: [employer]   Tobacco Use    Smoking status: Passive Smoke Exposure - Never Smoker    Smokeless tobacco: Never Used    Tobacco comment: lives with a smoker dad x 18 yrs and now with smoker boyfriend x 2 years   Substance and Sexual Activity    Alcohol use: No     Alcohol/week: 1.0 standard drinks     Types: 1 Standard drinks or equivalent per week    Drug use: No    Sexual activity: Yes     Partners: Male     Birth control/protection: None   Social History Narrative    ** Merged History Encounter **            IMMUNIZATIONS:    Immunization History   Administered Date(s) Administered    Hep B Vaccine 03/10/2014    Influenza Vaccine 09/21/2017, 10/22/2019    TB Skin Test (PPD) 09/19/2017       PHYSICAL EXAMINATION    Vital Signs    Visit Vitals  /86   Pulse 93   Temp 98.1 °F (36.7 °C) (Oral)   Resp 18   Ht 5' 3\" (1.6 m)   Wt 221 lb 4.8 oz (100.4 kg)   SpO2 94%   BMI 39.20 kg/m²       Weight Metrics 1/30/2020 1/30/2020 11/20/2019 10/29/2019 10/29/2019 8/9/2019 8/9/2019   Weight - 221 lb 4.8 oz 223 lb - 217 lb - 214 lb 14.4 oz   Neck Circ (inches) 15 - - - - - -   Waist Measure Inches 45 - - 44 - 41 -   Body Fat % 45.4 - - 45.6 - 43.3 -   BMI - 39.2 kg/m2 39.5 kg/m2 - 38.44 kg/m2 - 38.07 kg/m2         General appearance - Well nourished. Well appearing. Well developed. No acute distress. Obese. Head - Normocephalic. Atraumatic. Eyes - pupils equal and reactive, extraocular eye movements intact, sclera anicteric  Ears - Hearing is grossly normal bilaterally. Nose - normal and patent, no erythema, discharge or polyps   Mouth - mucous membranes moist, pharynx normal with cobblestone appearance. No erythema, white exudate or obstruction. Neck - supple. Midline trachea. No carotid bruits are noted. No thyromegaly noted. Chest - clear to auscultation bilaterally anterriorly and posteriorly. No wheezes, rales or rhonchi. Breath sounds are symmetrical and unlabored bilaterally. Heart - normal rate. Regular rhythm, normal S1, S2. No murmur. No rubs, clicks or gallops noted. Abdomen - soft and distended. No masses or organomegaly. No rebound, rigidity or guarding. Bowel sounds normal x 4 quadrants. RUQ pain on palpation. Left ASIS > right. Neurological - awake, alert and oriented to person, place, and time and event. Cranial nerves II through XII intact. Muscle strength is +5/5 x 4 extremities. Sensation is intact to light touch bilaterally. Steady gait. Musculoskeletal - Intact x 4 extremities. Full ROM x 4 extremities. No pain with movement. Back exam - normal range of motion. No pain on palpation of the spinous processes in the cervical, thoracic, lumbar, sacral regions. No CVA tenderness. Heme/Lymph - peripheral pulses normal x 4 extremities. No peripheral edema is noted. No cervical adenopathy noted. Skin - no rashes, erythema, ecchymosis, lacerations, abrasions, suspicious moles noted. No skin tags. No acanthosis nigricans noted in the axilla or neck. Psychological -   normal behavior, speech, dress and thought processes. Good insight. Good eye contact.   Normal affect. Appropriate mood. DATA REVIEWED  Lab Results   Component Value Date/Time    WBC 8.0 08/21/2019 03:44 PM    Hemoglobin (POC) 12.3 06/08/2015 11:44 AM    HGB 11.6 08/21/2019 03:44 PM    HCT 36.5 08/21/2019 03:44 PM    PLATELET 206 86/18/9376 03:44 PM    MCV 82 08/21/2019 03:44 PM     Lab Results   Component Value Date/Time    Sodium 139 08/21/2019 03:44 PM    Potassium 4.3 08/21/2019 03:44 PM    Chloride 104 08/21/2019 03:44 PM    CO2 22 08/21/2019 03:44 PM    Anion gap 2 (L) 09/18/2010 02:50 AM    Glucose 106 (H) 08/21/2019 03:44 PM    BUN 11 08/21/2019 03:44 PM    Creatinine 0.88 08/21/2019 03:44 PM    BUN/Creatinine ratio 13 08/21/2019 03:44 PM    GFR est AA 96 08/21/2019 03:44 PM    GFR est non-AA 83 08/21/2019 03:44 PM    Calcium 9.7 08/21/2019 03:44 PM    Bilirubin, total 0.3 08/21/2019 03:44 PM    AST (SGOT) 15 08/21/2019 03:44 PM    Alk. phosphatase 90 08/21/2019 03:44 PM    Protein, total 7.0 08/21/2019 03:44 PM    Albumin 4.1 08/21/2019 03:44 PM    Globulin 3.7 09/18/2010 02:50 AM    A-G Ratio 1.4 08/21/2019 03:44 PM    ALT (SGPT) 8 08/21/2019 03:44 PM     Lab Results   Component Value Date/Time    Cholesterol, total 122 04/12/2019 09:51 AM    HDL Cholesterol 48 04/12/2019 09:51 AM    LDL, calculated 66 04/12/2019 09:51 AM    VLDL, calculated 8 04/12/2019 09:51 AM    Triglyceride 41 04/12/2019 09:51 AM     Lab Results   Component Value Date/Time    VITAMIN D, 25-HYDROXY 18.5 (L) 04/12/2019 09:51 AM       Lab Results   Component Value Date/Time    Hemoglobin A1c 5.4 03/05/2019 03:01 PM     Lab Results   Component Value Date/Time    TSH 2.440 08/21/2019 03:44 PM       ASSESSMENT and PLAN    ICD-10-CM ICD-9-CM    1. Metabolic syndrome F94.42 361.0    2. RLQ abdominal pain R10.31 789.03 URINALYSIS W/ RFLX MICROSCOPIC      CULTURE, URINE      AMB POC URINE PREGNANCY TEST, VISUAL COLOR COMPARISON    due to bladder vs constipation vs muscular vs GYN vs other, intermittently   3.  YUNIER (obstructive sleep apnea) G47.33 327.23    4. Left thyroid nodule E04.1 241.0    5. S/P thyroidectomy Z98.890 V45.89    6. Vitamin D deficiency E55.9 268.9    7. Snoring R06.83 786.09     due to YNUIER   8. Heartburn R12 787.1      Reviewed notes from Dr. Shanda Hernandez from 11/20/2019. Continue current medications and care. Restart Metformin as directed, as tolerated. Start OTC vit B12 due to potential to become vit B12 deficient. Take Nexium daily until heartburn symptoms resolve. Avoid GERD triggers. Discussed the patient's BMI with her. The BMI follow up plan is as follows:   Diet recommendations:  Decrease carbohydrates (white foods including flour, white bread, white rice, white pasta, white potatoes; corn, sweet foods, sweet drinks and alcohol), increase green leafy vegetables and protein with each meal.  Avoid fried foods. Eat 3-5 small meals daily. Do not skip meals. Ok to use meal replacements up to twice daily with protein goal of 20 g per meal.   Recommend OTC Premiere Protein bars or shakes. Keep a running calorie intake log. Increase water intake. Increase physical activity to 30 minutes daily for health benefit or 60 minutes daily to prevent weight regain, as tolerated. Physical Activity prescription:  A goal of 30 minutes physical activity daily is recommended for health benefit and at least 60 minutes daily to prevent weight regain. For weight loss, no less than 75% needs to be aerobic (i.e. Walking) and no more than 25% resistance exercising (i.e. Weight lifting). For weight maintenance phase, 50% aerobic and 50% resistance exercises. Sleep prescription:    A goal of 7-8 hours of uninterrupted sleep is recommended to turn off the Grehlin hormone to be released from the stomach and triggers appetite while promoting weight gain. Proper rest turns on Leptin hormone to be released from white adipose tissue and promotes weight loss.   Counseled patient on: weight loss goals, emphasizing a 5-10% weight loss in 6-12 months and strategies, ADRs and benefits of metformin. Bladder hygiene. Heartburn. Relevant handouts given and discussed with patient. Immunizations noted. Praised pt for weight loss efforts and progress. Urine test done today. Patient was offered a choice/choices in the treatment plan today. Patient expresses understanding of the plan and agrees with recommendations. More than 30 mins spent face to face with patient and more than 50% of this time spent in counseling and coordinating care. Follow-up and Dispositions    · Return in about 1 month (around 2/29/2020) for weight, results, referral follow up. Written by tali Briceno, as dictated by Dr. Patti Love DO. Documentation True and Accepted by Alexander Cortes. Deyvi Martínez. Patient Instructions        Learning About Low-Carbohydrate Diets for Weight Loss  What is a low-carbohydrate diet? Low-carb diets avoid foods that are high in carbohydrate. These high-carb foods include pasta, bread, rice, cereal, fruits, and starchy vegetables. Instead, these diets usually have you eat foods that are high in fat and protein. Many people lose weight quickly on a low-carb diet. But the early weight loss is water. People on this diet often gain the weight back after they start eating carbs again. Not all diet plans are safe or work well. A lot of the evidence shows that low-carb diets aren't healthy. That's because these diets often don't include healthy foods like fruits and vegetables. Losing weight safely means balancing protein, fat, and carbs with every meal and snack. And low-carb diets don't always provide the vitamins, minerals, and fiber you need. If you have a serious medical condition, talk to your doctor before you try any diet. These conditions include kidney disease, heart disease, type 2 diabetes, high cholesterol, and high blood pressure.   If you are pregnant, it may not be safe for your baby if you are on a low-carb diet. How can you lose weight safely? You might have heard that a diet plan helped another person lose weight. But that doesn't mean that it will work for you. It is very hard to stay on a diet that includes lots of big changes in your eating habits. If you want to get to a healthy weight and stay there, making healthy lifestyle changes will often work better than dieting. These steps can help. · Make a plan for change. Work with your doctor to create a plan that is right for you. · See a dietitian. He or she can show you how to make healthy changes in your eating habits. · Manage stress. If you have a lot of stress in your life, it can be hard to focus on making healthy changes to your daily habits. · Track your food and activity. You are likely to do better at losing weight if you keep track of what you eat and what you do. Follow-up care is a key part of your treatment and safety. Be sure to make and go to all appointments, and call your doctor if you are having problems. It's also a good idea to know your test results and keep a list of the medicines you take. Where can you learn more? Go to http://weston-karel.info/. Enter A121 in the search box to learn more about \"Learning About Low-Carbohydrate Diets for Weight Loss. \"  Current as of: November 7, 2018  Content Version: 12.2  © 2721-9263 [employer]. Care instructions adapted under license by [employer] (which disclaims liability or warranty for this information). If you have questions about a medical condition or this instruction, always ask your healthcare professional. [address] any warranty or liability for your use of this information. Your Insulin level or blood sugar level is too high in your blood stream.  METFORMIN is recommended to improve your body's Insulin Sensitivity and thus decrease its Insulin Resistance.   It is safe in pediatric patients 8years old and older. It is contraindicated if your kidney creatinine level is 1.4 or higher in women or 1.5 or higher in men. Weight loss, nausea, abdominal cramping and diarrhea are common side effects. The latter, because Metformin stimulates hormones (GLP-1 and Peptide YY) in the lower bowel that help reduce your appetite, slows down how quickly food leaves your stomach, decreases how much food you want to eat, helps beta cells produce Insulin when blood sugar is high and helps keep the liver from releasing too much sugar. Metformin is best tolerated if you take 1 pill at bedtime for 3-7 days then increase gradually to 2 pills in the morning and 2 at bedtime. If you can not tolerate it at the maximum recommended dose then decrease it by one pill to the amount you tolerate. This medication should be discontinued 24 hours before any surgical procedure and restarted 48 hours after the surgery. Vitamin b12 will need to be checked yearly with its use since Metformin can cause some deficiency in this vitamin. Please take Over the Counter vit B12 1000 mcg daily while using Metformin. It will boost your metabolism and improve your memory. TRY THIS:  Metformin 500 mg at bedtime x 3 days then 2 pills at bedtime x 3 days then add 1 pill in the morning x 3 days then 2 pills in morning while continuing 2 pills at night. WEIGHT LOSS PLAN    1. Read food labels and count calories. Goal 9155-8481 calories daily for women and 7301-6694 calories daily for men. Achieve this by decreasing caloric intake by 500 calories by weekly increments. NOTE:  1 pound = 3500 calories! Www.loseit. ECOtality  Www.OOHLALA Mobilenesspal.ECOtality  Www.Pipette. ECOtality  Www.Azteq Mobilefinder. gov  Weight watchers mobile    Calories needed to lose 1-2 pounds a week = 10 x your weight in pounds    2. Increase water intake. Per [facility], it is important to drink 1/2 your body weight in ounces of water daily.   Decrease your water consumption 2-3 hours before bedtime to prevent sleep disturbance from frequent urination. 3.  Decrease sugary beverages. Each can or glass of soda increases your risk of obesity by 60%. Can lose 10 pounds in a month by avoiding any soda. 12 oz can of soda = 140 calories, 16 oz cup of sweet tea = 200 calories    16 oz orange juice = 200 calories, 10 oz apple juice = 150 calories   32 oz sports drink = 200 calories, 16 oz punch = 240 calories   3.5 oz alcohol = 100-150 calories     4. Avoid High Fructose Corn Syrup products. This ingredient makes products highly addictive! 5. Exercise 30 minutes daily 5 days weekly, minimally. If you burn 3500 calories that equals a pound! Use a pedometer to count steps. Visit www. Localmind for a free pedometer and diet recommendations. Your maximum heart rate = 220 - your age    Never exercise at your maximum heart rate. See handout for target heart rate. 6.  Decrease carbohydrates (white bread, pasta, rice, potatoes, sweet foods and sweet drinks like soda, tea, coffee, juice and sports drinks). Increase fiber and protein. Goal:   calories daily of carbohydrates     Try CHROMIUM PICONATE 200 MG THREE TIMES DAILY,    to decrease Premenstrual carbohydrate cravings. 7.  Eat 3-5 small meals daily, include lots of protein (beans/legumes, nuts, lean meat, eggs) and green vegetables with each. (Breakfast, lunch, dinner with 2 healthy snacks)    8. Get proper rest 7-8 hours uninterrupted. When you get less than 6 hours, it triggers hunger by affecting your Grehlin:Leptin ratio and this results in weight gain. 9.  Watch your food portions. Green leafy vegetables should cover 1/2 of the plate, lean meat 1/4 of the plate, starchy vegetable 1/4 of the plate. Use smaller plates. 10.  Do not eat until you are full. Eat until you are no longer hungry.   If you are not sure, try drinking a glass of water before getting your second serving of food. 11.  Do not weigh yourself daily. Wait until your next office visit. Use how you feel and  how your clothes fit as measurements of success. 12.  Address your spirituality to draw strength from above during your journey. Remember \"I am fearfully and wonderfully made. Marvelous in His eyes. \"    13. Set realistic, appropriate and achievable weight loss goals:     RECOMMENDED TARGET WEIGHT LOSS:  Initial weight loss of 5-10% of your initial body weight achieved over 6 months or a decrease of 2 BMI units. MINIMUM GOAL OF WEIGHT LOSS:  Reduce body weight and maintain a lower body weight. Prevent weight gain. RELATED WEBSITES:      Www.obesityaction. org  (and consider joining the Obesity Action Coalition for $25/year. The [facility] mission is to elevated and empower those affected by obesity through education, advocacy and support. Quarterly journals included in membership fee. )    Www.Fortem. Atonarp  www.Vcommerce     RELATED DIETS:  Dr. Pérez Devries Weight loss challenge, Mediterranean diet, [address] [place] [address] Watchers, Paleo Diet (anti-inflammatory diet)    EXERCISE 150 MINUTES WEEKLY. THIS CAN BE ACHIEVED BY WORKING OUT OR WALKING A MINIMUM OF 30 MINUTES FOR 5 DAYS WEEKLY. YOU CAN EXERCISE IN INCREMENTS OF 10-15 MINUTES UP TO 3 TIMES A DAY. Consider performing \"brainless exercise. \"  Choose your favorite tv program.  Five minutes before and for 5 minutes after the tv program, stretch your body. While the program is on, walk in place watching the show. When commercials come on, rest or walk around the house to do other things. When the program begins, return to walking in place. When you are able keep walking during the commercials and add light weights to your ankles or hands. By the end of the show, you would have walked 30 minutes. If you need shorter spurts of exercise, walk during the commercials and rest during the show.     Drink to glasses of water prior to any exercise to prevent dehydration and to improve the results of the work out. Your RESTING HEART RATE is the number of times your heart beats per minute when you are not exerting yourself. The more fit you are, the lower your resting heart rate will be. Your MAXIMUM HEART RATE is the number of times per minute your heart pumps when it is working at 100% capacity. NEVER EXERCISE AT YOUR MAXIMUM HEART RATE. MAX HEART RATE = 220 - your age    For example, in a 48year old, the maximum heart rate is 220-50 = 170 beats per minute    Your [place] is the number of beats per minute our heart should pump during aerobic exercise. Reaching your target heart rate indicates that your body is receiving maximum cardiovascular and fat burning benefits. If you are fit, your TARGET HEART RATE = 70-85% of your maximum Heart rate      For a 48year old with vigorous intensity physical activity,         Target heart rate= 170 bpm x .70 = 119 bpm     Target heart rate = 170 bpm x .85=  145 bpm        Therefore, your target heart rate during physical activity is 119-145      If you are not fit, TARGET HEART RATE = 50-70% of your maximum Heart rate    MODERATE CONSISTENT AEROBIC EXERCISE OR WALKING FOR AT LEAST 150 MINUTES WEEKLY IS AN ESSENTIAL PART OF ANY WEIGHT LOSS OF WEIGHT MAINTENANCE PROGRAM.     During WEIGHT LOSS PHASE, at least 75% of your exercise needs to be walking or moderate aerobic activity and 25% or 0% can be Isometric or Resistance type exercises (the latter can be deferred to the weight maintenance phase.)     During WEIGHT MAINTENANCE PHASE, at least 50% of your exercise needs to be aerobic and 50% Isometric or Resistance type exercises. BENEFITS OF MODERATE INTENSITY EXERCISE MOST DAYS OF THE WEEK:     1. Insulin Resistance improves 30-85%   2. Abdominal Fat decreases by 30%   3. Inflammatory Markers decrease by 30% (therefore pain decreases)   4.   Systolic and Diastolic Blood Pressure decrease by 4 mmHg   5. HDL improves by 5%   6. Triglycerides decrease by 15%   7. Shift from small dense LDL to large dense LDL (therefore decrease Insulin Resistance)   8.   Decrease coagulability

## 2020-01-30 NOTE — PATIENT INSTRUCTIONS
Learning About Low-Carbohydrate Diets for Weight Loss What is a low-carbohydrate diet? Low-carb diets avoid foods that are high in carbohydrate. These high-carb foods include pasta, bread, rice, cereal, fruits, and starchy vegetables. Instead, these diets usually have you eat foods that are high in fat and protein. Many people lose weight quickly on a low-carb diet. But the early weight loss is water. People on this diet often gain the weight back after they start eating carbs again. Not all diet plans are safe or work well. A lot of the evidence shows that low-carb diets aren't healthy. That's because these diets often don't include healthy foods like fruits and vegetables. Losing weight safely means balancing protein, fat, and carbs with every meal and snack. And low-carb diets don't always provide the vitamins, minerals, and fiber you need. If you have a serious medical condition, talk to your doctor before you try any diet. These conditions include kidney disease, heart disease, type 2 diabetes, high cholesterol, and high blood pressure. If you are pregnant, it may not be safe for your baby if you are on a low-carb diet. How can you lose weight safely? You might have heard that a diet plan helped another person lose weight. But that doesn't mean that it will work for you. It is very hard to stay on a diet that includes lots of big changes in your eating habits. If you want to get to a healthy weight and stay there, making healthy lifestyle changes will often work better than dieting. These steps can help. · Make a plan for change. Work with your doctor to create a plan that is right for you. · See a dietitian. He or she can show you how to make healthy changes in your eating habits. · Manage stress. If you have a lot of stress in your life, it can be hard to focus on making healthy changes to your daily habits. · Track your food and activity.  You are likely to do better at losing weight if you keep track of what you eat and what you do. Follow-up care is a key part of your treatment and safety. Be sure to make and go to all appointments, and call your doctor if you are having problems. It's also a good idea to know your test results and keep a list of the medicines you take. Where can you learn more? Go to http://weston-karel.info/. Enter A121 in the search box to learn more about \"Learning About Low-Carbohydrate Diets for Weight Loss. \" Current as of: November 7, 2018 Content Version: 12.2 © 3242-9150 Lemon. Care instructions adapted under license by [employer] (which disclaims liability or warranty for this information). If you have questions about a medical condition or this instruction, always ask your healthcare professional. [address] any warranty or liability for your use of this information. Your Insulin level or blood sugar level is too high in your blood stream.  METFORMIN is recommended to improve your body's Insulin Sensitivity and thus decrease its Insulin Resistance. It is safe in pediatric patients 8years old and older. It is contraindicated if your kidney creatinine level is 1.4 or higher in women or 1.5 or higher in men. Weight loss, nausea, abdominal cramping and diarrhea are common side effects. The latter, because Metformin stimulates hormones (GLP-1 and Peptide YY) in the lower bowel that help reduce your appetite, slows down how quickly food leaves your stomach, decreases how much food you want to eat, helps beta cells produce Insulin when blood sugar is high and helps keep the liver from releasing too much sugar. Metformin is best tolerated if you take 1 pill at bedtime for 3-7 days then increase gradually to 2 pills in the morning and 2 at bedtime. If you can not tolerate it at the maximum recommended dose then decrease it by one pill to the amount you tolerate. This medication should be discontinued 24 hours before any surgical procedure and restarted 48 hours after the surgery. Vitamin b12 will need to be checked yearly with its use since Metformin can cause some deficiency in this vitamin. Please take Over the Counter vit B12 1000 mcg daily while using Metformin. It will boost your metabolism and improve your memory. TRY THIS:  Metformin 500 mg at bedtime x 3 days then 2 pills at bedtime x 3 days then add 1 pill in the morning x 3 days then 2 pills in morning while continuing 2 pills at night. WEIGHT LOSS PLAN 1. Read food labels and count calories. Goal 7884-4876 calories daily for women and 0418-6764 calories daily for men. Achieve this by decreasing caloric intake by 500 calories by weekly increments. NOTE:  1 pound = 3500 calories! Www.[employer] Medical Devicesit. Zaelab Www.Jawbone.Zaelab Www.Stem[employer] Www.[employer]. gov Weight watchers mobile Calories needed to lose 1-2 pounds a week = 10 x your weight in pounds 2. Increase water intake. Per [facility] Weight loss Program, it is important to drink 1/2 your body weight in ounces of water daily. Decrease your water consumption 2-3 hours before bedtime to prevent sleep disturbance from frequent urination. 3.  Decrease sugary beverages. Each can or glass of soda increases your risk of obesity by 60%. Can lose 10 pounds in a month by avoiding any soda. 12 oz can of soda = 140 calories, 16 oz cup of sweet tea = 200 calories 16 oz orange juice = 200 calories, 10 oz apple juice = 150 calories 32 oz sports drink = 200 calories, 16 oz punch = 240 calories 3.5 oz alcohol = 100-150 calories 4. Avoid High Fructose Corn Syrup products. This ingredient makes products highly addictive! 5. Exercise 30 minutes daily 5 days weekly, minimally. If you burn 3500 calories that equals a pound! Use a pedometer to count steps.  Visit www.[employer]raLinear. com for a free pedometer and diet recommendations. Your maximum heart rate = 220 - your age Never exercise at your maximum heart rate. See handout for target heart rate. 6.  Decrease carbohydrates (white bread, pasta, rice, potatoes, sweet foods and sweet drinks like soda, tea, coffee, juice and sports drinks). Increase fiber and protein. Goal:   calories daily of carbohydrates Try CHROMIUM PICONATE 200 MG THREE TIMES DAILY,  
 to decrease Premenstrual carbohydrate cravings. 7.  Eat 3-5 small meals daily, include lots of protein (beans/legumes, nuts, lean meat, eggs) and green vegetables with each. (Breakfast, lunch, dinner with 2 healthy snacks) 8. Get proper rest 7-8 hours uninterrupted. When you get less than 6 hours, it triggers hunger by affecting your Grehlin:Leptin ratio and this results in weight gain. 9.  Watch your food portions. Green leafy vegetables should cover 1/2 of the plate, lean meat 1/4 of the plate, starchy vegetable 1/4 of the plate. Use smaller plates. 10.  Do not eat until you are full. Eat until you are no longer hungry. If you are not sure, try drinking a glass of water before getting your second serving of food. 11.  Do not weigh yourself daily. Wait until your next office visit. Use how you feel and  how your clothes fit as measurements of success. 12.  Address your spirituality to draw strength from above during your journey. Remember \"I am fearfully and wonderfully made. Marvelous in His eyes. \" 
 
13. Set realistic, appropriate and achievable weight loss goals: 
 
 RECOMMENDED TARGET WEIGHT LOSS:  Initial weight loss of 5-10% of your initial body weight achieved over 6 months or a decrease of 2 BMI units. MINIMUM GOAL OF WEIGHT LOSS:  Reduce body weight and maintain a lower body weight. Prevent weight gain. RELATED WEBSITES:     
Www.obesityaction. org 
 (and consider joining the Obesity Action Coalition for $25/year. The [address] mission is to elevated and empower those affected by obesity through education, advocacy and support. Quarterly journals included in membership fee. ) Www.Fablistic 
www.[employer] RELATED DIETS:  Dr. Huseyin Peace Weight loss challenge, Mediterranean diet, [place] Diet, [employer] Terry Watchers, Paleo Diet (anti-inflammatory diet) EXERCISE 150 MINUTES WEEKLY. THIS CAN BE ACHIEVED BY WORKING OUT OR WALKING A MINIMUM OF 30 MINUTES FOR 5 DAYS WEEKLY. YOU CAN EXERCISE IN INCREMENTS OF 10-15 MINUTES UP TO 3 TIMES A DAY. Consider performing \"brainless exercise. \"  Choose your favorite tv program.  Five minutes before and for 5 minutes after the tv program, stretch your body. While the program is on, walk in place watching the show. When commercials come on, rest or walk around the house to do other things. When the program begins, return to walking in place. When you are able keep walking during the commercials and add light weights to your ankles or hands. By the end of the show, you would have walked 30 minutes. If you need shorter spurts of exercise, walk during the commercials and rest during the show. Drink to glasses of water prior to any exercise to prevent dehydration and to improve the results of the work out. Your RESTING HEART RATE is the number of times your heart beats per minute when you are not exerting yourself. The more fit you are, the lower your resting heart rate will be. Your MAXIMUM HEART RATE is the number of times per minute your heart pumps when it is working at 100% capacity. NEVER EXERCISE AT YOUR MAXIMUM HEART RATE. MAX HEART RATE = 220 - your age For example, in a 48year old, the maximum heart rate is 220-50 = 170 beats per minute Your TARGET HEART RATE is the number of beats per minute our heart should pump during aerobic exercise. Reaching your target heart rate indicates that your body is receiving maximum cardiovascular and fat burning benefits. If you are fit, your TARGET HEART RATE = 70-85% of your maximum Heart rate For a 48year old with vigorous intensity physical activity, Target heart rate= 170 bpm x .70 = 119 bpm 
   Target heart rate = 170 bpm x .85=  145 bpm 
 
    Therefore, your target heart rate during physical activity is 119-145 If you are not fit, TARGET HEART RATE = 50-70% of your maximum Heart rate MODERATE CONSISTENT AEROBIC EXERCISE OR WALKING FOR AT LEAST 150 MINUTES WEEKLY IS AN ESSENTIAL PART OF ANY WEIGHT LOSS OF WEIGHT MAINTENANCE PROGRAM. During WEIGHT LOSS PHASE, at least 75% of your exercise needs to be walking or moderate aerobic activity and 25% or 0% can be Isometric or Resistance type exercises (the latter can be deferred to the weight maintenance phase.) During WEIGHT MAINTENANCE PHASE, at least 50% of your exercise needs to be aerobic and 50% Isometric or Resistance type exercises. BENEFITS OF MODERATE INTENSITY EXERCISE MOST DAYS OF THE WEEK: 
 
 1. Insulin Resistance improves 30-85% 2. Abdominal Fat decreases by 30% 3. Inflammatory Markers decrease by 30% (therefore pain decreases) 4. Systolic and Diastolic Blood Pressure decrease by 4 mmHg 5. HDL improves by 5% 6. Triglycerides decrease by 15% 7. Shift from small dense LDL to large dense LDL (therefore decrease Insulin Resistance) 8. Decrease coagulability

## 2020-01-30 NOTE — PROGRESS NOTES
Surinder Luevano is a 36 y.o. female  Chief Complaint   Patient presents with    Weight Management    Blood Pressure Check    Other     Wants to discuss Metformin medication     There are no preventive care reminders to display for this patient. Visit Vitals  /86   Pulse 93   Temp 98.1 °F (36.7 °C) (Oral)   Resp 18   Ht 5' 3\" (1.6 m)   Wt 221 lb 4.8 oz (100.4 kg)   SpO2 94%   BMI 39.20 kg/m²     1. Have you been to the ER, urgent care clinic since your last visit? Hospitalized since your last visit? No    2. Have you seen or consulted any other health care providers outside of the [address] [place] [address] since your last visit? Include any pap smears or colon screening.  No

## 2020-01-31 LAB
APPEARANCE UR: ABNORMAL
BACTERIA UR CULT: NORMAL
BILIRUB UR QL STRIP: NEGATIVE
COLOR UR: YELLOW
GLUCOSE UR QL: NEGATIVE
HGB UR QL STRIP: NEGATIVE
KETONES UR QL STRIP: NEGATIVE
LEUKOCYTE ESTERASE UR QL STRIP: NEGATIVE
MICRO URNS: ABNORMAL
NITRITE UR QL STRIP: NEGATIVE
PH UR STRIP: 6.5 [PH] (ref 5–7.5)
PROT UR QL STRIP: NEGATIVE
SP GR UR: 1.02 (ref 1–1.03)
UROBILINOGEN UR STRIP-MCNC: 1 MG/DL (ref 0.2–1)

## 2020-02-23 ENCOUNTER — HOSPITAL ENCOUNTER (OUTPATIENT)
Dept: SLEEP MEDICINE | Age: 40
Discharge: HOME | End: 2020-02-23
Payer: COMMERCIAL

## 2020-02-23 VITALS
SYSTOLIC BLOOD PRESSURE: 131 MMHG | OXYGEN SATURATION: 96 % | WEIGHT: 223.9 LBS | HEART RATE: 98 BPM | DIASTOLIC BLOOD PRESSURE: 89 MMHG | BODY MASS INDEX: 39.67 KG/M2 | HEIGHT: 63 IN

## 2020-02-23 DIAGNOSIS — G47.33 OSA (OBSTRUCTIVE SLEEP APNEA): ICD-10-CM

## 2020-02-23 PROCEDURE — 95810 POLYSOM 6/> YRS 4/> PARAM: CPT | Performed by: INTERNAL MEDICINE

## 2020-02-27 ENCOUNTER — TELEPHONE (OUTPATIENT)
Dept: FAMILY MEDICINE CLINIC | Age: 40
End: 2020-02-27

## 2020-02-27 ENCOUNTER — OFFICE VISIT (OUTPATIENT)
Dept: FAMILY MEDICINE CLINIC | Age: 40
End: 2020-02-27

## 2020-02-27 VITALS
WEIGHT: 224.8 LBS | RESPIRATION RATE: 14 BRPM | HEART RATE: 91 BPM | BODY MASS INDEX: 39.83 KG/M2 | DIASTOLIC BLOOD PRESSURE: 83 MMHG | TEMPERATURE: 99 F | HEIGHT: 63 IN | OXYGEN SATURATION: 96 % | SYSTOLIC BLOOD PRESSURE: 130 MMHG

## 2020-02-27 DIAGNOSIS — R10.11 RUQ ABDOMINAL PAIN: ICD-10-CM

## 2020-02-27 DIAGNOSIS — R12 HEARTBURN: ICD-10-CM

## 2020-02-27 DIAGNOSIS — E88.81 METABOLIC SYNDROME: ICD-10-CM

## 2020-02-27 DIAGNOSIS — R10.31 RLQ ABDOMINAL PAIN: ICD-10-CM

## 2020-02-27 DIAGNOSIS — E88.81 INSULIN RESISTANCE: ICD-10-CM

## 2020-02-27 RX ORDER — METFORMIN HYDROCHLORIDE 500 MG/1
1000 TABLET, EXTENDED RELEASE ORAL 2 TIMES DAILY WITH MEALS
Qty: 360 TAB | Refills: 3 | Status: SHIPPED | OUTPATIENT
Start: 2020-02-27 | End: 2021-06-16 | Stop reason: ALTCHOICE

## 2020-02-27 RX ORDER — ESOMEPRAZOLE MAGNESIUM 40 MG/1
CAPSULE, DELAYED RELEASE ORAL
Qty: 30 CAP | Refills: 5 | Status: SHIPPED | OUTPATIENT
Start: 2020-02-27 | End: 2021-06-29 | Stop reason: SDUPTHER

## 2020-02-27 RX ORDER — DICYCLOMINE HYDROCHLORIDE 20 MG/1
20 TABLET ORAL EVERY 6 HOURS
Qty: 30 TAB | Refills: 2 | Status: SHIPPED | OUTPATIENT
Start: 2020-02-27 | End: 2021-11-11 | Stop reason: ALTCHOICE

## 2020-02-27 NOTE — PATIENT INSTRUCTIONS
Apples, berries, and cherries are the best fruits. The good nuts to snack on - almonds, pecans, walnuts, pistachios, sunflower seeds. Try Premier Protein Shakes instead of skipping meals.

## 2020-02-27 NOTE — TELEPHONE ENCOUNTER
Appointment Saved  Appointment Information  The following appointments have been successfully scheduled:    Date/time Tuesday, March 24, 2020 08:20 AM  Patient Christophe Alvarado 1980 73[birth year] [facility] #[id number] #[id number]  Department [facility]-MAIN OFFICE-PCP  Appointment type Lab Only  Provider LAB    The patient will like to do her labs before her appointment.

## 2020-02-27 NOTE — PROGRESS NOTES
HISTORY OF PRESENT ILLNESS  Rg Mohr is a 36 y.o. female presents with Weight Management; Results; and Medication Refill      Agree with nurse note. Pt with metabolic syndrome, insulin resistance, heartburn presents to the office with a BP of 130/83. She takes metformin  mg 2 tabs BID for metabolic syndrome; tolerating well. She takes Nexium 40 mg prn for heartburn with relief. Pt requests to review labs from 1/30/2020. UA showed a cloudy appearance, urine culture showed mixed urogenital imtiaz <10,0000 imtiaz. Pt with obesity is up 3 lbs since the last ov on 1/30/2020. Percent body fat has changed from 45.4 to 45.6, waist circumference measurement has changed from 45 to 44. She drinks 64 oz of water daily. She skips breakfast. She snacks on popcorn, candy, or chips instead of eating lunch at work. Pt with history of RLQ abdominal pain and RUQ abdominal pain requests a refill of Bentyl 20 mg. No pain today. No other associated symptoms. Written by tali Workman, as dictated by Dr. Shalom Pickard DO.     ROS    Review of Systems negative except as noted above in HPI. ALLERGIES:    Allergies   Allergen Reactions    Skelaxin [Metaxalone] Other (comments)     Too groggy       CURRENT MEDICATIONS:      PAST MEDICAL HISTORY:    Past Medical History:   Diagnosis Date    Abnormal Pap smear of cervix 1990s    Breast cyst 2009    benign. Left. 1.7 cm and multiple others. Dr. Cisneros Buttery Chicken pox childhood    Chronic idiopathic constipation 07/2015    Dr. Ritesh Benavides.  HELM (dyspnea on exertion) 02/2015    Dr. 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 Retrosternal goiter. Dr. Uyen Gilman.  Enlarged tonsils and adenoids 09/2010    Iodine-deficiency related goiter     Left. Dr. Uyen Gilman.     YUNIER (obstructive sleep apnea) 02/25/2015     Brian Roland, uses cpap. Dr. Vickey Bacon. States cpap broken. Dr. Cinthya Friedman.  Thyroid cyst     Right.  Thyroid nodule 2019       PAST SURGICAL HISTORY:    Past Surgical History:   Procedure Laterality Date    HX BREAST BIOPSY Left 2018    due to mass     HX  SECTION  2004    [facility].  HX CYST INCISION AND DRAINAGE Left 2016    benign. Dr. Laya Loza Right 09/15/2017    benign. Dr. Erik Rivers.  HX HERNIA REPAIR  3963    umbilical.    HX OTHER SURGICAL  2014    thyroid biopsy. Dr. Doroteo Krishna.  HX THYROIDECTOMY Left 2015    due to [address] Dr. Jerzy Wilson. benign. FAMILY HISTORY:    Family History   Problem Relation Age of Onset    Hypertension Mother     Lung Disease Mother         chronic bronchitis    Sleep Apnea Mother     Hypertension Father     Diabetes Paternal Grandmother     Breast Cancer Maternal Aunt     Other Sister         severe scoliosis. txd with back brace.     Breast Cancer Maternal Aunt     Breast Cancer Maternal Aunt        SOCIAL HISTORY:    Social History     Socioeconomic History    Marital status: SINGLE     Spouse name: Not on file    Number of children: 1    Years of education: Not on file    Highest education level: Not on file   Occupational History    Occupation:      Employer: [employer]   Tobacco Use    Smoking status: Passive Smoke Exposure - Never Smoker    Smokeless tobacco: Never Used    Tobacco comment: lives with a smoker dad x 18 yrs and now with smoker boyfriend x 2 years   Substance and Sexual Activity    Alcohol use: No     Alcohol/week: 1.0 standard drinks     Types: 1 Standard drinks or equivalent per week    Drug use: No    Sexual activity: Yes     Partners: Male     Birth control/protection: None   Social History Narrative    ** Merged History Encounter **            IMMUNIZATIONS: Immunization History   Administered Date(s) Administered    Hep B Vaccine 03/10/2014    Influenza Vaccine 09/21/2017, 10/22/2019    TB Skin Test (PPD) 09/19/2017       PHYSICAL EXAMINATION    Vital Signs    Visit Vitals  /83 (BP 1 Location: Left arm, BP Patient Position: Sitting)   Pulse 91   Temp 99 °F (37.2 °C) (Oral)   Resp 14   Ht 5' 3\" (1.6 m)   Wt 224 lb 12.8 oz (102 kg)   LMP 01/28/2020 (Approximate)   SpO2 96%   BMI 39.82 kg/m²       Weight Metrics 2/27/2020 2/27/2020 2/23/2020 1/30/2020 1/30/2020 11/20/2019 10/29/2019   Weight - 224 lb 12.8 oz 223 lb 14.4 oz - 221 lb 4.8 oz 223 lb -   Neck Circ (inches) - - - 15 - - -   Waist Measure Inches 44 - - 45 - - 44   Body Fat % 45.6 - - 45.4 - - 45.6   BMI - 39.82 kg/m2 39.66 kg/m2 - 39.2 kg/m2 39.5 kg/m2 -         General appearance - Well nourished. Well appearing. Well developed. No acute distress. Obese. Head - Normocephalic. Atraumatic. Eyes - pupils equal and reactive, extraocular eye movements intact, sclera anicteric  Ears - Hearing is grossly normal bilaterally. Nose - normal and patent, no erythema, discharge or polyps   Mouth - mucous membranes moist, pharynx normal with cobblestone appearance. No erythema, white exudate or obstruction. Neck - supple. Midline trachea. No carotid bruits are noted. No thyromegaly noted. Chest - clear to auscultation bilaterally anterriorly and posteriorly. No wheezes, rales or rhonchi. Breath sounds are symmetrical and unlabored bilaterally. Heart - normal rate. Regular rhythm, normal S1, S2. No murmur. No rubs, clicks or gallops noted. Abdomen - soft and distended. No masses or organomegaly. No rebound, rigidity or guarding. Bowel sounds normal x 4 quadrants. No tenderness noted. Neurological - awake, alert and oriented to person, place, and time and event. Cranial nerves II through XII intact. Muscle strength is +5/5 x 4 extremities.   Sensation is intact to light touch bilaterally. Steady gait. Musculoskeletal - Intact x 4 extremities. Full ROM x 4 extremities. No pain with movement. Back exam - normal range of motion. No pain on palpation of the spinous processes in the cervical, thoracic, lumbar, sacral regions. No CVA tenderness. Heme/Lymph - peripheral pulses normal x 4 extremities. No peripheral edema is noted. No cervical adenopathy noted. Skin - no rashes, erythema, ecchymosis, lacerations, abrasions, suspicious moles noted. No skin tags. No acanthosis nigricans noted in the axilla or neck. Psychological -   normal behavior, speech, dress and thought processes. Good insight. Good eye contact. Normal affect. Appropriate mood. DATA REVIEWED  Lab Results   Component Value Date/Time    WBC 8.0 08/21/2019 03:44 PM    Hemoglobin (POC) 12.3 06/08/2015 11:44 AM    HGB 11.6 08/21/2019 03:44 PM    HCT 36.5 08/21/2019 03:44 PM    PLATELET 066 73/11/6481 03:44 PM    MCV 82 08/21/2019 03:44 PM     Lab Results   Component Value Date/Time    Sodium 139 08/21/2019 03:44 PM    Potassium 4.3 08/21/2019 03:44 PM    Chloride 104 08/21/2019 03:44 PM    CO2 22 08/21/2019 03:44 PM    Anion gap 2 (L) 09/18/2010 02:50 AM    Glucose 106 (H) 08/21/2019 03:44 PM    BUN 11 08/21/2019 03:44 PM    Creatinine 0.88 08/21/2019 03:44 PM    BUN/Creatinine ratio 13 08/21/2019 03:44 PM    GFR est AA 96 08/21/2019 03:44 PM    GFR est non-AA 83 08/21/2019 03:44 PM    Calcium 9.7 08/21/2019 03:44 PM    Bilirubin, total 0.3 08/21/2019 03:44 PM    AST (SGOT) 15 08/21/2019 03:44 PM    Alk.  phosphatase 90 08/21/2019 03:44 PM    Protein, total 7.0 08/21/2019 03:44 PM    Albumin 4.1 08/21/2019 03:44 PM    Globulin 3.7 09/18/2010 02:50 AM    A-G Ratio 1.4 08/21/2019 03:44 PM    ALT (SGPT) 8 08/21/2019 03:44 PM     Lab Results   Component Value Date/Time    Cholesterol, total 122 04/12/2019 09:51 AM    HDL Cholesterol 48 04/12/2019 09:51 AM    LDL, calculated 66 04/12/2019 09:51 AM    VLDL, calculated 8 04/12/2019 09:51 AM    Triglyceride 41 04/12/2019 09:51 AM     Lab Results   Component Value Date/Time    VITAMIN D, 25-HYDROXY 18.5 (L) 04/12/2019 09:51 AM       Lab Results   Component Value Date/Time    Hemoglobin A1c 5.4 03/05/2019 03:01 PM     Lab Results   Component Value Date/Time    TSH 2.440 08/21/2019 03:44 PM       ASSESSMENT and PLAN    ICD-10-CM ICD-9-CM    1. Insulin resistance E88.81 277.7 metFORMIN ER (GLUCOPHAGE XR) 500 mg tablet   2. Metabolic syndrome O78.78 252.5 metFORMIN ER (GLUCOPHAGE XR) 500 mg tablet   3. Heartburn R12 787.1 esomeprazole (NEXIUM) 40 mg capsule    uncontrolled due to diet vs other   4. RLQ abdominal pain R10.31 789.03 dicyclomine (BENTYL) 20 mg tablet    due to GYN vs constipation vs other   5. RUQ abdominal pain R10.11 789.01 dicyclomine (BENTYL) 20 mg tablet       Continue current medications and care. Increase fiber and water for the abdominal bloating. OK to try Gas-X if needed. Avoid sugar alcohol. Keep food diary to identify gas triggers and daily caloric intake. Prescriptions written and given to patient: Bentyl 20 mg, Nexium 40 mg, metformin  mg  Discussed the patient's BMI with her. The BMI follow up plan is as follows: I have counseled this patient on diet and exercise regimens. Diet recommendations:   Recommend carrots, celery, or cucumbers with hummus to snack on instead of chips and popcorn. Try Premier Protein Shakes instead of skipping meals. Decrease carbohydrates (white foods including flour, white bread, white rice, white pasta, white potatoes; corn, sweet foods, sweet drinks and alcohol), increase green leafy vegetables and protein with each meal.  Avoid fried foods. Eat 3-5 small meals daily. Do not skip meals. Ok to use meal replacements up to twice daily with protein goal of 20 g per meal.   Recommend OTC Premiere Protein bars or shakes. Increase water intake.   Increase physical activity to 30 minutes daily for health benefit or 60 minutes daily to prevent weight regain, as tolerated. Physical Activity prescription:  A goal of 30 minutes physical activity daily is recommended for health benefit and at least 60 minutes daily to prevent weight regain. For weight loss, no less than 75% needs to be aerobic (i.e. Walking) and no more than 25% resistance exercising (i.e. Weight lifting). For weight maintenance phase, 50% aerobic and 50% resistance exercises. Recommend the stand alone bike pedals to exercise while watching tv at night. Sleep prescription:    A goal of 7-8 hours of uninterrupted sleep is recommended to turn off the Grehlin hormone to be released from the stomach and triggers appetite while promoting weight gain. Proper rest turns on Leptin hormone to be released from white adipose tissue and promotes weight loss. Counseled patient on: weight loss goals, emphasizing a 5-10% weight loss in 6-12 months and strategies, heartburn, abdominal pain. Relevant handouts given and discussed with patient. Immunizations noted. Praised pt for weight loss efforts and progress. Patient was offered a choice/choices in the treatment plan today. Patient expresses understanding of the plan and agrees with recommendations. Follow-up and Dispositions    · Return in about 1 month (around 3/27/2020) for yearly physical exam, weight, results. Written by Cyndra Sacks, scribe, as dictated by Dr. Christa Gowers, DO. Documentation True and Accepted by Connie Biggs. Missy Melendez. Patient Instructions   Apples, berries, and cherries are the best fruits. The good nuts to snack on - almonds, pecans, walnuts, pistachios, sunflower seeds. Try Premier Protein Shakes instead of skipping meals.

## 2020-02-28 DIAGNOSIS — E04.1 LEFT THYROID NODULE: ICD-10-CM

## 2020-02-28 DIAGNOSIS — D75.839 THROMBOCYTOSIS: ICD-10-CM

## 2020-02-28 DIAGNOSIS — E55.9 VITAMIN D DEFICIENCY: ICD-10-CM

## 2020-02-28 DIAGNOSIS — E88.81 INSULIN RESISTANCE: ICD-10-CM

## 2020-02-28 DIAGNOSIS — Z00.00 PHYSICAL EXAM: Primary | ICD-10-CM

## 2020-02-28 DIAGNOSIS — Z00.00 PHYSICAL EXAM: ICD-10-CM

## 2020-02-29 ENCOUNTER — TELEPHONE (OUTPATIENT)
Dept: SLEEP MEDICINE | Age: 40
End: 2020-02-29

## 2020-02-29 DIAGNOSIS — G47.33 OSA (OBSTRUCTIVE SLEEP APNEA): Primary | ICD-10-CM

## 2020-02-29 NOTE — TELEPHONE ENCOUNTER
Michelle Ocasio is to be contacted by lead sleep technologist regarding results of Sleep Testing which was indicative of an average AHI of 59 per hour with an SpO2 breana of 77% and SpO2 of < 88% being 15 minutes. An APAP prescription has been written and patient will be contacted by office staff regarding follow-up  in 2-3 months after initiation of therapy. Encounter Diagnosis   Name Primary?  YUNIER (obstructive sleep apnea) Yes       Orders Placed This Encounter    AMB SUPPLY ORDER     Diagnosis: (G47.33) YUNIER (obstructive sleep apnea)  (primary encounter diagnosis)     Respironics DreamStation ( Unit - Auto Mode) / Heated Humidifier :    Positive Airway Pressure Therapy: Duration of need: 99 months. Auto - PAP: 4 - 20 cmH2O; Optistart enabled. Ramp Time: 30 Minutes; Flex: 2. Remote monitoring enrollment.  Nasal Cushion (Replace) 2 per month.  Nasal Interface Mask 1 every 3 months.  Headgear 1 every 6 months.  Filter(s) Disposable 2 per month.  Filter(s) Non-Disposable 1 every 6 months. [facility] for Locked Jose (Replace) 1 every 6 months.  Tubing with heating element 1 every 3 months. Perform Mask Fitting per patient preference and comfort - replace as above. Delaney Thao MD, FAASM; NPI: [id number]  Electronically signed. 02/29/20

## 2020-03-04 ENCOUNTER — DOCUMENTATION ONLY (OUTPATIENT)
Dept: SLEEP MEDICINE | Age: 40
End: 2020-03-04

## 2020-03-05 NOTE — TELEPHONE ENCOUNTER
Pt was called and VM left telling pt. That her lab orders were placed and are up at the from for her to .

## 2020-03-10 ENCOUNTER — OFFICE VISIT (OUTPATIENT)
Dept: ENDOCRINOLOGY | Age: 40
End: 2020-03-10

## 2020-03-10 VITALS
BODY MASS INDEX: 39.87 KG/M2 | WEIGHT: 225 LBS | DIASTOLIC BLOOD PRESSURE: 85 MMHG | SYSTOLIC BLOOD PRESSURE: 131 MMHG | HEART RATE: 98 BPM | HEIGHT: 63 IN

## 2020-03-10 DIAGNOSIS — E04.1 THYROID CYST: Primary | ICD-10-CM

## 2020-03-10 DIAGNOSIS — E04.1 THYROID NODULE: ICD-10-CM

## 2020-03-10 RX ORDER — LEVOTHYROXINE SODIUM 25 UG/1
25 TABLET ORAL
Qty: 90 TAB | Refills: 3 | Status: SHIPPED | OUTPATIENT
Start: 2020-03-10 | End: 2021-11-11 | Stop reason: ALTCHOICE

## 2020-03-10 RX ORDER — DICYCLOMINE HYDROCHLORIDE 10 MG/1
CAPSULE ORAL
COMMUNITY
Start: 2020-02-27 | End: 2020-03-31 | Stop reason: SDUPTHER

## 2020-03-10 NOTE — PROGRESS NOTES
CONSULTATION REQUESTED BY: Carlos Agudelo DO     REASON FOR CONSULT: Evaluation of thyroid nodule / cyst    CHIEF COMPLAINT: Thyroid nodule / cyst    HISTORY OF PRESENT ILLNESS:   Lord Dakins is a 36 y.o. female with a PMHx as noted below who was referred to our endocrinology clinic for evaluation of a thyroid nodule. Patient noted for 3.7 cm left lobe nodule in 2013, with left lobectomy in 2015,   See summary below,  Swallowing ok, no dyspnea,  Patient denies symptoms of hyper or hypothyroidism although mark trended upward,    8/26/13: Thyroid Ultrasound: \"3.7 x 5.2 x 6.4 cm left lobe thyroid nodule\"  6/8/15: Left thyroid Lobectomy: Dr. Danis Hughes, Path suggestively benign. 8/21/19: Thyroid Ultrasound   Right 0.8 x 0.6 x 0.5 cm mixed solid/cystic nodule    Right 4mm cyst   Left 1.4 x 0.5 x 0.6 cm remnant with probable 7 mm solid nodule    Wt Readings from Last 3 Encounters:   03/10/20 225 lb (102.1 kg)   02/27/20 224 lb 12.8 oz (102 kg)   02/23/20 223 lb 14.4 oz (101.6 kg)     Review of most recent thyroid function:  Lab Results   Component Value Date    TSH 2.440 08/21/2019    TSH 3.120 03/05/2019    TSH 1.770 10/26/2017    FT4 1.43 03/05/2019    FT4 1.21 10/26/2017    FT4 1.12 10/04/2013    TMCLT 9 08/21/2019      TSILT = Thyroid stimulating antibodies  TMCLT = TPO antibodies  T3LT = Total T3 levels    At this time they would like to further investigate the nature of the thyroid nodule. PAST MEDICAL/SURGICAL HISTORY:   Past Medical History:   Diagnosis Date    Abnormal Pap smear of cervix 1990s    Breast cyst 2009    benign. Left. 1.7 cm and multiple others. Dr. Violet Kincaid Chicken pox childhood    Chronic idiopathic constipation 07/2015    Dr. Bryanna Nguyen.  VOLODYMYR (dyspnea on exertion) 02/2015    Dr. 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 Retrosternal goiter. Dr. Polina Fontana.  Enlarged tonsils and adenoids 2010    Iodine-deficiency related goiter     Left. Dr. Polina Fontana.  YUNIER (obstructive sleep apnea) 2015    Dr. Melissa Swan, uses cpap. Dr. Zbigniew Crowder. States cpap broken. Dr. Maya Chaudhry.  Thyroid cyst     Right.  Thyroid nodule 2019     Past Surgical History:   Procedure Laterality Date    HX BREAST BIOPSY Left 2018    due to mass     HX  SECTION  2004    [facility].  HX CYST INCISION AND DRAINAGE Left 2016    benign. Dr. David Rico Right 09/15/2017    benign. Dr. Beto Boone.  HX HERNIA REPAIR  0114    umbilical.    HX OTHER SURGICAL  2014    thyroid biopsy. Dr. Niraj Flor.  HX THYROIDECTOMY Left 2015    due to [address] Dr. Polina Fontana. benign. ALLERGIES:   Allergies   Allergen Reactions    Skelaxin [Metaxalone] Other (comments)     Too groggy       MEDICATIONS ON ADMISSION:     Current Outpatient Medications:     dicyclomine (BENTYL) 10 mg capsule, , Disp: , Rfl:     metFORMIN ER (GLUCOPHAGE XR) 500 mg tablet, Take 2 Tabs by mouth two (2) times daily (with meals). Indications: prevention of type 2 diabetes mellitus, insulin resistance, Disp: 360 Tab, Rfl: 3    esomeprazole (NEXIUM) 40 mg capsule, Take one capsule by mouth daily for Heartburn  Indications: heartburn, Disp: 30 Cap, Rfl: 5    dicyclomine (BENTYL) 20 mg tablet, Take 1 Tab by mouth every six (6) hours. Indications: abdominal spasm, Disp: 30 Tab, Rfl: 2    cyanocobalamin (VITAMIN B-12) 1,000 mcg tablet, Take 1,000 mcg by mouth daily. , Disp: , Rfl:     SOCIAL HISTORY:   Social History     Socioeconomic History    Marital status: SINGLE     Spouse name: Not on file    Number of children: 1    Years of education: Not on file    Highest education level: Not on file   Occupational History    Occupation:      Employer: [employer] Social Needs    Financial resource strain: Not on file    Food insecurity:     Worry: Not on file     Inability: Not on file    Transportation needs:     Medical: Not on file     Non-medical: Not on file   Tobacco Use    Smoking status: Passive Smoke Exposure - Never Smoker    Smokeless tobacco: Never Used    Tobacco comment: lives with a smoker dad x 18 yrs and now with smoker boyfriend x 2 years   Substance and Sexual Activity    Alcohol use: No     Alcohol/week: 1.0 standard drinks     Types: 1 Standard drinks or equivalent per week    Drug use: No    Sexual activity: Yes     Partners: Male     Birth control/protection: None   Lifestyle    Physical activity:     Days per week: Not on file     Minutes per session: Not on file    Stress: Not on file   Relationships    Social connections:     Talks on phone: Not on file     Gets together: Not on file     Attends Yarsani service: Not on file     Active member of club or organization: Not on file     Attends meetings of clubs or organizations: Not on file     Relationship status: Not on file    Intimate partner violence:     Fear of current or ex partner: Not on file     Emotionally abused: Not on file     Physically abused: Not on file     Forced sexual activity: Not on file   Other Topics Concern    Not on file   Social History Narrative    ** Merged History Encounter **            FAMILY HISTORY:  Family History   Problem Relation Age of Onset    Hypertension Mother     Lung Disease Mother         chronic bronchitis    Sleep Apnea Mother     Hypertension Father     Diabetes Paternal Grandmother     Breast Cancer Maternal Aunt     Other Sister         severe scoliosis. txd with back brace.  Breast Cancer Maternal Aunt     Breast Cancer Maternal Aunt        REVIEW OF SYSTEMS: Complete ROS assessed and noted for that which is described above, all else are negative.   Eyes: normal  ENT: normal  CVS: normal  Resp: normal  GI: normal  : normal  GYN: normal  Endocrine: normal  Integument: normal  Musculoskeletal: normal  Neuro: normal  Psych: normal    PHYSICAL EXAMINATION:    VITAL SIGNS:  Visit Vitals  /85 (BP 1 Location: Left arm, BP Patient Position: Sitting)   Pulse 98   Ht 5' 3\" (1.6 m)   Wt 225 lb (102.1 kg)   BMI 39.86 kg/m²       GENERAL: NCAT, Sitting comfortably, NAD  EYES: EOMI, non-icteric, no proptosis  Ear/Nose/Throat: NCAT, no inflammation,  LYMPH NODES: No LAD  CARDIOVASCULAR: S1 S2, RRR, No murmur, 2+ radial pulses  RESPIRATORY: CTA b/l, no wheeze/rales  GASTROINTESTINAL: NT, ND  MUSCULOSKELETAL: Normal ROM, no atrophy  SKIN: warm, no edema/rash/ or other skin changes  NEUROLOGIC: 5/5 power all extremities, AAOx3, no tremors  PSYCHIATRIC: Normal affect, Normal insight and judgement      REVIEW OF LABORATORY AND RADIOLOGY DATA:   Labs and documentation have been reviewed as described above. ASSESSMENT AND PLAN:   Mohinder Vega is a 36 y.o. female with a PMHx as noted above who was referred to our endocrinology clinic for evaluation of a thyroid nodule. Thyroid Nodule    Thyroid Nodules: Today I personally reviewed the images of her thyroid Ultrasound. I have shared the images together with the patient. Some remnant tissue was left behind in the left lobe. We do not see any concerning findings on her ultrasound. Noted partially cystic nodule which can expand and contract over time due to fluid portion,  We will plan to repeat her US in about 2 years as long as asymptomatic. Thyroid Function:   Patients TSH prior to surgery was 1.1,   After surgery it was as high as 3.1,   Her weight is noted to have trended up consistently over time,  It is very reasonable to provide a supportive dose of levothyroxine, she agrees,   Will trial only 25 mcg of levothyroxine Mon - Fri only for now,  Goal to get her TSH closer to 1, her pre-op TSH level,   Discussed proper way to take levothyroxine.      3-4 month f/u with thyroid pre-labs few days prior,  60 minutes spent together with patient today of which >50% of this time was spent in counseling and coordination of care. Francisco Page.  [address] [facility]

## 2020-03-10 NOTE — PATIENT INSTRUCTIONS
PLEASE READ THESE INSTRUCTIONS:  
 
1. Plan to take 25 mcg of levothyroxine each morning MON - FRI only. 2. Remember to take levothyroxine with a glass of water only, on an empty stomach each morning, 1 hour prior to ingesting any other medications, including vitamins, food, coffee, tea, juice or any other beverages. All of these can reduce the absorption of thyroid hormone tablets and result in fluctuating levels in the blood and on labs, affecting also how one feels. 3. We will plan for you to return to clinic in 3 months for re-evaluation, 
 
4. I have provided you with a lab order sheet so you can have your labs repeated 2 days before your next visit. This way we can have the results available to us in time for your visit so we can make the best decisions at that time. 5. We will plan to follow your thyroid with ultrasound about 2 years from now unless there are any concerns. Quinn Fairchild. [address] [facility] [place] [facility] [address] [place] [address]

## 2020-03-24 NOTE — TELEPHONE ENCOUNTER
Unable to leave a message on phone numbers. One number person ended call immediately. Please send letter to the patient. Message also sent via [address] Fax DME order & Schedule 1st adherence visit in 60 to 90 days.

## 2020-03-25 LAB
25(OH)D3+25(OH)D2 SERPL-MCNC: 23.9 NG/ML (ref 30–100)
ALBUMIN SERPL-MCNC: 3.8 G/DL (ref 3.8–4.8)
ALBUMIN/GLOB SERPL: 1.5 {RATIO} (ref 1.2–2.2)
ALP SERPL-CCNC: 84 IU/L (ref 39–117)
ALT SERPL-CCNC: 11 IU/L (ref 0–32)
AST SERPL-CCNC: 13 IU/L (ref 0–40)
BILIRUB SERPL-MCNC: 0.2 MG/DL (ref 0–1.2)
BUN SERPL-MCNC: 10 MG/DL (ref 6–24)
BUN/CREAT SERPL: 13 (ref 9–23)
CALCIUM SERPL-MCNC: 9.2 MG/DL (ref 8.7–10.2)
CHLORIDE SERPL-SCNC: 105 MMOL/L (ref 96–106)
CHOLEST SERPL-MCNC: 114 MG/DL (ref 100–199)
CO2 SERPL-SCNC: 23 MMOL/L (ref 20–29)
CREAT SERPL-MCNC: 0.8 MG/DL (ref 0.57–1)
ERYTHROCYTE [DISTWIDTH] IN BLOOD BY AUTOMATED COUNT: 13.1 % (ref 11.7–15.4)
GLOBULIN SER CALC-MCNC: 2.6 G/DL (ref 1.5–4.5)
GLUCOSE SERPL-MCNC: 105 MG/DL (ref 65–99)
HBA1C MFR BLD: NORMAL %
HCT VFR BLD AUTO: 35.8 % (ref 34–46.6)
HDLC SERPL-MCNC: 45 MG/DL
HGB BLD-MCNC: 11.7 G/DL (ref 11.1–15.9)
INSULIN SERPL-ACNC: 16.1 UIU/ML (ref 2.6–24.9)
INTERPRETATION, 910389: NORMAL
LDLC SERPL CALC-MCNC: 62 MG/DL (ref 0–99)
MCH RBC QN AUTO: 27 PG (ref 26.6–33)
MCHC RBC AUTO-ENTMCNC: 32.7 G/DL (ref 31.5–35.7)
MCV RBC AUTO: 83 FL (ref 79–97)
PLATELET # BLD AUTO: 371 X10E3/UL (ref 150–450)
POTASSIUM SERPL-SCNC: 4.6 MMOL/L (ref 3.5–5.2)
PROT SERPL-MCNC: 6.4 G/DL (ref 6–8.5)
RBC # BLD AUTO: 4.34 X10E6/UL (ref 3.77–5.28)
SODIUM SERPL-SCNC: 140 MMOL/L (ref 134–144)
T4 FREE SERPL-MCNC: 1.27 NG/DL (ref 0.82–1.77)
TRIGL SERPL-MCNC: 36 MG/DL (ref 0–149)
TSH SERPL DL<=0.005 MIU/L-ACNC: 3.03 UIU/ML (ref 0.45–4.5)
TSH SERPL DL<=0.005 MIU/L-ACNC: 3.35 UIU/ML (ref 0.45–4.5)
VLDLC SERPL CALC-MCNC: 7 MG/DL (ref 5–40)
WBC # BLD AUTO: 7.4 X10E3/UL (ref 3.4–10.8)

## 2020-03-31 ENCOUNTER — OFFICE VISIT (OUTPATIENT)
Dept: FAMILY MEDICINE CLINIC | Age: 40
End: 2020-03-31

## 2020-03-31 VITALS
HEIGHT: 63 IN | SYSTOLIC BLOOD PRESSURE: 130 MMHG | DIASTOLIC BLOOD PRESSURE: 84 MMHG | OXYGEN SATURATION: 98 % | HEART RATE: 102 BPM | BODY MASS INDEX: 40.06 KG/M2 | RESPIRATION RATE: 18 BRPM | WEIGHT: 226.1 LBS | TEMPERATURE: 98.2 F

## 2020-03-31 DIAGNOSIS — G47.33 OSA (OBSTRUCTIVE SLEEP APNEA): ICD-10-CM

## 2020-03-31 DIAGNOSIS — Z82.49 FAMILY HISTORY OF HYPERTENSION: ICD-10-CM

## 2020-03-31 DIAGNOSIS — E89.0 S/P THYROIDECTOMY: ICD-10-CM

## 2020-03-31 DIAGNOSIS — Z86.39 HISTORY OF THYROID NODULE: ICD-10-CM

## 2020-03-31 DIAGNOSIS — E55.9 VITAMIN D DEFICIENCY: ICD-10-CM

## 2020-03-31 DIAGNOSIS — H61.21 IMPACTED CERUMEN OF RIGHT EAR: ICD-10-CM

## 2020-03-31 DIAGNOSIS — E88.81 METABOLIC SYNDROME: ICD-10-CM

## 2020-03-31 DIAGNOSIS — Z82.0 FAMILY HISTORY OF SLEEP APNEA: ICD-10-CM

## 2020-03-31 DIAGNOSIS — E66.01 CLASS 3 SEVERE OBESITY DUE TO EXCESS CALORIES WITHOUT SERIOUS COMORBIDITY WITH BODY MASS INDEX (BMI) OF 40.0 TO 44.9 IN ADULT (HCC): ICD-10-CM

## 2020-03-31 DIAGNOSIS — N60.01 BILATERAL BREAST CYSTS: ICD-10-CM

## 2020-03-31 DIAGNOSIS — K59.04 CHRONIC IDIOPATHIC CONSTIPATION: ICD-10-CM

## 2020-03-31 DIAGNOSIS — N60.02 BILATERAL BREAST CYSTS: ICD-10-CM

## 2020-03-31 DIAGNOSIS — Z80.3 FAMILY HISTORY OF BREAST CANCER: ICD-10-CM

## 2020-03-31 DIAGNOSIS — Z23 NEED FOR TDAP VACCINATION: ICD-10-CM

## 2020-03-31 DIAGNOSIS — E04.1 THYROID CYST: ICD-10-CM

## 2020-03-31 DIAGNOSIS — R63.5 WEIGHT GAIN: ICD-10-CM

## 2020-03-31 DIAGNOSIS — E88.81 INSULIN RESISTANCE: ICD-10-CM

## 2020-03-31 DIAGNOSIS — R06.83 SNORING: ICD-10-CM

## 2020-03-31 DIAGNOSIS — Z00.00 WELL WOMAN EXAM (NO GYNECOLOGICAL EXAM): Primary | ICD-10-CM

## 2020-03-31 RX ORDER — ERGOCALCIFEROL 1.25 MG/1
50000 CAPSULE ORAL
Qty: 5 CAP | Refills: 5 | Status: SHIPPED | OUTPATIENT
Start: 2020-03-31 | End: 2021-06-29 | Stop reason: SDUPTHER

## 2020-03-31 NOTE — PROGRESS NOTES
Feli Gonzalez is a 36 y.o. female  HIPAA verified by two patient identifiers. There are no preventive care reminders to display for this patient. Chief Complaint   Patient presents with    Physical     There were no vitals taken for this visit. Pain Scale: /10  Pain Location:     1. Have you been to the ER, urgent care clinic since your last visit? Hospitalized since your last visit? No    2. Have you seen or consulted any other health care providers outside of the [address] [place] [address] since your last visit? Include any pap smears or colon screening.  No

## 2020-03-31 NOTE — PATIENT INSTRUCTIONS
Almonds, pecans, walnut, sunflower seeds, pistachios. Constipation: Care Instructions Your Care Instructions Constipation means that you have a hard time passing stools (bowel movements). People pass stools from 3 times a day to once every 3 days. What is normal for you may be different. Constipation may occur with pain in the rectum and cramping. The pain may get worse when you try to pass stools. Sometimes there are small amounts of bright red blood on toilet paper or the surface of stools. This is because of enlarged veins near the rectum (hemorrhoids). A few changes in your diet and lifestyle may help you avoid ongoing constipation. Your doctor may also prescribe medicine to help loosen your stool. Some medicines can cause constipation. These include pain medicines and antidepressants. Tell your doctor about all the medicines you take. Your doctor may want to make a medicine change to ease your symptoms. Follow-up care is a key part of your treatment and safety. Be sure to make and go to all appointments, and call your doctor if you are having problems. It's also a good idea to know your test results and keep a list of the medicines you take. How can you care for yourself at home? · Drink plenty of fluids, enough so that your urine is light yellow or clear like water. If you have kidney, heart, or liver disease and have to limit fluids, talk with your doctor before you increase the amount of fluids you drink. · Include high-fiber foods in your diet each day. These include fruits, vegetables, beans, and whole grains. · Get at least 30 minutes of exercise on most days of the week. Walking is a good choice. You also may want to do other activities, such as running, swimming, cycling, or playing tennis or team sports. · Take a fiber supplement, such as Citrucel or Metamucil, every day. Read and follow all instructions on the label. · Schedule time each day for a bowel movement. A daily routine may help. Take your time having your bowel movement. · Support your feet with a small step stool when you sit on the toilet. This helps flex your hips and places your pelvis in a squatting position. · Your doctor may recommend an over-the-counter laxative to relieve your constipation. Examples are Milk of Magnesia and MiraLax. Read and follow all instructions on the label. Do not use laxatives on a long-term basis. When should you call for help? Call your doctor now or seek immediate medical care if: 
  · You have new or worse belly pain.  
  · You have new or worse nausea or vomiting.  
  · You have blood in your stools.  
 Watch closely for changes in your health, and be sure to contact your doctor if: 
  · Your constipation is getting worse.  
  · You do not get better as expected. Where can you learn more? Go to http://weston-karel.info/ Enter 21 [phone] in the search box to learn more about \"Constipation: Care Instructions. \" Current as of: June 26, 2019Content Version: 12.4 © 9633-5507 [employer]. Care instructions adapted under license by [employer] (which disclaims liability or warranty for this information). If you have questions about a medical condition or this instruction, always ask your healthcare professional. [address] any warranty or liability for your use of this information. To relieve or prevent constipation, increase your water intake, fiber, and walking to prevent constipation. Use over the counter Smooth Move Tea ([employer] tea section),  fiber or stool softener as needed. Consider OTC Miralax or Milk of Magnesia if no bowel movement in 3 days. Hypothyroidism: Care Instructions Your Care Instructions You have hypothyroidism, which means that your body is not making enough thyroid hormone. This hormone helps your body use energy. If your thyroid level is low, you may feel tired, be constipated, have an increase in your blood pressure, or have dry skin or memory problems. You may also get cold easily, even when it is warm. Women with low thyroid levels may have heavy menstrual periods. A blood test to find your thyroid-stimulating hormone (TSH) level is used to check for hypothyroidism. A high TSH level may mean that you have low thyroid. When your body is not making enough thyroid hormone, TSH levels rise in an effort to make the body produce more. The treatment for hypothyroidism is to take thyroid hormone pills. You should start to feel better in 1 to 2 weeks. But it can take several months to see changes in the TSH level. You will need regular visits with your doctor to make sure you have the right dose of medicine. Most people need treatment for the rest of their lives. You will need to see your doctor regularly to have blood tests and to make sure you are doing well. Follow-up care is a key part of your treatment and safety. Be sure to make and go to all appointments, and call your doctor if you are having problems. It's also a good idea to know your test results and keep a list of the medicines you take. How can you care for yourself at home? · Take your thyroid hormone medicine exactly as prescribed. Call your doctor if you think you are having a problem with your medicine. Most people do not have side effects if they take the right amount of medicine regularly. ? Take the medicine 30 minutes before breakfast, and do not take it with calcium, vitamins, or iron. ? Do not take extra doses of your thyroid medicine. It will not help you get better any faster, and it may cause side effects. ? If you forget to take a dose, do NOT take a double dose of medicine. Take your usual dose the next day.  
· Tell your doctor about all prescription, herbal, or over-the-counter products you take. · Take care of yourself. Eat a healthy diet, get enough sleep, and get regular exercise. When should you call for help? Call 911 anytime you think you may need emergency care. For example, call if: 
  · You passed out (lost consciousness).  
  · You have severe trouble breathing.  
  · You have a very slow heartbeat (less than 60 beats a minute).  
  · You have a low body temperature (95°F or below).  
 Call your doctor now or seek immediate medical care if: 
  · You feel tired, sluggish, or weak.  
  · You have trouble remembering things or concentrating.  
  · You do not begin to feel better 2 weeks after starting your medicine.  
 Watch closely for changes in your health, and be sure to contact your doctor if you have any problems. Where can you learn more? Go to http://weston-karel.info/ Enter N889 in the search box to learn more about \"Hypothyroidism: Care Instructions. \" Current as of: July 28, 2019Content Version: 12.4 © 4603-0092 [employer]. Care instructions adapted under license by [employer] (which disclaims liability or warranty for this information). If you have questions about a medical condition or this instruction, always ask your healthcare professional. [address] any warranty or liability for your use of this information.

## 2020-03-31 NOTE — PROGRESS NOTES
HISTORY OF PRESENT ILLNESS  Jackelyn Farley is a 36 y.o. female here for an annual physical exam    Agree with nurse note. Pt brought in her [facility] Provider Screening Form documenting she had a physical today. Pt with metabolic syndrome, insulin resistance, constipation, vit d deficiency, and family hx of breast ca, hypertension, and YUNIER presents to the office with a BP of 130/84. She takes metformin  mg 2 pills BID for insulin resistance; tolerating well. Pt requests to review labs from 3/24/2020. Glucose 105, Cr 0.80, LFTs wnl, CBC wnl, LDL 62, HDL 45, trigs 36, vit d 23.9, insulin 16.1. Pt's most recent pap smear was on 4/17/2019 with NP Sonido Villa. Negative. Pt's most recent mammogram was on 7/3/2019. Negative. F/U 1 year. Breast MRI on 9/26/2019 showed multiple cysts through BL breast but no suspicious masses. Recommend concurrent mammogram and breast MRI annually. Pt saw Dr. Rodriguez Mark on 3/10/2020 for thyroid nodule. Thyroid US on 8/21/2019 showed R 0.8 x 0.6 x 0.5 cm mixed solid/cystic nodule, R 4mm cyst after surgery, L 1.4 x 0.5 x 0.6 cm remnant with probable 7 mm solid nodule. F/U US in 2 years. She is s/p thyroidectomy on 6/8/2015. Patient's TSH prior to surgery was 1.1. After surgery it was as high as 3.1. Started her on Synthroid 25 mcg Mon-Fri. Goal to get her TSH closer to 1, her pre-op TSH level. TSH 3.35 and FT4 1.27 on 3/24/2020. Pt with obesity is up 17 lbs since 3/2019. She attributes this to snacking on chips. She drinks 64 oz of water daily. Pt had a sleep study on 3/4/2020 with Dr. Marleen Alvares. AHI was 59 and oxygen levels dropped to a low of 77%. Recommended APAP treatment with setting to be between 4 and 20 cm of water. New Concerns    None. Written by tali Galicia, as dictated by Dr. Richard Simmons DO.    ROS    Review of Systems is negative except as mentioned above in HPI.     ALLERGIES:    Allergies   Allergen Reactions    Skelaxin [Metaxalone] Other (comments)     Too groggy       CURRENT MEDICATIONS:    Outpatient Medications Marked as Taking for the 3/31/20 encounter (Office Visit) with Millie Wagner DO   Medication Sig Dispense Refill    ergocalciferol (ERGOCALCIFEROL) 1,250 mcg (50,000 unit) capsule Take 1 Cap by mouth every seven (7) days. Indications: low vitamin D levels 5 Cap 5    levothyroxine (SYNTHROID) 25 mcg tablet Take 1 Tab by mouth Daily (before breakfast). 90 Tab 3    metFORMIN ER (GLUCOPHAGE XR) 500 mg tablet Take 2 Tabs by mouth two (2) times daily (with meals). Indications: prevention of type 2 diabetes mellitus, insulin resistance (Patient taking differently: Take 1,000 mg by mouth nightly. Indications: prevention of type 2 diabetes mellitus, insulin resistance) 360 Tab 3    esomeprazole (NEXIUM) 40 mg capsule Take one capsule by mouth daily for Heartburn  Indications: heartburn 30 Cap 5    dicyclomine (BENTYL) 20 mg tablet Take 1 Tab by mouth every six (6) hours. Indications: abdominal spasm 30 Tab 2    cyanocobalamin (VITAMIN B-12) 1,000 mcg tablet Take 1,000 mcg by mouth daily. PAST MEDICAL HISTORY:    Past Medical History:   Diagnosis Date    Abnormal Pap smear of cervix 1990s    Breast cyst 2009    benign. Left. 1.7 cm and multiple others. Dr. Renée Pinedo Chicken pox childhood    Chronic idiopathic constipation 07/2015    Dr. Brady Arrieta.  HELM (dyspnea on exertion) 02/2015    Dr. 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 Retrosternal goiter. Dr. Ewing Blessing.  Enlarged tonsils and adenoids 09/2010    Iodine-deficiency related goiter     Left. Dr. Ewing Blessing.  YUNIER (obstructive sleep apnea) 02/25/2015    Dr. Larry Simon, uses cpap. Dr. Sarah Marmolejo. States cpap broken. Dr. Nedra Serrato.  Thyroid cyst 08/219    Right.       Thyroid nodule 2019       PAST SURGICAL HISTORY:    Past Surgical History:   Procedure Laterality Date    HX BREAST BIOPSY Left 2018    due to mass     HX  SECTION  2004    [facility].  HX CYST INCISION AND DRAINAGE Left 2016    benign. Dr. Charlene Fuller Right 09/15/2017    benign. Dr. Bernice Haley.  HX HERNIA REPAIR  3692    umbilical.    HX OTHER SURGICAL  2014    thyroid biopsy. Dr. Xin Girard.  HX THYROIDECTOMY Left 2015    due to [address] Dr. Kate Talley. benign. FAMILY HISTORY:    Family History   Problem Relation Age of Onset    Hypertension Mother     Lung Disease Mother         chronic bronchitis    Sleep Apnea Mother     Hypertension Father     Diabetes Paternal Grandmother     No Known Problems Sister     No Known Problems Sister     No Known Problems Sister     No Known Problems Brother         murdered    Breast Cancer Maternal Aunt     Other Sister         severe scoliosis. txd with back brace.  Breast Cancer Maternal Aunt     Breast Cancer Maternal Aunt     No Known Problems Maternal Grandmother     No Known Problems Maternal Grandfather        SOCIAL HISTORY:    Social History     Socioeconomic History    Marital status: SINGLE     Spouse name: Not on file    Number of children: 1    Years of education: Not on file    Highest education level: Not on file   Occupational History    Occupation:      Employer: [employer]   Tobacco Use    Smoking status: Passive Smoke Exposure - Never Smoker    Smokeless tobacco: Never Used    Tobacco comment: lives with a smoker dad x 18 yrs and history of living with smoker boyfriend x 2 years   Substance and Sexual Activity    Alcohol use:  Yes     Alcohol/week: 2.0 standard drinks     Types: 2 Shots of liquor per week     Frequency: 2-4 times a month     Drinks per session: 1 or 2    Drug use: No    Sexual activity: Yes     Partners: Male     Birth control/protection: None   Lifestyle    Physical activity     Days per week: 5 days     Minutes per session: 30 min    Stress: Only a little   Social History Narrative    ** Merged History Encounter **            IMMUNIZATIONS:    Immunization History   Administered Date(s) Administered    Hep B Vaccine 03/10/2014    Influenza Vaccine 09/21/2017, 10/22/2019    TB Skin Test (PPD) 09/19/2017    Tdap 03/31/2020         PHYSICAL EXAMINATION    Vital signs     Visit Vitals  /84 (BP 1 Location: Left arm, BP Patient Position: Sitting)   Pulse (!) 102   Temp 98.2 °F (36.8 °C) (Oral)   Resp 18   Ht 5' 3\" (1.6 m)   Wt 226 lb 1.6 oz (102.6 kg)   SpO2 98%   BMI 40.05 kg/m²        General appearance - Well nourished. Well appearing. Well developed. No acute distress. Obese. Head - Normocephalic. Atraumatic. Non tender sinuses x 4. Eyes - pupils equal and reactive, extraocular eye movements intact, sclera anicteric. Mildly injected sclera. Ears - Hearing is grossly normal bilaterally. Bilateral TM's intact. External ear canals normal without evidence of blood or swelling. Dark brown cerumen completely occluding R EAC. Nose - normal and patent. No erythema. No discharge. No polyps. Increased turbinate edema on the L. Mouth - mucous membranes with adequate moisture. Posterior pharynx normal without lesions, white exudate, or obstruction. Neck - supple. Midline trachea. No carotid bruits are noted. No thyromegaly noted. Neck is supple without rigidity. Chest - clear to auscultation bilaterally anterriorly and posteriorly. No wheezes, rales or rhonchi. Breath sounds are symmetrical bilaterally. Unlabored respirations. Heart - normal rate. Regular rhythm. Normal S1, S2. No murmurs. No rubs, clicks or gallops noted. Abdomen - soft and distended. No masses or organomegaly. No rebound, rigidity or guarding. Bowel sounds normal x 4 quadrants.   No tenderness noted. Back exam - normal range of motion. No pain on palpation of the spinous processes in the cervical, thoracic, lumbar, sacral regions. No CVA tenderness. Neurological - awake, alert and oriented to person, place, and time and event. Cranial nerves II through XII intact. No focal findings. Clear speech. Muscle strength is +5/5 x 4 extremities. Sensation is intact to light touch bilaterally. Steady gait. Musculoskeletal - Intact x 4 extremities. Full ROM x 4 extremities. No pain with movement. No pain on palpation of the bilateral shoulders, elbows, wrists, hands. No tenderness in the pelvis, pubic bone, bilateral hips, knees, ankles. No obvious deformity. Heme/Lymph - peripheral pulses normal x 4 extremities. No peripheral edema is noted. No cervical adenopathy noted. Skin - no rashes, erythema, ecchymosis, lacerations, abrasions, suspicious moles. Psychological -   normal behavior, dress and thought processes. Good insight. Good eye contact. Normal affect. Appropriate mood. Normal speech. DATA REVIEWED    Lab Results   Component Value Date/Time    WBC 7.4 03/24/2020 08:31 AM    Hemoglobin (POC) 12.3 06/08/2015 11:44 AM    HGB 11.7 03/24/2020 08:31 AM    HCT 35.8 03/24/2020 08:31 AM    PLATELET 637 10/21/3498 08:31 AM    MCV 83 03/24/2020 08:31 AM     Lab Results   Component Value Date/Time    Sodium 140 03/24/2020 08:31 AM    Potassium 4.6 03/24/2020 08:31 AM    Chloride 105 03/24/2020 08:31 AM    CO2 23 03/24/2020 08:31 AM    Anion gap 2 (L) 09/18/2010 02:50 AM    Glucose 105 (H) 03/24/2020 08:31 AM    BUN 10 03/24/2020 08:31 AM    Creatinine 0.80 03/24/2020 08:31 AM    BUN/Creatinine ratio 13 03/24/2020 08:31 AM    GFR est  03/24/2020 08:31 AM    GFR est non-AA 93 03/24/2020 08:31 AM    Calcium 9.2 03/24/2020 08:31 AM    Bilirubin, total 0.2 03/24/2020 08:31 AM    AST (SGOT) 13 03/24/2020 08:31 AM    Alk.  phosphatase 84 03/24/2020 08:31 AM    Protein, total 6.4 03/24/2020 08:31 AM    Albumin 3.8 03/24/2020 08:31 AM    Globulin 3.7 09/18/2010 02:50 AM    A-G Ratio 1.5 03/24/2020 08:31 AM    ALT (SGPT) 11 03/24/2020 08:31 AM     Lab Results   Component Value Date/Time    Cholesterol, total 114 03/24/2020 08:31 AM    HDL Cholesterol 45 03/24/2020 08:31 AM    LDL, calculated 62 03/24/2020 08:31 AM    VLDL, calculated 7 03/24/2020 08:31 AM    Triglyceride 36 03/24/2020 08:31 AM     Lab Results   Component Value Date/Time    VITAMIN D, 25-HYDROXY 23.9 (L) 03/24/2020 08:31 AM       Lab Results   Component Value Date/Time    Hemoglobin A1c CANCELED 03/24/2020 08:31 AM     Lab Results   Component Value Date/Time    TSH 3.030 03/24/2020 08:35 AM       ASSESSMENT and PLAN    ICD-10-CM ICD-9-CM    1. Well woman exam (no gynecological exam) Z00.00 V70.0 NICOTINE/COTININE, UR, QT   2. Metabolic syndrome J16.03 619.1    3. YUNIER (obstructive sleep apnea) G47.33 327.23     uncontrolled off CPAP x yrs   4. Vitamin D deficiency E55.9 268.9    5. Weight gain R63.5 783. 1     17# since 03/2019 due to chip eating vs untreated thyroid dz vs untreated YUNIER vs other   6. Bilateral breast cysts N60.01 610.0 REFERRAL TO BREAST SURGERY    N60.02      Multiple per 3d mammogram and MRI   7. Insulin resistance E88.81 277.7    8. Chronic idiopathic constipation K59.04 564.00     due to thyroid vs other   9. S/P thyroidectomy Z98.890 V45.89    10. Class 3 severe obesity due to excess calories without serious comorbidity with body mass index (BMI) of 40.0 to 44.9 in adult (HCC) E66.01 278.01     Z68.41 V85.41    11. Snoring R06.83 786.09     due to YUNIER vs other   12. Family history of breast cancer Z80.3 V16.3 REFERRAL TO BREAST SURGERY    3 maternal aunts   15. History of thyroid nodule Z86.39 V12.29     resolved after L thyroidectomy but now L remnant thyroid noted   14. Family history of hypertension Z82.49 V17.49    15. Family history of sleep apnea Z82.0 V19.8    16.  Need for Tdap vaccination Z23 V06.1 TETANUS, DIPHTHERIA TOXOIDS AND ACELLULAR PERTUSSIS VACCINE (TDAP), IN INDIVIDS. >=7, IM      OH IMMUNIZ ADMIN,1 SINGLE/COMB VAC/TOXOID   17. Thyroid cyst E04.1 246.2     Bilaterally, per US with mixed solid/cystic nodule on R and L 7mm solid nodule   18. Impacted cerumen of right ear H61.21 380.4 REMOVAL IMPACTED CERUMEN IRRIGATION/LVG UNILAT     [facility] Provider Screening Form completed. Copied and scanned to chart. Original returned to patient. SEE SCANNED DOCUMENT. See ophthalmologist every 2 to 3 years unless otherwise directed. See dentist every 6 months. See dermatologist for annual check. R ear irrigation performed after procedure described and verbal consent received. Pt tolerated procedure well. No complications. Prescriptions written and sent to pharmacist or given to patient. Vit D 50,000IU  Continue current medications. Start Rx Vit D 50,000IU q7d due to low normal level. Chart reviewed and updated. Specialist notes reviewed and appreciated. Referrals given. Keep appointments with specialists. Breast surgery  Discussed recent test results and goals with patient. Recheck pertinent labs. Recent PAP and mammograms reviewed. Recheck as instructed by specialists. Immunizations are noted. Advise flu vaccine between the months September to December. Tdap administered today. Advised balanced diet and exercise. Proper rest.  Increase water and fiber. Avoid tobacco.  Decrease alcohol and caffeine. Decrease carbohydrates, increase green leafy vegetables and protein. Increase water intake. Eat 3-5 small meals daily. Increase physical activity. Relevant handouts provided and discussed with pt. Counselled pt on:  Patient health concerns. Thyroid, weight, breast cysts, metabolic syndrome. Discussed the patient's BMI with her. The BMI follow up plan is as follows: I have counseled this patient on diet and exercise regimens.     Follow-up and Dispositions    · Return in about 1 year (around 3/31/2021) for yearly physical exam.       Encouraged the pt to sign up for [employer] to be able to view results and send me any questions or concerns prior to the next visit where we will go over results in detail. Patient was offered a choice/choices in the treatment plan today. Patient expresses understanding of the plan and agrees with recommendations. Written by tali Gandhi, as dictated by Dr. Milly Lizama DO. Documentation True and Accepted by Lucie Angulo. Elbert Esposito. Patient Instructions     Almonds, pecans, walnut, sunflower seeds, pistachios. Constipation: Care Instructions  Your Care Instructions    Constipation means that you have a hard time passing stools (bowel movements). People pass stools from 3 times a day to once every 3 days. What is normal for you may be different. Constipation may occur with pain in the rectum and cramping. The pain may get worse when you try to pass stools. Sometimes there are small amounts of bright red blood on toilet paper or the surface of stools. This is because of enlarged veins near the rectum (hemorrhoids). A few changes in your diet and lifestyle may help you avoid ongoing constipation. Your doctor may also prescribe medicine to help loosen your stool. Some medicines can cause constipation. These include pain medicines and antidepressants. Tell your doctor about all the medicines you take. Your doctor may want to make a medicine change to ease your symptoms. Follow-up care is a key part of your treatment and safety. Be sure to make and go to all appointments, and call your doctor if you are having problems. It's also a good idea to know your test results and keep a list of the medicines you take. How can you care for yourself at home? · Drink plenty of fluids, enough so that your urine is light yellow or clear like water.  If you have kidney, heart, or liver disease and have to limit fluids, talk with your doctor before you increase the amount of fluids you drink. · Include high-fiber foods in your diet each day. These include fruits, vegetables, beans, and whole grains. · Get at least 30 minutes of exercise on most days of the week. Walking is a good choice. You also may want to do other activities, such as running, swimming, cycling, or playing tennis or team sports. · Take a fiber supplement, such as Citrucel or Metamucil, every day. Read and follow all instructions on the label. · Schedule time each day for a bowel movement. A daily routine may help. Take your time having your bowel movement. · Support your feet with a small step stool when you sit on the toilet. This helps flex your hips and places your pelvis in a squatting position. · Your doctor may recommend an over-the-counter laxative to relieve your constipation. Examples are Milk of Magnesia and MiraLax. Read and follow all instructions on the label. Do not use laxatives on a long-term basis. When should you call for help? Call your doctor now or seek immediate medical care if:    · You have new or worse belly pain.     · You have new or worse nausea or vomiting.     · You have blood in your stools.    Watch closely for changes in your health, and be sure to contact your doctor if:    · Your constipation is getting worse.     · You do not get better as expected. Where can you learn more? Go to http://weston-karel.info/  Enter P343 in the search box to learn more about \"Constipation: Care Instructions. \"  Current as of: June 26, 2019Content Version: 12.4  © 9750-5163 Healthwise, Incorporated. Care instructions adapted under license by [employer] (which disclaims liability or warranty for this information).  If you have questions about a medical condition or this instruction, always ask your healthcare professional. [place] [address] any warranty or liability for your use of this information. To relieve or prevent constipation, increase your water intake, fiber, and walking to prevent constipation. Use over the counter Smooth Move Tea ([employer] tea section),  fiber or stool softener as needed. Consider OTC Miralax or Milk of Magnesia if no bowel movement in 3 days. Hypothyroidism: Care Instructions  Your Care Instructions    You have hypothyroidism, which means that your body is not making enough thyroid hormone. This hormone helps your body use energy. If your thyroid level is low, you may feel tired, be constipated, have an increase in your blood pressure, or have dry skin or memory problems. You may also get cold easily, even when it is warm. Women with low thyroid levels may have heavy menstrual periods. A blood test to find your thyroid-stimulating hormone (TSH) level is used to check for hypothyroidism. A high TSH level may mean that you have low thyroid. When your body is not making enough thyroid hormone, TSH levels rise in an effort to make the body produce more. The treatment for hypothyroidism is to take thyroid hormone pills. You should start to feel better in 1 to 2 weeks. But it can take several months to see changes in the TSH level. You will need regular visits with your doctor to make sure you have the right dose of medicine. Most people need treatment for the rest of their lives. You will need to see your doctor regularly to have blood tests and to make sure you are doing well. Follow-up care is a key part of your treatment and safety. Be sure to make and go to all appointments, and call your doctor if you are having problems. It's also a good idea to know your test results and keep a list of the medicines you take. How can you care for yourself at home? · Take your thyroid hormone medicine exactly as prescribed. Call your doctor if you think you are having a problem with your medicine.  Most people do not have side effects if they take the right amount of medicine regularly. ? Take the medicine 30 minutes before breakfast, and do not take it with calcium, vitamins, or iron. ? Do not take extra doses of your thyroid medicine. It will not help you get better any faster, and it may cause side effects. ? If you forget to take a dose, do NOT take a double dose of medicine. Take your usual dose the next day. · Tell your doctor about all prescription, herbal, or over-the-counter products you take. · Take care of yourself. Eat a healthy diet, get enough sleep, and get regular exercise. When should you call for help? Call 911 anytime you think you may need emergency care. For example, call if:    · You passed out (lost consciousness).     · You have severe trouble breathing.     · You have a very slow heartbeat (less than 60 beats a minute).     · You have a low body temperature (95°F or below).    Call your doctor now or seek immediate medical care if:    · You feel tired, sluggish, or weak.     · You have trouble remembering things or concentrating.     · You do not begin to feel better 2 weeks after starting your medicine.    Watch closely for changes in your health, and be sure to contact your doctor if you have any problems. Where can you learn more? Go to http://weston-karel.info/  Enter K096 in the search box to learn more about \"Hypothyroidism: Care Instructions. \"  Current as of: July 28, 2019Content Version: 12.4  © 1401-5860 Healthwise, Incorporated. Care instructions adapted under license by [employer] (which disclaims liability or warranty for this information). If you have questions about a medical condition or this instruction, always ask your healthcare professional. [place] [address] any warranty or liability for your use of this information.

## 2020-04-01 LAB
COTININE UR QL SCN: NEGATIVE NG/ML
DRUG SCREEN COMMENT:, 753798: NORMAL

## 2020-04-04 ENCOUNTER — E-VISIT (OUTPATIENT)
Dept: FAMILY MEDICINE CLINIC | Age: 40
End: 2020-04-04

## 2020-04-04 DIAGNOSIS — R35.0 FREQUENT URINATION: Primary | ICD-10-CM

## 2020-04-06 NOTE — TELEPHONE ENCOUNTER
Sonia Patiño is a 36 y.o. female evaluated via E-visit on 4/4/2020. Consent:  She  is aware that that she may receive a bill for this evisit service, depending on her insurance coverage, and has provided written consent to proceed: Yes      Documentation:  I communicated electronically with the patient about possible UTI/ Frequent urination. UTI Patient Questionnaire reviewed. Details of this discussion including any medical advice provided: You sound like you have symptoms of bladder irritation. Please get an over the counter UTI Test Kit to help us determine if you have a bladder infection. I will need you to send my your results please. Meanwhile, start taking an over the counter Probiotic to build the good bacteria in your bladder. Avoid spicy foods, alcohol, caffeine and sugar. These can irritate your bladder. Increase your water intake to flush your bladder. Let me know if your symptoms worsen or persist.        I affirm this is a Patient Initiated Episode with an Established Patient who has not had a related appointment within my department in the past 7 days or scheduled within the next 24 hours.     Total Time: minutes: 5-10 minutes        Tangela Paul DO

## 2020-04-24 ENCOUNTER — OFFICE VISIT (OUTPATIENT)
Dept: PRIMARY CARE CLINIC | Age: 40
End: 2020-04-24

## 2020-04-24 VITALS
DIASTOLIC BLOOD PRESSURE: 81 MMHG | WEIGHT: 227 LBS | OXYGEN SATURATION: 98 % | RESPIRATION RATE: 16 BRPM | BODY MASS INDEX: 40.22 KG/M2 | HEART RATE: 119 BPM | TEMPERATURE: 98.7 F | HEIGHT: 63 IN | SYSTOLIC BLOOD PRESSURE: 129 MMHG

## 2020-04-24 DIAGNOSIS — R10.32 LEFT LOWER QUADRANT ABDOMINAL PAIN: ICD-10-CM

## 2020-04-24 DIAGNOSIS — R00.0 TACHYCARDIA: ICD-10-CM

## 2020-04-24 DIAGNOSIS — N30.01 ACUTE CYSTITIS WITH HEMATURIA: Primary | ICD-10-CM

## 2020-04-24 LAB
BILIRUB UR QL STRIP: NEGATIVE
GLUCOSE UR-MCNC: NEGATIVE MG/DL
KETONES P FAST UR STRIP-MCNC: NEGATIVE MG/DL
PH UR STRIP: 5.5 [PH] (ref 4.6–8)
PROT UR QL STRIP: NEGATIVE
SP GR UR STRIP: 1.03 (ref 1–1.03)
UA UROBILINOGEN AMB POC: NORMAL (ref 0.2–1)
URINALYSIS CLARITY POC: NORMAL
URINALYSIS COLOR POC: YELLOW
URINE BLOOD POC: NORMAL
URINE LEUKOCYTES POC: NORMAL
URINE NITRITES POC: POSITIVE

## 2020-04-24 RX ORDER — NITROFURANTOIN 25; 75 MG/1; MG/1
100 CAPSULE ORAL 2 TIMES DAILY
Qty: 10 CAP | Refills: 0 | Status: SHIPPED | OUTPATIENT
Start: 2020-04-24 | End: 2020-04-29 | Stop reason: ALTCHOICE

## 2020-04-24 NOTE — PROGRESS NOTES
Chief Complaint   Patient presents with    Side Pain     Patient in room #4 with complaints of left side pain for four days, noted area of pain in lower left abdomen

## 2020-04-24 NOTE — PATIENT INSTRUCTIONS
Urinary Tract Infection in Women: Care Instructions Your Care Instructions A urinary tract infection, or UTI, is a general term for an infection anywhere between the kidneys and the urethra (where urine comes out). Most UTIs are bladder infections. They often cause pain or burning when you urinate. UTIs are caused by bacteria and can be cured with antibiotics. Be sure to complete your treatment so that the infection goes away. Follow-up care is a key part of your treatment and safety. Be sure to make and go to all appointments, and call your doctor if you are having problems. It's also a good idea to know your test results and keep a list of the medicines you take. How can you care for yourself at home? · Take your antibiotics as directed. Do not stop taking them just because you feel better. You need to take the full course of antibiotics. · Drink extra water and other fluids for the next day or two. This may help wash out the bacteria that are causing the infection. (If you have kidney, heart, or liver disease and have to limit fluids, talk with your doctor before you increase your fluid intake.) · Avoid drinks that are carbonated or have caffeine. They can irritate the bladder. · Urinate often. Try to empty your bladder each time. · To relieve pain, take a hot bath or lay a heating pad set on low over your lower belly or genital area. Never go to sleep with a heating pad in place. To prevent UTIs · Drink plenty of water each day. This helps you urinate often, which clears bacteria from your system. (If you have kidney, heart, or liver disease and have to limit fluids, talk with your doctor before you increase your fluid intake.) · Urinate when you need to. · Urinate right after you have sex. · Change sanitary pads often. · Avoid douches, bubble baths, feminine hygiene sprays, and other feminine hygiene products that have deodorants. · After going to the bathroom, wipe from front to back. When should you call for help? Call your doctor now or seek immediate medical care if: 
  · Symptoms such as fever, chills, nausea, or vomiting get worse or appear for the first time.  
  · You have new pain in your back just below your rib cage. This is called flank pain.  
  · There is new blood or pus in your urine.  
  · You have any problems with your antibiotic medicine.  
 Watch closely for changes in your health, and be sure to contact your doctor if: 
  · You are not getting better after taking an antibiotic for 2 days.  
  · Your symptoms go away but then come back. Where can you learn more? Go to http://weston-karel.info/ Enter F608 in the search box to learn more about \"Urinary Tract Infection in Women: Care Instructions. \" Current as of: August 21, 2019Content Version: 12.4 © 1311-9836 Healthwise, Incorporated. Care instructions adapted under license by [employer] (which disclaims liability or warranty for this information). If you have questions about a medical condition or this instruction, always ask your healthcare professional. [address] any warranty or liability for your use of this information.

## 2020-04-24 NOTE — PROGRESS NOTES
HISTORY OF PRESENT ILLNESS  Janes Torres is a 36 y.o. female. HPI  4 day history of LLQ pain. Rates 6 out of 10. Sharp at times. Last period was last week. Ended 2 days ago. Normal flow and duration. Comes and goes. Worse in am. Nothing makes better or worse. Has similar symptoms several weeks ago and was diagnosed with UTI. Denies fever, flank pain. Did have dysuria several days ago. Denies N/V/D/Constipation. No history of ovarian cyst/PCOS. Past Medical History:   Diagnosis Date    Abnormal Pap smear of cervix 1990s    Breast cyst     benign. Left. 1.7 cm and multiple others. Dr. Eugenio Banks Chicken pox childhood    Chronic idiopathic constipation 2015    Dr. Mihai Anaya.  HELM (dyspnea on exertion) 2015    Dr. 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 Retrosternal goiter. Dr. Phyllis Devi.  Enlarged tonsils and adenoids 2010    Iodine-deficiency related goiter     Left. Dr. Phyllis Devi.  YUNIER (obstructive sleep apnea) 2015    Dr. Leah Mason, uses cpap. Dr. Lester Lerma. States cpap broken. Dr. Carlos Niño.  Thyroid cyst     Right.  Thyroid nodule 2019     Past Surgical History:   Procedure Laterality Date    HX BREAST BIOPSY Left 2018    due to mass     HX  SECTION  2004    [facility].  HX CYST INCISION AND DRAINAGE Left 2016    benign. Dr. Polly Conrad Right 09/15/2017    benign. Dr. Taylor Pascual.  HX HERNIA REPAIR  3227    umbilical.    HX OTHER SURGICAL  2014    thyroid biopsy. Dr. Rut Blanco.  HX THYROIDECTOMY Left 2015    due to [address] Dr. Phyllis Devi. benign.      Allergies   Allergen Reactions    Skelaxin [Metaxalone] Other (comments)     Too groggy         Review of Systems   Constitutional: Negative for chills, fever and malaise/fatigue. Respiratory: Negative for cough. Cardiovascular: Negative for chest pain and leg swelling. Gastrointestinal: Negative for blood in stool and heartburn. Genitourinary: Negative for flank pain. Musculoskeletal: Negative for back pain, joint pain and myalgias. Physical Exam  Vitals signs and nursing note reviewed. Constitutional:       General: She is not in acute distress. Appearance: Normal appearance. She is obese. She is not toxic-appearing. HENT:      Head: Normocephalic and atraumatic. Eyes:      Pupils: Pupils are equal, round, and reactive to light. Neck:      Musculoskeletal: Normal range of motion and neck supple. Cardiovascular:      Rate and Rhythm: Regular rhythm. Tachycardia present. Pulmonary:      Effort: Pulmonary effort is normal.      Breath sounds: Normal breath sounds. Abdominal:      General: Bowel sounds are normal. There is no distension. Palpations: Abdomen is soft. Tenderness: There is no right CVA tenderness, left CVA tenderness, guarding or rebound. Hernia: No hernia is present. Comments: Mild tenderness on palpation LLQ   Skin:     General: Skin is warm and dry. Neurological:      General: No focal deficit present. Mental Status: She is alert.    Psychiatric:         Mood and Affect: Mood normal.       Results for orders placed or performed in visit on 04/24/20   AMB POC URINALYSIS DIP STICK MANUAL W/O MICRO   Result Value Ref Range    Color (UA POC) Yellow     Clarity (UA POC) Cloudy     Glucose (UA POC) Negative Negative    Bilirubin (UA POC) Negative Negative    Ketones (UA POC) Negative Negative    Specific gravity (UA POC) 1.030 1.001 - 1.035    Blood (UA POC) 2+ Negative    pH (UA POC) 5.5 4.6 - 8.0    Protein (UA POC) Negative Negative    Urobilinogen (UA POC) 0.2 mg/dL 0.2 - 1    Nitrites (UA POC) Positive Negative    Leukocyte esterase (UA POC) 1+ Negative       ASSESSMENT and PLAN ICD-10-CM ICD-9-CM    1. Acute cystitis with hematuria N30.01 595.0    2. Left lower quadrant abdominal pain R10.32 789.04 AMB POC URINALYSIS DIP STICK MANUAL W/O MICRO   3. Tachycardia R00.0 785.0      Encounter Diagnoses   Name Primary?  Acute cystitis with hematuria Yes    Left lower quadrant abdominal pain     Tachycardia      Orders Placed This Encounter    AMB POC URINALYSIS DIP STICK MANUAL W/O MICRO    nitrofurantoin, macrocrystal-monohydrate, (MACROBID) 100 mg capsule   I have discussed with the patient the diagnosis  and intended plan as seen in the orders. Patient received AVS , all questions answered concerning all future plans. I have discussed medication side effects and warnings with the patient/ guardian. Patient instructed to go to ER if symptoms worsen or fail to improve. Consider TSH level related to tachycardia.     Signed By: STANLEY Hernandez     April 24, 2020

## 2020-04-29 ENCOUNTER — OFFICE VISIT (OUTPATIENT)
Dept: SURGERY | Age: 40
End: 2020-04-29

## 2020-04-29 VITALS
TEMPERATURE: 98.2 F | BODY MASS INDEX: 40.22 KG/M2 | HEIGHT: 63 IN | HEART RATE: 97 BPM | SYSTOLIC BLOOD PRESSURE: 130 MMHG | DIASTOLIC BLOOD PRESSURE: 86 MMHG | WEIGHT: 227 LBS

## 2020-04-29 DIAGNOSIS — N60.01 BILATERAL BREAST CYSTS: Primary | ICD-10-CM

## 2020-04-29 DIAGNOSIS — N60.02 BILATERAL BREAST CYSTS: Primary | ICD-10-CM

## 2020-04-29 NOTE — PROGRESS NOTES
HISTORY OF PRESENT ILLNESS  Berto Gordon is a 36 y.o. female. HPI ESTABLISHED patient here for bilateral breast cysts. She just felt these last night. She has a history of bilateral breast cysts. She is high risk and gets annual screening breast MRIs.     - LEFT breast needle biopsy revealed fibroadenoma    Family history-   3 maternal aunts had breast cancer, all in their 52's. 1 , 2 living.     Breast imaging-  2019 - breast MRI, BI-RADS 2, bilateral cysts  7/3/2019 - mammogram, BI-RADS 2 bilateral cysts    Review of Systems   All other systems reviewed and are negative. Physical Exam  Vitals signs and nursing note reviewed. Chest:      Breasts:         Right: Mass present. No swelling, bleeding, inverted nipple, nipple discharge, skin change or tenderness. Left: Mass present. No swelling, bleeding, inverted nipple, nipple discharge, skin change or tenderness. Lymphadenopathy:      Upper Body:      Right upper body: No supraclavicular, axillary or pectoral adenopathy. Left upper body: No supraclavicular, axillary or pectoral adenopathy. BREAST ULTRASOUND  Indication: bilateral breast mass behind areola  Technique: The area was scanned using a high-frequency linear-array near-field transducer  Findings: multiple retroareolar masses, oval, hypoechoic, through transmission  Impression: Benign simple cysts  Disposition: Discussed aspiration or observation. Pt has elected for observation    ASSESSMENT and PLAN    ICD-10-CM ICD-9-CM    1. Bilateral breast cysts N60.01 610.0     N60.02       37 yo high risk patient with breast cysts  Start vitamin E and primrose oil. Likely hormonal variation is cause. Due for mammograms in July.   F/u in 6 months

## 2020-04-29 NOTE — PATIENT INSTRUCTIONS

## 2020-06-24 ENCOUNTER — VIRTUAL VISIT (OUTPATIENT)
Dept: SLEEP MEDICINE | Age: 40
End: 2020-06-24

## 2020-06-24 ENCOUNTER — DOCUMENTATION ONLY (OUTPATIENT)
Dept: SLEEP MEDICINE | Age: 40
End: 2020-06-24

## 2020-06-24 DIAGNOSIS — G47.33 OSA (OBSTRUCTIVE SLEEP APNEA): Primary | ICD-10-CM

## 2020-06-24 NOTE — PATIENT INSTRUCTIONS
[address] [facility] [place]. [address] [facility], [address] Tel.  [phone] Fax. 100 [facility] [address] [place], [address] [facility] Tel.  [phone] Fax. [phone] [address] Tel.  [phone] Fax. [phone] Learning About CPAP for Sleep Apnea What is CPAP? CPAP is a small machine that you use at home every night while you sleep. It increases air pressure in your throat to keep your airway open. When you have sleep apnea, this can help you sleep better so you feel much better. CPAP stands for \"continuous positive airway pressure. \" The CPAP machine will have one of the following: · A mask that covers your nose and mouth · Prongs that fit into your nose · A mask that covers your nose only, the most common type. This type is called NCPAP. The N stands for \"nasal.\" Why is it done? CPAP is usually the best treatment for obstructive sleep apnea. It is the first treatment choice and the most widely used. Your doctor may suggest CPAP if you have: · Moderate to severe sleep apnea. · Sleep apnea and coronary artery disease (CAD) or heart failure. How does it help? · CPAP can help you have more normal sleep, so you feel less sleepy and more alert during the daytime. · CPAP may help keep heart failure or other heart problems from getting worse. · NCPAP may help lower your blood pressure. · If you use CPAP, your bed partner may also sleep better because you are not snoring or restless. What are the side effects? Some people who use CPAP have: · A dry or stuffy nose and a sore throat. · Irritated skin on the face. · Sore eyes. · Bloating. If you have any of these problems, work with your doctor to fix them. Here are some things you can try: · Be sure the mask or nasal prongs fit well. · See if your doctor can adjust the pressure of your CPAP. · If your nose is dry, try a humidifier. · If your nose is runny or stuffy, try decongestant medicine or a steroid nasal spray. If these things do not help, you might try a different type of machine. Some machines have air pressure that adjusts on its own. Others have air pressures that are different when you breathe in than when you breathe out. This may reduce discomfort caused by too much pressure in your nose. Where can you learn more? Go to AppZero.be Enter G983 in the search box to learn more about \"Learning About CPAP for Sleep Apnea. \"  
© 8647-7949 Healthwise, Incorporated. Care instructions adapted under license by [facility] (which disclaims liability or warranty for this information). This care instruction is for use with your licensed healthcare professional. If you have questions about a medical condition or this instruction, always ask your healthcare professional. [address] any warranty or liability for your use of this information. Content Version: 5.3.91956; Last Revised: January 11, 2010 PROPER SLEEP HYGIENE What to avoid · Do not have drinks with caffeine, such as coffee or black tea, for 8 hours before bed. · Do not smoke or use other types of tobacco near bedtime. Nicotine is a stimulant and can keep you awake. · Avoid drinking alcohol late in the evening, because it can cause you to wake in the middle of the night. · Do not eat a big meal close to bedtime. If you are hungry, eat a light snack. · Do not drink a lot of water close to bedtime, because the need to urinate may wake you up during the night. · Do not read or watch TV in bed. Use the bed only for sleeping and sexual activity. What to try · Go to bed at the same time every night, and wake up at the same time every morning. Do not take naps during the day. · Keep your bedroom quiet, dark, and cool. · Get regular exercise, but not within 3 to 4 hours of your bedtime. [place] · Sleep on a comfortable pillow and mattress. · If watching the clock makes you anxious, turn it facing away from you so you cannot see the time. · If you worry when you lie down, start a worry book. Well before bedtime, write down your worries, and then set the book and your concerns aside. · Try meditation or other relaxation techniques before you go to bed. · If you cannot fall asleep, get up and go to another room until you feel sleepy. Do something relaxing. Repeat your bedtime routine before you go to bed again. · Make your house quiet and calm about an hour before bedtime. Turn down the lights, turn off the TV, log off the computer, and turn down the volume on music. This can help you relax after a busy day. Drowsy Driving: The [employer] cites drowsiness as a causing factor in more than 734,479 police reported crashes annually, resulting in 76,000 injuries and 1,500 deaths. Other surveys suggest 55% of people polled have driven while drowsy in the past year, 23% had fallen asleep but not crashed, 3% crashed, and 2% had and accident due to drowsy driving. Who is at risk? Young Drivers: One study of drowsy driving accidents states that 55% of the drivers were under 25 years. Of those, 75% were male. Shift Workers and Travelers: People who work overnight or travel across time zones frequently are at higher risk of experiencing Circadian Rhythm Disorders. They are trying to work and function when their body is programed to sleep. Sleep Deprived: Lack of sleep has a serious impact on your ability to pay attention or focus on a task. Consistently getting less than the average of 8 hours your body needs creates partial or cumulative sleep deprivation.   
Untreated Sleep Disorders: Sleep Apnea, Narcolepsy, R.L.S., and other sleep disorders (untreated) prevent a person from getting enough restful sleep. This leads to excessive daytime sleepiness and increases the risk for drowsy driving accidents by up to 7 times. Medications / Alcohol: Even over the counter medications can cause drowsiness. Medications that impair a drivers attention should have a warning label. Alcohol naturally makes you sleepy and on its own can cause accidents. Combined with excessive drowsiness its effects are amplified. Signs of Drowsy Driving: * You don't remember driving the last few miles * You may drift out of your jina * You are unable to focus and your thoughts wander * You may yawn more often than normal 
 * You have difficulty keeping your eyes open / nodding off * Missing traffic signs, speeding, or tailgating Prevention-  
Good sleep hygiene, lifestyle and behavioral choices have the most impact on drowsy driving. There is no substitute for sleep and the average person requires 8 hours nightly. If you find yourself driving drowsy, stop and sleep. Consider the sleep hygiene tips provided during your visit as well. Medication Refill Policy: Refills for all medications require 1 week advance notice. Please have your pharmacy fax a refill request. We are unable to fax, or call in \"controled substance\" medications and you will need to pick these prescriptions up from our office. [employer] Activation Thank you for requesting access to [employer]. Please follow the instructions below to securely access and download your online medical record. [employer] allows you to send messages to your doctor, view your test results, renew your prescriptions, schedule appointments, and more. How Do I Sign Up? 1. In your internet browser, go to [email or web address]. 2. Click on the First Time User? Click Here link in the Sign In box. You will see the New Member Sign Up page. 3. Enter your [employer] Access Code exactly as it appears below.  You will not need to use this code after youve completed the sign-up process. If you do not sign up before the expiration date, you must request a new code. [employer] Access Code: Activation code not generated Current [employer]ight Status: Active (This is the date your [employer]ight access code will ) 4. Enter the last four digits of your Social Security Number (xxxx) and Date of Birth (mm/dd/yyyy) as indicated and click Submit. You will be taken to the next sign-up page. 5. Create a [employer] ID. This will be your Arrowsight login ID and cannot be changed, so think of one that is secure and easy to remember. 6. Create a [employer]ight password. You can change your password at any time. 7. Enter your Password Reset Question and Answer. This can be used at a later time if you forget your password. 8. Enter your e-mail address. You will receive e-mail notification when new information is available in [address]. 9. Click Sign Up. You can now view and download portions of your medical record. 10. Click the Download Summary menu link to download a portable copy of your medical information. Additional Information If you have questions, please call [phone]. Remember, [employer]rowsight is NOT to be used for urgent needs. For medical emergencies, dial 911.

## 2020-06-24 NOTE — PROGRESS NOTES
217 [facility] [place]. [place], [address]  Tel.  [phone]  Fax. 100 [facility] 60  [place], [address] [facility]  Tel.  [phone]  Fax. [phone] 9[address] [facility] Dagoberto Mcmahon   Tel.  [phone]  Fax. [phone]       S>Leslie Ying is a 36 y.o. female who was seen by synchronous (real-time) audio-video technology on 6/24/2020. Consent:  She is aware that this patient-initiated Telehealth encounter is a billable service, with coverage as determined by her insurance carrier. She is aware that she may receive a bill and has provided verbal consent to proceed: Yes    I was at home while conducting this encounter. Patient verified with 's License. Polysomnogram was performed 02-23-20 and the results of the study were explained to the patient. Please refer to interpretation report for further details. Apnea/Hypopnea index of 59.7 which indicates severe apnea. A total of 394 apnea and hypopnea events were observed during the analysis period as follows: 141 obstructive apneas, 2 central apneas,, 0 mixed apneas and 251 hypopneas. An APAP device was prescribed for her on 2-29-20 but she chose not to pursue this form of therapy and expressed an interest in inquiring about upper airway stimulation therapy. She continues to have snoring, snorting, choking, awakening in the middle of the night because of SOB and for urination. Her weight has not changed from 11-20 2019 and remains at 227 lbs. Allergies   Allergen Reactions    Skelaxin [Metaxalone] Other (comments)     Too groggy       She has a current medication list which includes the following prescription(s): ergocalciferol, levothyroxine, metformin er, esomeprazole, dicyclomine, and cyanocobalamin. .      She  has a past medical history of Abnormal Pap smear of cervix (1990s), Breast cyst (2009), Chicken pox (childhood), Chronic idiopathic constipation (07/2015), HELM (dyspnea on exertion) (02/2015), EBV positive mononucleosis syndrome (10/2013, 2014), Enlarged thyroid (09/2010, 06/2015), Enlarged tonsils and adenoids (09/2010), Iodine-deficiency related goiter, YUNIER (obstructive sleep apnea) (02/25/2015), Thyroid cyst (08/219), and Thyroid nodule (08/2019). O>    Vitals reported by patient:    Patient-Reported Vitals 6/24/2020   Patient-Reported Weight 227 lbs   Patient-Reported Height 5' 3\"       Physical Exam completed by visual and auditory observation of patient with verbal input from patient. General:   Alert, oriented, not in acute distress   Eyes:  Anicteric Sclerae; no obvious strabismus   Nose:  No obvious nasal septum deviation    Neck:   Midline trachea, no visible mass   Chest/Lungs:  Respiratory effort normal, no visualized signs of difficulty breathing or respiratory distress   CVS:  No JVD   Extremities:  No obvious rashes noted on face, neck, or hands   Neuro:  No facial asymmetry, no focal deficits; no obvious tremor    Psych:  Normal affect,  normal countenance           A>    ICD-10-CM ICD-9-CM    1. YUNIER (obstructive sleep apnea) G47.33 327.23 AMB SUPPLY ORDER   2. BMI 40.0-44.9, adult [facility]) Z68.41 V85.41        P>      * Stanislav Rhodes was provided information on sleep apnea including coresponding risk factors and the importance of proper treatment. * Treatment options were offered. * Qualifiers for Upper Airway Stimulation Therapy reviewed with patient, including the need for BMI to be <35 along with documentation of PAP failure. * Patient has elected to proceed with a positive airway pressure trial (CPAP). * We have written an order for a PAP device with pressure set at 4 - 20 cmH2O.     Orders Placed This Encounter    AMB SUPPLY ORDER     Diagnosis: (G47.33) YNUIER (obstructive sleep apnea)  (primary encounter diagnosis)     Respironics DreamStation ( Unit - Auto Mode) / Heated Humidifier :    Positive Airway Pressure Therapy: Duration of need: 99 months. Auto - PAP: 4 - 20 cmH2O; Optistart enabled. Ramp Time: 30 Minutes; Flex: 2. Remote monitoring enrollment.  Nasal Cushion (Replace) 2 per month.  Nasal Interface Mask 1 every 3 months.  Headgear 1 every 6 months.  Filter(s) Disposable 2 per month.  Filter(s) Non-Disposable 1 every 6 months. [address] for [employer] (Replace) 1 every 6 months.  Tubing with heating element 1 every 3 months. Perform Mask Fitting per patient preference and comfort - replace as above. Janice Vo MD, [facility]; NPI: [id number]  Electronically signed. 06/24/20       * Follow-up appointment was scheduled 6-8 weeks following PAP therapy initiation to gauge treatment response and compliance. * Counseling was provided regarding the importance of regular PAP device use. * We have recommended a dedicated weight loss and exercise regiment as significant weight reduction has been shown to reduce severity of obstructive sleep apnea. * Counseling was provided regarding safe driving and proper sleep hygiene, with particular attention to maintaining lateral positioning while sleeping with the use of bed pillows to reduce apnic events at night. * Patient was asked to contact our office at any time for further questions regarding their sleep symptoms. Office visit exceeded 25 minutes with counseling and direction of care taking up more than 50% of the allotted time. Thank you for allowing to participate in your patient's medical care. Pursuant to the emergency declaration under the [facility], [facility] waiver authority and the [employer] General Act, this Virtual Visit was conducted, with patient's consent, to reduce the patient's risk of exposure to COVID-19 and provide continuity of care for an established patient.      Services were provided through a video synchronous discussion virtually to substitute for in-person clinic visit. Cherise Lin MD, FAASM  Electronically signed.  06/24/20

## 2020-07-08 ENCOUNTER — DOCUMENTATION ONLY (OUTPATIENT)
Dept: SLEEP MEDICINE | Age: 40
End: 2020-07-08

## 2020-07-11 LAB
T4 FREE SERPL-MCNC: 1.35 NG/DL (ref 0.82–1.77)
TSH SERPL DL<=0.005 MIU/L-ACNC: 1.81 UIU/ML (ref 0.45–4.5)

## 2020-07-14 ENCOUNTER — VIRTUAL VISIT (OUTPATIENT)
Dept: ENDOCRINOLOGY | Age: 40
End: 2020-07-14

## 2020-07-14 DIAGNOSIS — E04.1 THYROID CYST: Primary | ICD-10-CM

## 2020-07-14 DIAGNOSIS — E04.1 THYROID NODULE: ICD-10-CM

## 2020-07-14 DIAGNOSIS — E03.9 ACQUIRED HYPOTHYROIDISM: ICD-10-CM

## 2020-07-14 RX ORDER — ACETAMINOPHEN AND CODEINE PHOSPHATE 120; 12 MG/5ML; MG/5ML
SOLUTION ORAL
COMMUNITY
Start: 2020-06-30 | End: 2021-06-16 | Stop reason: ALTCHOICE

## 2020-07-14 NOTE — PROGRESS NOTES
**DUE TO PANDEMIC AND HEALTH CONCERNS IN THE COMMUNITY, THIS PATIENT WAS EITHER ILL OR FOUND TO BE HIGH RISK FOR IN-PERSON EVALUATION WITHIN THE CLINIC. THE FOLLOWING IS A VIRTUAL TELEMEDICINE VIDEO ENCOUNTER VIA [employer], TO WHICH THE PATIENT AGREED. THE PURPOSE IS TO LIMIT INTERRUPTIONS IN HEALTHCARE AND TO PROVIDE FOR ONGOING URGENT NEEDS UNDER THE CURRENT CONDITIONS. CHIEF COMPLAINT: Thyroid nodule / cyst / Hypothyroidism    HISTORY OF PRESENT ILLNESS:   Noris Khan is a 36 y.o. female with a PMHx as noted below who presents for f/u evaluation of a thyroid nodule. Patient noted for 3.7 cm left lobe nodule in 2013, with left lobectomy in 2015,     8/26/13: Thyroid Ultrasound: \"3.7 x 5.2 x 6.4 cm left lobe thyroid nodule\"  6/8/15: Left thyroid Lobectomy: Dr. Kevin Layne, Path suggestively benign. 8/21/19: Thyroid Ultrasound   Right 0.8 x 0.6 x 0.5 cm mixed solid/cystic nodule    Right 4mm cyst   Left 1.4 x 0.5 x 0.6 cm remnant with probable 7 mm solid nodule    Patient remains asymptomatic,  We started her on 25 mcg levothyroxine M-F only,  She has been taking this correctly as we had discussed,  Her repeat TSH came down from 3.3 to 1.8,   Reports feeling fine,  No overt symptoms of hyper or hypothyroidism,    Wt Readings from Last 3 Encounters:   04/29/20 227 lb (103 kg)   04/24/20 227 lb (103 kg)   03/31/20 226 lb 1.6 oz (102.6 kg)     Review of most recent thyroid function:  Lab Results   Component Value Date    TSH 1.810 07/10/2020    TSH 3.030 03/24/2020    TSH 3.350 03/24/2020    FT4 1.35 07/10/2020    FT4 1.27 03/24/2020    FT4 1.43 03/05/2019    TMCLT 9 08/21/2019      TSILT = Thyroid stimulating antibodies  TMCLT = TPO antibodies  T3LT = Total T3 levels    PAST MEDICAL/SURGICAL HISTORY:   Past Medical History:   Diagnosis Date    Abnormal Pap smear of cervix 1990s    Breast cyst 2009    benign. Left. 1.7 cm and multiple others.   Dr. Amado Pa Chicken pox childhood    Chronic idiopathic constipation 2015    Dr. Arti Odom.  HELM (dyspnea on exertion) 2015    Dr. Radha Oliveira EBV positive mononucleosis syndrome 10/2013,     chronic or reactivated    Enlarged thyroid 2010, 2015    with Right tracheal deviation. Retrosternal goiter. Dr. Steve Lombardi.  Enlarged tonsils and adenoids 2010    Iodine-deficiency related goiter     Left. Dr. Steve Lombardi.  YUNIER (obstructive sleep apnea) 2015    Dr. Annemarie Rios, uses cpap. Dr. Sierra Farooq. States cpap broken. Dr. Jose Rafael Herr.  Thyroid cyst     Right.  Thyroid nodule 2019     Past Surgical History:   Procedure Laterality Date    HX BREAST BIOPSY Left 2018    due to mass     HX  SECTION      [facility].  HX CYST INCISION AND DRAINAGE Left 2016    benign. Dr. Samson Erickson Right 09/15/2017    benign. Dr. Maximus Tucker.  HX HERNIA REPAIR  [id number]    umbilical.    HX OTHER SURGICAL  2014    thyroid biopsy. Dr. Robert Adams.  HX THYROIDECTOMY Left 2015    due to [address] Dr. Steve Lombardi. benign. ALLERGIES:   Allergies   Allergen Reactions    Skelaxin [Metaxalone] Other (comments)     Too groggy       MEDICATIONS ON ADMISSION:     Current Outpatient Medications:     norethindrone (MICRONOR) 0.35 mg tab, , Disp: , Rfl:     ergocalciferol (ERGOCALCIFEROL) 1,250 mcg (50,000 unit) capsule, Take 1 Cap by mouth every seven (7) days. Indications: low vitamin D levels, Disp: 5 Cap, Rfl: 5    levothyroxine (SYNTHROID) 25 mcg tablet, Take 1 Tab by mouth Daily (before breakfast). , Disp: 90 Tab, Rfl: 3    metFORMIN ER (GLUCOPHAGE XR) 500 mg tablet, Take 2 Tabs by mouth two (2) times daily (with meals). Indications: prevention of type 2 diabetes mellitus, insulin resistance (Patient taking differently: Take 1,000 mg by mouth nightly.  Indications: prevention of type 2 diabetes mellitus, insulin resistance), Disp: 360 Tab, Rfl: 3    esomeprazole (NEXIUM) 40 mg capsule, Take one capsule by mouth daily for Heartburn  Indications: heartburn, Disp: 30 Cap, Rfl: 5    dicyclomine (BENTYL) 20 mg tablet, Take 1 Tab by mouth every six (6) hours. Indications: abdominal spasm, Disp: 30 Tab, Rfl: 2    cyanocobalamin (VITAMIN B-12) 1,000 mcg tablet, Take 1,000 mcg by mouth daily. , Disp: , Rfl:     SOCIAL HISTORY:   Social History     Socioeconomic History    Marital status: SINGLE     Spouse name: Not on file    Number of children: 1    Years of education: Not on file    Highest education level: Not on file   Occupational History    Occupation:      Employer: [employer]   Social Needs    Financial resource strain: Not on file    Food insecurity     Worry: Not on file     Inability: Not on file    Transportation needs     Medical: Not on file     Non-medical: Not on file   Tobacco Use    Smoking status: Passive Smoke Exposure - Never Smoker    Smokeless tobacco: Never Used    Tobacco comment: lives with a smoker dad x 18 yrs and history of living with smoker boyfriend x 2 years   Substance and Sexual Activity    Alcohol use: Yes     Alcohol/week: 2.0 standard drinks     Types: 2 Shots of liquor per week     Frequency: 2-4 times a month     Drinks per session: 1 or 2    Drug use: No    Sexual activity: Yes     Partners: Male     Birth control/protection: None   Lifestyle    Physical activity     Days per week: 5 days     Minutes per session: 30 min    Stress:  Only a little   Relationships    Social connections     Talks on phone: Not on file     Gets together: Not on file     Attends Rastafarian service: Not on file     Active member of club or organization: Not on file     Attends meetings of clubs or organizations: Not on file     Relationship status: Not on file    Intimate partner violence     Fear of current or ex partner: Not on file     Emotionally abused: Not on file Physically abused: Not on file     Forced sexual activity: Not on file   Other Topics Concern    Not on file   Social History Narrative    ** Merged History Encounter **            FAMILY HISTORY:  Family History   Problem Relation Age of Onset    Hypertension Mother     Lung Disease Mother         chronic bronchitis    Sleep Apnea Mother     Hypertension Father     Diabetes Paternal Grandmother     No Known Problems Sister     No Known Problems Sister     No Known Problems Sister     No Known Problems Brother         murdered    Breast Cancer Maternal Aunt     Other Sister         severe scoliosis. txd with back brace.  Breast Cancer Maternal Aunt     Breast Cancer Maternal Aunt     No Known Problems Maternal Grandmother     No Known Problems Maternal Grandfather        REVIEW OF SYSTEMS: Complete ROS assessed and noted for that which is described above, all else are negative. Eyes: normal  ENT: normal  CVS: normal  Resp: normal  GI: normal  : normal  GYN: normal  Endocrine: normal  Integument: normal  Musculoskeletal: normal  Neuro: normal  Psych: normal    PHYSICAL EXAMINATION:  Telemedicine Visit    GENERAL: NCAT, Appears well nourished  EYES: EOMI, non-icteric, no proptosis  Ear/Nose/Throat: NCAT, no visible inflammation or masses  CARDIOVASCULAR: no cyanosis, no visible JVD  RESPIRATORY: comfortable respirations observed, no cyanosis  MUSCULOSKELETAL: Normal ROM of upper extremities observed  SKIN: No edema, rash, or other significant changes observed  NEUROLOGIC:  AAOx3  PSYCHIATRIC: Normal affect, Normal insight and judgement    REVIEW OF LABORATORY AND RADIOLOGY DATA:   Labs and documentation have been reviewed as described above. ASSESSMENT AND PLAN:   Salima Canales is a 36 y.o. female with a PMHx as noted above who presents for f/u evaluation of a thyroid nodule.      Thyroid Nodule  Hypothyroidism    Thyroid Nodules:   Asymptomatic,  Some remnant tissue was left behind in the left lobe. We do not see any concerning findings on her ultrasound. Noted partially cystic nodule which can expand and contract over time due to fluid portion,  We will plan to repeat her US in about 2 years as long as asymptomatic. Thyroid Function:   Increase to 25 mcg of levothyroxine 7 days per week,  Reminded of proper way to take levothyroxine. 3-4 month f/u with thyroid pre-labs few days prior, Nov 20 at 3:50 PM,    20 minutes spent toward telemedicine visit today of which >50% of this time was spent in counseling and coordination of care. Kecia Olmos.  [address] [facility]

## 2020-09-23 ENCOUNTER — DOCUMENTATION ONLY (OUTPATIENT)
Dept: SLEEP MEDICINE | Age: 40
End: 2020-09-23

## 2020-09-23 NOTE — PROGRESS NOTES
Patient was set up [facility], 8/10/2020 and added to the PAMS program     PAMS-Day 14 PAMS-Email 11:04am - Good morning Vaishnavi Cox, We are writing from the Patient [address] for [address] regarding your therapy. I am your sleep : I have left several messages trying to reach you to see if you need any assistance with your therapy. The data being transmitted from your therapy device shows that things seem to be going well with your therapy. We would like to discuss your therapy and provide any needed assistance. If you are having any issues with your therapy, please respond to this email or call our coaching center at [phone]. Hi everything is going okay. I am still trying to get in the habit of using it every night. But other than that it&apos;s fine. I appreciate you reaching out. My response: Good morning, Thank you for your response. I am glad that things are going well. I will checking back in again in about a week. Please continue to use machine nightly. Also If you have not done so you will need to schedule a follow up with your treating physical before day 90 which is November 8th. If you have any questions or need any assistance please reach out. Thank you.

## 2020-10-14 ENCOUNTER — HOSPITAL ENCOUNTER (OUTPATIENT)
Dept: MAMMOGRAPHY | Age: 40
Discharge: HOME | End: 2020-10-14
Attending: SURGERY
Payer: COMMERCIAL

## 2020-10-14 DIAGNOSIS — Z12.31 VISIT FOR SCREENING MAMMOGRAM: ICD-10-CM

## 2020-10-14 PROCEDURE — 77063 BREAST TOMOSYNTHESIS BI: CPT

## 2020-10-26 ENCOUNTER — OFFICE VISIT (OUTPATIENT)
Dept: SURGERY | Age: 40
End: 2020-10-26
Payer: COMMERCIAL

## 2020-10-26 VITALS
DIASTOLIC BLOOD PRESSURE: 83 MMHG | HEART RATE: 97 BPM | SYSTOLIC BLOOD PRESSURE: 132 MMHG | BODY MASS INDEX: 35.63 KG/M2 | WEIGHT: 227 LBS | HEIGHT: 67 IN | TEMPERATURE: 98.5 F

## 2020-10-26 DIAGNOSIS — N60.19 FIBROCYSTIC BREAST DISEASE (FCBD), UNSPECIFIED LATERALITY: ICD-10-CM

## 2020-10-26 DIAGNOSIS — Z91.89 AT HIGH RISK FOR BREAST CANCER: Primary | ICD-10-CM

## 2020-10-26 PROCEDURE — 99213 OFFICE O/P EST LOW 20 MIN: CPT | Performed by: SURGERY

## 2020-10-26 NOTE — PATIENT INSTRUCTIONS

## 2020-10-26 NOTE — LETTER
10/26/20 Patient: Ana Kam YOB: 1980 Date of Visit: 10/26/2020 Gita Gibson, [address] [place] [address] VIA In Basket Dear Gita Gibson, DO, Thank you for referring Ms. Candice Miranda to [address] MAIN OFFICE [address] [facility] for evaluation. My notes for this consultation are attached. If you have questions, please do not hesitate to call me. I look forward to following your patient along with you. Sincerely, Muriel Ansari MD

## 2020-10-26 NOTE — PROGRESS NOTES
HISTORY OF PRESENT ILLNESS  Renny Grover is a 36 y.o. female. HPI ESTABLISHED patient here for high risk screening follow up. She is not having any breast problems at this time. She has been using Vitamin E.      - LEFT breast needle biopsy revealed fibroadenoma     Family history-  3 maternal aunts had breast cancer, all in their 52's. 1 , 2 living.     Breast imaging-  2019 - breast MRI, BI-RADS 2, bilateral cysts  10/14/2020 - mammogram    [facility] Results (most recent):  Results from [place] encounter on 10/14/20   [facility] 3D MONICA W MAMMO BI SCREENING INCL CAD    Narrative STUDY: Bilateral digital screening mammogram with 3-D tomosynthesis    INDICATION:  Screening. COMPARISON: Most recent     BREAST COMPOSITION: The breasts are heterogeneously dense, which may obscure  small masses. FINDINGS: Bilateral digital screening mammography was performed and is  interpreted in conjunction with a computer assisted detection (CAD) system. Additionally, tomosynthesis of both breasts in the CC and MLO projections was  performed. There are multiple bilateral breast masses. There has been no  significant change. Impression IMPRESSION:  BI-RADS 2: Benign. No mammographic evidence of malignancy. RECOMMENDATIONS:  Next screening mammogram is recommended in one year. The patient will be notified of these results. In accordance with [place] legislation enacted 2012 (32.1-229 of the Code  of [place]), we have informed this patient by letter that she may have dense  breast tissue. Dense breast tissue can hide cancer or other abnormalities. We  have instructed her to contact her referring physician if she has any questions  or concerns about this report. There are many factors that can increase a  woman's risk of developing breast cancer, including a family history of breast  cancer.  A woman's lifetime risk of developing breast cancer can be estimated  using the Breast Cancer Risk Assessment Tool, found on the [employer] website (www.cancer.gov/bcrisktool/). The addition of breast MRI as a  screening tool may be useful for women with lifetime risk assessments greater  than 20%. Review of Systems   All other systems reviewed and are negative. Physical Exam  Vitals signs and nursing note reviewed. Chest:      Breasts: Breasts are symmetrical.         Right: No inverted nipple, mass, nipple discharge, skin change or tenderness. Left: No inverted nipple, mass, nipple discharge, skin change or tenderness. Comments: Thickening bilateral breasts lower outer quadrants  Lymphadenopathy:      Cervical: No cervical adenopathy. ASSESSMENT and PLAN    ICD-10-CM ICD-9-CM    1. At high risk for breast cancer  Z91.89 V49.89 MRI BREAST BI W WO CONT   2. Fibrocystic breast disease (FCBD), unspecified laterality  N60.19 610.1      40 high risk patient. Mammogram normal.   Due for breast mri. Will schedule.    F/u in 1 year with NP

## 2020-11-04 ENCOUNTER — VIRTUAL VISIT (OUTPATIENT)
Dept: SLEEP MEDICINE | Age: 40
End: 2020-11-04
Payer: COMMERCIAL

## 2020-11-04 DIAGNOSIS — G47.33 OSA (OBSTRUCTIVE SLEEP APNEA): Primary | ICD-10-CM

## 2020-11-04 PROCEDURE — 99442 PR PHYS/QHP TELEPHONE EVALUATION 11-20 MIN: CPT | Performed by: INTERNAL MEDICINE

## 2020-11-04 NOTE — PROGRESS NOTES
217 [facility]., [facility]. [place], [address]  Tel.  [phone]  Fax. 100 [facility] 60  [address] [facility] [address], [address] [facility] [facility] [address]  Tel.  [phone]  Fax. [phone] [address] [facility] Dagoberto Mcmahon   Tel.  [phone]  Fax. [phone] [facility] Erna Nicolas is a 36 y.o. female evaluated via telephone on 11/4/2020. Consent:  She and/or health care decision maker is aware that that she may receive a bill for this telephone service, depending on her insurance coverage, and has provided verbal consent to proceed: Yes    Patient was identified using their date of birth and street address for this audio only visit and patient was unable to connect on the audio-visual platform. Documentation:    I communicated with the patient about: Her adherence and response to positive airway pressure therapy. She is using the device on 80% of the night and is 70% adherent to use > 4 hours over the past 30 days. Her AHI has decreased from 59.7 at baseline on sleep testing performed in 2020 to current AHI of 2.0 on PAP Therapy. She admits that her sleep has improved on PAP Therapy using a nasal pillows mask and heated tubing. Encounter Diagnoses   Name Primary?  YUNIER (obstructive sleep apnea) Yes    BMI 35.0-35.9,adult          Dolphin Sleepiness Score: 2   and Modified F.O.S.Q. Score Total / 2: 18.5   which reflect improved sleep quality over therapy time. Details of this discussion including any medical advice provided:     * Patient is using her PAP device regularly and benefiting form therapy,  continued use of the device at 4-20 cmH2O is advised. * She is familiar with the telephone monitoring application, is willing to track therapy and agrees to notify use if AHI is >5 per hour. * She is aware of the relationship between YUNIER and HTN which is stable as is her mood (patient is currently on anti-depressant therapy).     * We have recommended a dedicated weight loss through appropriate diet and an exercise regiment as significant weight reduction has been shown to reduce severity of obstructive sleep apnea. *   Follow-up and Dispositions    · Return in about 1 year (around 11/4/2021), or if symptoms worsen or fail to improve. * She was asked to contact our office for any problems regarding PAP therapy. * Counseling was provided regarding the importance of regular PAP use and on proper sleep hygiene and safe driving. * Re-enforced proper and regular cleaning for the device. Thank you for allowing us to participate in your patient's medical care. I affirm this is a Patient Initiated Episode with an Established Patient who has not had a related appointment within my department in the past 7 days or scheduled within the next 24 hours. Total Time: minutes: 11-20 minutes    Note: not billable if this call serves to triage the patient into an appointment for the relevant concern    Christopher Ignacio MD, FAASM  Diplomate American Board of Sleep Medicine  Diplomate in Sleep Medicine - ABP    Electronically signed.  11/04/20

## 2020-11-04 NOTE — PATIENT INSTRUCTIONS
[address] [place]. [address] [facility], [address] Tel.  [phone] Fax. [address] [facility], [address] Tel.  [phone] Fax. [phone] [address] [place] [address]to Ariza Tel.  [phone] Fax. [phone] Learning About CPAP for Sleep Apnea What is CPAP? CPAP is a small machine that you use at home every night while you sleep. It increases air pressure in your throat to keep your airway open. When you have sleep apnea, this can help you sleep better so you feel much better. CPAP stands for \"continuous positive airway pressure. \" The CPAP machine will have one of the following: · A mask that covers your nose and mouth · Prongs that fit into your nose · A mask that covers your nose only, the most common type. This type is called NCPAP. The N stands for \"nasal.\" Why is it done? CPAP is usually the best treatment for obstructive sleep apnea. It is the first treatment choice and the most widely used. Your doctor may suggest CPAP if you have: · Moderate to severe sleep apnea. · Sleep apnea and coronary artery disease (CAD) or heart failure. How does it help? · CPAP can help you have more normal sleep, so you feel less sleepy and more alert during the daytime. · CPAP may help keep heart failure or other heart problems from getting worse. · NCPAP may help lower your blood pressure. · If you use CPAP, your bed partner may also sleep better because you are not snoring or restless. What are the side effects? Some people who use CPAP have: · A dry or stuffy nose and a sore throat. · Irritated skin on the face. · Sore eyes. · Bloating. If you have any of these problems, work with your doctor to fix them. Here are some things you can try: · Be sure the mask or nasal prongs fit well. · See if your doctor can adjust the pressure of your CPAP. · If your nose is dry, try a humidifier. · If your nose is runny or stuffy, try decongestant medicine or a steroid nasal spray. If these things do not help, you might try a different type of machine. Some machines have air pressure that adjusts on its own. Others have air pressures that are different when you breathe in than when you breathe out. This may reduce discomfort caused by too much pressure in your nose. Where can you learn more? Go to Seiratherm.be Enter G692 in the search box to learn more about \"Learning About CPAP for Sleep Apnea. \"  
© 6741-5496 Healthwise, Incorporated. Care instructions adapted under license by [facility] (which disclaims liability or warranty for this information). This care instruction is for use with your licensed healthcare professional. If you have questions about a medical condition or this instruction, always ask your healthcare professional. [address] any warranty or liability for your use of this information. Content Version: 2.0.62040; Last Revised: January 11, 2010 PROPER SLEEP HYGIENE What to avoid · Do not have drinks with caffeine, such as coffee or black tea, for 8 hours before bed. · Do not smoke or use other types of tobacco near bedtime. Nicotine is a stimulant and can keep you awake. · Avoid drinking alcohol late in the evening, because it can cause you to wake in the middle of the night. · Do not eat a big meal close to bedtime. If you are hungry, eat a light snack. · Do not drink a lot of water close to bedtime, because the need to urinate may wake you up during the night. · Do not read or watch TV in bed. Use the bed only for sleeping and sexual activity. What to try · Go to bed at the same time every night, and wake up at the same time every morning. Do not take naps during the day. · Keep your bedroom quiet, dark, and cool. · Get regular exercise, but not within 3 to 4 hours of your bedtime. Dwayne Martin · Sleep on a comfortable pillow and mattress. · If watching the clock makes you anxious, turn it facing away from you so you cannot see the time. · If you worry when you lie down, start a worry book. Well before bedtime, write down your worries, and then set the book and your concerns aside. · Try meditation or other relaxation techniques before you go to bed. · If you cannot fall asleep, get up and go to another room until you feel sleepy. Do something relaxing. Repeat your bedtime routine before you go to bed again. · Make your house quiet and calm about an hour before bedtime. Turn down the lights, turn off the TV, log off the computer, and turn down the volume on music. This can help you relax after a busy day. Drowsy Driving: The [facility] cites drowsiness as a causing factor in more than 561,970 police reported crashes annually, resulting in 76,000 injuries and 1,500 deaths. Other surveys suggest 55% of people polled have driven while drowsy in the past year, 23% had fallen asleep but not crashed, 3% crashed, and 2% had and accident due to drowsy driving. Who is at risk? Young Drivers: One study of drowsy driving accidents states that 55% of the drivers were under 25 years. Of those, 75% were male. Shift Workers and Travelers: People who work overnight or travel across time zones frequently are at higher risk of experiencing Circadian Rhythm Disorders. They are trying to work and function when their body is programed to sleep. Sleep Deprived: Lack of sleep has a serious impact on your ability to pay attention or focus on a task. Consistently getting less than the average of 8 hours your body needs creates partial or cumulative sleep deprivation.   
Untreated Sleep Disorders: Sleep Apnea, Narcolepsy, R.L.S., and other sleep disorders (untreated) prevent a person from getting enough restful sleep. This leads to excessive daytime sleepiness and increases the risk for drowsy driving accidents by up to 7 times. Medications / Alcohol: Even over the counter medications can cause drowsiness. Medications that impair a drivers attention should have a warning label. Alcohol naturally makes you sleepy and on its own can cause accidents. Combined with excessive drowsiness its effects are amplified. Signs of Drowsy Driving: * You don't remember driving the last few miles * You may drift out of your jina * You are unable to focus and your thoughts wander * You may yawn more often than normal 
 * You have difficulty keeping your eyes open / nodding off * Missing traffic signs, speeding, or tailgating Prevention-  
Good sleep hygiene, lifestyle and behavioral choices have the most impact on drowsy driving. There is no substitute for sleep and the average person requires 8 hours nightly. If you find yourself driving drowsy, stop and sleep. Consider the sleep hygiene tips provided during your visit as well. Medication Refill Policy: Refills for all medications require 1 week advance notice. Please have your pharmacy fax a refill request. We are unable to fax, or call in \"controled substance\" medications and you will need to pick these prescriptions up from our office. Digital Ally Activation Thank you for requesting access to Digital Ally. Please follow the instructions below to securely access and download your online medical record. Digital Ally allows you to send messages to your doctor, view your test results, renew your prescriptions, schedule appointments, and more. How Do I Sign Up? 1. In your internet browser, go to [email or web address]. 2. Click on the First Time User? Click Here link in the Sign In box. You will see the New Member Sign Up page. 3. Enter your Digital Ally Access Code exactly as it appears below.  You will not need to use this code after youve completed the sign-up process. If you do not sign up before the expiration date, you must request a new code. Mama Access Code: Activation code not generated Current Mama Status: Active (This is the date your Mama access code will ) 4. Enter the last four digits of your Social Security Number (xxxx) and Date of Birth (mm/dd/yyyy) as indicated and click Submit. You will be taken to the next sign-up page. 5. Create a Activiomicst ID. This will be your Mama login ID and cannot be changed, so think of one that is secure and easy to remember. 6. Create a Mama password. You can change your password at any time. 7. Enter your Password Reset Question and Answer. This can be used at a later time if you forget your password. 8. Enter your e-mail address. You will receive e-mail notification when new information is available in [address]. 9. Click Sign Up. You can now view and download portions of your medical record. 10. Click the Download Summary menu link to download a portable copy of your medical information. Additional Information If you have questions, please call [phone]. Remember, Mama is NOT to be used for urgent needs. For medical emergencies, dial 911.

## 2021-03-17 ENCOUNTER — VIRTUAL VISIT (OUTPATIENT)
Dept: SURGERY | Age: 41
End: 2021-03-17
Payer: COMMERCIAL

## 2021-03-17 VITALS — HEIGHT: 63 IN | BODY MASS INDEX: 40.75 KG/M2 | WEIGHT: 230 LBS

## 2021-03-17 DIAGNOSIS — G47.33 OSA (OBSTRUCTIVE SLEEP APNEA): ICD-10-CM

## 2021-03-17 DIAGNOSIS — E66.01 MORBID OBESITY (HCC): Primary | ICD-10-CM

## 2021-03-17 DIAGNOSIS — K21.9 GASTROESOPHAGEAL REFLUX DISEASE, UNSPECIFIED WHETHER ESOPHAGITIS PRESENT: ICD-10-CM

## 2021-03-17 DIAGNOSIS — R73.03 PREDIABETES: ICD-10-CM

## 2021-03-17 DIAGNOSIS — Z01.818 PRE-OP EXAM: ICD-10-CM

## 2021-03-17 PROCEDURE — 99202 OFFICE O/P NEW SF 15 MIN: CPT | Performed by: NURSE PRACTITIONER

## 2021-03-17 NOTE — PROGRESS NOTES
1. Have you been to the ER, urgent care clinic since your last visit? Hospitalized since your last visit? No  2. Have you seen or consulted any other health care providers outside of the [address] [place] [address] since your last visit? Include any pap smears or colon screening. No    Patient states she has not been weighed since January ( wt-230 lbs) . States she does not own a home scale.

## 2021-03-17 NOTE — PROGRESS NOTES
I was in the office while conducting this encounter. Consent:  She and/or her healthcare decision maker is aware that this patient-initiated Telehealth encounter is a billable service, with coverage as determined by her insurance carrier. She is aware that she may receive a bill and has provided verbal consent to proceed: Yes    This virtual visit was conducted via InsideTrack. Pursuant to the emergency declaration under the [facility], [facility] waiver authority and the [employer] and [employer] Act, this Virtual  Visit was conducted to reduce the patient's risk of exposure to COVID-19 and provide continuity of care for an established patient. Services were provided through a video synchronous discussion virtually to substitute for in-person clinic visit. Due to this being a TeleHealth evaluation, many elements of the physical examination are unable to be assessed. Total Time: minutes: 21-30 minutes. HISTORY OF PRESENT ILLNESS  Alex Hernandez is new patient presents today for evaluation for weight loss surgery. Patient has been overweight for the past 16 years. Her weight has ranged from 200-230 lbs. Her heaviest weight is 230lbs. Dietary Habits:  Breakfast- skips  Lunch- burger, chicken, leftovers  Dinner- Steak, spaghetti, fried chicken. Snack- chips  Liquids- water, cut back on soda. Prior weight loss attempts: Healthy lifestyle eating and phentermine. She states that it worked the best.   [address]     Do you Snore loudly? Yes  Do you often feel Tired, fatigued, or sleepy during the daytime? Yes  Has anyone Observed you stop breathing during your sleep? yes  Are you being treated for high blood Pressure? no  BMI more than 35 kg/m2? yes  Age over 48years old? No   Neck Circumference >16 inches?  No   Gender male? no  ______________________________________     SCORE: 3     If YES to 0 - 2, low risk of sleep apnea  If YES to 3 - 4 intermediate risk of having sleep apnea  If YES to 5 - 8 high risk of having sleep apnea (or 2 + BMI 35 or Neck > 17\" or Male)         Ms. Janice Ramos has a reminder for a \"due or due soon\" health maintenance. I have asked that she contact her primary care provider for follow-up on this health maintenance. Patient Active Problem List   Diagnosis Code    SOB (shortness of breath) on exertion R06.02    Snoring R06.83    Vitamin D deficiency E55.9    YUNIER (obstructive sleep apnea) G47.33    Fatigue R53.83    Chronic idiopathic constipation K59.04    Cyst of right breast N60.01    Obesity, Class II, BMI 35-39.9 E66.9    Exercise-induced asthma J45.990    Family history of breast cancer Z80.3    History of thyroid nodule Z86.39    Cyst of left breast N60.02    Severe obesity (HCC) E66.01    Thyroid nodule E04.1    Thyroid cyst E04.1       Past Medical History:   Diagnosis Date    Abnormal Pap smear of cervix 1990s    Breast cyst 2009    benign. Left. 1.7 cm and multiple others. Dr. Berry Records Chicken pox childhood    Chronic idiopathic constipation 2015    Dr. Harsha Skinner.  Chronic pain     HELM (dyspnea on exertion) 2015    Dr. Ori Yousif EBV positive mononucleosis syndrome 10/2013,     chronic or reactivated    Enlarged thyroid 2010, 2015    with Right tracheal deviation. Retrosternal goiter. Dr. Yesenia Holley.  Enlarged tonsils and adenoids 2010    GERD (gastroesophageal reflux disease)     Iodine-deficiency related goiter     Left. Dr. Yesenia Holley.  YUNIER (obstructive sleep apnea) 2015    Dr. Erica Anthony, uses cpap. Dr. Kimo Nguyen. States cpap broken. Dr. Nathanael Roach.  Thyroid cyst     Right.  Thyroid nodule 2019         Past Surgical History:   Procedure Laterality Date    HX BREAST BIOPSY Left 2018    benign    HX  SECTION  2004    [facility].     HX CYST INCISION AND DRAINAGE Left 2016 benign. Dr. Tamy Mayorga Right 09/15/2017    benign. Dr. Guy Jorge.  HX HERNIA REPAIR  9238    umbilical.    HX OTHER SURGICAL  5/2014    thyroid biopsy. Dr. Shweta Ohara.  HX THYROIDECTOMY Left 06/08/2015    due to [address] Dr. Yesenia Holley. benign. Maxine Snellen, DO      Allergies   Allergen Reactions    Skelaxin [Metaxalone] Other (comments)     Too groggy         Family History   Problem Relation Age of Onset    Hypertension Mother     Lung Disease Mother         chronic bronchitis    Sleep Apnea Mother     Hypertension Father     Diabetes Paternal Grandmother     No Known Problems Sister     No Known Problems Sister     No Known Problems Sister     No Known Problems Brother         murdered    Breast Cancer Maternal Aunt     Other Sister         severe scoliosis. txd with back brace.  Breast Cancer Maternal Aunt     Breast Cancer Maternal Aunt     No Known Problems Maternal Grandmother     No Known Problems Maternal Grandfather        Social History     Socioeconomic History    Marital status: SINGLE     Spouse name: Not on file    Number of children: 1    Years of education: Not on file    Highest education level: Not on file   Occupational History    Occupation:      Employer: [employer]   Social Needs    Financial resource strain: Not on file    Food insecurity     Worry: Not on file     Inability: Not on file    Transportation needs     Medical: Not on file     Non-medical: Not on file   Tobacco Use    Smoking status: Passive Smoke Exposure - Never Smoker    Smokeless tobacco: Never Used    Tobacco comment: lives with a smoker dad x 18 yrs and history of living with smoker boyfriend x 2 years   Substance and Sexual Activity    Alcohol use:  Yes     Alcohol/week: 2.0 standard drinks     Types: 2 Shots of liquor per week     Frequency: 2-4 times a month     Drinks per session: 1 or 2    Drug use: No    Sexual activity: Yes     Partners: Male     Birth control/protection: None   Lifestyle    Physical activity     Days per week: 5 days     Minutes per session: 30 min    Stress: Only a little   Relationships    Social connections     Talks on phone: Not on file     Gets together: Not on file     Attends [employer] service: Not on file     Active member of club or organization: Not on file     Attends meetings of clubs or organizations: Not on file     Relationship status: Not on file    Intimate partner violence     Fear of current or ex partner: Not on file     Emotionally abused: Not on file     Physically abused: Not on file     Forced sexual activity: Not on file   Other Topics Concern    Not on file   Social History Narrative    ** Merged History Encounter **          HPI    Review of Systems   Constitutional: Negative for chills, fever and malaise/fatigue. HENT: Negative for congestion, hearing loss, sinus pain, sore throat and tinnitus. Eyes: Negative for blurred vision, double vision, pain, discharge and redness. Respiratory: Negative for cough, sputum production, shortness of breath and wheezing. Cardiovascular: Negative for chest pain, palpitations and leg swelling. Gastrointestinal: Negative for abdominal pain, constipation, diarrhea, heartburn, nausea and vomiting. Genitourinary: Negative for dysuria, frequency and urgency. Musculoskeletal: Negative for back pain, joint pain and neck pain. Skin: Negative for itching and rash. Neurological: Negative for dizziness and headaches. Psychiatric/Behavioral: Negative for depression. The patient is not nervous/anxious and does not have insomnia. Physical Exam  HENT:      Mouth/Throat:      Mouth: Mucous membranes are moist.   Pulmonary:      Effort: No respiratory distress. Abdominal:      Tenderness: There is no abdominal tenderness (per patient. ).    Neurological:      Mental Status: She is alert and oriented to person, place, and time. ASSESSMENT and PLAN      Sleeve gastrectomy or vertical gastric resection involves a resection of the vast majority of the stomach usually done with a dilator pressed against the right half of the stomach of the lesser curvature. One preserves the pyloric sphincter at the lower end of the stomach and then sequentially staples and divides all the way up to the gastroesophageal junction leaving a small rim of the stomach to the left better known as the angle of His. This procedure significantly reduces the amount that the stomach can hold while removing the part of the stomach that secretes the hormone grehlin and alters the speed of food emptying from the stomach. Once food leaves the stomach, the remainder of the intestinal tract is completely intact and there is no effect on the digestion or absorption of food nutrients and calories. The procedure is intermediate between the adjustable gastric band and the gastric bypass as far as weight loss, potential complications and resolution of comorbid diseases such as Type 2 diabetes, high blood pressure, sleep apnea, arthritic pain, cholesterol disorders to mention a few. The usual complications are that of any procedure which affects the ability of the stomach to accept food at any given time. Those are usually nausea and vomiting which either spontaneously resolve or require endoscopic dilatation. The most serious complication from this surgery is a leak from the staple line usually near the  gastroesophageal junction. The frequency of this serious complication is low and usually heals spontaneously although reoperation may be necessary. The other serious complications are those which can occur with any major surgery and include  Infection, bleeding, blood clots and/or cardiopulmonary problems. Patient meets criteria established by the NIH.  Without weight reduction, co-morbidities will escalate as well as risk of early mortality. Recommendation is patient could be served with surgical weight reduction, the procedure of Sleeve gastrectomy. I explained to the patient differences between laparoscopic and open gastric bypass, laparoscopic adjustable gastric banding, and sleeve gastrectomy procedures. Patient has attended one our informational meeting and has seen our educational materials. Patient desires to have surgery with Dr. Amy Haile. I have reinforced without lifestyle change and behavior modification, they would not achieve his weight loss goals. I reviewed risks and complications associated with each procedure. Patient has been sent to [facility] for H. Pylori for  testing. Will treat based on results. She will need an UGI. Other recommendations include psychological evaluation, nutritional consultation. I discussed with patient a diet high in protein, low-fat, low- sugar, limited carbohydrates, and discontinuing use of carbonated beverages. Also discussed physical activity and exercises. I have answered all questions, they wish to proceed. 25 Minutes spent face to face with patient, >50 % of time spent counseling.    ** Copy to PCP and other providers

## 2021-03-17 NOTE — PATIENT INSTRUCTIONS
Learning About Weight-Loss (Bariatric) Surgery What is weight-loss surgery? Bariatric surgery is surgery to help you lose weight. This type of surgery is only used for people who are very overweight and have not been able to lose weight with diet and exercise. This surgery makes the stomach smaller. Some types of surgery also change the connection between your stomach and intestines. Having weight-loss surgery is a big step. After surgery, you'll need to make new, lifelong changes in how you eat and drink. How is weight-loss surgery done? Bariatric surgery may be either \"open\" or \"laparoscopic. \" Open surgery is done through a large cut (incision) in the belly. Laparoscopic surgery is done through several small cuts. The doctor puts a lighted tube, or scope, and other surgical tools through small cuts in your belly. The doctor is able to see your organs with the scope. There are different types of bariatric surgery. Gastric sleeve surgery The surgery is usually done through several small incisions in the belly. The doctor removes more than half of your stomach. This leaves a thin sleeve, or tube, that is about the size of a banana. Because part of your stomach has been removed, this can't be reversed. Jennifer-en-Y gastric bypass surgery Jennifer-en-Y (say \"marcella-en-why\") surgery changes the connection between the stomach and the intestines. The doctor separates a section of your stomach from the rest of your stomach. This makes a small pouch. The new pouch will hold the food you eat. The doctor connects the stomach pouch to the middle part of the small intestine. Gastric banding surgery The surgery is usually done through several small incisions in the belly. The doctor wraps a band around the upper part of the stomach. This creates a small pouch. The small size of the pouch means that you will get full after you eat just a small amount of food.  The doctor can inflate or deflate the band to adjust the size. This lets the doctor adjust how quickly food passes from the new pouch into the stomach. It does not change the connection between the stomach and the intestines. What can you expect after the surgery? You may stay in the hospital for one or more days after the surgery. How long you stay depends on the type of surgery you had. Most people need 2 to 4 weeks before they are ready to get back to their usual routine. For the first 2 to 6 weeks after surgery, you probably will need to follow a liquid or soft diet. Bit by bit, you will be able to eat more solid foods. Your doctor may advise you to work with a dietitian. This way you'll be sure to get enough protein, vitamins, and minerals while you are losing weight. Even with a healthy diet, you may need to take vitamin and mineral supplements. After surgery, you will not be able to eat very much at one time. You will get full quickly. Try not to eat too much at one time or eat foods that are high in fat or sugar. If you do, you may vomit, get stomach pain, or have diarrhea. You probably will lose weight very quickly in the first few months after surgery. As time goes on, your weight loss will slow down. You will have regular doctor visits to check how you are doing. Think of bariatric surgery as a tool to help you lose weight. It isn't an instant fix. You will still need to eat a healthy diet and get regular exercise. This will help you reach your weight goal and avoid regaining the weight you lose. Follow-up care is a key part of your treatment and safety. Be sure to make and go to all appointments, and call your doctor if you are having problems. It's also a good idea to know your test results and keep a list of the medicines you take. Where can you learn more? Go to http://www.gray.com/ Enter G469 in the search box to learn more about \"Learning About Weight-Loss (Bariatric) Surgery. \" Current as of: December 11, 6899               NPTXMFR Version: 12.6 © 1994-2505 [employer]. Care instructions adapted under license by [employer] (which disclaims liability or warranty for this information). If you have questions about a medical condition or this instruction, always ask your healthcare professional. [address] any warranty or liability for your use of this information.

## 2021-04-28 ENCOUNTER — CLINICAL SUPPORT (OUTPATIENT)
Dept: SURGERY | Age: 41
End: 2021-04-28

## 2021-04-28 DIAGNOSIS — E66.01 MORBID OBESITY (HCC): Primary | ICD-10-CM

## 2021-05-26 ENCOUNTER — CLINICAL SUPPORT (OUTPATIENT)
Dept: SURGERY | Age: 41
End: 2021-05-26

## 2021-05-26 DIAGNOSIS — E66.01 MORBID OBESITY (HCC): Primary | ICD-10-CM

## 2021-05-26 NOTE — PROGRESS NOTES
[facility] at [facility]  Supervised Weight Loss     Date:   2021    Patient's Name: Karthik Bravo  : 1980    Insurance: Medical [place]            Session: 2 of  3 (90 days required)  Surgery: Sleeve Gastrectomy  Surgeon:  Lester Akers M.D. Height: 63\"  Reported Wt:    223      Lbs. BMI: 39   Pounds Lost since last month: 7#               Pounds Gained since last month: 0    Starting Weight: 230#   Previous Months Weight: 230#  Overall Pounds Lost: 7#  Overall Pounds Gained: 0    Other Pertinent Information: Today's appointment was completed in a virtual setting d/t COVID-[employer]. Today's wt was self-reported. Smoking Status:  none  Alcohol Intake: socially     I have reviewed with pt the guidelines of the supervised wt loss program.  Pt understands the expectations of some wt loss during the program and that wt gain could delay the process. I have also explained that appointments need to be consecutive and missing an appointment may result in starting over. Pt has received this information in writing. Changes that patient has made since last month include:  Using protein shakes for breakfast, eliminated soda. Eating Habits and Behaviors  A nutrition lesson was presented on label reading with specific guidelines provided for limiting added sugars. This information will help increase healthy food choices, promote weight loss and prevent dumping syndrome after gastric bypass. We also reviewed the general nutrition guidelines for bariatric surgery. Patient's current diet habits include: Pt is eating 3 meals per day. Using a protein shake for breakfast. Packing lunch from home and eating more sandwiches and fruit instead of Ramen noodles. Eating chicken and vegetables at dinner. Snack choices include fruit, nuts, chips. Pt is eating refined carbohydrate foods (bread, pasta, rice, potatoes) daily.  Pt is eating meals prepared outside of the home minimal this past month and mostly on special occasions. Pt is drinking mostly water or coffee (with sugar), eliminated soda this past month. Physical Activity/Exercise  We talked about the importance of increasing daily physical activity and beginning to develop an exercise regimen/routine. We talked about exercise as being an important part of long term weight loss after surgery. Comments:  During class, I discussed with patient the importance of getting into an exercise routine. Pt is currently walking daily for activity. Pt has been encouraged to maintain and increase as tolerated. Behavior Modification       We talked about how to eat more mindfully. Tips and recommendations for how to make these changes were provided. Pt was encouraged to keep a food journal and record what they were taking in daily. Overall Assessment: Pt demonstrates appropriate lifestyle changes evidenced by reported changes and reported wt loss. Will continue to assess. Patient-Set Goals:   1. Nutrition - maintain current changes   2. Exercise - maintain walking as tolerated   3.  Behavior -review nutrition education materials     Rolando Galloway, GILMA  5/26/2021

## 2021-06-16 ENCOUNTER — OFFICE VISIT (OUTPATIENT)
Dept: FAMILY MEDICINE CLINIC | Age: 41
End: 2021-06-16
Payer: COMMERCIAL

## 2021-06-16 VITALS
OXYGEN SATURATION: 99 % | HEART RATE: 103 BPM | BODY MASS INDEX: 38.62 KG/M2 | RESPIRATION RATE: 16 BRPM | TEMPERATURE: 96.6 F | SYSTOLIC BLOOD PRESSURE: 118 MMHG | WEIGHT: 218 LBS | HEIGHT: 63 IN | DIASTOLIC BLOOD PRESSURE: 88 MMHG

## 2021-06-16 DIAGNOSIS — E89.0 S/P THYROIDECTOMY: ICD-10-CM

## 2021-06-16 DIAGNOSIS — E88.81 METABOLIC SYNDROME: Primary | ICD-10-CM

## 2021-06-16 DIAGNOSIS — D75.839 THROMBOCYTOSIS: ICD-10-CM

## 2021-06-16 DIAGNOSIS — Z12.31 ENCOUNTER FOR SCREENING MAMMOGRAM FOR BREAST CANCER: ICD-10-CM

## 2021-06-16 LAB
ALBUMIN SERPL-MCNC: 3.8 G/DL (ref 3.5–5)
ALBUMIN/GLOB SERPL: 1 {RATIO} (ref 1.1–2.2)
ALP SERPL-CCNC: 114 U/L (ref 45–117)
ALT SERPL-CCNC: 12 U/L (ref 12–78)
ANION GAP SERPL CALC-SCNC: 7 MMOL/L (ref 5–15)
APPEARANCE UR: ABNORMAL
AST SERPL-CCNC: 10 U/L (ref 15–37)
BACTERIA URNS QL MICRO: ABNORMAL /HPF
BASOPHILS # BLD: 0 K/UL (ref 0–0.1)
BASOPHILS NFR BLD: 1 % (ref 0–1)
BILIRUB SERPL-MCNC: 0.5 MG/DL (ref 0.2–1)
BILIRUB UR QL: NEGATIVE
BUN SERPL-MCNC: 9 MG/DL (ref 6–20)
BUN/CREAT SERPL: 14 (ref 12–20)
CALCIUM SERPL-MCNC: 9.8 MG/DL (ref 8.5–10.1)
CHLORIDE SERPL-SCNC: 107 MMOL/L (ref 97–108)
CHOLEST SERPL-MCNC: 130 MG/DL
CO2 SERPL-SCNC: 25 MMOL/L (ref 21–32)
COLOR UR: ABNORMAL
CREAT SERPL-MCNC: 0.63 MG/DL (ref 0.55–1.02)
DIFFERENTIAL METHOD BLD: ABNORMAL
EOSINOPHIL # BLD: 0.2 K/UL (ref 0–0.4)
EOSINOPHIL NFR BLD: 3 % (ref 0–7)
EPITH CASTS URNS QL MICRO: ABNORMAL /LPF
ERYTHROCYTE [DISTWIDTH] IN BLOOD BY AUTOMATED COUNT: 12.9 % (ref 11.5–14.5)
EST. AVERAGE GLUCOSE BLD GHB EST-MCNC: 111 MG/DL
GLOBULIN SER CALC-MCNC: 3.9 G/DL (ref 2–4)
GLUCOSE SERPL-MCNC: 94 MG/DL (ref 65–100)
GLUCOSE UR STRIP.AUTO-MCNC: NEGATIVE MG/DL
HBA1C MFR BLD: 5.5 % (ref 4–5.6)
HCT VFR BLD AUTO: 40.3 % (ref 35–47)
HDLC SERPL-MCNC: 53 MG/DL
HDLC SERPL: 2.5 {RATIO} (ref 0–5)
HGB BLD-MCNC: 12 G/DL (ref 11.5–16)
HGB UR QL STRIP: NEGATIVE
HYALINE CASTS URNS QL MICRO: ABNORMAL /LPF (ref 0–5)
IMM GRANULOCYTES # BLD AUTO: 0 K/UL (ref 0–0.04)
IMM GRANULOCYTES NFR BLD AUTO: 0 % (ref 0–0.5)
KETONES UR QL STRIP.AUTO: NEGATIVE MG/DL
LDLC SERPL CALC-MCNC: 64.4 MG/DL (ref 0–100)
LEUKOCYTE ESTERASE UR QL STRIP.AUTO: ABNORMAL
LYMPHOCYTES # BLD: 2.2 K/UL (ref 0.8–3.5)
LYMPHOCYTES NFR BLD: 30 % (ref 12–49)
MCH RBC QN AUTO: 26.1 PG (ref 26–34)
MCHC RBC AUTO-ENTMCNC: 29.8 G/DL (ref 30–36.5)
MCV RBC AUTO: 87.6 FL (ref 80–99)
MONOCYTES # BLD: 0.4 K/UL (ref 0–1)
MONOCYTES NFR BLD: 6 % (ref 5–13)
NEUTS SEG # BLD: 4.5 K/UL (ref 1.8–8)
NEUTS SEG NFR BLD: 60 % (ref 32–75)
NITRITE UR QL STRIP.AUTO: NEGATIVE
NRBC # BLD: 0 K/UL (ref 0–0.01)
NRBC BLD-RTO: 0 PER 100 WBC
PH UR STRIP: 5.5 [PH] (ref 5–8)
PLATELET # BLD AUTO: 299 K/UL (ref 150–400)
PMV BLD AUTO: 10.2 FL (ref 8.9–12.9)
POTASSIUM SERPL-SCNC: 4.1 MMOL/L (ref 3.5–5.1)
PROT SERPL-MCNC: 7.7 G/DL (ref 6.4–8.2)
PROT UR STRIP-MCNC: NEGATIVE MG/DL
RBC # BLD AUTO: 4.6 M/UL (ref 3.8–5.2)
RBC #/AREA URNS HPF: ABNORMAL /HPF (ref 0–5)
SODIUM SERPL-SCNC: 139 MMOL/L (ref 136–145)
SP GR UR REFRACTOMETRY: 1.02 (ref 1–1.03)
T4 FREE SERPL-MCNC: 1.1 NG/DL (ref 0.8–1.5)
TRIGL SERPL-MCNC: 63 MG/DL (ref ?–150)
TSH SERPL DL<=0.05 MIU/L-ACNC: 1.46 UIU/ML (ref 0.36–3.74)
UROBILINOGEN UR QL STRIP.AUTO: 1 EU/DL (ref 0.2–1)
VLDLC SERPL CALC-MCNC: 12.6 MG/DL
WBC # BLD AUTO: 7.4 K/UL (ref 3.6–11)
WBC URNS QL MICRO: ABNORMAL /HPF (ref 0–4)

## 2021-06-16 PROCEDURE — 99213 OFFICE O/P EST LOW 20 MIN: CPT | Performed by: FAMILY MEDICINE

## 2021-06-16 RX ORDER — VITAMIN E CAP 100 UNIT 100 UNIT
100 CAP ORAL DAILY
COMMUNITY

## 2021-06-16 RX ORDER — FLUTICASONE PROPIONATE 50 MCG
1 SPRAY, SUSPENSION (ML) NASAL DAILY
Qty: 1 BOTTLE | Refills: 3 | Status: SHIPPED | OUTPATIENT
Start: 2021-06-16

## 2021-06-16 RX ORDER — AZITHROMYCIN 250 MG/1
TABLET, FILM COATED ORAL
Qty: 6 TABLET | Refills: 0 | Status: SHIPPED | OUTPATIENT
Start: 2021-06-16 | End: 2021-09-29 | Stop reason: ALTCHOICE

## 2021-06-16 NOTE — PROGRESS NOTES
1. Have you been to the ER, urgent care clinic since your last visit? Hospitalized since your last visit? No    2. Have you seen or consulted any other health care providers outside of the [address] [place] [address] since your last visit? Include any pap smears or colon screening. Yes [facility] gynecology     Chief Complaint   Patient presents with   [facility]     No complaints.

## 2021-06-18 DIAGNOSIS — R12 HEARTBURN: ICD-10-CM

## 2021-06-19 NOTE — PROGRESS NOTES
HISTORY OF PRESENT ILLNESS  Lynsey Blake is a 39 y.o. female. Upper respiratory problem    Started >9 days ago not better,   otc not helping, have a Sore throat, with a Cough which are Productive yellowish , no hx of asthma, no COPD, there has been a lot of decrease in the patient's sleep pattern, in addition there has been some muscle ache responding to OTC , no diarhea, no ear ache,also there has been a decrease in the appetite, has been had an exposure to sick person, fortunately an XXX smoker      Current Outpatient Medications   Medication Sig Dispense Refill    vitamin e (E GEMS) 100 unit capsule Take 100 Units by mouth daily. Unsure of the strength.  fluticasone propionate (FLONASE) 50 mcg/actuation nasal spray 1 [place] by Both Nostrils route daily. 1 Bottle 3    azithromycin (ZITHROMAX) 250 mg tablet 2 first day then one tab daily till finished 6 Tablet 0    ergocalciferol (ERGOCALCIFEROL) 1,250 mcg (50,000 unit) capsule Take 1 Cap by mouth every seven (7) days. Indications: low vitamin D levels 5 Cap 5    levothyroxine (SYNTHROID) 25 mcg tablet Take 1 Tab by mouth Daily (before breakfast). 90 Tab 3    esomeprazole (NEXIUM) 40 mg capsule Take one capsule by mouth daily for Heartburn  Indications: heartburn 30 Cap 5    dicyclomine (BENTYL) 20 mg tablet Take 1 Tab by mouth every six (6) hours. Indications: abdominal spasm 30 Tab 2    cyanocobalamin (VITAMIN B-12) 1,000 mcg tablet Take 1,000 mcg by mouth daily. Allergies   Allergen Reactions    Skelaxin [Metaxalone] Other (comments)     Too groggy     Past Medical History:   Diagnosis Date    Abnormal Pap smear of cervix 1990s    Breast cyst 2009    benign. Left. 1.7 cm and multiple others. Dr. Rhonda Hopkins Chicken pox childhood    Chronic idiopathic constipation 07/2015    Dr. Smith Cousin.     Chronic pain     HELM (dyspnea on exertion) 02/2015    Dr. Neeru Sales EBV positive mononucleosis syndrome 10/2013, 2014    chronic or reactivated    Enlarged thyroid 2010, 2015    with Right tracheal deviation. Retrosternal goiter. Dr. eJnnifer Castorena.  Enlarged tonsils and adenoids 2010    GERD (gastroesophageal reflux disease)     Iodine-deficiency related goiter     Left. Dr. Jennifer Castorena.  YUNIER (obstructive sleep apnea) 2015    Dr. Lisa Palomo, uses cpap. Dr. Babatunde Etienne. States cpap broken. Dr. Damon Holland.  Thyroid cyst     Right.  Thyroid nodule 2019     Past Surgical History:   Procedure Laterality Date    HX BREAST BIOPSY Left 2018    benign    HX  SECTION  2004    [facility].  HX CYST INCISION AND DRAINAGE Left 2016    benign. Dr. Holden Ferreira Right 09/15/2017    benign. Dr. Radha Bella.  HX HERNIA REPAIR  [id number]    umbilical.    HX OTHER SURGICAL  2014    thyroid biopsy. Dr. Radha Zhao.  HX THYROIDECTOMY Left 2015    due to [address] Dr. Jennifer Castorena. benign. Family History   Problem Relation Age of Onset    Hypertension Mother     Lung Disease Mother         chronic bronchitis    Sleep Apnea Mother     Hypertension Father     Diabetes Paternal Grandmother     No Known Problems Sister     No Known Problems Sister     No Known Problems Sister     No Known Problems Brother         murdered    Breast Cancer Maternal Aunt     Other Sister         severe scoliosis. txd with back brace.  Breast Cancer Maternal Aunt     Breast Cancer Maternal Aunt     No Known Problems Maternal Grandmother     No Known Problems Maternal Grandfather      Social History     Tobacco Use    Smoking status: Passive Smoke Exposure - Never Smoker    Smokeless tobacco: Never Used    Tobacco comment: lives with a smoker dad x 18 yrs and history of living with smoker boyfriend x 2 years   Substance Use Topics    Alcohol use:  Yes     Alcohol/week: 2.0 standard drinks     Types: 2 Shots of liquor per week      Lab Results   Component Value Date/Time    WBC 7.4 06/16/2021 12:40 PM    HGB 12.0 06/16/2021 12:40 PM    Hemoglobin (POC) 12.3 06/08/2015 11:44 AM    HCT 40.3 06/16/2021 12:40 PM    PLATELET 889 51/37/5738 12:40 PM    MCV 87.6 06/16/2021 12:40 PM     Lab Results   Component Value Date/Time    GFR est non-AA >60 06/16/2021 12:40 PM    GFR est AA >60 06/16/2021 12:40 PM    Creatinine 0.63 06/16/2021 12:40 PM    BUN 9 06/16/2021 12:40 PM    Sodium 139 06/16/2021 12:40 PM    Potassium 4.1 06/16/2021 12:40 PM    Chloride 107 06/16/2021 12:40 PM    CO2 25 06/16/2021 12:40 PM    Magnesium 2.1 09/18/2010 02:50 AM        Review of Systems   Constitutional: Positive for malaise/fatigue. Negative for chills and fever. HENT: Negative for nosebleeds. Eyes: Negative for pain. Respiratory: Negative for cough and wheezing. Cardiovascular: Negative for chest pain and leg swelling. Gastrointestinal: Negative for constipation, diarrhea and nausea. Genitourinary: Negative for frequency. Musculoskeletal: Negative for joint pain and myalgias. Skin: Negative for rash. Neurological: Negative for loss of consciousness and headaches. Endo/Heme/Allergies: Does not bruise/bleed easily. Psychiatric/Behavioral: Negative for depression. The patient is not nervous/anxious and does not have insomnia. All other systems reviewed and are negative. Physical Exam  Vitals and nursing note reviewed. Constitutional:       Appearance: She is well-developed. HENT:      Head: Normocephalic and atraumatic. Eyes:      Conjunctiva/sclera: Conjunctivae normal.   Cardiovascular:      Rate and Rhythm: Normal rate and regular rhythm. Heart sounds: Normal heart sounds. No murmur heard. Pulmonary:      Effort: Pulmonary effort is normal.      Breath sounds: Normal breath sounds. Abdominal:      General: Bowel sounds are normal. There is no distension. Palpations: Abdomen is soft.    Musculoskeletal: General: Normal range of motion. Cervical back: Normal range of motion and neck supple. Lymphadenopathy:      Cervical: Cervical adenopathy present. Skin:     Findings: No erythema. Neurological:      Mental Status: She is alert and oriented to person, place, and time. Psychiatric:         Behavior: Behavior normal.         ASSESSMENT and PLAN  Diagnoses and all orders for this visit:    1. Metabolic syndrome  -     CBC WITH AUTOMATED DIFF; Future  -     LIPID PANEL; Future  -     HEMOGLOBIN A1C WITH EAG; Future  -     METABOLIC PANEL, COMPREHENSIVE; Future  -     TSH 3RD GENERATION; Future  -     URINALYSIS W/ RFLX MICROSCOPIC; Future  -     T4, FREE; Future    2. Encounter for screening mammogram for breast cancer  -     [facility] MAMMO BI SCREENING INCL CAD; Future    3. Thrombocytosis (HCC)  -     CBC WITH AUTOMATED DIFF; Future  -     LIPID PANEL; Future  -     HEMOGLOBIN A1C WITH EAG; Future  -     METABOLIC PANEL, COMPREHENSIVE; Future  -     TSH 3RD GENERATION; Future  -     URINALYSIS W/ RFLX MICROSCOPIC; Future  -     T4, FREE; Future    4. S/P thyroidectomy  -     CBC WITH AUTOMATED DIFF; Future  -     LIPID PANEL; Future  -     HEMOGLOBIN A1C WITH EAG; Future  -     METABOLIC PANEL, COMPREHENSIVE; Future  -     TSH 3RD GENERATION; Future  -     URINALYSIS W/ RFLX MICROSCOPIC; Future  -     T4, FREE; Future    Other orders  -     fluticasone propionate (FLONASE) 50 mcg/actuation nasal spray; 1 [place] by Both Nostrils route daily.   -     azithromycin (ZITHROMAX) 250 mg tablet; 2 first day then one tab daily till finished        Lab results reviewed they are all nice and normal or as needed

## 2021-06-23 ENCOUNTER — OFFICE VISIT (OUTPATIENT)
Dept: SURGERY | Age: 41
End: 2021-06-23

## 2021-06-23 DIAGNOSIS — E66.01 MORBID OBESITY (HCC): Primary | ICD-10-CM

## 2021-06-24 NOTE — PROGRESS NOTES
[facility] at [facility]  Supervised Weight Loss     Date:   2021    Patient's Name: Flor Odonnell  : 1980    Insurance: Medical [place]                          Session: 3 of  3 (needs one more month for 90 days required)  Surgery: Sleeve Gastrectomy                     Surgeon:  Ashley Casiano M.D.      Height: 63\"                 Reported Wt:    218      Lbs. BMI: 38             Pounds Lost since last month: 5#               Pounds Gained since last month: 0     Starting Weight: 230#                       Previous Months Weight: 223#  Overall Pounds Lost: 12#                  Overall Pounds Gained: 0     Other Pertinent Information: Today's appointment was completed in a virtual setting d/t COVID-19. Today's wt was self-reported. Smoking Status:  none  Alcohol Intake: 2-3 drinks, 3 times per week    I have reviewed with pt the guidelines of the supervised wt loss program.  Pt understands the expectations of some wt loss during the program and that wt gain could delay the process. I have also explained that classes need to be consecutive. Missing a class may result in starting over. Pt has received this information in writing. Changes that patient has made since last month include:  Drinking a protein shake for breakfast, increased exercise, drinking more water. Eating Habits and Behaviors  General healthy eating guidelines were discussed. A nutrition lesson specific to the importance of protein intake after surgery was provided. We discussed food sources of protein, protein supplements and multiple reasons as to why protein is important after bariatric surgery. Pts were instructed to focus on including protein at every meal and practice eating protein first at the meal. Pts were encouraged to sample a protein shake for tolerance.  Patients were also instructed to use the balanced plate method for help with portion control and general healthy eating prior to surgery. We discussed measuring meals to 1/2 cup total per meal after surgery. Drinking only calorie-free, sugar-free and non-carbonated beverages. We discussed the importance of drinking 64 ounces of fluid per day to prevent dehydration post-operatively. Patient's current diet habits include: Pt is eating 2-3 meals per day. Snack choices include grapes, chips, raisins, vegetables. Pt is eating refined carbohydrate foods (bread, pasta, rice, potatoes) 4 times per week. Pt is eating sweets/desserts seldom. Pt is using baked, grilled, fried cooking methods. Pt is eating meals prepared outside of the home 1-3 times per week. Pt is drinking water, soda, coffee. Pt reports sometimes emotional eating. Physical Activity/Exercise  We talked about the importance of increasing daily physical activity and beginning to develop an exercise regimen/routine. We talked about exercise as being an important part of long term weight loss after surgery. Comments:  During class, I discussed with patient the importance of getting into an exercise routine. Pt is currently walking for activity. Pt has been encouraged to maintain and increase as tolerated. Behavior Modification       We talked about how to eat more mindfully. Tips and recommendations for how to make these changes were provided. Pt was encouraged to keep a food journal and record what they were taking in daily. Overall Assessment: Pt demonstrates appropriate lifestyle changes evidenced by reported changes and reported wt loss. Will continue to assess. Patient-Set Goals:   1. Nutrition - healthier meals, portion control   2. Exercise - continue walking  3.  Behavior -charting my intake     Katie Brewster, RD  6/24/2021

## 2021-06-29 DIAGNOSIS — R12 HEARTBURN: ICD-10-CM

## 2021-06-29 RX ORDER — ERGOCALCIFEROL 1.25 MG/1
50000 CAPSULE ORAL
Qty: 5 CAPSULE | Refills: 5 | Status: SHIPPED | OUTPATIENT
Start: 2021-06-29 | End: 2022-09-27

## 2021-06-29 RX ORDER — ESOMEPRAZOLE MAGNESIUM 40 MG/1
CAPSULE, DELAYED RELEASE ORAL
Qty: 30 CAPSULE | Refills: 5 | Status: SHIPPED | OUTPATIENT
Start: 2021-06-29 | End: 2021-09-29 | Stop reason: SDUPTHER

## 2021-06-29 NOTE — TELEPHONE ENCOUNTER
Last visit:6/16/21  Next visit:not scheduled  Previous refill 3/31/20(5+5R)    Requested Prescriptions     Pending Prescriptions Disp Refills    ergocalciferol (ERGOCALCIFEROL) 1,250 mcg (50,000 unit) capsule 5 Capsule 5     Sig: Take 1 Capsule by mouth every seven (7) days.  Indications: low vitamin D levels

## 2021-07-09 RX ORDER — ESOMEPRAZOLE MAGNESIUM 40 MG/1
CAPSULE, DELAYED RELEASE ORAL
Qty: 30 CAPSULE | Refills: 5 | Status: SHIPPED | OUTPATIENT
Start: 2021-07-09 | End: 2021-11-15 | Stop reason: SDUPTHER

## 2021-07-09 RX ORDER — ERGOCALCIFEROL 1.25 MG/1
CAPSULE ORAL
Qty: 5 CAPSULE | Refills: 5 | Status: SHIPPED | OUTPATIENT
Start: 2021-07-09 | End: 2021-11-11 | Stop reason: ALTCHOICE

## 2021-07-12 LAB
H PYLORI IGA SER-ACNC: <9 UNITS (ref 0–8.9)
H PYLORI IGG SER IA-ACNC: >9.4 INDEX VALUE (ref 0–0.79)

## 2021-07-12 NOTE — PROGRESS NOTES
HISTORY OF PRESENT ILLNESS  Enedelia Segovia is a 39 y.o. female. HPI Established patient presents for follow-up as a high risk patient due to her family history of breast cancer. Denies breast mass, skin changes, nipple discharge and pain. Breast history -   Referring - Dr. Jd Xiong   2018 - LEFT breast needle biopsy revealed fibroadenoma       Family history-  3 maternal aunts had breast cancer, all in their 52's. 1 , 2 living. 2018 - lifetime risk calculated by Dr. Opal Melvin - 20.4%. OB History    No obstetric history on file. Obstetric Comments   Menarche:  6. LMP: 3/1/18. # of Children:  1. Age at Delivery of First Child:  25.   Hysterectomy/oophorectomy:  NO/NO. Breast Bx:  no.  Hx of Breast Feeding:  no. BCP:  yes. Hormone therapy:  no.                      Past Surgical History:   Procedure Laterality Date    HX BREAST BIOPSY Left 2018    benign    HX  SECTION  2004    [facility].  HX CYST INCISION AND DRAINAGE Left 2016    benign. Dr. Harshil Whatley Right 09/15/2017    benign. Dr. Chad Tucker.  HX HERNIA REPAIR  0070    umbilical.    HX OTHER SURGICAL  2014    thyroid biopsy. Dr. Ishmael Jonas.  HX THYROIDECTOMY Left 2015    due to [address] Dr. Mohinder Simms. benign. [facility] Results (most recent):  Results from [place] encounter on 10/14/20    [facility] 3D [place] W MAMMO BI SCREENING INCL CAD    Narrative  STUDY: Bilateral digital screening mammogram with 3-D tomosynthesis    INDICATION:  Screening. COMPARISON: Most recent     BREAST COMPOSITION: The breasts are heterogeneously dense, which may obscure  small masses. FINDINGS: Bilateral digital screening mammography was performed and is  interpreted in conjunction with a computer assisted detection (CAD) system.   Additionally, tomosynthesis of both breasts in the CC and MLO projections was  performed. There are multiple bilateral breast masses. There has been no  significant change. Impression  IMPRESSION:  BI-RADS 2: Benign. No mammographic evidence of malignancy. RECOMMENDATIONS:  Next screening mammogram is recommended in one year. The patient will be notified of these results. In accordance with [place] legislation enacted July 2012 (32.1-229 of the [facility]), we have informed this patient by letter that she may have dense  breast tissue. Dense breast tissue can hide cancer or other abnormalities. We  have instructed her to contact her referring physician if she has any questions  or concerns about this report. There are many factors that can increase a  woman's risk of developing breast cancer, including a family history of breast  cancer. A woman's lifetime risk of developing breast cancer can be estimated  using the Breast Cancer Risk Assessment Tool, found on the [employer] website (www.cancer.gov/bcrisktool/). The addition of breast MRI as a  screening tool may be useful for women with lifetime risk assessments greater  than 20%. ROS    Physical Exam  Constitutional:       Appearance: Normal appearance. Chest:      Breasts:         Right: No mass, nipple discharge, skin change or tenderness. Left: Mass present. No nipple discharge, skin change or tenderness. Musculoskeletal:      Comments: FROM - UE x 2   Lymphadenopathy:      Upper Body:      Right upper body: No supraclavicular or axillary adenopathy. Left upper body: No supraclavicular or axillary adenopathy. Neurological:      Mental Status: She is alert.    Psychiatric:         Attention and Perception: Attention normal.         Mood and Affect: Mood normal.         Speech: Speech normal.         Behavior: Behavior normal.       Visit Vitals  BP (!) 140/81 (BP 1 Location: Left upper arm, BP Patient Position: Sitting, BP Cuff Size: Adult)   Pulse 91   Ht 5' 3\" (1.6 m) Wt 218 lb (98.9 kg)   BMI 38.62 kg/m²         ASSESSMENT and PLAN  Encounter Diagnoses   Name Primary?  Fibrocystic breast disease (FCBD), unspecified laterality Yes    At high risk for breast cancer         Normal exam with no evidence of malignancy. Firm area under LEFT NAC 2-3:00 with biopsy proven fibroadenoma. BSmammogram 3D in 10/2021. Last breast MRI in 9/2019. Will schedule a breast MRI for after her next period. RTC in 1 year or sooner PRN. She is comfortable with this plan. All questions answered and she stated understanding. Total time spent for this patient - 20 minutes.

## 2021-07-13 ENCOUNTER — OFFICE VISIT (OUTPATIENT)
Dept: SURGERY | Age: 41
End: 2021-07-13
Payer: COMMERCIAL

## 2021-07-13 VITALS
WEIGHT: 218 LBS | BODY MASS INDEX: 38.62 KG/M2 | DIASTOLIC BLOOD PRESSURE: 81 MMHG | HEIGHT: 63 IN | HEART RATE: 91 BPM | SYSTOLIC BLOOD PRESSURE: 140 MMHG

## 2021-07-13 DIAGNOSIS — Z91.89 AT HIGH RISK FOR BREAST CANCER: ICD-10-CM

## 2021-07-13 DIAGNOSIS — N60.19 FIBROCYSTIC BREAST DISEASE (FCBD), UNSPECIFIED LATERALITY: Primary | ICD-10-CM

## 2021-07-13 PROCEDURE — 99213 OFFICE O/P EST LOW 20 MIN: CPT | Performed by: NURSE PRACTITIONER

## 2021-07-13 NOTE — PATIENT INSTRUCTIONS

## 2021-07-22 DIAGNOSIS — R76.8 HELICOBACTER PYLORI AB+: Primary | ICD-10-CM

## 2021-07-22 NOTE — PROGRESS NOTES
Paulino Austin,   I tried to call you. I wanted to let you know that one of your H. Pylori antibodies test came back positive. We will need to perform a another test called H. pylori breath test to see if this is a true positive. You will need to be off of your acid reducers for approximately 2 weeks before performing the test.  Once this test comes back it will let us know whether or not we need to treat you for H. Pylori.   I will be sending another lab slip in the mail   León alfred,  Anuj Ya

## 2021-07-23 ENCOUNTER — TELEPHONE (OUTPATIENT)
Dept: SURGERY | Age: 41
End: 2021-07-23

## 2021-07-23 NOTE — TELEPHONE ENCOUNTER
Patient called in stating she received a phone call from our office yesterday. They did not leave a voicemail. She is returning that call.    CB: [phone] (work number, until [address] [facility])         [phone] (call and gives permission to leave VM)

## 2021-07-29 ENCOUNTER — OFFICE VISIT (OUTPATIENT)
Dept: SURGERY | Age: 41
End: 2021-07-29

## 2021-07-29 DIAGNOSIS — E66.01 MORBID OBESITY (HCC): Primary | ICD-10-CM

## 2021-07-30 NOTE — PROGRESS NOTES
[facility]  Supervised Weight Loss     Date:   2021    Patient's Name: Allison Penny  : 1980    Insurance: Medical [place]                          Session: 4 of  3   Surgery: Sleeve Gastrectomy                     Surgeon: Nelia Goyal M.D.      Height: 63\"                 Reported Wt:    218      Lbs.                           NOZ: 14             Pounds Lost since last month: 7               FYVLLL Gained since last month: 0     Starting Weight: 230#                       Previous Months Weight: 218#  Overall Pounds Lost: 12#                  Overall Pounds Gained: 0  Other Pertinent Information: Today's appointment was completed in a virtual setting d/t COVID-19. Smoking Status: None  Alcohol Intake: 2-3 drinks per week    I have reviewed with pt the guidelines of the supervised wt loss program.  Pt understands the expectations of some wt loss during the program and that wt gain could delay the process. I have also explained that appointments need to be consecutive and missing an appointment may result in starting over. Pt has received this information in writing. Changes that patient has made since last month include: excersing more, drinking water and getting enough sleep. Eating Habits and Behaviors  A nutrition lesson specific to vitamins was provided. We discussed the various reasons for needing vitamins and different types and doses. General healthy eating guidelines were also discussed. Pts were instructed that their plate should be made up 1/2 plate coming from non-starchy vegetables, 1/4 coming from lean meat, and 1/4 of their plate coming from carbohydrates, including fruits, starches, or milk. We discussed measuring meals to 1/2 cup total per meal after surgery. Drinking only calorie-free, sugar-free and non-carbonated beverages.  We discussed the importance of drinking 64 ounces of fluid per day to prevent dehydration post-operatively. Patient's current diet habits include: Pt is eating 3 meals per day. Snack choices include peanuts, yogurt, chips pretzels. Pt is eating refined carbohydrate foods (bread, pasta, rice, potatoes) 4x week. Pt is not eating sweets/desserts. Pt is using baking, frying and broiling cooking methods. Pt is eating meals prepared outside of the home 1x week. Pt is drinking water, soda and alcohol. Pt reports emotional eating. Physical Activity/Exercise  We talked about the importance of increasing daily physical activity and beginning to develop an exercise regimen/routine. We talked about exercise as being an important part of long term weight loss after surgery. Comments:  During class, I discussed with patient the importance of getting into an exercise routine. Pt is currently walking, yoga, weights daily for activity. Pt has been encouraged to continue with current exercise. Behavior Modification       We talked about how to eat more mindfully and identify emotional eating triggers. Tips and recommendations for how to make these changes were provided. Pt was encouraged to keep a food journal and record what they were taking in daily. Overall Assessment: Pt demonstrates appropriate lifestyle changes evidenced by reported changes and weight loss over the past 6 months. Demonstrates understanding of nutrition guidelines for bariatric surgery, but note patient would benefit from further lifestyle changes (no alcohol/soda, addressing emotional eating). If pt able to demonstrate further lifestyle changes would be appropriate for surgery. Patient-Set Goals:   1. Nutrition - eat healthier, drink more water  2. Exercise - continue with daily exercise  3.  Behavior - stay focused    Trini Chang, [address] [place] [address]  7/30/2021

## 2021-08-20 ENCOUNTER — PATIENT MESSAGE (OUTPATIENT)
Dept: SURGERY | Age: 41
End: 2021-08-20

## 2021-08-20 ENCOUNTER — HOSPITAL ENCOUNTER (OUTPATIENT)
Dept: MRI IMAGING | Age: 41
Discharge: HOME | End: 2021-08-20
Attending: NURSE PRACTITIONER
Payer: COMMERCIAL

## 2021-08-20 DIAGNOSIS — Z91.89 AT HIGH RISK FOR BREAST CANCER: ICD-10-CM

## 2021-08-20 PROCEDURE — 74011000258 HC RX REV CODE- 258: Performed by: NURSE PRACTITIONER

## 2021-08-20 PROCEDURE — 77049 MRI BREAST C-+ W/CAD BI: CPT

## 2021-08-20 PROCEDURE — A9575 INJ GADOTERATE MEGLUMI 0.1ML: HCPCS

## 2021-08-20 PROCEDURE — 74011250636 HC RX REV CODE- 250/636

## 2021-08-20 RX ORDER — SODIUM CHLORIDE 0.9 % (FLUSH) 0.9 %
10 SYRINGE (ML) INJECTION
Status: COMPLETED | OUTPATIENT
Start: 2021-08-20 | End: 2021-08-20

## 2021-08-20 RX ORDER — GADOTERATE MEGLUMINE 376.9 MG/ML
INJECTION INTRAVENOUS
Status: COMPLETED
Start: 2021-08-20 | End: 2021-08-20

## 2021-08-20 RX ADMIN — SODIUM CHLORIDE 100 ML: 900 INJECTION, SOLUTION INTRAVENOUS at 12:00

## 2021-08-20 RX ADMIN — Medication 10 ML: at 12:00

## 2021-08-20 RX ADMIN — GADOTERATE MEGLUMINE 20 ML: 376.9 INJECTION INTRAVENOUS at 11:12

## 2021-08-24 ENCOUNTER — TELEPHONE (OUTPATIENT)
Dept: SURGERY | Age: 41
End: 2021-08-24

## 2021-08-25 ENCOUNTER — OFFICE VISIT (OUTPATIENT)
Dept: SURGERY | Age: 41
End: 2021-08-25

## 2021-08-25 DIAGNOSIS — E66.01 MORBID OBESITY (HCC): Primary | ICD-10-CM

## 2021-08-25 NOTE — PROGRESS NOTES
[facility] at [facility]  Supervised Weight Loss     Date:   2021    Patient's Name: Sanam Hassan  : 1980    Insurance: Medical [place]                          Session: 5 of  3   Surgery: Sleeve Gastrectomy                     Surgeon: Rebekah Amaral M.D.        Other Pertinent Information: Today's appointment was completed in a virtual setting d/t LVL6. Pt previously completed required sessions for weight loss/nutrition and is completing today's session as an additional/optional class.        I have reviewed with pt the guidelines of the supervised wt loss program.  Pt understands the expectations of some wt loss during the program and that wt gain could delay the process. I have also explained that appointments need to be consecutive and missing an appointment may result in starting over. Pt has received this information in writing. Eating Habits and Behaviors  General healthy eating guidelines were also discussed. Pts were instructed that their plate should be made up 1/2 plate coming from non-starchy vegetables, 1/4 coming from lean meat, and 1/4 of their plate coming from carbohydrates, including fruits, starches, or milk. We discussed measuring meals to 1/2 cup total per meal after surgery. Drinking only calorie-free, sugar-free and non-carbonated beverages. We discussed the importance of drinking 64 ounces of fluid per day to prevent dehydration post-operatively. Physical Activity/Exercise  We talked about the importance of increasing daily physical activity and beginning to develop an exercise regimen/routine. We talked about exercise as being an important part of long term weight loss after surgery. Comments:  During class, I discussed with patient the importance of getting into an exercise routine.       Behavior Modification       A behavior modification lesson was provided with an emphasis on developing mindful eating behaviors. We talked about how to eat more mindfully and identify emotional eating triggers. Tips and recommendations for how to make these changes were provided. Pt was encouraged to keep a food journal and record what they were taking in daily. Overall Assessment: Pt has previously completed 90 days (4 visits) of nutrition education and supervised weight loss. Demonstrated to be an appropriate candidate at that time. Completed today's session as an optional appointment to receive continued education, support and accountability while waiting for next steps in approval process for bariatric surgery.        Ramin Castorena, GILMA  8/25/2021

## 2021-09-29 ENCOUNTER — VIRTUAL VISIT (OUTPATIENT)
Dept: FAMILY MEDICINE CLINIC | Age: 41
End: 2021-09-29
Payer: COMMERCIAL

## 2021-09-29 DIAGNOSIS — Z71.89 ADVICE GIVEN ABOUT COVID-19 VIRUS INFECTION: ICD-10-CM

## 2021-09-29 DIAGNOSIS — J06.9 ACUTE URI: Primary | ICD-10-CM

## 2021-09-29 PROCEDURE — 99213 OFFICE O/P EST LOW 20 MIN: CPT | Performed by: FAMILY MEDICINE

## 2021-09-29 RX ORDER — AZITHROMYCIN 250 MG/1
TABLET, FILM COATED ORAL
Qty: 6 TABLET | Refills: 0 | Status: SHIPPED | OUTPATIENT
Start: 2021-09-29 | End: 2021-11-11 | Stop reason: ALTCHOICE

## 2021-09-29 NOTE — PROGRESS NOTES
Chief Complaint   Patient presents with    Fatigue     1. Have you been to the ER, urgent care clinic since your last visit? Hospitalized since your last visit? No    2. Have you seen or consulted any other health care providers outside of the [address] [place] [address] since your last visit? Include any pap smears or colon screening. No    Call placed to pt. Verified patient with two type of identifiers.  c/o decreased energy , stated \"body just feels off\"

## 2021-09-29 NOTE — PROGRESS NOTES
HISTORY OF PRESENT ILLNESS  Jose Vee is a 39 y.o. female. Pt's main concerns were provided on virtual visit, a telemed format, COVID-19 vaccinated  In April of 2021, was tested with rapid chen with neg pt is w/ comorbid medical history and unaware of been exposed to covid-19 individual, Pt Have been trying to stay safe  ,  pt has no fever no cough no dyspnea, no ha, not dizzy, nl smell nl taste, no N/V/D, no body ache  With neck pain, felt nuaseated has had neck sx few yrs ago, getting daily ha, started 10 days ago,no injury getting better,         Current Outpatient Medications   Medication Sig Dispense Refill    Vitamin D2 1,250 mcg (50,000 unit) capsule TAKE 1 CAPSULE BY MOUTH EVERY 7 DAYS 5 Capsule 5    esomeprazole (NEXIUM) 40 mg capsule TAKE 1 CAPSULE BY MOUTH ONE TIME A DAY FOR HEARTBURN GENERIC FOR NEXIUM 30 Capsule 5    ergocalciferol (ERGOCALCIFEROL) 1,250 mcg (50,000 unit) capsule Take 1 Capsule by mouth every seven (7) days. Indications: low vitamin D levels 5 Capsule 5    vitamin e (E GEMS) 100 unit capsule Take 100 Units by mouth daily. Unsure of the strength.  fluticasone propionate (FLONASE) 50 mcg/actuation nasal spray 1 [place] by Both Nostrils route daily. 1 Bottle 3    levothyroxine (SYNTHROID) 25 mcg tablet Take 1 Tab by mouth Daily (before breakfast). 90 Tab 3    dicyclomine (BENTYL) 20 mg tablet Take 1 Tab by mouth every six (6) hours. Indications: abdominal spasm 30 Tab 2    cyanocobalamin (VITAMIN B-12) 1,000 mcg tablet Take 1,000 mcg by mouth daily. Allergies   Allergen Reactions    Skelaxin [Metaxalone] Other (comments)     Too groggy     Past Medical History:   Diagnosis Date    Abnormal Pap smear of cervix 1990s    Breast cyst 2009    benign. Left. 1.7 cm and multiple others. Dr. Kentrell Hyatt Chicken pox childhood    Chronic idiopathic constipation 07/2015    Dr. Daysi Meyer.     Chronic pain     HELM (dyspnea on exertion) 02/2015    Dr. Benja Hylton EBV positive mononucleosis syndrome 10/2013,     chronic or reactivated    Enlarged thyroid 2010, 2015    with Right tracheal deviation. Retrosternal goiter. Dr. Lorrie Torre.  Enlarged tonsils and adenoids 2010    GERD (gastroesophageal reflux disease)     Iodine-deficiency related goiter     Left. Dr. Lorrie Torre.  YUNIER (obstructive sleep apnea) 2015    Dr. Sherwin Meyer, uses cpap. Dr. Steve Zeng. States cpap broken. Dr. Debora Linares.  Thyroid cyst     Right.  Thyroid nodule 2019     Past Surgical History:   Procedure Laterality Date    HX BREAST BIOPSY Left 2018    benign    HX  SECTION  2004    [facility].  HX CYST INCISION AND DRAINAGE Left 2016    benign. Dr. Anu Chatman Right 09/15/2017    benign. Dr. Nicci Gao.  HX HERNIA REPAIR  4411    umbilical.    HX OTHER SURGICAL  2014    thyroid biopsy. Dr. Valentin Salcido.  HX THYROIDECTOMY Left 2015    due to [address] Dr[address] Lorrie Torre. benign. Family History   Problem Relation Age of Onset    Hypertension Mother     Lung Disease Mother         chronic bronchitis    Sleep Apnea Mother     Hypertension Father     Diabetes Paternal Grandmother     No Known Problems Sister     No Known Problems Sister     No Known Problems Sister     No Known Problems Brother         murdered    Breast Cancer Maternal Aunt     Other Sister         severe scoliosis. txd with back brace.  Breast Cancer Maternal Aunt     Breast Cancer Maternal Aunt     No Known Problems Maternal Grandmother     No Known Problems Maternal Grandfather      Social History     Tobacco Use    Smoking status: Passive Smoke Exposure - Never Smoker    Smokeless tobacco: Never Used    Tobacco comment: lives with a smoker dad x 18 yrs and history of living with smoker boyfriend x 2 years   Substance Use Topics    Alcohol use:  Yes Alcohol/week: 2.0 standard drinks     Types: 2 Shots of liquor per week      Lab Results   Component Value Date/Time    Hemoglobin A1c 5.5 06/16/2021 12:40 PM    Hemoglobin A1c CANCELED 03/24/2020 08:31 AM    Hemoglobin A1c 5.4 03/05/2019 03:01 PM    Glucose 94 06/16/2021 12:40 PM    LDL, calculated 64.4 06/16/2021 12:40 PM    Creatinine 0.63 06/16/2021 12:40 PM      Lab Results   Component Value Date/Time    Cholesterol, total 130 06/16/2021 12:40 PM    HDL Cholesterol 53 06/16/2021 12:40 PM    LDL, calculated 64.4 06/16/2021 12:40 PM    Triglyceride 63 06/16/2021 12:40 PM    CHOL/HDL Ratio 2.5 06/16/2021 12:40 PM        Review of Systems   Constitutional: Negative for chills and fever. HENT: Positive for sore throat. Negative for congestion and nosebleeds. Eyes: Negative for blurred vision and pain. Respiratory: Negative for cough, shortness of breath and wheezing. Cardiovascular: Negative for chest pain and leg swelling. Gastrointestinal: Negative for constipation, diarrhea, nausea and vomiting. Genitourinary: Negative for dysuria and frequency. Musculoskeletal: Negative for joint pain and myalgias. Skin: Negative for itching and rash. Neurological: Negative for dizziness, loss of consciousness and headaches. Psychiatric/Behavioral: Negative for depression. The patient is not nervous/anxious and does not have insomnia. Physical Exam  Constitutional:       Appearance: She is obese. She is not ill-appearing or toxic-appearing. HENT:      Head: Normocephalic and atraumatic. Mouth/Throat:      Mouth: Mucous membranes are moist.   Neurological:      Mental Status: She is alert and oriented to person, place, and time. Psychiatric:         Mood and Affect: Mood normal.         Behavior: Behavior normal.         Thought Content: Thought content normal.         Judgment: Judgment normal.         ASSESSMENT and PLAN  Diagnoses and all orders for this visit:    1.  Acute URI  - azithromycin (ZITHROMAX) 250 mg tablet; 2 first day then one tab daily till finished    2. Advice given about COVID-19 virus infection  -     azithromycin (ZITHROMAX) 250 mg tablet; 2 first day then one tab daily till finished    Concern abdout COVID-19 addressed and detailed, pt was told that the best way to prevent illness is by vaccination and protection, to Wear a facemask , having social distance, and to get tested and re-tested, with contact tracing, in addition patient was recently advised to get tested today for better outcome  Pt was told that currently,there is no antiviral medication which is recommended to treat COVID-19. Current treatment is aimed to relieve sxs such as pain relievers no ibuprofen but mostly acetaminophen, anti Cough medication , plenty of Rest and a lot of oral hydration. Isolation and Contact tracing at this time     Also was advised to stay in isolation for 10-14 days at this time with cold sxs presentation, Pt was also told if develop dyspnea needs to call 911 or go to er, call for juan carlos advise, pt agreed with today's visit. Pursuant to the emergency declaration under the [employer] and [facility], 1135 waiver authority and the [employer] and [employer] Act, this Virtual Visit was conducted, with patient's consent, to reduce the patient's risk of exposure to COVID-19 and provide continuity of care for an established patient. Today's recommendations, Services were provided through a Video synchronous discussion virtually to substitute for in-person appointment.

## 2021-10-20 ENCOUNTER — HOSPITAL ENCOUNTER (OUTPATIENT)
Dept: MAMMOGRAPHY | Age: 41
Discharge: HOME | End: 2021-10-20
Attending: FAMILY MEDICINE
Payer: COMMERCIAL

## 2021-10-20 DIAGNOSIS — Z12.31 ENCOUNTER FOR SCREENING MAMMOGRAM FOR BREAST CANCER: ICD-10-CM

## 2021-10-20 PROCEDURE — 77067 SCR MAMMO BI INCL CAD: CPT

## 2021-11-03 ENCOUNTER — OFFICE VISIT (OUTPATIENT)
Dept: SLEEP MEDICINE | Age: 41
End: 2021-11-03
Payer: COMMERCIAL

## 2021-11-03 VITALS
RESPIRATION RATE: 20 BRPM | DIASTOLIC BLOOD PRESSURE: 93 MMHG | BODY MASS INDEX: 39.01 KG/M2 | WEIGHT: 220.2 LBS | OXYGEN SATURATION: 97 % | TEMPERATURE: 98 F | SYSTOLIC BLOOD PRESSURE: 141 MMHG | HEART RATE: 96 BPM

## 2021-11-03 DIAGNOSIS — E66.2 HYPOVENTILATION ASSOCIATED WITH OBESITY SYNDROME (HCC): ICD-10-CM

## 2021-11-03 DIAGNOSIS — Z11.52 ENCOUNTER FOR SCREENING FOR COVID-19: ICD-10-CM

## 2021-11-03 DIAGNOSIS — G47.33 OSA (OBSTRUCTIVE SLEEP APNEA): Primary | ICD-10-CM

## 2021-11-03 PROCEDURE — 99214 OFFICE O/P EST MOD 30 MIN: CPT | Performed by: INTERNAL MEDICINE

## 2021-11-03 NOTE — PROGRESS NOTES
[address] [place]. [place], [address]  Tel.  [phone]  Fax. 100 [facility] 60  [place], [address] [facility]  Tel.  [phone]  Fax. [phone] [address] [facility] Dagoberto Mcmahon   Tel.  [phone]  Fax. [phone] Edgardo Diehl (: 1980) is a 39 y.o. female, established patient, seen for positive airway pressure follow-up and evaluation of the following chief complaint(s):   Sleep Problem (yearly follow-up)       Kamila Campos was last seen by me on 20, prior notes reviewed in detail. Polysomnogram (PSG) performed on 20 showed an average AHI of 59 per hour with an SpO2 breana of 77% and SpO2 of < 88% being 15 minutes. ASSESSMENT/PLAN:    ICD-10-CM ICD-9-CM    1. YUNIER (obstructive sleep apnea)  G47.33 327.23 SLEEP LAB (PAP TITRATION)   2. Hypoventilation associated with obesity syndrome (HCC)  E66.2 278.03 SLEEP LAB (PAP TITRATION)   3. BMI 38.0-38.9,adult  Z68.38 V85.38    4. Encounter for screening for COVID-19  Z11.52 V73.89 NOVEL CORONAVIRUS (COVID-19)      NOVEL CORONAVIRUS (COVID-19)       On Respironics :  DreamStation Auto-CPAP. Set up date  08-10-20. Settings:  Min EPAP: 04 cmH2O  Max EPAP: 20 cmH2O      1. Sleep Apnea -   * She is adherent with PAP therapy and PAP continues to benefit patient and remains necessary for control of her sleep apnea. 2. Hypoventilation - She is morbidly obese and is planning on having bariatric surgery. The need of ongoing PAP therapy and the need to have her PAP device available for use in the post operative period discussed. She has had CO2 levels at 27 and 28 on metabolic screen in the past. In the abscence of significant cardiopulmonary disease this is suggestive of obesity hypoventilation. For this reason she agrees to switch to bi-level therapy.     Orders Placed This Encounter    NOVEL CORONAVIRUS (COVID-19)     Standing Status:   Future     Number of Occurrences:   1 Standing Expiration Date:   2/3/2022     Scheduling Instructions:      1) Due to current limited availability of the COVID-19 PCR test, tests will be prioritized and may not be completed.              2) Order only if the test result will change clinical management or necessary for a return to mission-critical employment decision.              3) Print and instruct patient to adhere to [facility] home isolation program. (Link Above)              4) Set up or refer patient for a monitoring program.              5) Have patient sign up for and leverage MyChart (if not previously done). Order Specific Question:   Status     Answer:   Asymptomatic/Surveillance(e.g. pre-op/pre-procedure/pre-delivery/transfer)     Order Specific Question:   Reason for Test     Answer:   Upcoming elective surgery/procedure/delivery, return to work, or discharge to another facility    SLEEP LAB (PAP TITRATION)     Standing Status:   Future     Standing Expiration Date:   2/3/2022     Scheduling Instructions:      Perform ETCO2 monitoring during Polysomnography     Order Specific Question:   Reason for Exam     Answer:   YUNIER       * She is familiar with the telephone monitoring application, is willing to track therapy and agrees to notify use if AHI is >5 per hour. * Counseling was provided regarding the importance of regular PAP use with emphasis on ensuring sufficient total sleep time, proper sleep hygiene, and safe driving. * Re-enforced proper and regular cleaning for the device. * She was asked to contact our office for any problems regarding PAP therapy. 2. Recommended a dedicated weight loss program through appropriate diet and exercise regimen as significant weight reduction has been shown to reduce severity of obstructive sleep apnea. Follow-up and Dispositions    · Return for telephone follow-up after testing is completed.            SUBJECTIVE/OBJECTIVE:    She  is seen today for follow up on PAP device and reports no problems using the device. The following concerns identified:    Drowsiness no Problems exhaling no   Snoring no Forget to put on yes   Mask Comfortable yes Can't fall asleep no   Dry Mouth no Mask falls off no   Air Leaking no Frequent awakenings no       She admits that her sleep has improved on PAP therapy using nasal pillows mask and heated tubing. Review of device download indicated:    Percent of Days with Usage >= 4 hours 21 %  Average Usage (Days Used) 5 hour 6 minutes    Average Device Pressure < =90% of Time  8.8 cmH2O    Average Time in Large Leak Per Day  1 minutes    Average AHI: 1.9 /hr which reflects significantly improved sleep breathing condition. Haskell Sleepiness Score: 14           Sleep Review of Systems: notable for Negative difficulty falling asleep; Positive awakenings at night without device; Positive  early morning headaches; Negative  memory problems; Negative  concentration issues; Negative  chest pain; Negative  shortness of breath;  Negative rashes or itching; Negative heartburn / belching / flatulence; Negative  significant mood issues; no afternoon naps per week.     Visit Vitals  BP (!) 141/93 (BP 1 Location: Right arm, BP Patient Position: Sitting, BP Cuff Size: Large adult)   Pulse 96   Temp 98 °F (36.7 °C) (Temporal)   Resp 20   Wt 220 lb 3.2 oz (99.9 kg)   SpO2 97%   BMI 39.01 kg/m²         General:   Not in acute distress   Eyes:  Anicteric sclerae, no obvious strabismus   Nose:  No obvious nasal septum deviation    Oropharynx:   Class 4 oropharyngeal outlet, thick tongue base, uvula not seen due to low-lying soft palate, narrow tonsilo-pharyngeal pilars   Tonsils:   tonsils are not visualized due to low-lying soft palate   Neck:   midline trachea   Chest/Lungs:  Equal lung expansion, clear on auscultation    CVS:  Normal rate, regular rhythm; no JVD   Skin:  Warm to touch; no obvious rashes   Neuro:  No focal deficits ; no obvious tremor    Psych:  Normal affect, normal countenance; An electronic signature was used to authenticate this note. Ofelia Grove MD, FAASM  Electronically signed.  11/03/21

## 2021-11-03 NOTE — PATIENT INSTRUCTIONS
[address] [place]. [place], [address]  Tel.  [phone]  Fax. [address] [facility] [address]  [place], [address]  Tel.  [phone]  Fax. [phone] [address] [facility]  Tel.  [phone]  Fax. [phone]     Learning About CPAP for Sleep Apnea  What is CPAP? CPAP is a small machine that you use at home every night while you sleep. It increases air pressure in your throat to keep your airway open. When you have sleep apnea, this can help you sleep better so you feel much better. CPAP stands for \"continuous positive airway pressure. \"  The CPAP machine will have one of the following:  · A mask that covers your nose and mouth  · Prongs that fit into your nose  · A mask that covers your nose only, the most common type. This type is called NCPAP. The N stands for \"nasal.\"  Why is it done? CPAP is usually the best treatment for obstructive sleep apnea. It is the first treatment choice and the most widely used. Your doctor may suggest CPAP if you have:  · Moderate to severe sleep apnea. · Sleep apnea and coronary artery disease (CAD) or heart failure. How does it help? · CPAP can help you have more normal sleep, so you feel less sleepy and more alert during the daytime. · CPAP may help keep heart failure or other heart problems from getting worse. · NCPAP may help lower your blood pressure. · If you use CPAP, your bed partner may also sleep better because you are not snoring or restless. What are the side effects? Some people who use CPAP have:  · A dry or stuffy nose and a sore throat. · Irritated skin on the face. · Sore eyes. · Bloating. If you have any of these problems, work with your doctor to fix them. Here are some things you can try:  · Be sure the mask or nasal prongs fit well. · See if your doctor can adjust the pressure of your CPAP. · If your nose is dry, try a humidifier.   · If your nose is runny or stuffy, try decongestant medicine or a steroid nasal spray. If these things do not help, you might try a different type of machine. Some machines have air pressure that adjusts on its own. Others have air pressures that are different when you breathe in than when you breathe out. This may reduce discomfort caused by too much pressure in your nose. Where can you learn more? Go to [employer]be  Enter Jing Feliciano in the search box to learn more about \"Learning About CPAP for Sleep Apnea. \"   © 9084-5736 Healthwise, Incorporated. Care instructions adapted under license by [facility] (which disclaims liability or warranty for this information). This care instruction is for use with your licensed healthcare professional. If you have questions about a medical condition or this instruction, always ask your healthcare professional. [place] [address] any warranty or liability for your use of this information. Content Version: 3.1.55355; Last Revised: January 11, 2010  PROPER SLEEP HYGIENE    What to avoid  · Do not have drinks with caffeine, such as coffee or black tea, for 8 hours before bed. · Do not smoke or use other types of tobacco near bedtime. Nicotine is a stimulant and can keep you awake. · Avoid drinking alcohol late in the evening, because it can cause you to wake in the middle of the night. · Do not eat a big meal close to bedtime. If you are hungry, eat a light snack. · Do not drink a lot of water close to bedtime, because the need to urinate may wake you up during the night. · Do not read or watch TV in bed. Use the bed only for sleeping and sexual activity. What to try  · Go to bed at the same time every night, and wake up at the same time every morning. Do not take naps during the day. · Keep your bedroom quiet, dark, and cool. · Get regular exercise, but not within 3 to 4 hours of your bedtime. .  · Sleep on a comfortable pillow and mattress.   · If watching the clock makes you anxious, turn it facing away from you so you cannot see the time. · If you worry when you lie down, start a worry book. Well before bedtime, write down your worries, and then set the book and your concerns aside. · Try meditation or other relaxation techniques before you go to bed. · If you cannot fall asleep, get up and go to another room until you feel sleepy. Do something relaxing. Repeat your bedtime routine before you go to bed again. · Make your house quiet and calm about an hour before bedtime. Turn down the lights, turn off the TV, log off the computer, and turn down the volume on music. This can help you relax after a busy day. Drowsy Driving: The [facility] 54 cites drowsiness as a causing factor in more than 872,179 police reported crashes annually, resulting in 76,000 injuries and 1,500 deaths. Other surveys suggest 55% of people polled have driven while drowsy in the past year, 23% had fallen asleep but not crashed, 3% crashed, and 2% had and accident due to drowsy driving. Who is at risk? Young Drivers: One study of drowsy driving accidents states that 55% of the drivers were under 25 years. Of those, 75% were male. Shift Workers and Travelers: People who work overnight or travel across time zones frequently are at higher risk of experiencing Circadian Rhythm Disorders. They are trying to work and function when their body is programed to sleep. Sleep Deprived: Lack of sleep has a serious impact on your ability to pay attention or focus on a task. Consistently getting less than the average of 8 hours your body needs creates partial or cumulative sleep deprivation. Untreated Sleep Disorders: Sleep Apnea, Narcolepsy, R.L.S., and other sleep disorders (untreated) prevent a person from getting enough restful sleep. This leads to excessive daytime sleepiness and increases the risk for drowsy driving accidents by up to 7 times.   Medications / Alcohol: Even over the counter medications can cause drowsiness. Medications that impair a drivers attention should have a warning label. Alcohol naturally makes you sleepy and on its own can cause accidents. Combined with excessive drowsiness its effects are amplified. Signs of Drowsy Driving:   * You don't remember driving the last few miles   * You may drift out of your jina   * You are unable to focus and your thoughts wander   * You may yawn more often than normal   * You have difficulty keeping your eyes open / nodding off   * Missing traffic signs, speeding, or tailgating  Prevention-   Good sleep hygiene, lifestyle and behavioral choices have the most impact on drowsy driving. There is no substitute for sleep and the average person requires 8 hours nightly. If you find yourself driving drowsy, stop and sleep. Consider the sleep hygiene tips provided during your visit as well. Medication Refill Policy: Refills for all medications require 1 week advance notice. Please have your pharmacy fax a refill request. We are unable to fax, or call in \"controled substance\" medications and you will need to pick these prescriptions up from our office. [employer] Activation    Thank you for requesting access to [employer]. Please follow the instructions below to securely access and download your online medical record. [employer] allows you to send messages to your doctor, view your test results, renew your prescriptions, schedule appointments, and more. How Do I Sign Up? 1. In your internet browser, go to [email or web address]. 2. Click on the First Time User? Click Here link in the Sign In box. You will see the New Member Sign Up page. 3. Enter your [employer] Access Code exactly as it appears below. You will not need to use this code after youve completed the sign-up process. If you do not sign up before the expiration date, you must request a new code. [employer] Access Code:  Activation code not generated  Current [employer] Status: Active (This is the date your [employer] access code will )    4. Enter the last four digits of your Social Security Number (xxxx) and Date of Birth (mm/dd/yyyy) as indicated and click Submit. You will be taken to the next sign-up page. 5. Create a PO-MOt ID. This will be your [employer] login ID and cannot be changed, so think of one that is secure and easy to remember. 6. Create a [employer] password. You can change your password at any time. 7. Enter your Password Reset Question and Answer. This can be used at a later time if you forget your password. 8. Enter your e-mail address. You will receive e-mail notification when new information is available in [address] 9. Click Sign Up. You can now view and download portions of your medical record. 10. Click the Download Summary menu link to download a portable copy of your medical information. Additional Information    If you have questions, please call [phone]. Remember, [employer] is NOT to be used for urgent needs. For medical emergencies, dial 911.

## 2021-11-04 LAB
H. PYLORI BREATH COLLECTION, 998167: NORMAL
UREA BREATH TEST QL: POSITIVE

## 2021-11-11 ENCOUNTER — OFFICE VISIT (OUTPATIENT)
Dept: PRIMARY CARE CLINIC | Age: 41
End: 2021-11-11
Payer: COMMERCIAL

## 2021-11-11 VITALS
HEIGHT: 63 IN | WEIGHT: 221 LBS | RESPIRATION RATE: 16 BRPM | BODY MASS INDEX: 39.16 KG/M2 | DIASTOLIC BLOOD PRESSURE: 77 MMHG | TEMPERATURE: 98.1 F | SYSTOLIC BLOOD PRESSURE: 136 MMHG | OXYGEN SATURATION: 97 % | HEART RATE: 101 BPM

## 2021-11-11 DIAGNOSIS — R10.32 LEFT LOWER QUADRANT ABDOMINAL PAIN: Primary | ICD-10-CM

## 2021-11-11 DIAGNOSIS — K59.04 CHRONIC IDIOPATHIC CONSTIPATION: ICD-10-CM

## 2021-11-11 DIAGNOSIS — R10.2 PELVIC PAIN: ICD-10-CM

## 2021-11-11 PROBLEM — H52.10 MYOPIA: Status: ACTIVE | Noted: 2017-01-16

## 2021-11-11 LAB
BILIRUB UR QL STRIP: NEGATIVE
GLUCOSE UR-MCNC: NEGATIVE MG/DL
KETONES P FAST UR STRIP-MCNC: NEGATIVE MG/DL
PH UR STRIP: 5.5 [PH] (ref 4.6–8)
PROT UR QL STRIP: NEGATIVE
SP GR UR STRIP: 1.03 (ref 1–1.03)
UA UROBILINOGEN AMB POC: NORMAL (ref 0.2–1)
URINALYSIS CLARITY POC: NORMAL
URINALYSIS COLOR POC: NORMAL
URINE BLOOD POC: NEGATIVE
URINE LEUKOCYTES POC: NEGATIVE
URINE NITRITES POC: NEGATIVE

## 2021-11-11 PROCEDURE — 81003 URINALYSIS AUTO W/O SCOPE: CPT | Performed by: NURSE PRACTITIONER

## 2021-11-11 PROCEDURE — 99213 OFFICE O/P EST LOW 20 MIN: CPT | Performed by: NURSE PRACTITIONER

## 2021-11-11 RX ORDER — NITROFURANTOIN 25; 75 MG/1; MG/1
100 CAPSULE ORAL 2 TIMES DAILY
Qty: 10 CAPSULE | Refills: 0 | Status: SHIPPED | OUTPATIENT
Start: 2021-11-11 | End: 2021-11-16

## 2021-11-11 NOTE — PROGRESS NOTES
RM 4    Chief Complaint   Patient presents with    Abdominal Pain     lower left pain started this morning.  Last BM x 2 days        Visit Vitals  /77 (BP 1 Location: Left arm, BP Patient Position: Sitting)   Pulse (!) 101   Temp 98.1 °F (36.7 °C) (Skin)   Resp 16   Ht 5' 3\" (1.6 m)   Wt 221 lb (100.2 kg)   SpO2 97%   BMI 39.15 kg/m²

## 2021-11-11 NOTE — PATIENT INSTRUCTIONS
Abdominal Pain: Care Instructions  Your Care Instructions     Abdominal pain has many possible causes. Some aren't serious and get better on their own in a few days. Others need more testing and treatment. If your pain continues or gets worse, you need to be rechecked and may need more tests to find out what is wrong. You may need surgery to correct the problem. Don't ignore new symptoms, such as fever, nausea and vomiting, urination problems, pain that gets worse, and dizziness. These may be signs of a more serious problem. Your doctor may have recommended a follow-up visit in the next 8 to 12 hours. If you are not getting better, you may need more tests or treatment. The doctor has checked you carefully, but problems can develop later. If you notice any problems or new symptoms, get medical treatment right away. Follow-up care is a key part of your treatment and safety. Be sure to make and go to all appointments, and call your doctor if you are having problems. It's also a good idea to know your test results and keep a list of the medicines you take. How can you care for yourself at home? · Rest until you feel better. · To prevent dehydration, drink plenty of fluids. Choose water and other clear liquids until you feel better. If you have kidney, heart, or liver disease and have to limit fluids, talk with your doctor before you increase the amount of fluids you drink. · If your stomach is upset, eat mild foods, such as rice, dry toast or crackers, bananas, and applesauce. Try eating several small meals instead of two or three large ones. · Wait until 48 hours after all symptoms have gone away before you have spicy foods, alcohol, and drinks that contain caffeine. · Do not eat foods that are high in fat. · Avoid anti-inflammatory medicines such as aspirin, ibuprofen (Advil, Motrin), and naproxen (Aleve). These can cause stomach upset.  Talk to your doctor if you take daily aspirin for another health problem. When should you call for help? Call 911 anytime you think you may need emergency care. For example, call if:    · You passed out (lost consciousness).     · You pass maroon or very bloody stools.     · You vomit blood or what looks like coffee grounds.     · You have new, severe belly pain. Call your doctor now or seek immediate medical care if:    · Your pain gets worse, especially if it becomes focused in one area of your belly.     · You have a new or higher fever.     · Your stools are black and look like tar, or they have streaks of blood.     · You have unexpected vaginal bleeding.     · You have symptoms of a urinary tract infection. These may include:  ? Pain when you urinate. ? Urinating more often than usual.  ? Blood in your urine.     · You are dizzy or lightheaded, or you feel like you may faint. Watch closely for changes in your health, and be sure to contact your doctor if:    · You are not getting better after 1 day (24 hours). Where can you learn more? Go to http://www.gray.com/  Enter T244 in the search box to learn more about \"Abdominal Pain: Care Instructions. \"  Current as of: July 1, 2021               Content Version: 13.0  © 9116-8650 Healthwise, Incorporated. Care instructions adapted under license by [employer] (which disclaims liability or warranty for this information). If you have questions about a medical condition or this instruction, always ask your healthcare professional. [place] [address] any warranty or liability for your use of this information.

## 2021-11-11 NOTE — PROGRESS NOTES
HISTORY OF PRESENT ILLNESS  Ying Ibanez is a 39 y.o. female. Patient with pain LLQ  X 8 hours. Last BM 2 days ago. Does not go daily. History of chronic constipation. Was on bentyl for  Not sure. Woke with pain. Does not travel. Feels when walking. US of abdomen results reviewed . No nausea, Non vomiting. No fever or diarrhea. Describes the pain as sharp. Lasts minute. 10/10. Has had before. UTI. \"Its usually a UTI. \"  No dysuria. No diverticulitis history  Was on a z pack 1 week ago for a cold. Past Medical History:   Diagnosis Date    Abnormal Pap smear of cervix 1990s    Breast cyst     benign. Left. 1.7 cm and multiple others. Dr. Ugarte Fraction Chicken pox childhood    Chronic idiopathic constipation 2015    Dr. Malika Jean.  Chronic pain     HELM (dyspnea on exertion) 2015    Dr. Ayala Lomas EBV positive mononucleosis syndrome 10/2013,     chronic or reactivated    Enlarged thyroid 2010, 2015    with Right tracheal deviation. Retrosternal goiter. Dr. Mima Hawkins.  Enlarged tonsils and adenoids 2010    GERD (gastroesophageal reflux disease)     Iodine-deficiency related goiter     Left. Dr. Mima Hawkins.  YUNIER (obstructive sleep apnea) 2015    Dr. Marcella Matamoros, uses cpap. Dr. Antonietta Santizo. States cpap broken. Dr. Jacklyn Bach.  Thyroid cyst     Right.  Thyroid nodule 2019     Past Surgical History:   Procedure Laterality Date    HX BREAST BIOPSY Left 2018    benign    HX  SECTION  2004    [facility].  HX CYST INCISION AND DRAINAGE Left 2016    benign. Dr. Zuleyka Ray Right 09/15/2017    benign. Dr. Waylon Aragon.  HX HERNIA REPAIR  9679    umbilical.    HX OTHER SURGICAL  2014    thyroid biopsy. Dr. Alejandro Leyva.  HX THYROIDECTOMY Left 2015    due to [address] Dr. Mima Hawkins. benign.          Review of Systems   Constitutional: Negative for fever and malaise/fatigue. Respiratory: Negative for cough. Gastrointestinal: Positive for abdominal pain and constipation. Negative for blood in stool, diarrhea, nausea and vomiting. Genitourinary: Negative for dysuria, flank pain, frequency, hematuria and urgency. Musculoskeletal: Negative for back pain and myalgias. Neurological: Negative for dizziness and headaches. All other systems reviewed and are negative. Physical Exam  Vitals and nursing note reviewed. Constitutional:       Appearance: She is obese. HENT:      Head: Normocephalic and atraumatic. Cardiovascular:      Rate and Rhythm: Normal rate. Pulses: Normal pulses. Pulmonary:      Effort: Pulmonary effort is normal.      Breath sounds: Normal breath sounds. Abdominal:      General: Abdomen is protuberant. Bowel sounds are normal. There is distension. Tenderness: There is abdominal tenderness in the suprapubic area. There is no right CVA tenderness or left CVA tenderness. Negative signs include psoas sign. Hernia: No hernia is present. Musculoskeletal:         General: Normal range of motion. Skin:     General: Skin is warm. Neurological:      General: No focal deficit present. Mental Status: She is alert. Results for orders placed or performed in visit on 11/11/21   AMB POC URINALYSIS DIP STICK AUTO W/O MICRO   Result Value Ref Range    Color (UA POC) Dark Yellow     Clarity (UA POC) Slightly Cloudy     Glucose (UA POC) Negative Negative    Bilirubin (UA POC) Negative Negative    Ketones (UA POC) Negative Negative    Specific gravity (UA POC) 1.030 1.001 - 1.035    Blood (UA POC) Negative Negative    pH (UA POC) 5.5 4.6 - 8.0    Protein (UA POC) Negative Negative    Urobilinogen (UA POC) 0.2 mg/dL 0.2 - 1    Nitrites (UA POC) Negative Negative    Leukocyte esterase (UA POC) Negative Negative       ASSESSMENT and PLAN    ICD-10-CM ICD-9-CM    1.  Left lower quadrant abdominal pain  R10.32 789.04 AMB POC URINALYSIS DIP STICK AUTO W/O MICRO      CULTURE, URINE   2. Chronic idiopathic constipation  K59.04 564.00    3. Pelvic pain  R10.2 [id number] CULTURE, URINE     Encounter Diagnoses   Name Primary?  Left lower quadrant abdominal pain Yes    Chronic idiopathic constipation     Pelvic pain      Orders Placed This Encounter    CULTURE, URINE    AMB POC URINALYSIS DIP STICK AUTO W/O MICRO    nitrofurantoin, macrocrystal-monohydrate, (MACROBID) 100 mg capsule   Will send culture  Increase fluids  Medication as directed. If pain becomes sustained and worse, go to ER.   Signed By: STANLEY Brito     November 11, 2021

## 2021-11-12 RX ORDER — CLARITHROMYCIN 500 MG/1
500 TABLET, FILM COATED ORAL 2 TIMES DAILY
Qty: 28 TABLET | Refills: 0 | Status: SHIPPED | OUTPATIENT
Start: 2021-11-12 | End: 2021-11-26

## 2021-11-12 RX ORDER — AMOXICILLIN 500 MG/1
1000 CAPSULE ORAL 2 TIMES DAILY
Qty: 56 CAPSULE | Refills: 0 | Status: SHIPPED | OUTPATIENT
Start: 2021-11-12 | End: 2021-11-26

## 2021-11-12 NOTE — PROGRESS NOTES
Leslie,  Your H. pylori breath test was positive. This means we will need to treat you for the bacteria. I am going to call in a prescription to the pharmacy. The prescription will consist of 2 antibiotics and one acid reducer. Take as directed over 14 days.   Call if you have any questions  Sheryl

## 2021-11-12 NOTE — PROGRESS NOTES
H. pylori treatment sent to pharmacy. Patient takes Nexium 40 mg daily.   Advised patient to increase to 40 mg twice daily

## 2021-11-13 ENCOUNTER — TELEPHONE (OUTPATIENT)
Dept: PRIMARY CARE CLINIC | Age: 41
End: 2021-11-13

## 2021-11-13 LAB
BACTERIA SPEC CULT: NORMAL
CC UR VC: NORMAL
SERVICE CMNT-IMP: NORMAL

## 2021-11-13 NOTE — TELEPHONE ENCOUNTER
Patient called  To inform of negative urine culture. May stop antibiotic. She reports the pain in abdomen is gone and is feeling better. She was instructed to go to ER if pain returns or contact PCP.

## 2021-11-15 ENCOUNTER — PATIENT MESSAGE (OUTPATIENT)
Dept: FAMILY MEDICINE CLINIC | Age: 41
End: 2021-11-15

## 2021-11-15 DIAGNOSIS — R12 HEARTBURN: ICD-10-CM

## 2021-11-18 RX ORDER — ESOMEPRAZOLE MAGNESIUM 40 MG/1
CAPSULE, DELAYED RELEASE ORAL
Qty: 60 CAPSULE | Refills: 5 | Status: SHIPPED | OUTPATIENT
Start: 2021-11-18

## 2021-11-24 ENCOUNTER — OFFICE VISIT (OUTPATIENT)
Dept: SLEEP MEDICINE | Age: 41
End: 2021-11-24

## 2021-11-24 DIAGNOSIS — Z11.52 ENCOUNTER FOR SCREENING FOR COVID-19: Primary | ICD-10-CM

## 2021-11-26 LAB
SARS-COV-2, NAA 2 DAY TAT: NORMAL
SARS-COV-2, NAA: NOT DETECTED

## 2021-11-29 ENCOUNTER — HOSPITAL ENCOUNTER (OUTPATIENT)
Dept: SLEEP MEDICINE | Age: 41
Discharge: HOME | End: 2021-11-29
Payer: COMMERCIAL

## 2021-11-29 VITALS
OXYGEN SATURATION: 96 % | HEART RATE: 93 BPM | BODY MASS INDEX: 39.51 KG/M2 | SYSTOLIC BLOOD PRESSURE: 144 MMHG | DIASTOLIC BLOOD PRESSURE: 91 MMHG | TEMPERATURE: 98 F | HEIGHT: 63 IN | WEIGHT: 223 LBS

## 2021-11-29 DIAGNOSIS — E66.2 HYPOVENTILATION ASSOCIATED WITH OBESITY SYNDROME (HCC): ICD-10-CM

## 2021-11-29 DIAGNOSIS — G47.33 OSA (OBSTRUCTIVE SLEEP APNEA): Primary | ICD-10-CM

## 2021-11-29 DIAGNOSIS — G47.33 OSA (OBSTRUCTIVE SLEEP APNEA): ICD-10-CM

## 2021-11-29 PROCEDURE — 95811 POLYSOM 6/>YRS CPAP 4/> PARM: CPT | Performed by: INTERNAL MEDICINE

## 2021-11-30 NOTE — PROGRESS NOTES
Orders Placed This Encounter    SLEEP LAB (PAP TITRATION)     Standing Status:   Future     Standing Expiration Date:   2/28/2022     Scheduling Instructions:      Perform Bi-level Titration.      Order Specific Question:   Reason for Exam     Answer:   YUNIER

## 2021-12-01 ENCOUNTER — TELEPHONE (OUTPATIENT)
Dept: SLEEP MEDICINE | Age: 41
End: 2021-12-01

## 2021-12-01 DIAGNOSIS — G47.33 OSA (OBSTRUCTIVE SLEEP APNEA): Primary | ICD-10-CM

## 2021-12-01 NOTE — TELEPHONE ENCOUNTER
Orders Placed This Encounter    AMB SUPPLY ORDER     Diagnosis: Sleep Apnea ICD-10 Code (G47.30); ICD-9 Code (780.57). Positive Airway Pressure Therapy: Duration of need: 99 months. ResMed VPAP Auto (VAuto Mode): Maximum IPAP: 20 cmH2O; Minimum EPAP: 5 cmH2O; PS: 6 cmH2O. Ramp Time: 30 Minutes. CPAP mask -  As fitted during titration OR patient preference, headgear, tubing, and filter;  heated humidifier; wireless modem. Remote monitoring enrollment. Justice Rojas MD, FAASM; NPI: [id number]  Electronically signed. 12/01/21

## 2021-12-02 ENCOUNTER — DOCUMENTATION ONLY (OUTPATIENT)
Dept: SLEEP MEDICINE | Age: 41
End: 2021-12-02

## 2021-12-02 DIAGNOSIS — A04.8 H. PYLORI INFECTION: Primary | ICD-10-CM

## 2021-12-02 NOTE — PROGRESS NOTES
Patient has completed a course of H. pylori treatment and is inquiring about neck steps. She will need to be retested for H. pylori to ensure eradication. Patient will need to be off antibiotics and all proton pump inhibitors for at least 2 weeks prior to retest.  New breath test order has been placed. Patient may take Pepcid 40 mg twice a day, which she can obtain over-the-counter if needed for acid reflux. Will need to remind patient n.p.o. at least 1 hour prior to testing. Nurse to advise patient and send copy of new order.

## 2021-12-17 LAB — UREA BREATH TEST QL: POSITIVE

## 2021-12-18 ENCOUNTER — PATIENT MESSAGE (OUTPATIENT)
Dept: SURGERY | Age: 41
End: 2021-12-18

## 2021-12-21 RX ORDER — DOXYCYCLINE 100 MG/1
100 TABLET ORAL 2 TIMES DAILY
Qty: 28 TABLET | Refills: 0 | Status: SHIPPED | OUTPATIENT
Start: 2021-12-21 | End: 2022-01-04

## 2021-12-21 RX ORDER — METRONIDAZOLE 500 MG/1
500 TABLET ORAL 4 TIMES DAILY
Qty: 56 TABLET | Refills: 0 | Status: SHIPPED | OUTPATIENT
Start: 2021-12-21 | End: 2022-01-04

## 2021-12-21 NOTE — PROGRESS NOTES
Patient H. pylori breath test resulted positive after treatment. Patient prescribed doxycycline 100 mg twice daily, Flagyl 500 mg 4 times daily and Nexium 40 mg twice daily.   She will take this for 2 weeks then wait 2 weeks as well as stopping her Nexium for these 2 weeks and retest.

## 2022-01-05 ENCOUNTER — VIRTUAL VISIT (OUTPATIENT)
Dept: FAMILY MEDICINE CLINIC | Age: 42
End: 2022-01-05

## 2022-01-05 DIAGNOSIS — U07.1 COVID-19 VIRUS INFECTION: Primary | ICD-10-CM

## 2022-01-05 DIAGNOSIS — E66.01 SEVERE OBESITY (BMI 35.0-35.9 WITH COMORBIDITY) (HCC): ICD-10-CM

## 2022-01-05 DIAGNOSIS — Z02.89 ENCOUNTER TO OBTAIN EXCUSE FROM WORK: ICD-10-CM

## 2022-01-05 DIAGNOSIS — Z02.71 DISABILITY EXAMINATION: ICD-10-CM

## 2022-01-05 DIAGNOSIS — Z02.89 ENCOUNTER FOR COMPLETION OF FORM WITH PATIENT: ICD-10-CM

## 2022-01-05 PROCEDURE — 99214 OFFICE O/P EST MOD 30 MIN: CPT | Performed by: FAMILY MEDICINE

## 2022-01-05 NOTE — LETTER
NOTIFICATION RETURN TO WORK / SCHOOL        1/6/2022 5:50 PM    Ms. Silva [facility] [phone]      To Whom It May Concern:    Melissa Alfonso [facility]  [facility] She will be excused from work from 12/28/2021 due to COVID-19 infection and she will be able to return     to work/school on: 1/10/202    If there are questions or concerns please have the patient contact our office.         Sincerely,      Ale Bourne MD

## 2022-01-05 NOTE — PROGRESS NOTES
Chief Complaint   Patient presents with    Concern For [id number] (Coronavirus)     1. Have you been to the ER, urgent care clinic since your last visit? Hospitalized since your last visit? Yes When: 12/28/21 Where: [place] [facility]  Reason for visit: congestion    2. Have you seen or consulted any other health care providers outside of the [address] [place] [address] since your last visit? Include any pap smears or colon screening. No    Call placed to pt. Verified patient with two type of identifiers. Tested positive for covid on 12/28/21,   feeling better ,   requesting return to work documentation completed.

## 2022-01-06 NOTE — PROGRESS NOTES
HISTORY OF PRESENT ILLNESS  Jessica Martinez is a 43 y.o. female. Today's COVID-19 ++vaccinated x2 in April. pt main concerns were provided on virtual visit and Video telemed format,  Present on VV for the concern of the current medical conditions,  pt is w/ comorbid history and  the pt does not know if has been exposed to covid-19 individual, and has not been tested yet, currently not working  patient currently have hoarseness having cough mostly dry, does not have shortness of breath and patient is not feeling lightheadedness and Dz for last couple of days,  pt has no low grade fever,  nl smell nl taste, patient also not complaining of some nausea feeling , no vomiting diarrhea, no body ache , and no orbital pain, no rash, patient also states of having a good family support at this time    12/28/221, was supposed 1/5/2022, 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 We will try to get [facility] paperwork faxed to us to be completed    Current Outpatient Medications   Medication Sig Dispense Refill    esomeprazole (NEXIUM) 40 mg capsule TAKE 1 CAPSULE BY MOUTH twice daily  FOR HEARTBURN GENERIC FOR NEXIUM 60 Capsule 5    ergocalciferol (ERGOCALCIFEROL) 1,250 mcg (50,000 unit) capsule Take 1 Capsule by mouth every seven (7) days. Indications: low vitamin D levels 5 Capsule 5    vitamin e (E GEMS) 100 unit capsule Take 100 Units by mouth daily. Unsure of the strength.  fluticasone propionate (FLONASE) 50 mcg/actuation nasal spray 1 [place] by Both Nostrils route daily. 1 Bottle 3    cyanocobalamin (VITAMIN B-12) 1,000 mcg tablet Take 1,000 mcg by mouth daily. Allergies   Allergen Reactions    Skelaxin [Metaxalone] Other (comments)     Too groggy     Past Medical History:   Diagnosis Date    Abnormal Pap smear of cervix 1990s    Breast cyst 2009    benign. Left. 1.7 cm and multiple others.   Dr. Angeles Taylor pox childhood  Chronic idiopathic constipation 2015    Dr. Keenan Rubinstein.  Chronic pain     HELM (dyspnea on exertion) 2015    Dr. Maximiliano Arroyo EBV positive mononucleosis syndrome 10/2013,     chronic or reactivated    Enlarged thyroid 2010, 2015    with Right tracheal deviation. Retrosternal goiter. Dr. Aliya Long.  Enlarged tonsils and adenoids 2010    GERD (gastroesophageal reflux disease)     Iodine-deficiency related goiter     Left. Dr. Aliya Long.  YUNIER (obstructive sleep apnea) 2015    Dr. Helen Rodriguez, uses cpap. Dr. Bisi Nagel. States cpap broken. Dr. Esteban Lee.  Thyroid cyst     Right.  Thyroid nodule 2019     Past Surgical History:   Procedure Laterality Date    HX BREAST BIOPSY Left 2018    benign    HX  SECTION      [facility].  HX CYST INCISION AND DRAINAGE Left 2016    benign. Dr. Shikha Ugarte Right 09/15/2017    benign. Dr. Shahida Saravia.  HX HERNIA REPAIR  6077    umbilical.    HX OTHER SURGICAL  2014    thyroid biopsy. Dr. Heena Jimenez.  HX THYROIDECTOMY Left 2015    due to [address] Dr. Aliya Long. benign. Family History   Problem Relation Age of Onset    Hypertension Mother     Lung Disease Mother         chronic bronchitis    Sleep Apnea Mother     Hypertension Father     Diabetes Paternal Grandmother     No Known Problems Sister     No Known Problems Sister     No Known Problems Sister     No Known Problems Brother         murdered    Breast Cancer Maternal Aunt     Other Sister         severe scoliosis. txd with back brace.     Breast Cancer Maternal Aunt     Breast Cancer Maternal Aunt     No Known Problems Maternal Grandmother     No Known Problems Maternal Grandfather      Social History     Tobacco Use    Smoking status: Passive Smoke Exposure - Never Smoker    Smokeless tobacco: Never Used    Tobacco comment: lives with a smoker dad x 18 yrs and history of living with smoker boyfriend x 2 years   Substance Use Topics    Alcohol use: Yes     Alcohol/week: 2.0 standard drinks     Types: 2 Shots of liquor per week      Lab Results   Component Value Date/Time    Cholesterol, total 130 06/16/2021 12:40 PM    HDL Cholesterol 53 06/16/2021 12:40 PM    LDL, calculated 64.4 06/16/2021 12:40 PM    Triglyceride 63 06/16/2021 12:40 PM    CHOL/HDL Ratio 2.5 06/16/2021 12:40 PM     Lab Results   Component Value Date/Time    ALT (SGPT) 12 06/16/2021 12:40 PM    Alk. phosphatase 114 06/16/2021 12:40 PM    Bilirubin, total 0.5 06/16/2021 12:40 PM    Albumin 3.8 06/16/2021 12:40 PM    Protein, total 7.7 06/16/2021 12:40 PM    PLATELET 465 95/40/4803 12:40 PM        Review of Systems   Constitutional: Negative for chills and fever. HENT: Negative for congestion and nosebleeds. Eyes: Negative for blurred vision and pain. Respiratory: Negative for cough, shortness of breath and wheezing. Cardiovascular: Negative for chest pain and leg swelling. Gastrointestinal: Negative for constipation, diarrhea, nausea and vomiting. Genitourinary: Negative for dysuria and frequency. Musculoskeletal: Negative for joint pain and myalgias. Skin: Negative for itching and rash. Neurological: Negative for dizziness, loss of consciousness and headaches. Psychiatric/Behavioral: Negative for depression. The patient is not nervous/anxious and does not have insomnia. Physical Exam  Constitutional:       Appearance: She is obese. She is not ill-appearing or toxic-appearing. HENT:      Head: Normocephalic and atraumatic. Mouth/Throat:      Mouth: Mucous membranes are moist.   Neurological:      Mental Status: She is alert and oriented to person, place, and time. Psychiatric:         Mood and Affect: Mood normal.         Behavior: Behavior normal.         Thought Content:  Thought content normal.         Judgment: Judgment normal.         ASSESSMENT and PLAN  Diagnoses and all orders for this visit:    1. COVID-19 virus infection    2. Severe obesity (BMI 35.0-35.9 with comorbidity) ([address]    3. Encounter to obtain excuse from work    4. Disability examination    5. Encounter for completion of form with patient    Pt was told that currently need to be in isolation 10 to 14 days and,there is no antiviral medication to treat COVID-19.  Current treatment is aimed to relieve sxs such as pain relievers no ibuprofen but mostly acetaminophen, anti Cough medication , plenty of Rest and a lot of oral hydration, Isolation and Contact tracing at this time, was told to cont temp monitoring and have tylenol ES available to bring the temp down, observe for worsening condition such as Dyspnea and difficulty breathing for which needs to call 911 ASAP, pt agreed

## 2022-02-15 ENCOUNTER — HOSPITAL ENCOUNTER (INPATIENT)
Age: 42
LOS: 2 days | Discharge: HOME | DRG: 390 | End: 2022-02-17
Attending: EMERGENCY MEDICINE | Admitting: SURGERY
Payer: COMMERCIAL

## 2022-02-15 ENCOUNTER — APPOINTMENT (OUTPATIENT)
Dept: CT IMAGING | Age: 42
DRG: 390 | End: 2022-02-15
Attending: EMERGENCY MEDICINE
Payer: COMMERCIAL

## 2022-02-15 DIAGNOSIS — K56.609 SBO (SMALL BOWEL OBSTRUCTION) (HCC): Primary | ICD-10-CM

## 2022-02-15 LAB
ALBUMIN SERPL-MCNC: 3.8 G/DL (ref 3.5–5)
ALBUMIN/GLOB SERPL: 0.9 {RATIO} (ref 1.1–2.2)
ALP SERPL-CCNC: 100 U/L (ref 45–117)
ALT SERPL-CCNC: 18 U/L (ref 12–78)
ANION GAP SERPL CALC-SCNC: 5 MMOL/L (ref 5–15)
APPEARANCE UR: ABNORMAL
AST SERPL-CCNC: 12 U/L (ref 15–37)
BACTERIA URNS QL MICRO: NEGATIVE /HPF
BASOPHILS # BLD: 0 K/UL (ref 0–0.1)
BASOPHILS NFR BLD: 0 % (ref 0–1)
BILIRUB SERPL-MCNC: 0.7 MG/DL (ref 0.2–1)
BILIRUB UR QL: NEGATIVE
BUN SERPL-MCNC: 9 MG/DL (ref 6–20)
BUN/CREAT SERPL: 11 (ref 12–20)
CALCIUM SERPL-MCNC: 9.5 MG/DL (ref 8.5–10.1)
CHLORIDE SERPL-SCNC: 105 MMOL/L (ref 97–108)
CO2 SERPL-SCNC: 28 MMOL/L (ref 21–32)
COLOR UR: ABNORMAL
CREAT SERPL-MCNC: 0.8 MG/DL (ref 0.55–1.02)
DIFFERENTIAL METHOD BLD: ABNORMAL
EOSINOPHIL # BLD: 0 K/UL (ref 0–0.4)
EOSINOPHIL NFR BLD: 0 % (ref 0–7)
EPITH CASTS URNS QL MICRO: ABNORMAL /LPF
ERYTHROCYTE [DISTWIDTH] IN BLOOD BY AUTOMATED COUNT: 13 % (ref 11.5–14.5)
GLOBULIN SER CALC-MCNC: 4.3 G/DL (ref 2–4)
GLUCOSE SERPL-MCNC: 99 MG/DL (ref 65–100)
GLUCOSE UR STRIP.AUTO-MCNC: NEGATIVE MG/DL
HCG UR QL: NEGATIVE
HCT VFR BLD AUTO: 41.5 % (ref 35–47)
HGB BLD-MCNC: 12.6 G/DL (ref 11.5–16)
HGB UR QL STRIP: NEGATIVE
IMM GRANULOCYTES # BLD AUTO: 0 K/UL (ref 0–0.04)
IMM GRANULOCYTES NFR BLD AUTO: 0 % (ref 0–0.5)
KETONES UR QL STRIP.AUTO: ABNORMAL MG/DL
LEUKOCYTE ESTERASE UR QL STRIP.AUTO: NEGATIVE
LIPASE SERPL-CCNC: 66 U/L (ref 73–393)
LYMPHOCYTES # BLD: 1.5 K/UL (ref 0.8–3.5)
LYMPHOCYTES NFR BLD: 16 % (ref 12–49)
MCH RBC QN AUTO: 26.5 PG (ref 26–34)
MCHC RBC AUTO-ENTMCNC: 30.4 G/DL (ref 30–36.5)
MCV RBC AUTO: 87.4 FL (ref 80–99)
MONOCYTES # BLD: 0.5 K/UL (ref 0–1)
MONOCYTES NFR BLD: 5 % (ref 5–13)
MUCOUS THREADS URNS QL MICRO: ABNORMAL /LPF
NEUTS SEG # BLD: 7.8 K/UL (ref 1.8–8)
NEUTS SEG NFR BLD: 79 % (ref 32–75)
NITRITE UR QL STRIP.AUTO: NEGATIVE
NRBC # BLD: 0 K/UL (ref 0–0.01)
NRBC BLD-RTO: 0 PER 100 WBC
PH UR STRIP: 5.5 [PH] (ref 5–8)
PLATELET # BLD AUTO: 419 K/UL (ref 150–400)
PMV BLD AUTO: 9 FL (ref 8.9–12.9)
POTASSIUM SERPL-SCNC: 3.7 MMOL/L (ref 3.5–5.1)
PROT SERPL-MCNC: 8.1 G/DL (ref 6.4–8.2)
PROT UR STRIP-MCNC: ABNORMAL MG/DL
RBC # BLD AUTO: 4.75 M/UL (ref 3.8–5.2)
RBC #/AREA URNS HPF: ABNORMAL /HPF (ref 0–5)
SODIUM SERPL-SCNC: 138 MMOL/L (ref 136–145)
SP GR UR REFRACTOMETRY: 1.03 (ref 1–1.03)
UA: UC IF INDICATED,UAUC: ABNORMAL
UROBILINOGEN UR QL STRIP.AUTO: 1 EU/DL (ref 0.2–1)
WBC # BLD AUTO: 9.8 K/UL (ref 3.6–11)
WBC URNS QL MICRO: ABNORMAL /HPF (ref 0–4)

## 2022-02-15 PROCEDURE — 81025 URINE PREGNANCY TEST: CPT

## 2022-02-15 PROCEDURE — 74011250636 HC RX REV CODE- 250/636: Performed by: EMERGENCY MEDICINE

## 2022-02-15 PROCEDURE — 74011000636 HC RX REV CODE- 636: Performed by: EMERGENCY MEDICINE

## 2022-02-15 PROCEDURE — 96374 THER/PROPH/DIAG INJ IV PUSH: CPT

## 2022-02-15 PROCEDURE — 65270000029 HC RM PRIVATE

## 2022-02-15 PROCEDURE — 74177 CT ABD & PELVIS W/CONTRAST: CPT

## 2022-02-15 PROCEDURE — 74011000250 HC RX REV CODE- 250: Performed by: EMERGENCY MEDICINE

## 2022-02-15 PROCEDURE — 81001 URINALYSIS AUTO W/SCOPE: CPT

## 2022-02-15 PROCEDURE — 83690 ASSAY OF LIPASE: CPT

## 2022-02-15 PROCEDURE — 96375 TX/PRO/DX INJ NEW DRUG ADDON: CPT

## 2022-02-15 PROCEDURE — 36415 COLL VENOUS BLD VENIPUNCTURE: CPT

## 2022-02-15 PROCEDURE — 74011250637 HC RX REV CODE- 250/637: Performed by: EMERGENCY MEDICINE

## 2022-02-15 PROCEDURE — 74011250636 HC RX REV CODE- 250/636: Performed by: SURGERY

## 2022-02-15 PROCEDURE — 99284 EMERGENCY DEPT VISIT MOD MDM: CPT

## 2022-02-15 PROCEDURE — 80053 COMPREHEN METABOLIC PANEL: CPT

## 2022-02-15 PROCEDURE — 85025 COMPLETE CBC W/AUTO DIFF WBC: CPT

## 2022-02-15 RX ORDER — ONDANSETRON 2 MG/ML
4 INJECTION INTRAMUSCULAR; INTRAVENOUS
Status: DISCONTINUED | OUTPATIENT
Start: 2022-02-15 | End: 2022-02-17 | Stop reason: HOSPADM

## 2022-02-15 RX ORDER — HYDROMORPHONE HYDROCHLORIDE 1 MG/ML
0.5 INJECTION, SOLUTION INTRAMUSCULAR; INTRAVENOUS; SUBCUTANEOUS
Status: DISCONTINUED | OUTPATIENT
Start: 2022-02-15 | End: 2022-02-17 | Stop reason: HOSPADM

## 2022-02-15 RX ORDER — ONDANSETRON 2 MG/ML
4 INJECTION INTRAMUSCULAR; INTRAVENOUS
Status: COMPLETED | OUTPATIENT
Start: 2022-02-15 | End: 2022-02-15

## 2022-02-15 RX ORDER — HYDRALAZINE HYDROCHLORIDE 20 MG/ML
10 INJECTION INTRAMUSCULAR; INTRAVENOUS
Status: DISCONTINUED | OUTPATIENT
Start: 2022-02-15 | End: 2022-02-17 | Stop reason: HOSPADM

## 2022-02-15 RX ORDER — FENTANYL CITRATE 50 UG/ML
50 INJECTION, SOLUTION INTRAMUSCULAR; INTRAVENOUS
Status: COMPLETED | OUTPATIENT
Start: 2022-02-15 | End: 2022-02-15

## 2022-02-15 RX ORDER — DEXTROSE, SODIUM CHLORIDE, AND POTASSIUM CHLORIDE 5; .45; .15 G/100ML; G/100ML; G/100ML
125 INJECTION INTRAVENOUS CONTINUOUS
Status: DISCONTINUED | OUTPATIENT
Start: 2022-02-15 | End: 2022-02-17 | Stop reason: HOSPADM

## 2022-02-15 RX ORDER — LORAZEPAM 2 MG/ML
1 INJECTION INTRAMUSCULAR
Status: DISCONTINUED | OUTPATIENT
Start: 2022-02-15 | End: 2022-02-17 | Stop reason: HOSPADM

## 2022-02-15 RX ORDER — MORPHINE SULFATE 2 MG/ML
4 INJECTION, SOLUTION INTRAMUSCULAR; INTRAVENOUS ONCE
Status: COMPLETED | OUTPATIENT
Start: 2022-02-15 | End: 2022-02-15

## 2022-02-15 RX ORDER — ENOXAPARIN SODIUM 100 MG/ML
40 INJECTION SUBCUTANEOUS EVERY 24 HOURS
Status: DISCONTINUED | OUTPATIENT
Start: 2022-02-16 | End: 2022-02-17 | Stop reason: HOSPADM

## 2022-02-15 RX ORDER — DIPHENHYDRAMINE HYDROCHLORIDE 50 MG/ML
25 INJECTION, SOLUTION INTRAMUSCULAR; INTRAVENOUS
Status: DISCONTINUED | OUTPATIENT
Start: 2022-02-15 | End: 2022-02-17 | Stop reason: HOSPADM

## 2022-02-15 RX ADMIN — IOPAMIDOL 100 ML: 755 INJECTION, SOLUTION INTRAVENOUS at 21:11

## 2022-02-15 RX ADMIN — IOHEXOL 50 ML: 240 INJECTION, SOLUTION INTRATHECAL; INTRAVASCULAR; INTRAVENOUS; ORAL at 22:01

## 2022-02-15 RX ADMIN — POTASSIUM CHLORIDE, DEXTROSE MONOHYDRATE AND SODIUM CHLORIDE 125 ML/HR: 150; 5; 450 INJECTION, SOLUTION INTRAVENOUS at 22:28

## 2022-02-15 RX ADMIN — LIDOCAINE HYDROCHLORIDE 40 ML: 20 SOLUTION OROPHARYNGEAL at 21:43

## 2022-02-15 RX ADMIN — FENTANYL CITRATE 50 MCG: 50 INJECTION INTRAMUSCULAR; INTRAVENOUS at 19:53

## 2022-02-15 RX ADMIN — MORPHINE SULFATE 4 MG: 2 INJECTION, SOLUTION INTRAMUSCULAR; INTRAVENOUS at 21:27

## 2022-02-15 RX ADMIN — SODIUM CHLORIDE 500 ML: 9 INJECTION, SOLUTION INTRAVENOUS at 19:51

## 2022-02-15 RX ADMIN — ONDANSETRON 4 MG: 2 INJECTION INTRAMUSCULAR; INTRAVENOUS at 19:52

## 2022-02-16 ENCOUNTER — APPOINTMENT (OUTPATIENT)
Dept: GENERAL RADIOLOGY | Age: 42
DRG: 390 | End: 2022-02-16
Attending: SURGERY
Payer: COMMERCIAL

## 2022-02-16 LAB
ANION GAP SERPL CALC-SCNC: 3 MMOL/L (ref 5–15)
BUN SERPL-MCNC: 7 MG/DL (ref 6–20)
BUN/CREAT SERPL: 9 (ref 12–20)
CALCIUM SERPL-MCNC: 8.7 MG/DL (ref 8.5–10.1)
CHLORIDE SERPL-SCNC: 107 MMOL/L (ref 97–108)
CO2 SERPL-SCNC: 27 MMOL/L (ref 21–32)
CREAT SERPL-MCNC: 0.77 MG/DL (ref 0.55–1.02)
ERYTHROCYTE [DISTWIDTH] IN BLOOD BY AUTOMATED COUNT: 13.2 % (ref 11.5–14.5)
GLUCOSE SERPL-MCNC: 158 MG/DL (ref 65–100)
HCT VFR BLD AUTO: 34.5 % (ref 35–47)
HGB BLD-MCNC: 10.6 G/DL (ref 11.5–16)
MCH RBC QN AUTO: 26.2 PG (ref 26–34)
MCHC RBC AUTO-ENTMCNC: 30.7 G/DL (ref 30–36.5)
MCV RBC AUTO: 85.4 FL (ref 80–99)
NRBC # BLD: 0 K/UL (ref 0–0.01)
NRBC BLD-RTO: 0 PER 100 WBC
PLATELET # BLD AUTO: 376 K/UL (ref 150–400)
PMV BLD AUTO: 9.3 FL (ref 8.9–12.9)
POTASSIUM SERPL-SCNC: 4 MMOL/L (ref 3.5–5.1)
RBC # BLD AUTO: 4.04 M/UL (ref 3.8–5.2)
SODIUM SERPL-SCNC: 137 MMOL/L (ref 136–145)
WBC # BLD AUTO: 8.7 K/UL (ref 3.6–11)

## 2022-02-16 PROCEDURE — 65270000029 HC RM PRIVATE

## 2022-02-16 PROCEDURE — 80048 BASIC METABOLIC PNL TOTAL CA: CPT

## 2022-02-16 PROCEDURE — 85027 COMPLETE CBC AUTOMATED: CPT

## 2022-02-16 PROCEDURE — 74018 RADEX ABDOMEN 1 VIEW: CPT

## 2022-02-16 PROCEDURE — 74011250636 HC RX REV CODE- 250/636: Performed by: SURGERY

## 2022-02-16 PROCEDURE — 99222 1ST HOSP IP/OBS MODERATE 55: CPT | Performed by: SURGERY

## 2022-02-16 PROCEDURE — 36415 COLL VENOUS BLD VENIPUNCTURE: CPT

## 2022-02-16 RX ADMIN — ENOXAPARIN SODIUM 40 MG: 100 INJECTION SUBCUTANEOUS at 08:02

## 2022-02-16 RX ADMIN — HYDROMORPHONE HYDROCHLORIDE 0.5 MG: 1 INJECTION, SOLUTION INTRAMUSCULAR; INTRAVENOUS; SUBCUTANEOUS at 19:41

## 2022-02-16 RX ADMIN — POTASSIUM CHLORIDE, DEXTROSE MONOHYDRATE AND SODIUM CHLORIDE 125 ML/HR: 150; 5; 450 INJECTION, SOLUTION INTRAVENOUS at 07:57

## 2022-02-16 RX ADMIN — HYDROMORPHONE HYDROCHLORIDE 0.5 MG: 1 INJECTION, SOLUTION INTRAMUSCULAR; INTRAVENOUS; SUBCUTANEOUS at 02:32

## 2022-02-16 NOTE — ED NOTES
Patient ate 75% of her lunch. She hasn't complained of any pain, N/V/D today. She states she feels better. She is ambulatory to restroom by herself.

## 2022-02-16 NOTE — PROGRESS NOTES
TRANSFER - IN REPORT:    Verbal report received from Clark Santoyo RN (name) on Rosanna Moody  being received from ED (unit) for routine progression of care      Report consisted of patients Situation, Background, Assessment and   Recommendations(SBAR). Information from the following report(s) SBAR, Kardex, Intake/Output and MAR was reviewed with the receiving nurse. Opportunity for questions and clarification was provided. Assessment completed upon patients arrival to unit and care assumed.

## 2022-02-16 NOTE — H&P
Surgery Consult    Subjective:      Porfirio Simmons is a 43 y.o. female who is being seen for evaluation of abdominal pain. The pain is located in the periumbilical.  Pain is described as cramping and measures 5/10 in intensity. Onset of pain was 1 day ago. Aggravating factors include pressure and eating. Alleviating factors include none. Associated symptoms include nausea and vomiting. Patient has a history of a umbilical hernia repair. She also had a CS 'where they did something with my bowels'      Patient Active Problem List    Diagnosis Date Noted    SBO (small bowel obstruction) ([address] 02/15/2022    Thyroid cyst     Thyroid nodule 08/01/2019    Severe obesity ([address] 06/27/2019    Cyst of left breast 03/16/2018    Obesity, Class II, BMI 35-39.9 09/26/2017    Exercise-induced asthma 09/26/2017    Family history of breast cancer 09/26/2017    History of thyroid nodule 09/26/2017    Cyst of right breast 09/18/2017    Myopia 01/16/2017    Chronic idiopathic constipation 07/01/2015    Fatigue 06/24/2015    YUNIER (obstructive sleep apnea) 02/25/2015    Vitamin D deficiency 06/27/2014    Snoring 10/04/2013    SOB (shortness of breath) on exertion 08/23/2013     Past Medical History:   Diagnosis Date    Abnormal Pap smear of cervix 1990s    Breast cyst 2009    benign. Left. 1.7 cm and multiple others. Dr. Gonzales Olmos Chicken pox childhood    Chronic idiopathic constipation 07/2015    Dr. Ignacio Basurto.  Chronic pain     HELM (dyspnea on exertion) 02/2015    Dr. Brian Mills EBV positive mononucleosis syndrome 10/2013, 2014    chronic or reactivated    Enlarged thyroid 09/2010, 06/2015    with Right tracheal deviation. Retrosternal goiter. Dr. Aubrey Erickson.  Enlarged tonsils and adenoids 09/2010    GERD (gastroesophageal reflux disease)     Iodine-deficiency related goiter     Left. Dr. Aubrey Erickson.  YUNIER (obstructive sleep apnea) 02/25/2015    Dr. Laila Wells, uses cpap. Dr. Agnes Feliz. States cpap broken. Dr. Kay Malagon.  Thyroid cyst     Right.  Thyroid nodule 2019      Past Surgical History:   Procedure Laterality Date    HX BREAST BIOPSY Left 2018    benign    HX  SECTION  2004    [facility].  HX CYST INCISION AND DRAINAGE Left 2016    benign. Dr. Katie Rosas Right 09/15/2017    benign. Dr. Golden Weiss.  HX HERNIA REPAIR  9305    umbilical.    HX OTHER SURGICAL  2014    thyroid biopsy. Dr. Linsey Antoine.  HX THYROIDECTOMY Left 2015    due to [address] Dr. Kennedy Gifford. benign. Social History     Tobacco Use    Smoking status: Passive Smoke Exposure - Never Smoker    Smokeless tobacco: Never Used    Tobacco comment: lives with a smoker dad x 18 yrs and history of living with smoker boyfriend x 2 years   Substance Use Topics    Alcohol use: Yes     Alcohol/week: 2.0 standard drinks     Types: 2 Shots of liquor per week      Family History   Problem Relation Age of Onset    Hypertension Mother     Lung Disease Mother         chronic bronchitis    Sleep Apnea Mother     Hypertension Father     Diabetes Paternal Grandmother     No Known Problems Sister     No Known Problems Sister     No Known Problems Sister     No Known Problems Brother         murdered    Breast Cancer Maternal Aunt     Other Sister         severe scoliosis. txd with back brace.     Breast Cancer Maternal Aunt     Breast Cancer Maternal Aunt     No Known Problems Maternal Grandmother     No Known Problems Maternal Grandfather       Current Facility-Administered Medications   Medication Dose Route Frequency    benzocaine-menthoL (CHLORASEPTIC MAX) lozenge 1 Lozenge  1 Lozenge Oral Q2H PRN    dextrose 5% - 0.45% NaCl with KCl 20 mEq/L infusion  125 mL/hr IntraVENous CONTINUOUS    diphenhydrAMINE (BENADRYL) injection 25 mg  25 mg IntraVENous Q6H PRN    enoxaparin (LOVENOX) injection 40 mg  40 mg SubCUTAneous Q24H    hydrALAZINE (APRESOLINE) 20 mg/mL injection 10 mg  10 mg IntraVENous Q6H PRN    HYDROmorphone (DILAUDID) injection 0.5 mg  0.5 mg IntraVENous Q2H PRN    ondansetron (ZOFRAN) injection 4 mg  4 mg IntraVENous Q6H PRN    LORazepam (ATIVAN) injection 1 mg  1 mg IntraVENous Q6H PRN     Current Outpatient Medications   Medication Sig    esomeprazole (NEXIUM) 40 mg capsule TAKE 1 CAPSULE BY MOUTH twice daily  FOR HEARTBURN GENERIC FOR NEXIUM    ergocalciferol (ERGOCALCIFEROL) 1,250 mcg (50,000 unit) capsule Take 1 Capsule by mouth every seven (7) days. Indications: low vitamin D levels    vitamin e (E GEMS) 100 unit capsule Take 100 Units by mouth daily. Unsure of the strength.  fluticasone propionate (FLONASE) 50 mcg/actuation nasal spray 1 [place] by Both Nostrils route daily.  cyanocobalamin (VITAMIN B-12) 1,000 mcg tablet Take 1,000 mcg by mouth daily. Allergies   Allergen Reactions    Skelaxin [Metaxalone] Other (comments)     Too groggy       Review of Systems:    A comprehensive review of systems was negative except for that written in the History of Present Illness. Objective:        Visit Vitals  /84 (BP 1 Location: Left upper arm, BP Patient Position: At rest;Lying)   Pulse 100   Temp 98.3 °F (36.8 °C)   Resp 16   Ht 5' 3\" (1.6 m)   Wt 98.6 kg (217 lb 6 oz)   SpO2 97%   BMI 38.51 kg/m²       Physical Exam:  GENERAL: alert, cooperative, no distress, appears stated age, LUNG: clear to auscultation bilaterally, HEART: regular rate and rhythm, S1, S2 normal, no murmur, click, rub or gallop, ABDOMEN: soft, non-tender. Bowel sounds hypoactive, distended, tympanic. No masses,  no organomegaly    Imaging:    IMPRESSION     1. Localized small bowel distention in left midabdomen concerning for closed  loop obstruction. 2. 2.4 cm inferior right breast lesion, not further determined on this study.  No  radiographic correlation recommended. 3. Posterior uterine mass measuring 5.7 cm, possibly large leiomyoma. 4. Trace free peritoneal fluid. Additionally, there appears to be an surgical stable line of the small bowel in the right mid-abdomen      Plain film after gastrografin challenge -  Contrast in the colon. Residual dilated loops of small bowel     Lab/Data Review:  BMP:   Lab Results   Component Value Date/Time     02/16/2022 03:20 AM    K 4.0 02/16/2022 03:20 AM     02/16/2022 03:20 AM    CO2 27 02/16/2022 03:20 AM    AGAP 3 (L) 02/16/2022 03:20 AM     (H) 02/16/2022 03:20 AM    BUN 7 02/16/2022 03:20 AM    CREA 0.77 02/16/2022 03:20 AM    GFRAA >60 02/16/2022 03:20 AM    GFRNA >60 02/16/2022 03:20 AM     CBC:   Lab Results   Component Value Date/Time    WBC 8.7 02/16/2022 03:20 AM    HGB 10.6 (L) 02/16/2022 03:20 AM    HCT 34.5 (L) 02/16/2022 03:20 AM     02/16/2022 03:20 AM         Assessment:     43year old with partial to resolving SBO likely secondary to adhesions     Plan:     1.  I recommend proceeding with Conservative therapy:  Bowel rest.

## 2022-02-17 VITALS
SYSTOLIC BLOOD PRESSURE: 162 MMHG | BODY MASS INDEX: 38.52 KG/M2 | HEART RATE: 86 BPM | OXYGEN SATURATION: 97 % | RESPIRATION RATE: 18 BRPM | WEIGHT: 217.37 LBS | DIASTOLIC BLOOD PRESSURE: 91 MMHG | HEIGHT: 63 IN | TEMPERATURE: 98.3 F

## 2022-02-17 LAB
ANION GAP SERPL CALC-SCNC: 2 MMOL/L (ref 5–15)
BUN SERPL-MCNC: 3 MG/DL (ref 6–20)
BUN/CREAT SERPL: 4 (ref 12–20)
CALCIUM SERPL-MCNC: 8.3 MG/DL (ref 8.5–10.1)
CHLORIDE SERPL-SCNC: 111 MMOL/L (ref 97–108)
CO2 SERPL-SCNC: 26 MMOL/L (ref 21–32)
CREAT SERPL-MCNC: 0.67 MG/DL (ref 0.55–1.02)
ERYTHROCYTE [DISTWIDTH] IN BLOOD BY AUTOMATED COUNT: 13.2 % (ref 11.5–14.5)
GLUCOSE SERPL-MCNC: 109 MG/DL (ref 65–100)
HCT VFR BLD AUTO: 33.3 % (ref 35–47)
HGB BLD-MCNC: 10.3 G/DL (ref 11.5–16)
MCH RBC QN AUTO: 26.5 PG (ref 26–34)
MCHC RBC AUTO-ENTMCNC: 30.9 G/DL (ref 30–36.5)
MCV RBC AUTO: 85.8 FL (ref 80–99)
NRBC # BLD: 0 K/UL (ref 0–0.01)
NRBC BLD-RTO: 0 PER 100 WBC
PLATELET # BLD AUTO: 318 K/UL (ref 150–400)
PMV BLD AUTO: 9.3 FL (ref 8.9–12.9)
POTASSIUM SERPL-SCNC: 3.8 MMOL/L (ref 3.5–5.1)
RBC # BLD AUTO: 3.88 M/UL (ref 3.8–5.2)
SODIUM SERPL-SCNC: 139 MMOL/L (ref 136–145)
WBC # BLD AUTO: 6.9 K/UL (ref 3.6–11)

## 2022-02-17 PROCEDURE — 99238 HOSP IP/OBS DSCHRG MGMT 30/<: CPT | Performed by: SURGERY

## 2022-02-17 PROCEDURE — 36415 COLL VENOUS BLD VENIPUNCTURE: CPT

## 2022-02-17 PROCEDURE — 85027 COMPLETE CBC AUTOMATED: CPT

## 2022-02-17 PROCEDURE — 80048 BASIC METABOLIC PNL TOTAL CA: CPT

## 2022-02-17 PROCEDURE — 2709999900 HC NON-CHARGEABLE SUPPLY

## 2022-02-17 PROCEDURE — 74011250636 HC RX REV CODE- 250/636: Performed by: SURGERY

## 2022-02-17 RX ADMIN — ENOXAPARIN SODIUM 40 MG: 100 INJECTION SUBCUTANEOUS at 08:51

## 2022-02-17 RX ADMIN — POTASSIUM CHLORIDE, DEXTROSE MONOHYDRATE AND SODIUM CHLORIDE 125 ML/HR: 150; 5; 450 INJECTION, SOLUTION INTRAVENOUS at 01:25

## 2022-02-17 NOTE — DISCHARGE SUMMARY
Discharge Summary    Patient ID:  Malcom Barrientos  [id number]  female  43 y.o.  1980    Admit date: 2/15/2022    Discharge date: 2/17/2022    Admitting Physician: Tianna Lynch MD     Consulting Physician(s):   Treatment Team: Attending Provider: Samara Carmichael MD; Consulting Provider: Samara Carmichael MD; Utilization Review: Dereck Peguero; Primary Nurse: Louise Sandra RN                         HPI:  Pt is a 43 y.o. female who presents at this time with SBO requiring acute care monitoring and/or treatment. Problem List:   Problem List as of 2/17/2022 Date Reviewed: 11/11/2021          Codes Class Noted - Resolved    SBO (small bowel obstruction) ([facility] ICD-10-CM: P60.692  ICD-9-CM: 560.9  2/15/2022 - Present        Thyroid cyst ICD-10-CM: E04.1  ICD-9-CM: 246.2  Unknown - Present    Overview Signed 10/29/2019  4:20 PM by José Manuel Harry DO     Right.                Thyroid nodule ICD-10-CM: E04.1  ICD-9-CM: 241.0  8/1/2019 - Present        Severe obesity ([facility] ICD-10-CM: E66.01  ICD-9-CM: 278.01  6/27/2019 - Present        Cyst of left breast ICD-10-CM: N60.02  ICD-9-CM: 610.0  3/16/2018 - Present        Obesity, Class II, BMI 35-39.9 ICD-10-CM: E66.9  ICD-9-CM: 278.00  9/26/2017 - Present        Exercise-induced asthma ICD-10-CM: J45.990  ICD-9-CM: 493.81  9/26/2017 - Present    Overview Signed 9/26/2017 11:18 AM by José Manuel Harry DO     stable             Family history of breast cancer ICD-10-CM: Z80.3  ICD-9-CM: V16.3  9/26/2017 - Present        History of thyroid nodule ICD-10-CM: Z86.39  ICD-9-CM: V12.29  9/26/2017 - Present    Overview Signed 9/26/2017 11:20 AM by José Manuel Corolla, DO     resolved after L thyroidectomy             Cyst of right breast ICD-10-CM: N60.01  ICD-9-CM: 610.0  9/18/2017 - Present        Myopia ICD-10-CM: H52.10  ICD-9-CM: 367.1  1/16/2017 - Present        Chronic idiopathic constipation ICD-10-CM: K59.04  ICD-9-CM: 564.00  7/1/2015 - Present    Overview Signed 10/21/2015  3:50 PM by Chelsy Pulse, DO     Dr. Martin Hallman.              Fatigue ICD-10-CM: R53.83  ICD-9-CM: 780.79  6/24/2015 - Present    Overview Signed 6/24/2015  1:15 PM by Chelsy Pulse, DO     due to thryoid vs YUNIER vs other, improved             YUNIER (obstructive sleep apnea) ICD-10-CM: G47.33  ICD-9-CM: 327.23  2/25/2015 - Present    Overview Signed 4/6/2015  4:31 PM by Chelsy Pulse, DO     Dr. Juliana Cueto             Vitamin D deficiency ICD-10-CM: E55.9  ICD-9-CM: 268.9  6/27/2014 - Present        Snoring ICD-10-CM: R06.83  ICD-9-CM: 786.09  10/4/2013 - Present        SOB (shortness of breath) on exertion ICD-10-CM: R06.02  ICD-9-CM: 786.05  8/23/2013 - Present    Overview Signed 8/23/2013  2:33 PM by Chelsy Pulse, DO     due to deconditioning vs other             RESOLVED: Retrosternal thyroid goiter ICD-10-CM: E04.9  ICD-9-CM: 240.9  6/24/2015 - 6/24/2015    Overview Signed 6/24/2015  1:13 PM by Chelsy Ibrahim, DO     resolved after thyroidectomy 06/08/15 by Dr. Adri Nagelus: Nontoxic uninodular goiter ICD-10-CM: E04.1  ICD-9-CM: 241.0  6/8/2015 - 6/24/2015        RESOLVED: EBV positive mononucleosis syndrome ICD-10-CM: B27.00  ICD-9-CM: 296  3/28/2014 - 9/26/2017    Overview Signed 3/28/2014  5:19 PM by Chelsy Pulse, DO     chronic or reactivated             RESOLVED: Obesity, Class I, BMI 30-34.9 ICD-10-CM: E66.9  ICD-9-CM: 278.00  3/28/2014 - 9/26/2017        RESOLVED: Back pain ICD-10-CM: M54.9  ICD-9-CM: 724.5  10/4/2013 - 6/24/2015    Overview Signed 10/4/2013 10:42 AM by Tyesha PITTMAN,      due to mattress vs standing vs lifting 1 y/o children vs other             RESOLVED: Thyroid nodule ICD-10-CM: E04.1  ICD-9-CM: 241.0  10/4/2013 - 6/24/2015    Overview Signed 10/4/2013 10:43 AM by Chelsy Ibrahim DO     Left             RESOLVED: Other malaise and fatigue ICD-10-CM: R53.81, R53.83  ICD-9-CM: 780.79 10/4/2013 - 6/24/2015        RESOLVED: Breast cyst ICD-10-CM: N60.09  ICD-9-CM: 610.0  Unknown - 9/26/2017    Overview Signed 8/23/2013  2:28 PM by Lynsey Woods DO     benign. Left. 1.7 cm and multiple others. RESOLVED: Headache(784.0) ICD-10-CM: R51  ICD-9-CM: 784.0  8/23/2013 - 6/24/2015    Overview Signed 8/23/2013  2:34 PM by Lynsey Woods DO     frontal               RESOLVED: UTI (urinary tract infection) ICD-10-CM: N39.0  ICD-9-CM: 599.0  9/18/2010 - 8/23/2013        RESOLVED: Leukocytosis, unspecified ICD-10-CM: M34.931  ICD-9-CM: 288.60  9/18/2010 - 3/28/2014        RESOLVED: Pharyngitis due to group A beta hemolytic Streptococci ICD-10-CM: J02.0  ICD-9-CM: 034.0  9/16/2010 - 8/23/2013        RESOLVED: Enlarged tonsils and adenoids ICD-10-CM: J35.3  ICD-9-CM: 474.10  9/1/2010 - 10/21/2015               Hospital Course:  Patient was managed conservatively with bowel rest and improved as expected. On the day of discharge, patient was able to tolerate a diet. Activity: Patient mobilized with nursing and was found to be safe and steady with ambulation. Discharged to: Home    Condition on Discharge: Stable     Discharge instructions:    - Take medications as prescribed  - Diet Regular  - Discharge activity:    - Activity as tolerated    - Ambulate several times a day   - Do not drive if taking opioid pain medications    Allergies: Allergies   Allergen Reactions    Skelaxin [Metaxalone] Other (comments)     Too groggy              -DISCHARGE MEDICATION LIST     Current Discharge Medication List      CONTINUE these medications which have NOT CHANGED    Details   esomeprazole (NEXIUM) 40 mg capsule TAKE 1 CAPSULE BY MOUTH twice daily  FOR HEARTBURN GENERIC FOR NEXIUM  Qty: 60 Capsule, Refills: 5    Associated Diagnoses: Heartburn      ergocalciferol (ERGOCALCIFEROL) 1,250 mcg (50,000 unit) capsule Take 1 Capsule by mouth every seven (7) days.  Indications: low vitamin D levels  Qty: 5 Capsule, Refills: 5      vitamin e (E GEMS) 100 unit capsule Take 100 Units by mouth daily. Unsure of the strength. fluticasone propionate (FLONASE) 50 mcg/actuation nasal spray 1 [place] by Both Nostrils route daily. Qty: 1 Bottle, Refills: 3      cyanocobalamin (VITAMIN B-12) 1,000 mcg tablet Take 1,000 mcg by mouth daily. per medical continuation form      -Follow up in office in 2 weeks      Signed:  Rigo Casanova  MSN, APRN, FNP-C, [facility]  Surgical Nurse Practitioner    2/17/2022  9:01 AM    NOTE PER DR CARLO MARQUEZ:  Pt examined, discussed and reviewed with NP, and questions answered with pt, and I agree/ concur with note NP     HPI:  Patient feeling well 80% improvement in symptoms, passing flatus and some bowel movements tolerating diet well, no concerns  Review of Systems   Gastrointestinal:        Minimal abdominal pain and no nausea and vomiting, overall symptoms 80% improved   All other systems reviewed and are negative. Physical Exam  Vitals and nursing note reviewed. Exam conducted with a chaperone present (RODO Casanova). Constitutional:       Appearance: Normal appearance. Cardiovascular:      Rate and Rhythm: Normal rate and regular rhythm. Pulmonary:      Effort: Pulmonary effort is normal.   Abdominal:      General: Abdomen is flat. Palpations: Abdomen is soft. Tenderness: There is no abdominal tenderness. Musculoskeletal:         General: Normal range of motion. Skin:     General: Skin is warm and dry. Neurological:      General: No focal deficit present. Mental Status: She is alert and oriented to person, place, and time. Psychiatric:         Mood and Affect: Mood normal.         Behavior: Behavior normal.         Thought Content:  Thought content normal.         Judgment: Judgment normal.         Active Problems:    SBO (small bowel obstruction) ([facility] (2/15/2022)         REC:   Patient improved, care and follow-up reviewed with patient, low residue diet for several days to a week then advance as tolerated, follow-up with PCP regarding general medical issues and review of history, follow-up with surgery as needed, patient understands, all questions answered.   Face-to-face time including review of any indicated imaging, discussion with patient, and other providers, exam and discussion with patient:       25      minutes

## 2022-02-17 NOTE — PROGRESS NOTES
Bedside and Verbal shift change report given to Coty Lima RN (oncoming nurse) by Palmer Ramos (offgoing nurse). Report included the following information SBAR, Kardex, Procedure Summary, Intake/Output, MAR and Recent Results.

## 2022-02-17 NOTE — PROGRESS NOTES
End of Shift Note    Bedside shift change report given to Johan Mcqueen RN (oncoming nurse) by Lorena Gray RN (offgoing nurse). Report included the following information SBAR, Kardex, Intake/Output and MAR    Shift worked:  7am-7pm     Shift summary and any significant changes:     Pt was welcomed to unit and made comfortable. Pt ambulated to the bathroom multiple times during the shift and tolerated the activity well. Pt is tolerating the current diet of clear liquids. Pt did not complain of nausea or pain during the shift. Pt had an uneventful shift. Concerns for physician to address:       Zone phone for oncoming shift:   [phone]       Activity:  Activity Level: Up ad jon,Bath Room Privileges  Number times ambulated in hallways past shift: 0  Number of times OOB to chair past shift: 0    Cardiac:   Cardiac Monitoring: No           Access:   Current line(s): PIV     Genitourinary:   Urinary status: voiding    Respiratory:   O2 Device: None (Room air)  Chronic home O2 use?: NO  Incentive spirometer at bedside: N/A     GI:  Last Bowel Movement Date: 02/16/22  Current diet:  ADULT DIET Regular  Passing flatus: YES  Tolerating current diet: YES       Pain Management:   Patient states pain is manageable on current regimen: YES    Skin:  Kory Score: 21  Interventions: float heels and increase time out of bed    Patient Safety:  Fall Score:  Total Score: 1  Interventions: gripper socks and pt to call before getting OOB       Length of Stay:  Expected LOS: - - -  Actual LOS: DANIELLE Ruth

## 2022-02-17 NOTE — PROGRESS NOTES
Hospital follow-up PCP transitional care appointment has been scheduled with Rohit Downey on 2/24/22 at 1100. This is the first available appointment. Pending patient discharge.   Torie Mcclelland, Care Management Specialist

## 2022-02-17 NOTE — DISCHARGE INSTRUCTIONS
Bowel Blockage (Intestinal Obstruction): Care Instructions  Your Care Instructions  A bowel blockage, also called an intestinal obstruction, can prevent gas, fluids, or solids from moving through the intestines normally. It can cause constipation and, rarely, diarrhea. You may have pain, nausea, vomiting, and cramping. Most of the time, complete blockages require a stay in the hospital and possibly surgery. But if your bowel is only partly blocked, your doctor may tell you to wait until it clears on its own and you are able to pass gas and stool. If so, there are things you can do at home to help make you feel better. If you have had surgery for a bowel blockage, there are things you can do at home to make sure you heal well. You can also make some changes to keep your bowel from becoming blocked again. Follow-up care is a key part of your treatment and safety. Be sure to make and go to all appointments, and call your doctor if you are having problems. It's also a good idea to know your test results and keep a list of the medicines you take. How can you care for yourself at home? If your doctor has told you to wait at home for a blockage to clear on its own:  · Follow your doctor's instructions. These may include eating a liquid diet to avoid complete blockage. · Be safe with medicines. Take your medicines exactly as prescribed. Call your doctor if you think you are having a problem with your medicine. · Put a heating pad set on low on your belly to relieve mild cramps and pain. To prevent another blockage  · Try to eat smaller amounts of food more often. For example, have 5 or 6 small meals throughout the day instead of 2 or 3 large meals. · Chew your food very well. Try to chew each bite about 20 times or until it is liquid. · Avoid high-fiber foods and raw fruits and vegetables with skins, husks, strings, or seeds.  These can form a ball of undigested material that can cause a blockage if a part of your bowel is scarred or narrowed. · Check with your doctor before you eat whole-grain products or use a fiber supplement such as Citrucel or Metamucil. · To help you have regular bowel movements, eat at regular times, do not strain during a bowel movement, and drink at least 8 to 10 glasses of water each day. If you have kidney, heart, or liver disease and have to limit fluids, talk with your doctor or before you increase the amount of fluids you drink. · Drink high-calorie liquid formulas if your doctor says to. Severe symptoms may make it hard for your body to take in vitamins and minerals. · Get regular exercise. It helps you digest your food better. Get at least 30 minutes of physical activity on most days of the week. Walking is a good choice. When should you call for help? Call your doctor now or seek immediate medical care if:  ? · You have a fever. ? · You are vomiting. ? · You have new or worse belly pain. ? · You cannot pass stools or gas. ? Watch closely for changes in your health, and be sure to contact your doctor if you have any problems. Where can you learn more? Go to http://www.gray.com/. Enter W903 in the search box to learn more about \"Bowel Blockage (Intestinal Obstruction): Care Instructions. \"  Current as of: May 12, 2017  Content Version: 11.4  © 2095-2521 Healthwise, Incorporated. Care instructions adapted under license by [employer] (which disclaims liability or warranty for this information).  If you have questions about a medical condition or this instruction, always ask your healthcare professional. [place] [address] any warranty or liability for your use of this information.

## 2022-02-17 NOTE — PROGRESS NOTES
Patient is ready for d/c from CM standpoint    Transition of Care Plan:    RUR:6%  Disposition:Home  Follow up appointments:on AVS  DME needed:none  Transportation at [address] @11:30am  [facility] or means to access home:        IM Medicare Letter:n/a  Is patient a BCPI-A Bundle:   n/a        If yes, was Bundle Letter given?:    Is patient a  and connected with the [place]? If yes, was [employer] transfer form completed and [facility] notified? Caregiver Contact:Shabnam reynolds [place] [phone]  Discharge Caregiver contacted prior to discharge? Care Conference needed?:                       Reason for Admission:  SBO                 RUR Score:   6%                  Plan for utilizing home health:    no      PCP: First and Last name:  Sim Contreras MD     Name of Practice:    Are you a current patient: Yes/No: yes   Approximate date of last visit: Dec 2021   Can you participate in a virtual visit with your PCP:                     Current Advanced Directive/Advance Care Plan: Prior      Healthcare Decision Maker:   Click here to complete [address] including selection of the Healthcare Decision Maker Relationship (ie \"Primary\")                             Transition of Care Plan:  CM completed assessment w/pt via phone. Prior to admission, pt was independent w/ADL, to include driving. No history of HH/Rehab or DME use. Patients support system includes, mother, sister & friends. No needs or concerns identified. PCP-     Sim Contreras MD  Pharmacy-[facility] on [address] [place] [address] Management Interventions  PCP Verified by CM: Yes  Mode of Transport at Discharge:  Other (see comment) (friend)  Transition of Care Consult (CM Consult): Discharge Planning  Discharge Durable Medical Equipment: No (no DME use)  Physical Therapy Consult: No  Occupational Therapy Consult: No  Speech Therapy Consult: No  Support Systems: Parent(s),Friend/Neighbor,Other Family Member(s) (Lives in a one story home w/minor son.   4 [address]IR[address])  Confirm Follow Up Transport: Self  Discharge Location  Patient Expects to be Discharged to[de-identified] Home      [facility]  Ext [phone]

## 2022-02-17 NOTE — PROGRESS NOTES
Problem: Falls - Risk of  Goal: *Absence of Falls  Description: Document Miguel Baron Fall Risk and appropriate interventions in the flowsheet.   Outcome: Resolved/Met     Problem: Patient Education: Go to Patient Education Activity  Goal: Patient/Family Education  Outcome: Resolved/Met

## 2022-02-17 NOTE — PROGRESS NOTES
DISCHARGE NOTE FROM [facility] NURSE    Patient determined to be stable for discharge by attending provider. I have reviewed the discharge instructions and follow-up appointments with the patient. They verbalized understanding and all questions were answered to their satisfaction. No complaints or further questions were expressed. No new medication scripts. Appropriate educational materials and medication side effect teaching were provided. PIV were removed prior to discharge. Patient did not discharge with any line, horn, or drain. All personal items collected during admission were returned to the patient prior to discharge. Post-op patient: Yes- Patient given post-op discharge kit and instructed on use.        Sanjay Hernandez RN

## 2022-02-17 NOTE — PROGRESS NOTES
Pharmacist Discharge Medication Reconciliation    Significant PMH:   Past Medical History:   Diagnosis Date    Abnormal Pap smear of cervix 1990s    Breast cyst 2009    benign. Left. 1.7 cm and multiple others. Dr. Susan Berry    Chicken pox childhood    Chronic idiopathic constipation 07/2015    Dr. Sravan Salinas. Chronic pain     HELM (dyspnea on exertion) 02/2015    Dr. 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 Retrosternal goiter. Dr. Ed Fernandes. Enlarged tonsils and adenoids 09/2010    GERD (gastroesophageal reflux disease)     Iodine-deficiency related goiter     Left. Dr. Ed Fernandes. YUNIER (obstructive sleep apnea) 02/25/2015    Dr. Akira Prakash, uses cpap. Dr. Serenity Shields. States cpap broken. Dr. Cassie Chao. Thyroid cyst 08/219    Right. Thyroid nodule 08/2019     Encounter Diagnoses:   Encounter Diagnosis   Name Primary? SBO (small bowel obstruction) (HCC) Yes     Allergies: Skelaxin [metaxalone]    Discharge Medications:   Current Discharge Medication List        CONTINUE these medications which have NOT CHANGED    Details   esomeprazole (NEXIUM) 40 mg capsule TAKE 1 CAPSULE BY MOUTH twice daily  FOR HEARTBURN GENERIC FOR NEXIUM  Qty: 60 Capsule, Refills: 5    Associated Diagnoses: Heartburn      ergocalciferol (ERGOCALCIFEROL) 1,250 mcg (50,000 unit) capsule Take 1 Capsule by mouth every seven (7) days. Indications: low vitamin D levels  Qty: 5 Capsule, Refills: 5      vitamin e (E GEMS) 100 unit capsule Take 100 Units by mouth daily. Unsure of the strength. fluticasone propionate (FLONASE) 50 mcg/actuation nasal spray 1 [place] by Both Nostrils route daily. Qty: 1 Bottle, Refills: 3      cyanocobalamin (VITAMIN B-12) 1,000 mcg tablet Take 1,000 mcg by mouth daily.            The patient's chart, MAR and AVS were reviewed by Miriam Tamayo [facility].    Discharging Provider: Kurtis Leigh MD    Thank you,     Hans Fiore, [facility]

## 2022-02-18 ENCOUNTER — PATIENT OUTREACH (OUTPATIENT)
Dept: OTHER | Age: 42
End: 2022-02-18

## 2022-02-18 NOTE — PROGRESS NOTES
Verified  and address for HIPAA security. Introduced [employer] for patient. Patient does not identify any Care Management needs at this time and declines services. *Spoke with patient who reports that she is doing better. Does has some Abdominal Cramping, but she also started her Period. Patient states she is passing gas as well as had a small BM this morning. *Patient states that she has always had issues with constipation. Suggested she try taking a stool softener and progress to a High Fiber Diet. *Patient confirmed she has a PCP Hospital Follow Up Wednesday, 22 @ 2p. Patient states that she is doing well enough that additional support is not needed. Patient had no other questions or concerns. Contact information provided and reminded her that I can be reached if any future needs arise.

## 2022-02-23 ENCOUNTER — OFFICE VISIT (OUTPATIENT)
Dept: FAMILY MEDICINE CLINIC | Age: 42
End: 2022-02-23
Payer: COMMERCIAL

## 2022-02-23 VITALS
OXYGEN SATURATION: 97 % | DIASTOLIC BLOOD PRESSURE: 91 MMHG | BODY MASS INDEX: 38.16 KG/M2 | RESPIRATION RATE: 18 BRPM | HEART RATE: 80 BPM | WEIGHT: 215.4 LBS | SYSTOLIC BLOOD PRESSURE: 143 MMHG

## 2022-02-23 DIAGNOSIS — K59.09 CONSTIPATION, CHRONIC: ICD-10-CM

## 2022-02-23 DIAGNOSIS — Z09 HOSPITAL DISCHARGE FOLLOW-UP: ICD-10-CM

## 2022-02-23 DIAGNOSIS — Z76.89 ENCOUNTER FOR SUPPORT AND COORDINATION OF TRANSITION OF CARE: ICD-10-CM

## 2022-02-23 DIAGNOSIS — K56.609 SBO (SMALL BOWEL OBSTRUCTION) (HCC): Primary | ICD-10-CM

## 2022-02-23 PROCEDURE — 1111F DSCHRG MED/CURRENT MED MERGE: CPT | Performed by: FAMILY MEDICINE

## 2022-02-23 PROCEDURE — 99215 OFFICE O/P EST HI 40 MIN: CPT | Performed by: FAMILY MEDICINE

## 2022-02-23 NOTE — PATIENT INSTRUCTIONS
Learning About Ileus  What is ileus? Ileus (say \"ILL-ee-us\") is a blockage of the bowel (intestines) that happens when the intestines don't work as they should. Normally, muscles in the intestines squeeze to push food and waste along. When this process stops, the intestines stop digesting food and moving waste out of the body. This may also be called paralytic ileus. Ileus is not the same as a mechanical bowel obstruction. In a mechanical bowel obstruction, something is actually blocking the intestine, like scar tissue or a tumor. That is not true in ileus. Ileus sometimes happens after surgery to the belly. But it can also be caused by other things, such as some medicines, certain diseases or infections, and nerve problems. The doctor may do a number of tests. These tests may include X-rays, blood tests, and a CT scan. Testing can help the doctor be sure that nothing is blocking the intestines. Most people who have ileus need to be treated in the hospital.  What are the symptoms? Symptoms may include:  · Cramping belly pain. · Bloating. · Nausea or vomiting. · Not passing stool or gas. How is ileus treated? You will need to avoid eating solid food until you are better. Instead, you will get fluids and nutrition through a vein (IV). This helps prevent dehydration. It also lets your bowel rest.  You may have a tube that goes through your nose and into your stomach. This can help ease pain and bloating. You may get other treatments, depending on what caused ileus. For example, a medicine might be stopped if it is affecting your bowel. The intestines will often start working again in a few days. Signs of this include being able to pass gas or have a bowel movement. As your intestines start working, you will switch slowly from a liquid diet back to solid foods. Follow-up care is a key part of your treatment and safety.  Be sure to make and go to all appointments, and call your doctor if you are having problems. It's also a good idea to know your test results and keep a list of the medicines you take. Where can you learn more? Go to http://www.gray.com/  Enter M933 in the search box to learn more about \"Learning About Ileus. \"  Current as of: February 10, 2021               Content Version: 13.0  © 2006-2021 [employer]. Care instructions adapted under license by [employer] (which disclaims liability or warranty for this information). If you have questions about a medical condition or this instruction, always ask your healthcare professional. [place] [address] any warranty or liability for your use of this information. Abdominal Pain: Care Instructions  Your Care Instructions     Abdominal pain has many possible causes. Some aren't serious and get better on their own in a few days. Others need more testing and treatment. If your pain continues or gets worse, you need to be rechecked and may need more tests to find out what is wrong. You may need surgery to correct the problem. Don't ignore new symptoms, such as fever, nausea and vomiting, urination problems, pain that gets worse, and dizziness. These may be signs of a more serious problem. Your doctor may have recommended a follow-up visit in the next 8 to 12 hours. If you are not getting better, you may need more tests or treatment. The doctor has checked you carefully, but problems can develop later. If you notice any problems or new symptoms, get medical treatment right away. Follow-up care is a key part of your treatment and safety. Be sure to make and go to all appointments, and call your doctor if you are having problems. It's also a good idea to know your test results and keep a list of the medicines you take. How can you care for yourself at home? · Rest until you feel better. · To prevent dehydration, drink plenty of fluids.  Choose water and other clear liquids until you feel better. If you have kidney, heart, or liver disease and have to limit fluids, talk with your doctor before you increase the amount of fluids you drink. · If your stomach is upset, eat mild foods, such as rice, dry toast or crackers, bananas, and applesauce. Try eating several small meals instead of two or three large ones. · Wait until 48 hours after all symptoms have gone away before you have spicy foods, alcohol, and drinks that contain caffeine. · Do not eat foods that are high in fat. · Avoid anti-inflammatory medicines such as aspirin, ibuprofen (Advil, Motrin), and naproxen (Aleve). These can cause stomach upset. Talk to your doctor if you take daily aspirin for another health problem. When should you call for help? Call 911 anytime you think you may need emergency care. For example, call if:    · You passed out (lost consciousness).     · You pass maroon or very bloody stools.     · You vomit blood or what looks like coffee grounds.     · You have new, severe belly pain. Call your doctor now or seek immediate medical care if:    · Your pain gets worse, especially if it becomes focused in one area of your belly.     · You have a new or higher fever.     · Your stools are black and look like tar, or they have streaks of blood.     · You have unexpected vaginal bleeding.     · You have symptoms of a urinary tract infection. These may include:  ? Pain when you urinate. ? Urinating more often than usual.  ? Blood in your urine.     · You are dizzy or lightheaded, or you feel like you may faint. Watch closely for changes in your health, and be sure to contact your doctor if:    · You are not getting better after 1 day (24 hours). Where can you learn more? Go to http://www.gray.com/  Enter K436 in the search box to learn more about \"Abdominal Pain: Care Instructions. \"  Current as of: July 1, 2021               Content Version: 13.0  © 2251-8138 Healthwise, Incorporated. Care instructions adapted under license by [employer] (which disclaims liability or warranty for this information). If you have questions about a medical condition or this instruction, always ask your healthcare professional. [address] any warranty or liability for your use of this information.

## 2022-02-23 NOTE — LETTER
NOTIFICATION RETURN TO WORK / SCHOOL      2/23/2022 2:26 PM    Ms. Silva [facility] [phone] [phone]      To Whom It May Concern:    Darlene Pennington is currently under the care of [facility]. She will return to work/school on: 3/07/2022 due to the recent illness    If there are questions or concerns please have the patient contact our office.         Sincerely,      Mariah Regalado MD

## 2022-02-23 NOTE — PROGRESS NOTES
Transitional Care Management Progress Note    Patient: Kalyani Trivedi  : 1980  PCP: Halle Carvajal MD    Date of office visit: 2022   Date of admission: 2/15/22  Date of discharge: 22  Hospital: [facility]    Call initiated w/i 2 business dates of discharge: *No response documented in the last 14 days   Date of the most recent call to the patient: *No documented post hospital discharge outreach found in the last 14 days      Assessment/Plan:     Diagnoses and all orders for this visit:    1. SBO (small bowel obstruction) ([facility] [address]  -     REFERRAL TO GASTROENTEROLOGY    2. Encounter for support and coordination of transition of care    3. Hospital discharge follow-up  -     ND DISCHARGE MEDS RECONCILED W/ CURRENT OUTPATIENT MED LIST    4. Constipation, chronic  -     REFERRAL TO GASTROENTEROLOGY    Other orders  -     linaCLOtide (Linzess) 145 mcg cap capsule; Take 1 Capsule by mouth Daily (before breakfast). The complexity of medical decision making for this patient's transitional care is high   Follow-up and Dispositions    · Return in 1 month (on 3/23/2022), or if symptoms worsen or fail to improve. Subjective:   Kalyani Trivedi is a 43 y.o. female presenting today for follow-up after hospital discharge. This encounter and supporting documentation was reviewed if available. Medication reconciliation was performed today.   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 Authorizing Provider   esomeprazole (NEXIUM) 40 mg capsule TAKE 1 CAPSULE BY MOUTH twice daily  FOR HEARTBURN GENERIC FOR NEXIUM 11/18/21   Vincenzo Ribeiro MD   ergocalciferol (ERGOCALCIFEROL) 1,250 mcg (50,000 unit) capsule Take 1 Capsule by mouth every seven (7) days. Indications: low vitamin D levels 6/29/21   Vincenzo Ribeiro MD   vitamin e (E GEMS) 100 unit capsule Take 100 Units by mouth daily. Unsure of the strength. Provider, Historical   fluticasone propionate (FLONASE) 50 mcg/actuation nasal spray 1 [place] by Both Nostrils route daily. 6/16/21   Vincenzo Ribeiro MD   cyanocobalamin (VITAMIN B-12) 1,000 mcg tablet Take 1,000 mcg by mouth daily. Provider, Historical        ROS:    Constitutional: no chills and fever, obese, exhausted    HENT: no ear head pain and nosebleeds. No blurred vision, pain and discharge. Respiratory: no shortness of breath, wheezing cough nor sore throat. Cardiovascular: Has no chest pain, leg swelling ,and racing heart . Gastrointestinal: No constipation, diarrhea, nausea and vomiting. Genitourinary: No frequency. Musculoskeletal: Negative for joint pain. Skin: no itching, pimples or acne rash. Neurological: Negative for dizziness, no tremors   Psychiatric/Behavioral: Negative for depression has normal interest to do things and not depressed the patient is not nervous/anxious.              Current Outpatient Medications   Medication Sig Dispense Refill    esomeprazole (NEXIUM) 40 mg capsule TAKE 1 CAPSULE BY MOUTH twice daily  FOR HEARTBURN GENERIC FOR NEXIUM 60 Capsule 5    ergocalciferol (ERGOCALCIFEROL) 1,250 mcg (50,000 unit) capsule Take 1 Capsule by mouth every seven (7) days. Indications: low vitamin D levels 5 Capsule 5    vitamin e (E GEMS) 100 unit capsule Take 100 Units by mouth daily. Unsure of the strength.  fluticasone propionate (FLONASE) 50 mcg/actuation nasal spray 1 [place] by Both Nostrils route daily. 1 Bottle 3    cyanocobalamin (VITAMIN B-12) 1,000 mcg tablet Take 1,000 mcg by mouth daily. Allergies   Allergen Reactions    Skelaxin [Metaxalone] Other (comments)     Too groggy     Past Medical History:   Diagnosis Date    Abnormal Pap smear of cervix 1990s    Breast cyst     benign. Left. 1.7 cm and multiple others. Dr. Jaison Lopez Chicken pox childhood    Chronic idiopathic constipation 2015    Dr. Dennise Spatz.  Chronic pain     HELM (dyspnea on exertion) 2015    Dr. Radhames Rutledge EBV positive mononucleosis syndrome 10/2013,     chronic or reactivated    Enlarged thyroid 2010, 2015    with Right tracheal deviation. Retrosternal goiter. Dr. Claudia Jordan.  Enlarged tonsils and adenoids 2010    GERD (gastroesophageal reflux disease)     Iodine-deficiency related goiter     Left. Dr. Claudia Jordan.  YUNIER (obstructive sleep apnea) 2015    Dr. Whit Mckinney, uses cpap. Dr. Kareem Rouse. States cpap broken. Dr. Queenie Zuñiga.  Thyroid cyst     Right.  Thyroid nodule 2019     Past Surgical History:   Procedure Laterality Date    HX BREAST BIOPSY Left 2018    benign    HX  SECTION  2004    [facility].  HX CYST INCISION AND DRAINAGE Left 2016    benign. Dr. Kristina Bolton Right 09/15/2017    benign. Dr. Dawn Luciano.  HX HERNIA REPAIR  [id number]    umbilical.    HX OTHER SURGICAL  2014    thyroid biopsy. Dr. Arturo Ronquillo.  HX THYROIDECTOMY Left 2015    due to [address] Dr. Claudia Jordan. benign.      Family History   Problem Relation Age of Onset    Hypertension Mother     Lung Disease Mother         chronic bronchitis    Sleep Apnea Mother     Hypertension Father     Diabetes Paternal Grandmother     No Known Problems Sister     No Known Problems Sister     No Known Problems Sister     No Known Problems Brother         murdered    Breast Cancer Maternal Aunt     Other Sister         severe scoliosis. txd with back brace.  Breast Cancer Maternal Aunt     Breast Cancer Maternal Aunt     No Known Problems Maternal Grandmother     No Known Problems Maternal Grandfather      Social History     Tobacco Use    Smoking status: Passive Smoke Exposure - Never Smoker    Smokeless tobacco: Never Used    Tobacco comment: lives with a smoker dad x 18 yrs and history of living with smoker boyfriend x 2 years   Substance Use Topics    Alcohol use: Yes     Alcohol/week: 2.0 standard drinks     Types: 2 Shots of liquor per week          Objective: There were no vitals taken for this visit. General: Patient alert cooperative appears stated for the age  [de-identified]; normocephalic and atraumatic PERRLA extraocular motor intact normal tympanic membrane normal external ear bilaterally normal sinuses with mucosal normal no drainage  Neck: supple no adenopathy no JVD no bruit  Lungs: Clear to auscultation bilaterally no rales rhonchi or wheezes  Heart: Normal regular S1-S2 no gallops rubs or clicks no murmur  Breast exam deferred  Abdomen: Soft nontender normal bowel sounds no organomegaly  Extremities: Normal range of motion no point tenderness normal pulses no edema  Pelvic/: No lesion, no lymphadenopathy  Skin: Normal no lesion no rash\       We discussed the expected course, resolution and complications of the diagnosis(es) in detail. Medication risks, benefits, costs, interactions, and alternatives were discussed as indicated. I advised her to contact the office if her condition worsens, changes or fails to improve as anticipated. She expressed understanding with the diagnosis(es) and plan. Briana Garcia MD

## 2022-02-23 NOTE — PROGRESS NOTES
Chief Complaint   Patient presents with   [facility] Follow Up     1. Have you been to the ER, urgent care clinic since your last visit? Hospitalized since your last visit? Yes When: 2/15/22 Where: [address] [place] [address] Reason for visit: vomiting     2. Have you seen or consulted any other health care providers outside of the [address] [place] [address] since your last visit? Include any pap smears or colon screening. No     Verified patient with two type of identifiers.  admitted to [address] [place] [address] on 2/15/22 for c/o vomiting, abd pain,  Dx with small bowel obstruction,  Discharged on 2/17/22

## 2022-03-18 PROBLEM — E66.01 SEVERE OBESITY (HCC): Status: ACTIVE | Noted: 2019-06-27

## 2022-03-18 PROBLEM — K56.609 SBO (SMALL BOWEL OBSTRUCTION) (HCC): Status: ACTIVE | Noted: 2022-02-15

## 2022-03-19 PROBLEM — N60.01 CYST OF RIGHT BREAST: Status: ACTIVE | Noted: 2017-09-18

## 2022-03-19 PROBLEM — J45.990 EXERCISE-INDUCED ASTHMA: Status: ACTIVE | Noted: 2017-09-26

## 2022-03-19 PROBLEM — H52.10 MYOPIA: Status: ACTIVE | Noted: 2017-01-16

## 2022-03-19 PROBLEM — Z86.39 HISTORY OF THYROID NODULE: Status: ACTIVE | Noted: 2017-09-26

## 2022-03-19 PROBLEM — E66.812 OBESITY, CLASS II, BMI 35-39.9: Status: ACTIVE | Noted: 2017-09-26

## 2022-03-19 PROBLEM — Z80.3 FAMILY HISTORY OF BREAST CANCER: Status: ACTIVE | Noted: 2017-09-26

## 2022-03-19 PROBLEM — E66.9 OBESITY, CLASS II, BMI 35-39.9: Status: ACTIVE | Noted: 2017-09-26

## 2022-03-19 PROBLEM — E04.1 THYROID NODULE: Status: ACTIVE | Noted: 2019-08-01

## 2022-03-19 PROBLEM — N60.02 CYST OF LEFT BREAST: Status: ACTIVE | Noted: 2018-03-16

## 2022-03-26 LAB — UREA BREATH TEST QL: NEGATIVE

## 2022-04-12 ENCOUNTER — TELEPHONE (OUTPATIENT)
Dept: SURGERY | Age: 42
End: 2022-04-12

## 2022-04-12 DIAGNOSIS — K21.9 GASTROESOPHAGEAL REFLUX DISEASE, UNSPECIFIED WHETHER ESOPHAGITIS PRESENT: ICD-10-CM

## 2022-04-12 DIAGNOSIS — E66.01 MORBID OBESITY (HCC): Primary | ICD-10-CM

## 2022-04-12 NOTE — TELEPHONE ENCOUNTER
Called and spoke with Radiology. Per EB ok to put in new order. Order put in and Radioology notified.

## 2022-04-12 NOTE — TELEPHONE ENCOUNTER
Radiology called stating an UGI order was placed a year ago and would like an updated one sent over for this year. Patient is scheduled for tomorrow.

## 2022-04-13 ENCOUNTER — HOSPITAL ENCOUNTER (OUTPATIENT)
Dept: GENERAL RADIOLOGY | Age: 42
Discharge: HOME | End: 2022-04-13
Attending: NURSE PRACTITIONER
Payer: COMMERCIAL

## 2022-04-13 DIAGNOSIS — E66.01 MORBID OBESITY (HCC): ICD-10-CM

## 2022-04-13 DIAGNOSIS — K21.9 GASTROESOPHAGEAL REFLUX DISEASE, UNSPECIFIED WHETHER ESOPHAGITIS PRESENT: ICD-10-CM

## 2022-04-13 PROCEDURE — 74246 X-RAY XM UPR GI TRC 2CNTRST: CPT

## 2022-04-19 DIAGNOSIS — Z01.818 PRE-OP EXAM: Primary | ICD-10-CM

## 2022-04-20 DIAGNOSIS — E66.01 MORBID OBESITY (HCC): Primary | ICD-10-CM

## 2022-04-20 DIAGNOSIS — K21.9 GASTROESOPHAGEAL REFLUX DISEASE, UNSPECIFIED WHETHER ESOPHAGITIS PRESENT: ICD-10-CM

## 2022-06-03 ENCOUNTER — TELEPHONE (OUTPATIENT)
Dept: SURGERY | Age: 42
End: 2022-06-03

## 2022-06-03 NOTE — TELEPHONE ENCOUNTER
Called patient in attempt to follow up on a referral we sent out to Dr. Major Fortune for patient to complete a EGD. No answer, left voicemail advising to return call.

## 2022-06-17 ENCOUNTER — OFFICE VISIT (OUTPATIENT)
Dept: PRIMARY CARE CLINIC | Age: 42
End: 2022-06-17
Payer: COMMERCIAL

## 2022-06-17 VITALS
OXYGEN SATURATION: 98 % | SYSTOLIC BLOOD PRESSURE: 139 MMHG | RESPIRATION RATE: 16 BRPM | TEMPERATURE: 97.9 F | HEIGHT: 63 IN | HEART RATE: 88 BPM | WEIGHT: 215 LBS | BODY MASS INDEX: 38.09 KG/M2 | DIASTOLIC BLOOD PRESSURE: 84 MMHG

## 2022-06-17 DIAGNOSIS — H61.23 EXCESSIVE CERUMEN IN BOTH EAR CANALS: ICD-10-CM

## 2022-06-17 DIAGNOSIS — H10.32 ACUTE CONJUNCTIVITIS OF LEFT EYE, UNSPECIFIED ACUTE CONJUNCTIVITIS TYPE: Primary | ICD-10-CM

## 2022-06-17 PROCEDURE — 99213 OFFICE O/P EST LOW 20 MIN: CPT | Performed by: FAMILY MEDICINE

## 2022-06-17 NOTE — PROGRESS NOTES
HISTORY OF PRESENT ILLNESS  Rodri Navarro is a 43 y.o. female. HPI  Presents for left eye irritation since yesterday. Described as \"burning\" and sensitive to the light. She had her contacts on and forgot to take them off overnight, when she took them off today it felt more red. Clear drainage no yellow drainage. No eye pain. Wears daily contacts. She has tried allergy eye drops but no relief. Some people at work have pink eye. ROS  Past Medical History:   Diagnosis Date    Abnormal Pap smear of cervix 1990s    Breast cyst     benign. Left. 1.7 cm and multiple others. Dr. Oziel Bertrand Chicken pox childhood    Chronic idiopathic constipation 2015    Dr. Isaias Tinoco.  Chronic pain     HELM (dyspnea on exertion) 2015    Dr. Adeline Hancock EBV positive mononucleosis syndrome 10/2013,     chronic or reactivated    Enlarged thyroid 2010, 2015    with Right tracheal deviation. Retrosternal goiter. Dr. Tanesha Ortiz.  Enlarged tonsils and adenoids 2010    GERD (gastroesophageal reflux disease)     Iodine-deficiency related goiter     Left. Dr. Tanesha Ortiz.  YUNIER (obstructive sleep apnea) 2015    Dr. Robin Moraes, uses cpap. Dr. Bobo Briceño. States cpap broken. Dr. Sanjay Osborn.  Thyroid cyst     Right.  Thyroid nodule 2019     Past Surgical History:   Procedure Laterality Date    HX BREAST BIOPSY Left 2018    benign    HX  SECTION  2004    [facility].  HX CYST INCISION AND DRAINAGE Left 2016    benign. Dr. Angel Pagan Right 09/15/2017    benign. Dr. Kali Martínez.  HX HERNIA REPAIR  [id number]    umbilical.    HX OTHER SURGICAL  2014    thyroid biopsy. Dr. Lisa Arce.  HX THYROIDECTOMY Left 2015    due to [address]. Dr. Tanesha Ortiz. benign.      Social History     Socioeconomic History    Marital status: SINGLE    Number of children: 1 Occupational History    Occupation:      Employer: [employer]   Tobacco Use    Smoking status: Passive Smoke Exposure - Never Smoker    Smokeless tobacco: Never Used    Tobacco comment: lives with a smoker dad x 18 yrs and history of living with smoker boyfriend x 2 years   Vaping Use    Vaping Use: Never used   Substance and Sexual Activity    Alcohol use: Yes     Alcohol/week: 2.0 standard drinks     Types: 2 Shots of liquor per week    Drug use: No    Sexual activity: Yes     Partners: Male     Birth control/protection: None   Social History Narrative    ** Merged History Encounter **          Family History   Problem Relation Age of Onset    Hypertension Mother     Lung Disease Mother         chronic bronchitis    Sleep Apnea Mother     Hypertension Father     Diabetes Paternal Grandmother     No Known Problems Sister     No Known Problems Sister     No Known Problems Sister     No Known Problems Brother         murdered    Breast Cancer Maternal Aunt     Other Sister         severe scoliosis. txd with back brace.  Breast Cancer Maternal Aunt     Breast Cancer Maternal Aunt     No Known Problems Maternal Grandmother     No Known Problems Maternal Grandfather      Current Outpatient Medications on File Prior to Visit   Medication Sig Dispense Refill    linaCLOtide (Linzess) 145 mcg cap capsule Take 1 Capsule by mouth Daily (before breakfast). 30 Capsule 5    esomeprazole (NEXIUM) 40 mg capsule TAKE 1 CAPSULE BY MOUTH twice daily  FOR HEARTBURN GENERIC FOR NEXIUM 60 Capsule 5    ergocalciferol (ERGOCALCIFEROL) 1,250 mcg (50,000 unit) capsule Take 1 Capsule by mouth every seven (7) days. Indications: low vitamin D levels 5 Capsule 5    vitamin e (E GEMS) 100 unit capsule Take 100 Units by mouth daily. Unsure of the strength.  fluticasone propionate (FLONASE) 50 mcg/actuation nasal spray 1 [place] by Both Nostrils route daily.  1 Bottle 3    cyanocobalamin (VITAMIN B-12) 1,000 mcg tablet Take 1,000 mcg by mouth daily. No current facility-administered medications on file prior to visit. Allergies   Allergen Reactions    Skelaxin [Metaxalone] Other (comments)     Too groggy       Physical Exam   Visit Vitals  /84 (BP 1 Location: Right arm, BP Patient Position: Sitting)   Pulse 88   Temp 97.9 °F (36.6 °C) (Temporal)   Resp 16   Ht 5' 3\" (1.6 m)   Wt 215 lb (97.5 kg)   SpO2 98%   BMI 38.09 kg/m²   General: No apparent distress. Alert and oriented. Responds to all questions appropriately. Eye: very mild conjunctival erythema of the left eye. No significant discharge. Pupils equal round reactive to light. Full ocular movements without pain. No orbital erythem/edema. Ears: b/l cerumen build-up right worse than left  Resp: respirations unlabored  Skin: No rash. ASSESSMENT and PLAN    ICD-10-CM ICD-9-CM    1. Acute conjunctivitis of left eye, unspecified acute conjunctivitis type  H10.32 372.00    2. Excessive cerumen in both ear canals  H61.23 380.4 REMOVAL IMPACTED CERUMEN IRRIGATION/LVG UNILAT     Very mild erythema no signs of bacterial conjunctivitis. Advised warm compresses, no contacts until eyes feel better, if any thick purulent drainage or worsening redness advised to return immediately. Avoid wearing contacts overnight as it can cause eye injuries/infections. Pt found to have cerumen impaction right ear (reportedly decreased hearing on that side) and cerumen build-up in left. Right ear irrigated, removed left of debris manually with curette revealing normal TMs. Avoid using qtips. Return prn.      Rhina Rodas MD

## 2022-06-17 NOTE — PATIENT INSTRUCTIONS
Pinkeye: Care Instructions  Overview     Pinkeye is redness and swelling of the eye surface and the conjunctiva (the lining of the eyelid and the covering of the white part of the eye). Pinkeye is also called conjunctivitis. Pinkeye is often caused by infection with bacteria or a virus. Dry air, allergies, smoke, and chemicals are other common causes. Pinkeye often gets better on its own in 7 to 10 days. Antibiotics only help if the pinkeye is caused by bacteria. Pinkeye caused by infection spreads easily. If an allergy or chemical is causing pinkeye, it will not go away unless you can avoid whatever is causing it. Follow-up care is a key part of your treatment and safety. Be sure to make and go to all appointments, and call your doctor if you are having problems. It's also a good idea to know your test results and keep a list of the medicines you take. How can you care for yourself at home? · Wash your hands often. Always wash them before and after you treat pinkeye or touch your eyes or face. · Use moist cotton or a clean, wet cloth to remove crust. Wipe from the inside corner of the eye to the outside. Use a clean part of the cloth for each wipe. · Put cold or warm wet cloths on your eye a few times a day if the eye hurts. · Do not wear contact lenses or eye makeup until the pinkeye is gone. Throw away any eye makeup you were using when you got pinkeye. Clean your contacts and storage case. If you wear disposable contacts, use a new pair when your eye has cleared and it is safe to wear contacts again. · If the doctor gave you antibiotic ointment or eyedrops, use them as directed. Use the medicine for as long as instructed, even if your eye starts looking better soon. Keep the bottle tip clean, and do not let it touch the eye area. · To put in eyedrops or ointment:  ? Tilt your head back, and pull your lower eyelid down with one finger. ? Drop or squirt the medicine inside the lower lid.   ? Close your eye for 30 to 60 seconds to let the drops or ointment move around. ? Do not touch the ointment or dropper tip to your eyelashes or any other surface. · Do not share towels, pillows, or washcloths while you have pinkeye. When should you call for help? Call your doctor now or seek immediate medical care if:    · You have pain in your eye, not just irritation on the surface.     · You have a change in vision or loss of vision.     · You have an increase in discharge from the eye.     · Your eye has not started to improve or begins to get worse within 48 hours after you start using antibiotics.     · Pinkeye lasts longer than 7 days. Watch closely for changes in your health, and be sure to contact your doctor if you have any problems. Where can you learn more? Go to http://www.gray.com/  Enter Y392 in the search box to learn more about \"Pinkeye: Care Instructions. \"  Current as of: July 1, 2021               Content Version: 13.2  © 2006-2022 [employer]. Care instructions adapted under license by [employer] (which disclaims liability or warranty for this information). If you have questions about a medical condition or this instruction, always ask your healthcare professional. [address] any warranty or liability for your use of this information.

## 2022-06-17 NOTE — PROGRESS NOTES
Chief Complaint   Patient presents with    Eye Problem     Patient c/o left eye irritation and burning that started yesterday. She sttaes there has been Pink Eye cases at her work.      Visit Vitals  /84 (BP 1 Location: Right arm, BP Patient Position: Sitting)   Pulse 88   Temp 97.9 °F (36.6 °C) (Temporal)   Resp 16   Ht 5' 3\" (1.6 m)   Wt 215 lb (97.5 kg)   SpO2 98%   BMI 38.09 kg/m²

## 2022-06-29 ENCOUNTER — OFFICE VISIT (OUTPATIENT)
Dept: FAMILY MEDICINE CLINIC | Age: 42
End: 2022-06-29
Payer: COMMERCIAL

## 2022-06-29 VITALS
DIASTOLIC BLOOD PRESSURE: 84 MMHG | BODY MASS INDEX: 37.03 KG/M2 | HEIGHT: 63 IN | SYSTOLIC BLOOD PRESSURE: 126 MMHG | WEIGHT: 209 LBS | OXYGEN SATURATION: 97 % | TEMPERATURE: 99.7 F | RESPIRATION RATE: 17 BRPM | HEART RATE: 92 BPM

## 2022-06-29 DIAGNOSIS — T67.9XXA HEAT EXPOSURE, INITIAL ENCOUNTER: ICD-10-CM

## 2022-06-29 DIAGNOSIS — Z00.00 WELL WOMAN EXAM (NO GYNECOLOGICAL EXAM): Primary | ICD-10-CM

## 2022-06-29 DIAGNOSIS — Z71.89 ADVICE GIVEN ABOUT COVID-19 VIRUS INFECTION: ICD-10-CM

## 2022-06-29 DIAGNOSIS — E78.00 HYPERCHOLESTEROLEMIA: ICD-10-CM

## 2022-06-29 DIAGNOSIS — Z83.3 FAMILY HISTORY OF DIABETES MELLITUS (DM): ICD-10-CM

## 2022-06-29 PROCEDURE — 99396 PREV VISIT EST AGE 40-64: CPT | Performed by: FAMILY MEDICINE

## 2022-06-29 NOTE — PROGRESS NOTES
Chief Complaint   Patient presents with    Physical     Physical for be well    She went to the [facility], and in informed her that she has been exposure to Hep C     1. \"Have you been to the ER, urgent care clinic since your last visit? Hospitalized since your last visit? \" No    2. \"Have you seen or consulted any other health care providers outside of the [address] [place] [address] since your last visit? \" No     3. For patients aged 39-70: Has the patient had a colonoscopy / FIT/ Cologuard? NA - based on age      If the patient is female:    4. For patients aged 41-77: Has the patient had a mammogram within the past 2 years? Yes - no Care Gap present      5. For patients aged 21-65: Has the patient had a pap smear? Yes - Care Gap present.  Most recent result on file   [address] [employer] Maintenance Due   Topic Date Due    Hepatitis C Screening  Never done    Pneumococcal 0-64 years (1 - PCV) Never done    COVID-19 Vaccine (3 - Booster for Pfizer series) 09/25/2021    Cervical cancer screen  03/12/2022

## 2022-06-29 NOTE — PROGRESS NOTES
Subjective:   43 y.o. female for Well Woman Check. Her gyne and breast care is done elsewhere by her Ob-Gyne physician. Last pap last yr  Last mammog last yr      Current Outpatient Medications   Medication Sig Dispense Refill    linaCLOtide (Linzess) 145 mcg cap capsule Take 1 Capsule by mouth Daily (before breakfast). 30 Capsule 5    esomeprazole (NEXIUM) 40 mg capsule TAKE 1 CAPSULE BY MOUTH twice daily  FOR HEARTBURN GENERIC FOR NEXIUM 60 Capsule 5    ergocalciferol (ERGOCALCIFEROL) 1,250 mcg (50,000 unit) capsule Take 1 Capsule by mouth every seven (7) days. Indications: low vitamin D levels 5 Capsule 5    vitamin e (E GEMS) 100 unit capsule Take 100 Units by mouth daily. Unsure of the strength.  fluticasone propionate (FLONASE) 50 mcg/actuation nasal spray 1 [place] by Both Nostrils route daily. 1 Bottle 3    cyanocobalamin (VITAMIN B-12) 1,000 mcg tablet Take 1,000 mcg by mouth daily. Allergies   Allergen Reactions    Skelaxin [Metaxalone] Other (comments)     Too groggy     Past Medical History:   Diagnosis Date    Abnormal Pap smear of cervix 1990s    Breast cyst 2009    benign. Left. 1.7 cm and multiple others. Dr. Luis Salmon Chicken pox childhood    Chronic idiopathic constipation 07/2015    Dr. Monica Johnson.  Chronic pain     HELM (dyspnea on exertion) 02/2015    Dr. Verónica Galvez EBV positive mononucleosis syndrome 10/2013, 2014    chronic or reactivated    Enlarged thyroid 09/2010, 06/2015    with Right tracheal deviation. Retrosternal goiter. Dr. Russel Harris.  Enlarged tonsils and adenoids 09/2010    GERD (gastroesophageal reflux disease)     Hernia, abdominal 06/28/2022    Gastrointestinal Specialist     Iodine-deficiency related goiter     Left. Dr. Russel Harris.  YUNIER (obstructive sleep apnea) 02/25/2015    Dr. Lei Talley, uses cpap. Dr. Graciela Dean. States cpap broken. Dr. Demi Branham.  Thyroid cyst 08/219    Right.       Thyroid nodule 08/2019 Past Surgical History:   Procedure Laterality Date    HX BREAST BIOPSY Left 2018    benign    HX  SECTION  2004    [facility].  HX CYST INCISION AND DRAINAGE Left 2016    benign. Dr. Sandy Melton Right 09/15/2017    benign. Dr. Jac Price.  HX HERNIA REPAIR  [id number]    umbilical.    HX OTHER SURGICAL  2014    thyroid biopsy. Dr. Denny Alcocer.  HX THYROIDECTOMY Left 2015    due to [address] Dr. Vincent Bernal. benign. Family History   Problem Relation Age of Onset    Hypertension Mother     Lung Disease Mother         chronic bronchitis    Sleep Apnea Mother     Hypertension Father     Diabetes Paternal Grandmother     No Known Problems Sister     No Known Problems Sister     No Known Problems Sister     No Known Problems Brother         murdered    Breast Cancer Maternal Aunt     Other Sister         severe scoliosis. txd with back brace.  Breast Cancer Maternal Aunt     Breast Cancer Maternal Aunt     No Known Problems Maternal Grandmother     No Known Problems Maternal Grandfather      Social History     Tobacco Use    Smoking status: Passive Smoke Exposure - Never Smoker    Smokeless tobacco: Never Used    Tobacco comment: lives with a smoker dad x 18 yrs and history of living with smoker boyfriend x 2 years   Substance Use Topics    Alcohol use:  Yes     Alcohol/week: 2.0 standard drinks     Types: 2 Shots of liquor per week        Lab Results   Component Value Date/Time    WBC 6.9 2022 01:33 AM    HGB 10.3 (L) 2022 01:33 AM    Hemoglobin (POC) 12.3 2015 11:44 AM    HCT 33.3 (L) 2022 01:33 AM    PLATELET 698  01:33 AM    MCV 85.8 2022 01:33 AM     Lab Results   Component Value Date/Time    Hemoglobin A1c 5.5 2021 12:40 PM    Hemoglobin A1c CANCELED 2020 08:31 AM    Hemoglobin A1c 5.4 2019 03:01 PM    Glucose 109 (H) 02/17/2022 01:33 AM    LDL, calculated 64.4 06/16/2021 12:40 PM    Creatinine 0.67 02/17/2022 01:33 AM         ROS: Feeling generally well. No TIA's or unusual headaches, no dysphagia. No prolonged cough. No dyspnea or chest pain on exertion. No abdominal pain, change in bowel habits, black or bloody stools. No urinary tract symptoms. No new or unusual musculoskeletal symptoms. Specific concerns today: Hep-c check upt    Objective: The patient appears well, alert, oriented x 3, in no distress. Visit Vitals  /84 (BP 1 Location: Left upper arm, BP Patient Position: Sitting, BP Cuff Size: Thigh)   Pulse 92   Temp 99.7 °F (37.6 °C) (Oral)   Resp 17   Ht 5' 3\" (1.6 m)   Wt 209 lb (94.8 kg)   LMP 05/07/2022   SpO2 97%   BMI 37.02 kg/m²     ENT normal.  Neck supple. No adenopathy or thyromegaly. TADEO. Lungs are clear, good air entry, no wheezes, rhonchi or rales. S1 and S2 normal, no murmurs, regular rate and rhythm. Abdomen soft without tenderness, guarding, mass or organomegaly. Extremities show no edema, normal peripheral pulses. Neurological is normal, no focal findings. Breast and Pelvic exams are deferred. Assessment/Plan:   Well Woman  lose weight, increase physical activity, limit alcohol consumption, follow low fat diet, follow low salt diet  Diagnoses and all orders for this visit:    1. Well woman exam (no gynecological exam)  Comments:  [V70.0]    2. Advice given about COVID-19 virus infection    3. Hypercholesterolemia  -     CBC WITH AUTOMATED DIFF; Future  -     METABOLIC PANEL, COMPREHENSIVE; Future  -     TSH 3RD GENERATION; Future  -     LIPID PANEL; Future  -     HEMOGLOBIN A1C WITH EAG; Future    4. Family history of diabetes mellitus (DM)  -     CBC WITH AUTOMATED DIFF; Future  -     METABOLIC PANEL, COMPREHENSIVE; Future  -     TSH 3RD GENERATION; Future  -     LIPID PANEL; Future  -     HEMOGLOBIN A1C WITH EAG; Future    5.  Heat exposure, initial encounter  -     HEPATITIS PANEL, ACUTE; Future         pt was advised about the covid protection with vaccination, and to Wear a facemask , having social distance, and to get tested if possible,     patient acknowledged understanding and agreed with today's recommendations,

## 2022-06-30 LAB
ALBUMIN SERPL-MCNC: 3.4 G/DL (ref 3.5–5)
ALBUMIN/GLOB SERPL: 1 {RATIO} (ref 1.1–2.2)
ALP SERPL-CCNC: 100 U/L (ref 45–117)
ALT SERPL-CCNC: 16 U/L (ref 12–78)
ANION GAP SERPL CALC-SCNC: 6 MMOL/L (ref 5–15)
AST SERPL-CCNC: 10 U/L (ref 15–37)
BASOPHILS # BLD: 0 K/UL (ref 0–0.1)
BASOPHILS NFR BLD: 1 % (ref 0–1)
BILIRUB SERPL-MCNC: 0.5 MG/DL (ref 0.2–1)
BUN SERPL-MCNC: 8 MG/DL (ref 6–20)
BUN/CREAT SERPL: 10 (ref 12–20)
CALCIUM SERPL-MCNC: 9 MG/DL (ref 8.5–10.1)
CHLORIDE SERPL-SCNC: 107 MMOL/L (ref 97–108)
CHOLEST SERPL-MCNC: 127 MG/DL
CO2 SERPL-SCNC: 27 MMOL/L (ref 21–32)
CREAT SERPL-MCNC: 0.83 MG/DL (ref 0.55–1.02)
DIFFERENTIAL METHOD BLD: NORMAL
EOSINOPHIL # BLD: 0.2 K/UL (ref 0–0.4)
EOSINOPHIL NFR BLD: 2 % (ref 0–7)
ERYTHROCYTE [DISTWIDTH] IN BLOOD BY AUTOMATED COUNT: 12.8 % (ref 11.5–14.5)
EST. AVERAGE GLUCOSE BLD GHB EST-MCNC: 108 MG/DL
GLOBULIN SER CALC-MCNC: 3.4 G/DL (ref 2–4)
GLUCOSE SERPL-MCNC: 90 MG/DL (ref 65–100)
HAV IGM SER QL: NONREACTIVE
HBA1C MFR BLD: 5.4 % (ref 4–5.6)
HBV CORE IGM SER QL: NONREACTIVE
HBV SURFACE AG SER QL: <0.1 INDEX
HBV SURFACE AG SER QL: NEGATIVE
HCT VFR BLD AUTO: 38 % (ref 35–47)
HCV AB SERPL QL IA: ABNORMAL
HDLC SERPL-MCNC: 51 MG/DL
HDLC SERPL: 2.5 {RATIO} (ref 0–5)
HGB BLD-MCNC: 11.5 G/DL (ref 11.5–16)
IMM GRANULOCYTES # BLD AUTO: 0 K/UL (ref 0–0.04)
IMM GRANULOCYTES NFR BLD AUTO: 0 % (ref 0–0.5)
LDLC SERPL CALC-MCNC: 62.2 MG/DL (ref 0–100)
LYMPHOCYTES # BLD: 2.3 K/UL (ref 0.8–3.5)
LYMPHOCYTES NFR BLD: 29 % (ref 12–49)
MCH RBC QN AUTO: 26.8 PG (ref 26–34)
MCHC RBC AUTO-ENTMCNC: 30.3 G/DL (ref 30–36.5)
MCV RBC AUTO: 88.6 FL (ref 80–99)
MONOCYTES # BLD: 0.5 K/UL (ref 0–1)
MONOCYTES NFR BLD: 6 % (ref 5–13)
NEUTS SEG # BLD: 4.8 K/UL (ref 1.8–8)
NEUTS SEG NFR BLD: 62 % (ref 32–75)
NRBC # BLD: 0 K/UL (ref 0–0.01)
NRBC BLD-RTO: 0 PER 100 WBC
PLATELET # BLD AUTO: 363 K/UL (ref 150–400)
PMV BLD AUTO: 9.6 FL (ref 8.9–12.9)
POTASSIUM SERPL-SCNC: 4.5 MMOL/L (ref 3.5–5.1)
PROT SERPL-MCNC: 6.8 G/DL (ref 6.4–8.2)
RBC # BLD AUTO: 4.29 M/UL (ref 3.8–5.2)
SODIUM SERPL-SCNC: 140 MMOL/L (ref 136–145)
SP1: ABNORMAL
SP2: ABNORMAL
SP3: ABNORMAL
TRIGL SERPL-MCNC: 69 MG/DL (ref ?–150)
TSH SERPL DL<=0.05 MIU/L-ACNC: 2.53 UIU/ML (ref 0.36–3.74)
VLDLC SERPL CALC-MCNC: 13.8 MG/DL
WBC # BLD AUTO: 7.8 K/UL (ref 3.6–11)

## 2022-07-11 DIAGNOSIS — Z11.59 NEED FOR HEPATITIS C SCREENING TEST: Primary | ICD-10-CM

## 2022-07-12 ENCOUNTER — DOCUMENTATION ONLY (OUTPATIENT)
Dept: SURGERY | Age: 42
End: 2022-07-12

## 2022-07-12 NOTE — PROGRESS NOTES
Spoke with [facility] medical records dept at [facility]. EGD report requested. She will fax over.

## 2022-07-13 ENCOUNTER — OFFICE VISIT (OUTPATIENT)
Dept: FAMILY MEDICINE CLINIC | Age: 42
End: 2022-07-13
Payer: COMMERCIAL

## 2022-07-13 VITALS
DIASTOLIC BLOOD PRESSURE: 74 MMHG | WEIGHT: 218 LBS | RESPIRATION RATE: 20 BRPM | TEMPERATURE: 97.9 F | BODY MASS INDEX: 38.62 KG/M2 | SYSTOLIC BLOOD PRESSURE: 121 MMHG | HEIGHT: 63 IN | HEART RATE: 97 BPM | OXYGEN SATURATION: 100 %

## 2022-07-13 DIAGNOSIS — R53.83 OTHER FATIGUE: Primary | ICD-10-CM

## 2022-07-13 PROCEDURE — 99213 OFFICE O/P EST LOW 20 MIN: CPT | Performed by: FAMILY MEDICINE

## 2022-07-13 NOTE — PROGRESS NOTES
HISTORY OF PRESENT ILLNESS  Konrad Coffey is a 43 y.o. female,  present  for abnormal lihep c test, has ahd PRBC transfusion in 2004,    last LFT was normal,  the pt does not drink Etoh,  pt had no imaging studies done on the liver  , no hx of IVDU, no tattoos, not baby boomer, single no safe sex,  The pt has no problem with abdominal pain not nauseated no vomiting denies any itchy feeling, and has normal bowel movement, otherwise patient denies any other complaint at this time. Hep C virus Ab Interp. NONREACTIVE   EQUIVOCAL Abnormal              Current Outpatient Medications   Medication Sig Dispense Refill    linaCLOtide (Linzess) 145 mcg cap capsule Take 1 Capsule by mouth Daily (before breakfast). 30 Capsule 5    esomeprazole (NEXIUM) 40 mg capsule TAKE 1 CAPSULE BY MOUTH twice daily  FOR HEARTBURN GENERIC FOR NEXIUM 60 Capsule 5    ergocalciferol (ERGOCALCIFEROL) 1,250 mcg (50,000 unit) capsule Take 1 Capsule by mouth every seven (7) days. Indications: low vitamin D levels 5 Capsule 5    vitamin e (E GEMS) 100 unit capsule Take 100 Units by mouth daily. Unsure of the strength.  fluticasone propionate (FLONASE) 50 mcg/actuation nasal spray 1 [place] by Both Nostrils route daily. 1 Bottle 3    cyanocobalamin (VITAMIN B-12) 1,000 mcg tablet Take 1,000 mcg by mouth daily. As needed       Allergies   Allergen Reactions    Skelaxin [Metaxalone] Other (comments)     Too groggy     Past Medical History:   Diagnosis Date    Abnormal Pap smear of cervix 1990s    Breast cyst 2009    benign. Left. 1.7 cm and multiple others. Dr. Amairani Finney Chicken pox childhood    Chronic idiopathic constipation 07/2015    Dr. Stas Reyes.  Chronic pain     HELM (dyspnea on exertion) 02/2015    Dr. Gosia Lindsay EBV positive mononucleosis syndrome 10/2013, 2014    chronic or reactivated    Enlarged thyroid 09/2010, 06/2015    with Right tracheal deviation. Retrosternal goiter. Dr. Nicholas Zabala.     Enlarged tonsils and adenoids 2010    GERD (gastroesophageal reflux disease)     Hernia, abdominal 2022    Gastrointestinal Specialist     Iodine-deficiency related goiter     Left. Dr. Alonna Apley.  YUNIER (obstructive sleep apnea) 2015    Dr. Niles Butler, uses cpap. Dr. Dickson Valle. States cpap broken. Dr. Melita Lopez.  Thyroid cyst     Right.  Thyroid nodule 2019     Past Surgical History:   Procedure Laterality Date    HX BREAST BIOPSY Left 2018    benign    HX  SECTION      [facility].  HX CYST INCISION AND DRAINAGE Left 2016    benign. Dr. Kristel Min Right 09/15/2017    benign. Dr. Shannon Aguilar.  HX HERNIA REPAIR  [id number]    umbilical.    HX OTHER SURGICAL  2014    thyroid biopsy. Dr. Russ Tadeo.  HX THYROIDECTOMY Left 2015    due to [address] Dr. Alonna Apley. benign. Family History   Problem Relation Age of Onset    Hypertension Mother     Lung Disease Mother         chronic bronchitis    Sleep Apnea Mother     Hypertension Father     Diabetes Paternal Grandmother     No Known Problems Sister     No Known Problems Sister     No Known Problems Sister     No Known Problems Brother         murdered    Breast Cancer Maternal Aunt     Other Sister         severe scoliosis. txd with back brace.  Breast Cancer Maternal Aunt     Breast Cancer Maternal Aunt     No Known Problems Maternal Grandmother     No Known Problems Maternal Grandfather      Social History     Tobacco Use    Smoking status: Passive Smoke Exposure - Never Smoker    Smokeless tobacco: Never Used    Tobacco comment: lives with a smoker dad x 18 yrs and history of living with smoker boyfriend x 2 years   Substance Use Topics    Alcohol use:  Yes     Alcohol/week: 2.0 standard drinks     Types: 2 Shots of liquor per week      Lab Results   Component Value Date/Time    Cholesterol, total 127 06/29/2022 02:57 PM    HDL Cholesterol 51 06/29/2022 02:57 PM    LDL, calculated 62.2 06/29/2022 02:57 PM    Triglyceride 69 06/29/2022 02:57 PM    CHOL/HDL Ratio 2.5 06/29/2022 02:57 PM     Lab Results   Component Value Date/Time    ALT (SGPT) 16 06/29/2022 02:57 PM    Alk. phosphatase 100 06/29/2022 02:57 PM    Bilirubin, total 0.5 06/29/2022 02:57 PM    Albumin 3.4 (L) 06/29/2022 02:57 PM    Protein, total 6.8 06/29/2022 02:57 PM    PLATELET 519 09/57/7533 02:57 PM    Hepatitis B surface Ag <0.10 06/29/2022 02:57 PM        Review of Systems   Constitutional: Negative for chills and fever. HENT: Negative for congestion and nosebleeds. Eyes: Negative for blurred vision and pain. Respiratory: Negative for cough, shortness of breath and wheezing. Cardiovascular: Negative for chest pain and leg swelling. Gastrointestinal: Negative for constipation, diarrhea, nausea and vomiting. Genitourinary: Negative for dysuria and frequency. Musculoskeletal: Negative for joint pain and myalgias. Skin: Negative for itching and rash. Neurological: Negative for dizziness, loss of consciousness and headaches. Psychiatric/Behavioral: Negative for depression. The patient is not nervous/anxious and does not have insomnia. Physical Exam  Vitals and nursing note reviewed. Constitutional:       Appearance: She is well-developed. HENT:      Head: Normocephalic and atraumatic. Eyes:      Conjunctiva/sclera: Conjunctivae normal.      Pupils: Pupils are equal, round, and reactive to light. Neck:      Thyroid: No thyromegaly. Vascular: No JVD. Cardiovascular:      Rate and Rhythm: Normal rate and regular rhythm. Heart sounds: Normal heart sounds. No murmur heard. No friction rub. No gallop. Pulmonary:      Effort: Pulmonary effort is normal. No respiratory distress. Breath sounds: Normal breath sounds. No stridor. No wheezing or rales.    Abdominal:      General: Bowel sounds are normal. There is no distension. Palpations: Abdomen is soft. There is no mass. Tenderness: There is no abdominal tenderness. Musculoskeletal:         General: No tenderness. Normal range of motion. Lymphadenopathy:      Cervical: No cervical adenopathy. Skin:     Findings: No erythema or rash. Neurological:      Mental Status: She is alert and oriented to person, place, and time. Cranial Nerves: No cranial nerve deficit. Deep Tendon Reflexes: Reflexes are normal and symmetric. Psychiatric:         Behavior: Behavior normal.         ASSESSMENT and PLAN  Diagnoses and all orders for this visit:    1.  Other fatigue  -     HEPATITIS C QT BY PCR WITH REFLEX GENOTYPE; Future         pt was advised about the covid protection with vaccination, and to Wear a facemask , having social distance, and to get tested if possible,     patient acknowledged understanding and agreed with today's recommendations,

## 2022-07-13 NOTE — PROGRESS NOTES
Chief Complaint   Patient presents with    Labs       1. \"Have you been to the ER, urgent care clinic since your last visit? Hospitalized since your last visit? \" No    2. \"Have you seen or consulted any other health care providers outside of the [address] [place] [address] since your last visit? \" No     3. For patients aged 39-70: Has the patient had a colonoscopy / FIT/ Cologuard? NA - based on age      If the patient is female:    4. For patients aged 41-77: Has the patient had a mammogram within the past 2 years? Yes - no Care Gap present      5. For patients aged 21-65: Has the patient had a pap smear?  No   March 2021    Health Maintenance Due   Topic Date Due    Pneumococcal 0-64 years (1 - PCV) Never done    COVID-19 Vaccine (3 - Booster for Pfizer series) 09/25/2021    Cervical cancer screen  03/12/2022

## 2022-07-17 LAB
HCV GENTYP SERPL NAA+PROBE: NORMAL
HCV RNA SERPL NAA+PROBE-ACNC: NORMAL IU/ML
HCV RNA SERPL NAA+PROBE-LOG IU: NORMAL LOG10 IU/ML
TEST INFORMATION, 550045: NORMAL

## 2022-07-19 ENCOUNTER — OFFICE VISIT (OUTPATIENT)
Dept: SURGERY | Age: 42
End: 2022-07-19
Payer: COMMERCIAL

## 2022-07-19 VITALS
DIASTOLIC BLOOD PRESSURE: 82 MMHG | SYSTOLIC BLOOD PRESSURE: 149 MMHG | BODY MASS INDEX: 38.8 KG/M2 | WEIGHT: 219 LBS | HEIGHT: 63 IN

## 2022-07-19 DIAGNOSIS — Z12.31 ENCOUNTER FOR SCREENING MAMMOGRAM FOR BREAST CANCER: ICD-10-CM

## 2022-07-19 DIAGNOSIS — Z80.3 FAMILY HISTORY OF BREAST CANCER: ICD-10-CM

## 2022-07-19 DIAGNOSIS — Z91.89 AT HIGH RISK FOR BREAST CANCER: ICD-10-CM

## 2022-07-19 DIAGNOSIS — N60.19 FIBROCYSTIC BREAST DISEASE (FCBD), UNSPECIFIED LATERALITY: Primary | ICD-10-CM

## 2022-07-19 PROCEDURE — 99213 OFFICE O/P EST LOW 20 MIN: CPT | Performed by: NURSE PRACTITIONER

## 2022-07-19 NOTE — PROGRESS NOTES
HISTORY OF PRESENT ILLNESS  Torito Longoria is a 43 y.o. female. HPI Established patient presents for follow-up as a high risk patient due to her family history of breast cancer. Denies breast mass, skin changes, nipple discharge and pain.          Breast history -   Referring - Dr. Agnes Wyatt   2018 - LEFT breast needle biopsy revealed fibroadenoma        Family history-  3 maternal aunts had breast cancer, all in their 52's. 1 , 2 living. 2018 - lifetime risk calculated by Dr. Real Fitting - 20.4%. OB History        1    Para   1    Term   1            AB        Living           SAB        IAB        Ectopic        Molar        Multiple        Live Births              Obstetric Comments   Menarche:  6. LMP: 3/1/18. # of Children:  1. Age at Delivery of First Child:  25.   Hysterectomy/oophorectomy:  NO/NO. Breast Bx:  no.  Hx of Breast Feeding:  no. BCP:  yes. Hormone therapy:  no.                      Past Surgical History:   Procedure Laterality Date    HX BREAST BIOPSY Left 2018    benign    HX  SECTION  2004    [facility].  HX CYST INCISION AND DRAINAGE Left 2016    benign. Dr. Lisset Fortune Right 09/15/2017    benign. Dr. Deann Torres.  HX HERNIA REPAIR  [id number]    umbilical.    HX OTHER SURGICAL  2014    thyroid biopsy. Dr. Noah Schreiber.  HX THYROIDECTOMY Left 2015    due to [address] Dr. Carla Edgar. benign. [facility] Results (most recent):  Results from [place] encounter on 10/20/21    [facility] MAMMO BI SCREENING INCL CAD    Narrative  STUDY: Bilateral digital screening mammogram    INDICATION:  Screening. COMPARISON: Prior studies dating back to 2017    BREAST COMPOSITION:  The breasts are heterogeneously dense, which may obscure  small masses.     FINDINGS: Bilateral digital screening mammography was performed and is  interpreted in conjunction with a computer assisted detection (CAD) system. There are multiple bilateral oval circumscribed equal density masses, consistent  with waxing and waning cysts. No suspicious masses or calcifications are  identified. There has been no significant change. Impression  BI-RADS 2: Benign. No mammographic evidence of malignancy. RECOMMENDATIONS:  Next screening mammogram is recommended in one year. The patient will be notified of these results. MRI Results (most recent):  Results from [place] encounter on 08/20/21    MRI BREAST BI W WO CONT    Narrative  EXAM: BILATERAL BREAST MRI WITH CONTRAST    CLINICAL INDICATION: At high risk for breast cancer for screening. TECHNIQUE: Patient lay prone in a dedicated breast coil. Axial fat-suppressed T2  weighted spin-echo sequences and T1 weighted fat-suppressed 3D gradient-echo  sequences before gadolinium were performed through both breasts. Five sequential  thin section axial and one sagittal T1 weighted fat-suppressed 3D gradient-echo  sequences were then obtained post contrast. 20 cc Dotarem contrast agent was  used. Subtraction and MIP images were also obtained. Images were reviewed on a  high resolution monitor and optimally windowed. The images were analyzed using  CAD analysis, enhancement curves, digital subtraction, and 2 and 3 dimensional  reconstructions. COMPARISON: Mammogram 10/14/2020, MRI 9/26/2019, mammogram 7/3/2019, and MRI  4/6/2018. FINDINGS:    Background enhancement: Moderate to severe. Right Breast: Biopsy marker clip at 12:00 and multiple benign-appearing cysts,  some waxing and some waning compared to priors. There are no enhancing lesions  to suggest invasive breast carcinoma. Left Breast: Multiple benign-appearing cysts, some waxing and some waning  compared to priors. There are no enhancing lesions to suggest invasive breast  carcinoma.     Lymph: Unremarkable    Chest wall and visualized abdomen: Unremarkable. Impression  No evidence of invasive breast carcinoma. BI-RADS Assessment  Category 2: Benign finding. RECOMMENDATION: Annual mammogram and MRI screening is recommended for patients  with elevated risk for breast cancer (greater than 20% lifetime risk). ROS    Physical Exam  Constitutional:       Appearance: Normal appearance. Chest:   Breasts:      Right: No mass, nipple discharge, skin change, tenderness, axillary adenopathy or supraclavicular adenopathy. Left: No mass, nipple discharge, skin change, tenderness, axillary adenopathy or supraclavicular adenopathy. Musculoskeletal:      Comments: FROM - UE x 2   Lymphadenopathy:      Upper Body:      Right upper body: No supraclavicular or axillary adenopathy. Left upper body: No supraclavicular or axillary adenopathy. Neurological:      Mental Status: She is alert. Psychiatric:         Attention and Perception: Attention normal.         Mood and Affect: Mood normal.         Speech: Speech normal.         Behavior: Behavior normal.       Visit Vitals  BP (!) 149/82   Ht 5' 3\" (1.6 m)   Wt 219 lb (99.3 kg)   LMP 07/01/2022   BMI 38.79 kg/m²         ASSESSMENT and PLAN    ICD-10-CM ICD-9-CM    1. Fibrocystic breast disease (FCBD), unspecified laterality  N60.19 610.1    2. At high risk for breast cancer  Z91.89 V49.89 MRI BREAST BI W WO CONT   3. Family history of breast cancer  Z80.3 V16.3    4. Encounter for screening mammogram for breast cancer  Z12.31 V76.12 [facility] 3D [facility] MAMMO BI SCREENING INCL CAD        Normal exam with no evidence of malignancy. Firm area under LEFT NAC 2-3:00 with biopsy proven fibroadenoma. Breast MRI for high risk screening in 8/2022. BSmammogram 3D in 10/2022. RTC in 1 year or sooner PRN. She is comfortable with this plan. All questions answered and she stated understanding.           Total time spent for this patient - 20 minutes.

## 2022-07-19 NOTE — PATIENT INSTRUCTIONS

## 2022-07-25 ENCOUNTER — TELEPHONE (OUTPATIENT)
Dept: SURGERY | Age: 42
End: 2022-07-25

## 2022-07-25 NOTE — TELEPHONE ENCOUNTER
Pt emailed wanting to know if we received all requirements for bariatric surgery. I sent the pt an email letting her know since it's been almost a year since last nutrition appt and over a year since initial consultation with Eduardo Galvan, she will need to schedule a follow up appt with Eduardo Galvan and Lázaro Farias. She will likely need to restart the nutrition portion since Lázaro Farias technically never cleared her.

## 2022-08-17 ENCOUNTER — OFFICE VISIT (OUTPATIENT)
Dept: SURGERY | Age: 42
End: 2022-08-17
Payer: COMMERCIAL

## 2022-08-17 ENCOUNTER — TELEPHONE (OUTPATIENT)
Dept: SURGERY | Age: 42
End: 2022-08-17

## 2022-08-17 VITALS
SYSTOLIC BLOOD PRESSURE: 105 MMHG | HEIGHT: 63 IN | TEMPERATURE: 98.1 F | WEIGHT: 227.2 LBS | HEART RATE: 92 BPM | DIASTOLIC BLOOD PRESSURE: 70 MMHG | BODY MASS INDEX: 40.26 KG/M2

## 2022-08-17 DIAGNOSIS — E66.01 MORBID OBESITY (HCC): Primary | ICD-10-CM

## 2022-08-17 DIAGNOSIS — G47.33 OSA (OBSTRUCTIVE SLEEP APNEA): ICD-10-CM

## 2022-08-17 PROCEDURE — 99212 OFFICE O/P EST SF 10 MIN: CPT | Performed by: NURSE PRACTITIONER

## 2022-08-17 NOTE — PROGRESS NOTES
HISTORY OF PRESENT ILLNESS  Silvana Villa is new patient presents today for evaluation for weight loss surgery. She has been overweight for the past 17 years. Her weight has ranged from 200-230 lbs. Heaviest weight was 230 lbs. She is here to continue discussion of bariatric surgery       Dietary Habits:  Breakfast- skipping  Lunch- smoothies, chickfila and crackers. Dinner-similar  Snacking- chips, popcorn  Liquids- water. Prior weight loss attempts: healthy eating and phentermine. STOPBANG questionnaire  Pt has a history of sleep apnea. Ms. Elda Skiff has a reminder for a \"due or due soon\" health maintenance. I have asked that she contact her primary care provider for follow-up on this health maintenance. Patient Active Problem List   Diagnosis Code    SOB (shortness of breath) on exertion R06.02    Snoring R06.83    Vitamin D deficiency E55.9    YUNIER (obstructive sleep apnea) G47.33    Fatigue R53.83    Chronic idiopathic constipation K59.04    Cyst of right breast N60.01    Obesity, Class II, BMI 35-39.9 E66.9    Exercise-induced asthma J45.990    Family history of breast cancer Z80.3    History of thyroid nodule Z86.39    Cyst of left breast N60.02    Severe obesity ([address] E66.01    Thyroid nodule E04.1    Thyroid cyst E04.1    Myopia H52.10    SBO (small bowel obstruction) ([address] K56.609       Past Medical History:   Diagnosis Date    Abnormal Pap smear of cervix 1990s    Breast cyst 2009    benign. Left. 1.7 cm and multiple others. Dr. Jaja Whaley    Chicken pox childhood    Chronic idiopathic constipation 07/2015    Dr. Vangie Workman. Chronic pain     HELM (dyspnea on exertion) 02/2015    Dr. 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 Retrosternal goiter. Dr. Gamal Duval.     Enlarged tonsils and adenoids 09/2010    GERD (gastroesophageal reflux disease)     Hernia, abdominal 2022    [facility]     Iodine-deficiency related goiter     Left. Dr. Sudha Baker. YUNIER (obstructive sleep apnea) 2015    Dr. Wilma Flores, uses cpap. Dr. Jigar Wade. States cpap broken. Dr. Tony Carrion. Thyroid cyst     Right. Thyroid nodule 2019         Past Surgical History:   Procedure Laterality Date    HX BREAST BIOPSY Left 2018    benign    HX  SECTION  2004    [facility]. HX CYST INCISION AND DRAINAGE Left 2016    benign. Dr. Zachary Cantu Right 09/15/2017    benign. Dr. Jules Parra. HX HERNIA REPAIR  4778    umbilical.    HX OTHER SURGICAL  2014    thyroid biopsy. Dr. Ever Aquino. HX THYROIDECTOMY Left 2015    due to [address] Dr. Sudha Baker. benign. Mónica Samson MD      Allergies   Allergen Reactions    Skelaxin [Metaxalone] Other (comments)     Too groggy         Family History   Problem Relation Age of Onset    Hypertension Mother     Lung Disease Mother         chronic bronchitis    Sleep Apnea Mother     Hypertension Father     Diabetes Paternal Grandmother     No Known Problems Sister     No Known Problems Sister     No Known Problems Sister     No Known Problems Brother         murdered    Breast Cancer Maternal Aunt     Other Sister         severe scoliosis. txd with back brace.     Breast Cancer Maternal Aunt     Breast Cancer Maternal Aunt     No Known Problems Maternal Grandmother     No Known Problems Maternal Grandfather        Social History     Socioeconomic History    Marital status: SINGLE     Spouse name: Not on file    Number of children: 1    Years of education: Not on file    Highest education level: Not on file   Occupational History    Occupation:      Employer: [employer]   Tobacco Use    Smoking status: Passive Smoke Exposure - Never Smoker    Smokeless tobacco: Never    Tobacco comments: lives with a smoker dad x 18 yrs and history of living with smoker boyfriend x 2 years   Vaping Use    Vaping Use: Never used   Substance and Sexual Activity    Alcohol use: Yes     Alcohol/week: 2.0 standard drinks     Types: 2 Shots of liquor per week    Drug use: No    Sexual activity: Yes     Partners: Male     Birth control/protection: None   Other Topics Concern    Not on file   Social History Narrative    ** Merged History Encounter **          Social Determinants of Health     Financial Resource Strain: Not on file   Food Insecurity: Not on file   Transportation Needs: Not on file   Physical Activity: Not on file   Stress: Not on file   Social Connections: Not on file   Intimate Partner Violence: Not on file   Housing Stability: Not on file      HPI    Review of Systems   Constitutional:  Negative for chills, fever and malaise/fatigue. HENT:  Negative for congestion, hearing loss, sinus pain, sore throat and tinnitus. Eyes:  Negative for blurred vision, double vision, pain, discharge and redness. Respiratory:  Negative for cough, shortness of breath and wheezing. Cardiovascular:  Negative for chest pain, palpitations and leg swelling. Gastrointestinal:  Negative for abdominal pain, heartburn, nausea and vomiting. Genitourinary:  Negative for dysuria, frequency and urgency. Musculoskeletal:  Negative for back pain, joint pain and neck pain. Skin:  Negative for itching and rash. Neurological:  Negative for dizziness and headaches. Psychiatric/Behavioral:  Negative for depression. The patient is not nervous/anxious and does not have insomnia. Physical Exam  Constitutional:       Appearance: She is well-developed. HENT:      Mouth/Throat:      Mouth: Mucous membranes are moist.   Cardiovascular:      Rate and Rhythm: Normal rate and regular rhythm. Heart sounds: No murmur heard. No friction rub. No gallop.    Pulmonary:      Effort: Pulmonary effort is normal.      Breath sounds: Normal breath sounds. Abdominal:      General: Bowel sounds are normal. There is no distension. Palpations: Abdomen is soft. Tenderness: There is no abdominal tenderness. Comments: No masses or hernias noted   Musculoskeletal:      Cervical back: Normal range of motion. Skin:     General: Skin is warm and dry. Neurological:      Mental Status: She is alert and oriented to person, place, and time. ASSESSMENT and PLAN  Due to her reflux she has decided to move forward with Gastric bypass. The procedure of divided gastric bypass with Jennifer-en-Y gastrojejunostomy was explained to the patient including the four parts of the operation- 1) loss of appetite, 2) the restrictive phases, 3) the dumping phase, 4) mild malabsorption from the diversion of pancreatic or biliary enzymes. Informed consent was obtained concerning the potential morbidities and mortality of the operation including anastomotic leak, pulmonary embolism, deep vein thrombosis, bleeding, staple- line disruption, stomal stenosis or dilatation, inadequate weight loss, and requirement for life-long vitamin intake. Further information was given concerning a three-day hospitalization with at least one or more nights in a special care unit, protein- enriched liquified diet for two-thee weeks, convalescence for three to six weeks. Information was provided concerning the team approach anesthesia, nursing, and dietary. Pneumatic stockings, Heparin or Lovenox, and wound care management techniques were explained. Abdominal pain, nausea and/or vomiting may occur if eating too fast, too much, or not chewing well enough. With sweets or high fat ingestion, dumping may occur. It was further stressed the approximately three to five percent of patients require endoscopic balloon dilatation of the staple line.  Furthermore, a clear discussion of the imperfections of the operation was discussed including the fact that it not effective in every patient because of patient non-compliance and/or mechanical failure. It was hoped, however, that at approaching a ninety percent level, the patients can achieve a mean of seventy percent loss of excess body weight. This is determined partially by patient compliance and exercise as well as making wise choices for the diet. Patient meets criteria established by the NIH. Without weight reduction, co-morbidities will escalate as well as risk of early mortality. Recommendation is patient could be served with surgical weight reduction, the procedure of Gastric bypass I explained to the patient differences between laparoscopic and open gastric bypass and sleeve gastrectomy procedures. Patient has attended one our informational meeting and has seen our educational materials. Patient desires to have surgery with Dr. Yash Thompson. I have reinforced without lifestyle change and behavior modification, they would not achieve his weight loss goals. I reviewed risks and complications associated with each procedure. Patient has been tested and treated for H. [place]. Her UGI results are:    IMPRESSION  1. There is a hiatal hernia with a moderate amount of reflux into the proximal  thoracic esophagus. Remainder of the study is within normal limits. She has an EGD done 6/22. Requesting results. She will need to resume some nutrtional counseling. ? Psychology visit. Once the above is completed she will need to meet with Dr. Jesus Manuel Rapp. I discussed with patient a diet high in protein, low-fat, low- sugar, limited carbohydrates, and discontinuing use of carbonated beverages. Also discussed physical activity and exercises. I have answered all questions, they wish to proceed. 24 Minutes spent face to face with patient, >50 % of time spent counseling.    ** Copy to PCP and other providers

## 2022-08-17 NOTE — TELEPHONE ENCOUNTER
Spoke to Peter Zavala and requested copy of Endoscopy. [place] to fax to office. Explained that pt is currently in office.

## 2022-08-17 NOTE — PROGRESS NOTES
1. Have you been to the ER, urgent care clinic since your last visit? Hospitalized since your last visit? No    2. Have you seen or consulted any other health care providers outside of the [address] [place] [address] since your last visit? Include any pap smears or colon screening. Yes.  OB/GYN

## 2022-08-24 ENCOUNTER — CLINICAL SUPPORT (OUTPATIENT)
Dept: SURGERY | Age: 42
End: 2022-08-24

## 2022-08-24 DIAGNOSIS — E66.01 MORBID OBESITY (HCC): Primary | ICD-10-CM

## 2022-08-24 NOTE — PROGRESS NOTES
Pre-operative Bariatric Nutrition Evaluation ([place] )     Date: 2022   Serge Ospina M.D. Name: Ying Ibanez  :  1980  Age:  43  Gender: Female   Type of Surgery: [x]           Gastric Bypass   []           Sleeve Gastrectomy    ASSESSMENT:    Past Medical History:thyroid condition      Medications/Supplements:   Prior to Admission medications    Medication Sig Start Date End Date Taking? Authorizing Provider   linaCLOtide (Linzess) 145 mcg cap capsule Take 1 Capsule by mouth Daily (before breakfast). 22   Myah Manzano MD   esomeprazole (NEXIUM) 40 mg capsule TAKE 1 CAPSULE BY MOUTH twice daily  FOR HEARTBURN GENERIC FOR NEXIUM 21   Myah Manzano MD   ergocalciferol (ERGOCALCIFEROL) 1,250 mcg (50,000 unit) capsule Take 1 Capsule by mouth every seven (7) days. Indications: low vitamin D levels 21   Myah Manzano MD   vitamin e (E GEMS) 100 unit capsule Take 100 Units by mouth daily. Unsure of the strength. Provider, Historical   fluticasone propionate (FLONASE) 50 mcg/actuation nasal spray 1 [place] by Both Nostrils route daily. 21   Myah Manzano MD   cyanocobalamin 1,000 mcg tablet Take 1,000 mcg by mouth daily. As needed    Provider, Historical       Food Allergies/Intolerances:none    Anthropometrics:    Ht:63\"   Recent Office Wt: 227#    IBW: 115#    %IBW: 197%     BMI:40    Category: obesity III     Reported wt history: Pt presents today for pre-op nutrition evaluation for wt loss surgery. Reports lowest adult BW of 165# and highest adult BW of 235#. Attributes wt gain over the years r/t medical conditions and other lifestyle factors. Has attempted wt loss through various methods with most successful wt loss of 10-20#. Has been unable to maintain long term or significant wt loss and is now seeking approval for weight loss surgery. Pt was previously in our program in  but did not proceed with surgery approval at that time.  Now has different insurance. Pt will need to complete 6 months of supervised weight loss for insurance requirements. Exercise/Physical Activity: walking during lunch break (30 minutes) and light weight lifting    Reported Diet History:Phentermine     24 Hour Diet Recall  Breakfast  Skips    Lunch  Ordering out most days - [employer] or crackers/apples   Dinner  Chicken, potatoes/rice, vegetable    Snacks     Beverages  Water,sweetened tea, soda (h/o soda all day and has recently decreased)       Environment/Psychosocial/Support:Pt reports good support from friends and her son. Pt knows a few people who have had weight loss surgery. Pt works full time. NUTRITION DIAGNOSIS:  Self-monitoring deficit r/t previous lack of value for this change evidenced by pt skips meals. Excessive energy intake r/t food and beverage choices evidenced by diet recall that reveals heavy reliance on fast food meals and intake of sugar sweetened beverages. NUTRITION INTERVENTION:  Pt educated on nutrition recommendations for weight loss surgery, specifically gastric bypass. Instructed on consuming 3 meals per day starting now. Use the balanced plate method to plan meals, include 3 oz of lean source of protein, 1/2 cup whole grains, unlimited non-starchy vegetables, 1/2 cup fruit and 1 serving of low fat dairy. Utilize handouts listing healthy snack and meal ideas to limit restaurant meals. After surgery measure all meals to 1/2 cup. Each meal will contain a 1/4 cup lean protein and 1/4 cup fruit, non-starchy vegetable or starch (limiting to once per day). Aim for 60 g protein per day. Sip on 48-64 oz of sugar free, calorie free, non-carbonated beverages each day. Do not use a straw. Do not consume beverages 30 minutes before, during or 30 minutes after meals. Read all nutrition labels. Demonstrated and emphasized identifying serving size, total fat, sugar and protein content. Defined low fat as </= 3 g per serving. Discussed lean and extra lean sources of protein. Provided list of low fat cooking methods. Avoid foods with sugar listed in the first 3 ingredients and >/15 g sugar per serving. Excess sugar/fat intake may lead to dumping syndrome. Discussed signs and symptoms of dumping syndrome. Practice mindful eating habits; take small bites, chew thoroughly, avoid distractions, utilize hunger/fullness scale. Consume meals over 20-30 minutes. Attend Bariatric Support Group and increase physical activity (approved per MD) for long term weight maintenance. NUTRITION MONITORING AND EVALUATION:    The following goals were established with patient;  Maintain walking for exercise. Aim for 150 minutes per week. Eat 3 meals a day. Work on eating breakfast more consistently to ensure adequate protein intake. Limit reliance on fast food. Pack meals to take to work for lunch. Limit restaurant meals to no more than 2-3 meals per day. Continue to decrease soda intake and increase fluids to 64 oz per day. Replace sweetened tea with unsweetened tea. Can use calorie-free sweetener PRN. Print and review nutrition education materials provided. Follow up next month for continued nutrition education and supervised weight loss requirements. Specific tips and techniques to facilitate compliance with above recommendations were provided and discussed. Nutrition evaluation reveals important lifestyle changes indicated. Goals set and recommendations made. Will continue to assess. If further details are desired please feel free to contact me at [phone]. This phone number was also provided to the patient for any further questions or concerns.            Indra Patel RD

## 2022-09-03 ENCOUNTER — APPOINTMENT (OUTPATIENT)
Dept: GENERAL RADIOLOGY | Age: 42
End: 2022-09-03
Attending: NURSE PRACTITIONER
Payer: COMMERCIAL

## 2022-09-03 ENCOUNTER — HOSPITAL ENCOUNTER (EMERGENCY)
Age: 42
Discharge: HOME | End: 2022-09-03
Attending: EMERGENCY MEDICINE
Payer: COMMERCIAL

## 2022-09-03 VITALS
BODY MASS INDEX: 39.77 KG/M2 | RESPIRATION RATE: 18 BRPM | TEMPERATURE: 98.8 F | DIASTOLIC BLOOD PRESSURE: 95 MMHG | HEIGHT: 63 IN | SYSTOLIC BLOOD PRESSURE: 145 MMHG | OXYGEN SATURATION: 98 % | HEART RATE: 110 BPM | WEIGHT: 224.43 LBS
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
## 2022-09-03 DIAGNOSIS — S33.5XXA LUMBAR SPRAIN, INITIAL ENCOUNTER: Primary | ICD-10-CM

## 2022-09-03 LAB — HCG UR QL: NEGATIVE

## 2022-09-03 PROCEDURE — 74011250637 HC RX REV CODE- 250/637: Performed by: NURSE PRACTITIONER

## 2022-09-03 PROCEDURE — 81025 URINE PREGNANCY TEST: CPT

## 2022-09-03 PROCEDURE — 72100 X-RAY EXAM L-S SPINE 2/3 VWS: CPT

## 2022-09-03 PROCEDURE — 99283 EMERGENCY DEPT VISIT LOW MDM: CPT

## 2022-09-03 RX ORDER — CYCLOBENZAPRINE HCL 10 MG
10 TABLET ORAL
Qty: 20 TABLET | Refills: 0 | Status: SHIPPED | OUTPATIENT
Start: 2022-09-03

## 2022-09-03 RX ORDER — DICLOFENAC SODIUM 75 MG/1
75 TABLET, DELAYED RELEASE ORAL 2 TIMES DAILY
Qty: 30 TABLET | Refills: 0 | Status: SHIPPED | OUTPATIENT
Start: 2022-09-03

## 2022-09-03 RX ORDER — IBUPROFEN 400 MG/1
800 TABLET ORAL
Status: COMPLETED | OUTPATIENT
Start: 2022-09-03 | End: 2022-09-03

## 2022-09-03 RX ORDER — ACETAMINOPHEN 500 MG
1000 TABLET ORAL
Qty: 20 TABLET | Refills: 0 | Status: SHIPPED | OUTPATIENT
Start: 2022-09-03

## 2022-09-03 RX ADMIN — IBUPROFEN 800 MG: 400 TABLET, FILM COATED ORAL at 20:38
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
## 2022-09-04 NOTE — ED NOTES
2009 -   Emergency Department Nursing Plan of Care       The Nursing Plan of Care is developed from the Nursing assessment and Emergency Department Attending provider initial evaluation. The plan of care may be reviewed in the ED Provider note.     The Plan of Care was developed with the following considerations:   Patient / Family readiness to learn indicated by:verbalized understanding, successful return demonstration, and appropriate questions asked  Persons(s) to be included in education: patient  Barriers to Learning/Limitations:No    [address]    9/3/2022   8:09 PM

## 2022-09-06 ENCOUNTER — PATIENT OUTREACH (OUTPATIENT)
Dept: OTHER | Age: 42
End: 2022-09-06
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
## 2022-09-07 ENCOUNTER — PATIENT OUTREACH (OUTPATIENT)
Dept: OTHER | Age: 42
End: 2022-09-07
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
## 2022-09-08 ENCOUNTER — TELEPHONE (OUTPATIENT)
Dept: SURGERY | Age: 42
End: 2022-09-08

## 2022-09-08 ENCOUNTER — PATIENT OUTREACH (OUTPATIENT)
Dept: OTHER | Age: 42
End: 2022-09-08

## 2022-09-08 NOTE — TELEPHONE ENCOUNTER
Attempted to return pt's call regarding next steps for bariatric surgery. Pt needs to continue with nutrition counseling because she was never cleared by the dietician. She needs to be scheduled for a nutrition appt with [facility] or Christopher Bobby. Phone or in-person.
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
## 2022-09-09 ENCOUNTER — VIRTUAL VISIT (OUTPATIENT)
Dept: FAMILY MEDICINE CLINIC | Age: 42
End: 2022-09-09
Payer: COMMERCIAL

## 2022-09-09 DIAGNOSIS — M54.2 NECK PAIN: ICD-10-CM

## 2022-09-09 DIAGNOSIS — M54.40 BILATERAL LOW BACK PAIN WITH SCIATICA, SCIATICA LATERALITY UNSPECIFIED, UNSPECIFIED CHRONICITY: ICD-10-CM

## 2022-09-09 DIAGNOSIS — M54.9 BACK PAIN, UNSPECIFIED BACK LOCATION, UNSPECIFIED BACK PAIN LATERALITY, UNSPECIFIED CHRONICITY: Primary | ICD-10-CM
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
## 2022-09-09 DIAGNOSIS — M54.9 ACUTE UPPER BACK PAIN: ICD-10-CM

## 2022-09-09 DIAGNOSIS — Z02.89 ENCOUNTER TO OBTAIN EXCUSE FROM WORK: ICD-10-CM

## 2022-09-09 PROCEDURE — 99214 OFFICE O/P EST MOD 30 MIN: CPT | Performed by: FAMILY MEDICINE

## 2022-09-09 NOTE — LETTER
NOTIFICATION RETURN TO WORK / SCHOOL    9/9/2022 3:04 PM    Ms. Silva [facility]. [phone]      To Whom It May Concern:    Shazia Carpenter is currently under the care of [address] [place] [address]. She will return to work/school on: 9/9/2022 with a light duty work schedule,  due to the current medical    conditions, Ms Calixto Mandel would be unable to lift, carry or push weight greater than five pounds for the next Six     weeks. If there are questions or concerns please have the patient contact our office.         Sincerely,      Rose Leal MD

## 2022-09-09 NOTE — PROGRESS NOTES
Chief Complaint   Patient presents with    ED Follow-up     Back pain       1. Have you been to the ER, urgent care clinic since your last visit? Hospitalized since your last visit? Yes When: 9/3/22 Where: [facility]  Reason for visit: back pain    2. Have you seen or consulted any other health care providers outside of the [address] [place] [address] since your last visit? Include any pap smears or colon screening. No    Call placed to pt. Verified patient with two type of identifiers. seen at [facility] on 9/3/22 for c/o leg pain, still having lower back pain that radiates down right leg.  Requesting letter for weight restrictions for job
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
## 2022-09-21 ENCOUNTER — OFFICE VISIT (OUTPATIENT)
Dept: SURGERY | Age: 42
End: 2022-09-21

## 2022-09-21 DIAGNOSIS — E66.01 MORBID OBESITY (HCC): Primary | ICD-10-CM

## 2022-09-22 NOTE — PROGRESS NOTES
[facility]  Supervised Weight Loss     Date:   2022    Patient's Name: Dakota Arriaga  : 1980    Insurance:  [employer]            Session: 2 of  6  Surgery: Gastric Bypass  Surgeon:  Yovani Penny M.D. Height: 63\"  Reported Weight:    225      Lbs. BMI: 39   Pounds Lost since last month: 2#               Pounds Gained since last month: 0    Starting Weight: 227#   Previous Months Weight: 227#  Overall Pounds Lost: 2#  Overall Pounds Gained: 0    Other Pertinent Information: Today's appointment was completed in a virtual class setting. Smoking Status:  none  Alcohol Intake: 3 drinks, 2 times per week    I have reviewed with pt the guidelines of the supervised wt loss program.  Pt understands the expectations of some wt loss during the program and that wt gain could delay the process. I have also explained that appointments need to be consecutive and missing an appointment may result in starting over. Pt has received this information in writing. Changes that patient has made since last month include:  walking more, drinking protein shakes. Eating Habits and Behaviors  General healthy eating guidelines were discussed. A nutrition lesson was presented on portion control. Patients were instructed implement portion control now using the balanced plate method (1/2 plate non-starchy vegetables, 1/4 plate lean meat, and 1/4 plate whole grains and to include fruit and/or milk at meals or snack). We discussed measuring meals to 1/2 cup total per meal after surgery and appropriate portion progression long term. Patient's current diet habits include: Pt is eating 2-3 meals per day. Snack choices include chips or candy. Pt is eating refined carbohydrate foods (bread, pasta, rice, potatoes) 4 times per week. Pt is eating sweets/desserts 1-3 times per week. Pt is using baked, grilled, broiled and some fried cooking methods.  Pt is eating meals prepared outside of the home 1-3 times per week. Pt is drinking water, soda, caffeine. Pt reports \"none at the moment\" r/t emotional eating. Physical Activity/Exercise  We talked about the importance of increasing daily physical activity and beginning to develop an exercise regimen/routine. We talked about exercise as being an important part of long term weight loss after surgery. Comments:  During class, I discussed with patient the importance of getting into an exercise routine. Pt is currently walking for 15 minutes, 2 times per week for activity. Pt has been encouraged to maintain and increase as tolerated. Behavior Modification       We talked about how to eat more mindfully. Tips and recommendations for how to make these changes were provided. Pt was encouraged to keep a food journal and record what they were taking in daily. Overall Assessment: Pt demonstrates some small lifestyle changes evidenced by reported changes and reported wt loss. Continued changes indicated. Will continue to assess. Patient-Set Goals:   1. Nutrition - get the proper amount of protein   2. Exercise - increase walking, going to the gym 1-2 times per week  3.  Behavior -stay focused and motivated     Isabell Chávez, GILMA  9/22/2022

## 2022-09-23 ENCOUNTER — PATIENT OUTREACH (OUTPATIENT)
Dept: OTHER | Age: 42
End: 2022-09-23

## 2022-09-27 RX ORDER — ERGOCALCIFEROL 1.25 MG/1
CAPSULE ORAL
Qty: 5 CAPSULE | Refills: 5 | Status: SHIPPED | OUTPATIENT
Start: 2022-09-27

## 2022-10-05 ENCOUNTER — HOSPITAL ENCOUNTER (OUTPATIENT)
Dept: PHYSICAL THERAPY | Age: 42
Discharge: HOME | End: 2022-10-05
Payer: COMMERCIAL

## 2022-10-05 PROCEDURE — 97140 MANUAL THERAPY 1/> REGIONS: CPT

## 2022-10-05 PROCEDURE — 97162 PT EVAL MOD COMPLEX 30 MIN: CPT

## 2022-10-05 PROCEDURE — 97110 THERAPEUTIC EXERCISES: CPT
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
## 2022-10-12 ENCOUNTER — APPOINTMENT (OUTPATIENT)
Dept: PHYSICAL THERAPY | Age: 42
End: 2022-10-12
Payer: COMMERCIAL

## 2022-10-19 ENCOUNTER — OFFICE VISIT (OUTPATIENT)
Dept: SURGERY | Age: 42
End: 2022-10-19

## 2022-10-19 ENCOUNTER — HOSPITAL ENCOUNTER (OUTPATIENT)
Dept: PHYSICAL THERAPY | Age: 42
Discharge: HOME | End: 2022-10-19
Payer: COMMERCIAL

## 2022-10-19 DIAGNOSIS — E66.01 MORBID OBESITY (HCC): Primary | ICD-10-CM

## 2022-10-19 PROCEDURE — 97110 THERAPEUTIC EXERCISES: CPT

## 2022-10-19 PROCEDURE — 97140 MANUAL THERAPY 1/> REGIONS: CPT
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
## 2022-10-20 NOTE — PROGRESS NOTES
[facility]  Supervised Weight Loss     Date:   10/19/2022    Patient's Name: Queen Krista  : 1980    Insurance:  [place]                           Session: 3   Surgery: Gastric Bypass                  Surgeon:  Ubaldo Mosqueda M.D. Height: 63\"                 Reported Weight:    230      Lbs. BMI: 40             Pounds Lost since last month: 0#               Pounds Gained since last month: 5#     Starting Weight: 227#                       Previous Months Weight: 225#  Overall Pounds Lost: 0                  Overall Pounds Gained: 3#     Other Pertinent Information: Today's appointment was completed in a virtual class setting. Smoking Status:  none  Alcohol Intake: 2-3 drinks, 3 times per week    I have reviewed with pt the guidelines of the supervised wt loss program.  Pt understands the expectations of some wt loss during the program and that wt gain could delay the process. I have also explained that appointments need to be consecutive and missing an appointment may result in starting over. Pt has received this information in writing. Changes that patient has made since last month include:  more water, trying to have breakfast, including more exercise at home. Eating Habits and Behaviors  General healthy eating guidelines were also discussed. Pts were instructed that their plate should be made up 1/2 plate coming from non-starchy vegetables, 1/4 coming from lean meat, and 1/4 of their plate coming from carbohydrates, including fruits, starches, or milk. We discussed measuring meals to 1/2 cup total per meal after surgery. Drinking only calorie-free, sugar-free and non-carbonated beverages. We discussed the importance of drinking 64 ounces of fluid per day to prevent dehydration post-operatively. Patient's current diet habits include: Pt is eating 2-3 meals per day.  Tends to skip breakfast. Snack choices include chips and candy. Pt is eating refined carbohydrate foods (bread, pasta, rice, potatoes) 5 times per week. Pt is eating sweets/desserts not reported. Pt is using baked, grilled, broiled cooking methods. Pt is eating meals prepared outside of the home 1-3 times per week. Pt is drinking water, sweetened tea, coffee. Pt reports yes to stress/emotional eating, about 2-4 times per week. Physical Activity/Exercise  An exercise presentation was provided including information about exercise programs available both before and after surgery. We talked about the importance of increasing daily physical activity and beginning to develop an exercise regimen/routine. We talked about exercise as being an important part of long term weight loss after surgery. Comments:  During class, I discussed with patient the importance of getting into an exercise routine. Pt is currently walking and doing yoga about 3 times per week for activity. Pt has been encouraged to maintain and increase as tolerated. Eventually aiming for 150 minutes per week. Behavior Modification       We talked about how to eat more mindfully. Tips and recommendations for how to make these changes were provided. Pt was encouraged to keep a food journal and record what they were taking in daily. Overall Assessment: Pt demonstrates some small lifestyle changes evidenced by reported changes. Continued changes indicated evidenced by reported eating habits and reported wt gain. Will continue to assess. Patient-Set Goals:   1. Nutrition - more protein   2. Exercise - walking more, go to the gym at least once per week  3.  Behavior -stay focused and motivated     Bar Stanley, [address] [place] [address]  10/20/2022

## 2022-10-21 ENCOUNTER — PATIENT OUTREACH (OUTPATIENT)
Dept: OTHER | Age: 42
End: 2022-10-21
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
## 2022-10-26 ENCOUNTER — HOSPITAL ENCOUNTER (OUTPATIENT)
Dept: PHYSICAL THERAPY | Age: 42
Discharge: HOME | End: 2022-10-26
Payer: COMMERCIAL

## 2022-10-26 PROCEDURE — 97110 THERAPEUTIC EXERCISES: CPT
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
## 2022-11-02 ENCOUNTER — HOSPITAL ENCOUNTER (OUTPATIENT)
Dept: PHYSICAL THERAPY | Age: 42
Discharge: HOME | End: 2022-11-02
Payer: COMMERCIAL

## 2022-11-02 PROCEDURE — 97110 THERAPEUTIC EXERCISES: CPT
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
## 2022-11-09 ENCOUNTER — APPOINTMENT (OUTPATIENT)
Dept: PHYSICAL THERAPY | Age: 42
End: 2022-11-09
Payer: COMMERCIAL

## 2022-11-16 ENCOUNTER — APPOINTMENT (OUTPATIENT)
Dept: PHYSICAL THERAPY | Age: 42
End: 2022-11-16
Payer: COMMERCIAL

## 2022-11-16 ENCOUNTER — OFFICE VISIT (OUTPATIENT)
Dept: SURGERY | Age: 42
End: 2022-11-16

## 2022-11-16 DIAGNOSIS — E66.01 MORBID OBESITY (HCC): Primary | ICD-10-CM

## 2022-11-17 NOTE — PROGRESS NOTES
[facility] [place]  Supervised Weight Loss     Date:   2022    Patient's Name: Darlene Pennington  : 1980    Insurance:  [employer]                           Session:   Surgery: Gastric Bypass                  Surgeon:  Keith Pang M.D. Height: 63\"                 Reported Weight:    228      Lbs. BMI: 40             Pounds Lost since last month: 2#               Pounds Gained since last month: 0#     Starting Weight: 227#                       Previous Months Weight: 230#  Overall Pounds Lost: 0                  Overall Pounds Gained: 1#     Other Pertinent Information: Today's appointment was completed in a virtual class setting. Smoking Status:  none  Alcohol Intake: weekends    I have reviewed with pt the guidelines of the supervised wt loss program.  Pt understands the expectations of some wt loss during the program and that wt gain could delay the process. I have also explained that appointments need to be consecutive and missing an appointment may result in starting over. Pt has received this information in writing. Changes that patient has made since last month include:  Eliminated sodas. Eating Habits and Behaviors  A nutrition lesson was presented on label reading with specific guidelines provided for limiting added sugars. This information will help increase healthy food choices, promote weight loss and prevent dumping syndrome after gastric bypass. We also reviewed the general nutrition guidelines for bariatric surgery. Patient's current diet habits include: Pt is eating 3 meals per day. Protein shake for breakfast. Snack choices include pretzels, chips and fruit. Pt is eating refined carbohydrate foods (bread, pasta, rice, potatoes) not reported. Pt is eating sweets/desserts not reported. Pt is using baked, boiled and fried cooking methods.  Pt is eating meals prepared outside of the home not reported. Pt is drinking water and coffee with sugar. Has eliminated sodas. Is drinking tea and lemonade. Pt reports sometimes emotional eating. Physical Activity/Exercise  We talked about the importance of increasing daily physical activity and beginning to develop an exercise regimen/routine. We talked about exercise as being an important part of long term weight loss after surgery. Comments:  During class, I discussed with patient the importance of getting into an exercise routine. Pt is currently walking 30 minutes, 3 times per week for activity. Pt has been encouraged to maintain and increase as tolerated. Behavior Modification       We talked about how to eat more mindfully. Tips and recommendations for how to make these changes were provided. Pt was encouraged to keep a food journal and record what they were taking in daily. Overall Assessment: Pt demonstrates appropriate lifestyle changes evidenced by reported changes and reported wt loss from last month's session. Will continue to assess. Patient-Set Goals:   1. Nutrition - getting in more protein, eating breakfast  2. Exercise - increase exercise  3.  Behavior -stay focused    Donavan Germain, [address] [place] [address]  11/17/2022

## 2022-11-23 ENCOUNTER — APPOINTMENT (OUTPATIENT)
Dept: PHYSICAL THERAPY | Age: 42
End: 2022-11-23
Payer: COMMERCIAL

## 2022-11-25 ENCOUNTER — PATIENT OUTREACH (OUTPATIENT)
Dept: OTHER | Age: 42
End: 2022-11-25
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
## 2022-11-30 ENCOUNTER — APPOINTMENT (OUTPATIENT)
Dept: PHYSICAL THERAPY | Age: 42
End: 2022-11-30
Payer: COMMERCIAL

## 2022-12-14 ENCOUNTER — OFFICE VISIT (OUTPATIENT)
Dept: SURGERY | Age: 42
End: 2022-12-14

## 2022-12-14 DIAGNOSIS — E66.01 MORBID OBESITY (HCC): Primary | ICD-10-CM

## 2022-12-15 NOTE — PROGRESS NOTES
[facility] at [facility]  Supervised Weight Loss     Date:   2022    Patient's Name: Moses Martin  : 1980    Insurance:  [place]                           Session:   Surgery: Gastric Bypass                  Surgeon:  Jasmin Zhou M.D. Height: 63\"                 Reported Weight:    230      Lbs. BMI: 40             Pounds Lost since last month: 0               Pounds Gained since last month: 2#     Starting Weight: 227#                       Previous Months Weight: 228#  Overall Pounds Lost: 0                  Overall Pounds Gained: 3#     Other Pertinent Information: Today's appointment was completed in a virtual class setting. Smoking Status:  none  Alcohol Intake: weekends    I have reviewed with pt the guidelines of the supervised wt loss program.  Pt understands the expectations of some wt loss during the program and that wt gain could delay the process. I have also explained that classes need to be consecutive. Missing a class may result in starting over. Pt has received this information in writing. Changes that patient has made since last month include:  cut out sodas, limiting caffeine. Eating Habits and Behaviors  General healthy eating guidelines were discussed. A nutrition lesson specific to the importance of protein intake after surgery was provided. We discussed food sources of protein, protein supplements and multiple reasons as to why protein is important after bariatric surgery. Pts were instructed to focus on including protein at every meal and practice eating protein first at the meal. Pts were encouraged to sample a protein shake for tolerance. Patients were also instructed to use the balanced plate method for help with portion control and general healthy eating prior to surgery. We discussed measuring meals to 1/2 cup total per meal after surgery.  Drinking only calorie-free, sugar-free and non-carbonated beverages. We discussed the importance of drinking 64 ounces of fluid per day to prevent dehydration post-operatively. Patient's current diet habits include: Pt is eating 2-3 meals per day. Snack choices include yogurt, cheese, crackers and occasionally chips. Pt is eating refined carbohydrate foods (bread, pasta, rice, potatoes) not reported. Pt is eating sweets/desserts 1-2 times per week. Pt is using baked and fried cooking methods. Pt is eating meals prepared outside of the home 1-3 times per week. Pt is drinking water, sweet tea, green tea. Pt reports yes to emotional eating. Physical Activity/Exercise  We talked about the importance of increasing daily physical activity and beginning to develop an exercise regimen/routine. We talked about exercise as being an important part of long term weight loss after surgery. Comments:  During class, I discussed with patient the importance of getting into an exercise routine. Pt is currently walking 3 times per week for activity. Pt has been encouraged to maintain and increase as tolerated. Behavior Modification       We talked about how to eat more mindfully. Tips and recommendations for how to make these changes were provided. Pt was encouraged to keep a food journal and record what they were taking in daily. Overall Assessment: Pt demonstrates appropriate lifestyle changes evidenced by reported changes. Continued changes indicated. Will continue to assess. Patient-Set Goals:   1. Nutrition - decrease fast food, more water intake  2. Exercise - walk more, go back to the gym  3.  Behavior -monitor stress eating    Laila Loyd, GILMA  12/15/2022

## 2023-01-11 ENCOUNTER — OFFICE VISIT (OUTPATIENT)
Dept: SURGERY | Age: 43
End: 2023-01-11

## 2023-01-11 DIAGNOSIS — E66.01 MORBID OBESITY (HCC): Primary | ICD-10-CM

## 2023-01-12 NOTE — PROGRESS NOTES
[facility] at [facility]  Supervised Weight Loss     Date:   2023    Patient's Name: Greta Berry  : 1980    Insurance:  [place]                           Session:   Surgery: Gastric Bypass                  Surgeon:  Fay Bullard M.D. Height: 63\"                 Reported Weight:    230      Lbs. BMI: 40             Pounds Lost since last month: 0               Pounds Gained since last month: 0#     Starting Weight: 227#                       Previous Months Weight: 230#  Overall Pounds Lost: 0                  Overall Pounds Gained: 3#     Other Pertinent Information: Today's appointment was completed in a virtual class setting. Smoking Status:  none  Alcohol Intake: 3 drinks, 2 times per week    I have reviewed with pt the guidelines of the supervised wt loss program.  Pt understands the expectations of some wt loss during the program and that wt gain could delay the process. I have also explained that appointments need to be consecutive and missing an appointment may result in starting over. Pt has received this information in writing. Changes that patient has made since last month include:  drinking water, eating breakfast.      Eating Habits and Behaviors  A nutrition lesson specific to vitamins was provided. We discussed the various reasons for needing vitamins and different types and doses. General healthy eating guidelines were also discussed. Pts were instructed that their plate should be made up 1/2 plate coming from non-starchy vegetables, 1/4 coming from lean meat, and 1/4 of their plate coming from carbohydrates, including fruits, starches, or milk. We discussed measuring meals to 1/2 cup total per meal after surgery. Drinking only calorie-free, sugar-free and non-carbonated beverages. We discussed the importance of drinking 64 ounces of fluid per day to prevent dehydration post-operatively. Patient's current diet habits include: Pt is eating 2-3 meals per day. Snack choices include chips, pretzels, fruit, popcorn. Pt is eating refined carbohydrate foods (bread, pasta, rice, potatoes) 2-4 times per week. Pt is eating sweets/desserts 1-2 times per week. Pt is using baked, grilled, broiled and some fried cooking methods. Pt is eating meals prepared outside of the home 1-3 times per week. Pt is drinking water, sweet tea, soda, caffeine. Pt reports yes to emotional eating. Physical Activity/Exercise  We talked about the importance of increasing daily physical activity and beginning to develop an exercise regimen/routine. We talked about exercise as being an important part of long term weight loss after surgery. Comments:  During class, I discussed with patient the importance of getting into an exercise routine. Pt is currently walking 3-4 times per week for activity. Pt has been encouraged to maintain and increase as tolerated. Behavior Modification       We talked about how to eat more mindfully and identify emotional eating triggers. Tips and recommendations for how to make these changes were provided. Pt was encouraged to keep a food journal and record what they were taking in daily. Overall Assessment: Pt demonstrates some appropriate lifestyle changes evidenced by reported changes and reported wt that indicates mostly weight maintenance. Pt would benefit from continued changes to include elimination of carbonated and sugar sweetened beverages, consistently eating 3 meals a day to achieve adequate protein intake and developing non-food coping skills for emotional eating. Pt has been encouraged to continue attending monthly nutrition classes while waiting on next steps in approval process. Pt demonstrates understanding of nutrition guidelines for bariatric surgery and appears to be an appropriate candidate at this time. Patient-Set Goals:   1.  Nutrition - eating healthier, eating breakfast   2. Exercise - continue walking and going to the gym  3.  Behavior -stay focused, journal     Prerna Rodriguez, RD  1/12/2023

## 2023-01-13 ENCOUNTER — PATIENT OUTREACH (OUTPATIENT)
Dept: OTHER | Age: 43
End: 2023-01-13

## 2023-01-18 ENCOUNTER — HOSPITAL ENCOUNTER (OUTPATIENT)
Dept: MAMMOGRAPHY | Age: 43
Discharge: HOME | End: 2023-01-18
Attending: SURGERY
Payer: COMMERCIAL

## 2023-01-18 DIAGNOSIS — Z12.31 ENCOUNTER FOR SCREENING MAMMOGRAM FOR BREAST CANCER: ICD-10-CM

## 2023-01-18 PROCEDURE — 77063 BREAST TOMOSYNTHESIS BI: CPT

## 2023-02-08 ENCOUNTER — OFFICE VISIT (OUTPATIENT)
Dept: SURGERY | Age: 43
End: 2023-02-08

## 2023-02-08 DIAGNOSIS — E66.01 MORBID OBESITY (HCC): Primary | ICD-10-CM

## 2023-02-08 NOTE — PROGRESS NOTES
[address] [place] [address] at [facility]  Bariatric Surgery Program         Patient's Name: Rob Aviles  : 1980    This patient attended a 1 hour virtual nutrition class as part of the [address] [place] [address] Bariatric Surgery program at [address]. This class is part of the pre-op nutrition program for weight loss surgery patients. We reviewed key nutrition guidelines for bariatric surgery including portion sizes, protein, fluids, vitamins, exercise and behavior modification. Pt has previously completed required sessions and was evaluated to be an appropriate candidate for surgery. Attended today's session as an optional/additional class while waiting on next steps in approval process.      John Hodgson RD

## 2023-02-15 ENCOUNTER — PATIENT MESSAGE (OUTPATIENT)
Dept: FAMILY MEDICINE CLINIC | Age: 43
End: 2023-02-15

## 2023-02-15 ENCOUNTER — OFFICE VISIT (OUTPATIENT)
Dept: SURGERY | Age: 43
End: 2023-02-15

## 2023-02-15 ENCOUNTER — OFFICE VISIT (OUTPATIENT)
Dept: SLEEP MEDICINE | Age: 43
End: 2023-02-15
Payer: COMMERCIAL

## 2023-02-15 VITALS
DIASTOLIC BLOOD PRESSURE: 82 MMHG | OXYGEN SATURATION: 99 % | HEIGHT: 63 IN | BODY MASS INDEX: 41.25 KG/M2 | WEIGHT: 232.8 LBS | HEART RATE: 88 BPM | TEMPERATURE: 98.1 F | SYSTOLIC BLOOD PRESSURE: 145 MMHG | RESPIRATION RATE: 18 BRPM

## 2023-02-15 VITALS
HEIGHT: 63 IN | TEMPERATURE: 98.3 F | DIASTOLIC BLOOD PRESSURE: 83 MMHG | SYSTOLIC BLOOD PRESSURE: 140 MMHG | WEIGHT: 230 LBS | BODY MASS INDEX: 40.75 KG/M2 | HEART RATE: 80 BPM | OXYGEN SATURATION: 94 %

## 2023-02-15 DIAGNOSIS — E66.01 MORBID OBESITY (HCC): Primary | ICD-10-CM

## 2023-02-15 DIAGNOSIS — K21.9 GASTROESOPHAGEAL REFLUX DISEASE, UNSPECIFIED WHETHER ESOPHAGITIS PRESENT: ICD-10-CM

## 2023-02-15 DIAGNOSIS — R12 HEARTBURN: ICD-10-CM

## 2023-02-15 DIAGNOSIS — G47.33 OSA (OBSTRUCTIVE SLEEP APNEA): Primary | ICD-10-CM

## 2023-02-15 PROCEDURE — 99214 OFFICE O/P EST MOD 30 MIN: CPT | Performed by: SURGERY

## 2023-02-15 PROCEDURE — 99214 OFFICE O/P EST MOD 30 MIN: CPT | Performed by: INTERNAL MEDICINE

## 2023-02-15 NOTE — LETTER
2/16/2023    Patient: Gabino Blount   YOB: 1980   Date of Visit: 2/15/2023     Manisha Farrar MD  [address] [place] [address]    Dear Manisha Farrar MD,      Thank you for referring Ms. Nemo Coleman to [facility] [address] for evaluation. My notes for this consultation are attached. If you have questions, please do not hesitate to call me. I look forward to following your patient along with you.       Sincerely,    Gayle Cordero MD

## 2023-02-15 NOTE — PATIENT INSTRUCTIONS
[address] [facility] [place]. [place], [address]  Tel.  [phone]  Fax. [address] [facility] [address]  [place], [address] [facility]  Tel.  [phone]  Fax. [phone] [address]  Tel.  [phone]  Fax. [phone]     Learning About CPAP for Sleep Apnea  What is CPAP? CPAP is a small machine that you use at home every night while you sleep. It increases air pressure in your throat to keep your airway open. When you have sleep apnea, this can help you sleep better so you feel much better. CPAP stands for \"continuous positive airway pressure. \"  The CPAP machine will have one of the following:  A mask that covers your nose and mouth  Prongs that fit into your nose  A mask that covers your nose only, the most common type. This type is called NCPAP. The N stands for \"nasal.\"  Why is it done? CPAP is usually the best treatment for obstructive sleep apnea. It is the first treatment choice and the most widely used. Your doctor may suggest CPAP if you have: Moderate to severe sleep apnea. Sleep apnea and coronary artery disease (CAD) or heart failure. How does it help? CPAP can help you have more normal sleep, so you feel less sleepy and more alert during the daytime. CPAP may help keep heart failure or other heart problems from getting worse. NCPAP may help lower your blood pressure. If you use CPAP, your bed partner may also sleep better because you are not snoring or restless. What are the side effects? Some people who use CPAP have:  A dry or stuffy nose and a sore throat. Irritated skin on the face. Sore eyes. Bloating. If you have any of these problems, work with your doctor to fix them. Here are some things you can try:  Be sure the mask or nasal prongs fit well. See if your doctor can adjust the pressure of your CPAP. If your nose is dry, try a humidifier.   If your nose is runny or stuffy, try decongestant medicine or a steroid nasal spray. If these things do not help, you might try a different type of machine. Some machines have air pressure that adjusts on its own. Others have air pressures that are different when you breathe in than when you breathe out. This may reduce discomfort caused by too much pressure in your nose. Where can you learn more? Go to activ8 Intelligence.be  Enter Rhianna Katya in the search box to learn more about \"Learning About CPAP for Sleep Apnea. \"   © 9171-8201 Healthwise, Incorporated. Care instructions adapted under license by [facility] (which disclaims liability or warranty for this information). This care instruction is for use with your licensed healthcare professional. If you have questions about a medical condition or this instruction, always ask your healthcare professional. [address] any warranty or liability for your use of this information. Content Version: 7.2.89590; Last Revised: January 11, 2010  PROPER SLEEP HYGIENE    What to avoid  Do not have drinks with caffeine, such as coffee or black tea, for 8 hours before bed. Do not smoke or use other types of tobacco near bedtime. Nicotine is a stimulant and can keep you awake. Avoid drinking alcohol late in the evening, because it can cause you to wake in the middle of the night. Do not eat a big meal close to bedtime. If you are hungry, eat a light snack. Do not drink a lot of water close to bedtime, because the need to urinate may wake you up during the night. Do not read or watch TV in bed. Use the bed only for sleeping and sexual activity. What to try  Go to bed at the same time every night, and wake up at the same time every morning. Do not take naps during the day. Keep your bedroom quiet, dark, and cool. Get regular exercise, but not within 3 to 4 hours of your bedtime. .  Sleep on a comfortable pillow and mattress.   If watching the clock makes you anxious, turn it facing away from you so you cannot see the time. If you worry when you lie down, start a worry book. Well before bedtime, write down your worries, and then set the book and your concerns aside. Try meditation or other relaxation techniques before you go to bed. If you cannot fall asleep, get up and go to another room until you feel sleepy. Do something relaxing. Repeat your bedtime routine before you go to bed again. Make your house quiet and calm about an hour before bedtime. Turn down the lights, turn off the TV, log off the computer, and turn down the volume on music. This can help you relax after a busy day. Drowsy Driving: The [place] [address] cites drowsiness as a causing factor in more than 441,902 police reported crashes annually, resulting in 76,000 injuries and 1,500 deaths. Other surveys suggest 55% of people polled have driven while drowsy in the past year, 23% had fallen asleep but not crashed, 3% crashed, and 2% had and accident due to drowsy driving. Who is at risk? Young Drivers: One study of drowsy driving accidents states that 55% of the drivers were under 25 years. Of those, 75% were male. Shift Workers and Travelers: People who work overnight or travel across time zones frequently are at higher risk of experiencing Circadian Rhythm Disorders. They are trying to work and function when their body is programed to sleep. Sleep Deprived: Lack of sleep has a serious impact on your ability to pay attention or focus on a task. Consistently getting less than the average of 8 hours your body needs creates partial or cumulative sleep deprivation. Untreated Sleep Disorders: Sleep Apnea, Narcolepsy, R.L.S., and other sleep disorders (untreated) prevent a person from getting enough restful sleep. This leads to excessive daytime sleepiness and increases the risk for drowsy driving accidents by up to 7 times.   Medications / Alcohol: Even over the counter medications can cause drowsiness. Medications that impair a drivers attention should have a warning label. Alcohol naturally makes you sleepy and on its own can cause accidents. Combined with excessive drowsiness its effects are amplified. Signs of Drowsy Driving:   * You don't remember driving the last few miles   * You may drift out of your jina   * You are unable to focus and your thoughts wander   * You may yawn more often than normal   * You have difficulty keeping your eyes open / nodding off   * Missing traffic signs, speeding, or tailgating  Prevention-   Good sleep hygiene, lifestyle and behavioral choices have the most impact on drowsy driving. There is no substitute for sleep and the average person requires 8 hours nightly. If you find yourself driving drowsy, stop and sleep. Consider the sleep hygiene tips provided during your visit as well. Medication Refill Policy: Refills for all medications require 1 week advance notice. Please have your pharmacy fax a refill request. We are unable to fax, or call in \"controled substance\" medications and you will need to pick these prescriptions up from our office. World Reviewer Activation    Thank you for requesting access to World Reviewer. Please follow the instructions below to securely access and download your online medical record. World Reviewer allows you to send messages to your doctor, view your test results, renew your prescriptions, schedule appointments, and more. How Do I Sign Up? In your internet browser, go to [email or web address]. Click on the First Time User? Click Here link in the Sign In box. You will see the New Member Sign Up page. Enter your World Reviewer Access Code exactly as it appears below. You will not need to use this code after youve completed the sign-up process. If you do not sign up before the expiration date, you must request a new code. World Reviewer Access Code:  Activation code not generated  Current World Reviewer Status: Active (This is the date your VBOX access code will )    Enter the last four digits of your Social Security Number (xxxx) and Date of Birth (mm/dd/yyyy) as indicated and click Submit. You will be taken to the next sign-up page. Create a VBOX ID. This will be your VBOX login ID and cannot be changed, so think of one that is secure and easy to remember. Create a VBOX password. You can change your password at any time. Enter your Password Reset Question and Answer. This can be used at a later time if you forget your password. Enter your e-mail address. You will receive e-mail notification when new information is available in Intervolve. Click Sign Up. You can now view and download portions of your medical record. Click the Vonage link to download a portable copy of your medical information. Additional Information    If you have questions, please call [phone]. Remember, VBOX is NOT to be used for urgent needs. For medical emergencies, dial 911.
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
## 2023-02-15 NOTE — PROGRESS NOTES
Identified pt with two pt identifiers (name and ). Reviewed chart in preparation for visit and have obtained necessary documentation. Sherif Velasquez is a 37 y.o. female  Chief Complaint   Patient presents with    Follow-up     Final review for bariatric surgery     Visit Vitals  BP (!) 145/82 (BP 1 Location: Left arm, BP Patient Position: Sitting, BP Cuff Size: Adult)   Pulse 88   Temp 98.1 °F (36.7 °C) (Oral)   Resp 18   Ht 5' 3\" (1.6 m)   Wt 232 lb 12.8 oz (105.6 kg)   SpO2 99%   BMI 41.24 kg/m²       1. Have you been to the ER, urgent care clinic since your last visit? Hospitalized since your last visit? No    2. Have you seen or consulted any other health care providers outside of the [address] [place] [address] since your last visit? Include any pap smears or colon screening. No    Patient and provider made aware of elevated BP x2. Patient asymptomatic. Patient reminded to monitor BP, continue to take BP medications if prescribed, and follow up with PCP/Cardiologist.  Patient expressed understanding and agreement.

## 2023-02-15 NOTE — PROGRESS NOTES
[address] [facility] [place]. [place], [address]  Tel.  [phone]  Fax. 100 [facility] [address]  [place], [address] [facility]  Tel.  [phone]  Fax. [phone] [address] [place] Dagoberto Ariza  Tel.  [phone]  Fax. [facility] Margy Mena is a 37y.o. year old female seen for evaluation of a sleep disorder. ASSESSMENT/PLAN:      ICD-10-CM ICD-9-CM    1. YUNIER (obstructive sleep apnea)  G47.33 327.23       2. BMI 40.0-44.9, adult [facility])  Z68.41 V85.41           Patient has a history and examination consistent with the diagnosis of sleep apnea. Follow-up and Dispositions    Return for with sleep physician in 3 months. * She was provided information on sleep apnea including corresponding risk factors and the importance of proper treatment. * Device pressure change to Respironics  APAP 06-09 cmH2O. * Mask evaluation done in the office - see tech notes. * Treatment options were reviewed in detail. she would like to proceed with PAP therapy. Patient will be seen in follow-up in 6-8 weeks after PAP setup to gauge treatment response and adherence to therapy. * The patient was counseled regarding proper sleep hygiene, with emphasis on ensuring sufficient total sleep time; safe driving and the benefits of exercise and weight loss. * All of her questions were addressed. 2. Recommended a dedicated weight loss program through appropriate diet and exercise regimen as significant weight reduction has been shown to reduce severity of obstructive sleep apnea. SUBJECTIVE/OBJECTIVE:    Marilin Zheng is an 37 y.o. female referred for evaluation for a sleep disorder. She complains of snoring associated with awakening in the middle of the night because of heartburn, urination. Symptoms began several years ago, she was diagnosed with YUNIER and prescribed an APAP device which was recalled and she has received a replacement since that time. She has not been using the device due to a relocation and device was in storage. She is interested in weight loss surgery and wishes get to get back on therapy. The patient has undergone diagnostic testing for the current problems. Polysomnogram (PSG) performed on 02-23-20 showed an average AHI of 59 per hour with an SpO2 breana of 77% and SpO2 of < 88% being 15 minutes. She was treated with an APAP Device but returned for a Bi-Level titration on 11-29-21 due to elevated bicarbonate levels and a new Bi-Level device was prescribed for her on 12-01-21. She did not receive the device to date. Review of Systems:  Constitutional:  No significant weight loss or weight gain  Eyes:  No blurred vision  CVS:  No significant chest pain  Pulm:  No significant shortness of breath  GI:  No significant nausea or vomiting  :  + significant nocturia  Musculoskeletal:  No significant joint pain at night  Skin:  No significant rashes  Neuro:  No significant dizziness   Psych:  No active mood issues      Hudson Sleepiness Score: 10   and Modified F.O.S.Q. Score Total / 2: 17    Visit Vitals  BP (!) 140/83 (BP 1 Location: Right upper arm, BP Patient Position: Sitting, BP Cuff Size: Adult long)   Pulse 80   Temp 98.3 °F (36.8 °C) (Oral)   Ht 5' 3\" (1.6 m)   Wt 230 lb (104.3 kg)   SpO2 94%   BMI 40.74 kg/m²    Neck circ. in \"inches\": 17.5      General:   Alert, oriented, not in acute distress   Eyes:  Anicteric Sclerae; intact EOM's   Nose:  No obvious nasal septum deviation    Oropharynx:   Mallampati score 4, thick tongue base, uvula not seen due to low-lying soft palate, narrow tonsilo-pharyngeal pilars, tongue scalloped   Neck:   midline trachea,  no JVD   Chest/Lungs:  symmetrical lung expansion ,clear lung fields on auscultation    CVS:  Normal rate, regular rhythm    Extremities:  No obvious rashes, absent edema    Neuro:  No focal deficits;  No obvious tremor    Psych:  Normal eye contact; normal  affect, normal stephanie Purcell MD, FAASM  Diplomate American Board of Sleep Medicine  Diplomate in Sleep Medicine - ABP    Electronically signed.  02/15/23

## 2023-02-15 NOTE — PROGRESS NOTES
[facility] at [facility] Surgery History and Physical    History of Present Illness:      Stefania Wang is a 37 y.o. female who has been working towards bariatric surgery. She has gone through dietary and nutrition training. She unfortunately had a small bowel obstruction about 1 year ago. She has a history of  and had some sort of a bowel injury and ended up having a bowel resection. The small bowel obstruction was treated conservatively and she improved. She is back today to consider bariatric surgery    Past Medical History:   Diagnosis Date    Abnormal Pap smear of cervix 1990s    Breast cyst     benign. Left. 1.7 cm and multiple others. Dr. Niurka Ramirez    Chicken pox childhood    Chronic idiopathic constipation 2015    Dr. Candie Hill. Chronic pain     HELM (dyspnea on exertion) 2015    Dr. 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 Retrosternal goiter. Dr. Ninoska Bartlett. Enlarged tonsils and adenoids 2010    GERD (gastroesophageal reflux disease)     Hernia, abdominal 2022    Gastrointestinal Specialist     Iodine-deficiency related goiter     Left. Dr. Ninoska Bartlett. YUNIER (obstructive sleep apnea) 2015    Dr. Hilda Verduzco, uses cpap. Dr. Gale Lopez. States cpap broken. Dr. Laurel Wadsworth. Thyroid cyst     Right. Thyroid nodule 2019       Past Surgical History:   Procedure Laterality Date    HX BREAST BIOPSY Left 2018    benign    HX  SECTION  2004    [facility]. HX CYST INCISION AND DRAINAGE Left 2016    benign. Dr. Steven Peguero Right 09/15/2017    benign. Dr. Namrata Singh. HX HERNIA REPAIR  [id number]    umbilical.    HX OTHER SURGICAL  2014    thyroid biopsy. Dr. De La O Speak.     HX THYROIDECTOMY Left 2015    due to [address] TRACHEAL DEVIATION. Dr. Guillermo Jenkins. benign. Current Outpatient Medications:     Vitamin D2 1,250 mcg (50,000 unit) capsule, TAKE 1 CAPSULE BY MOUTH ONCE EVERY 7 DAYS FOR LOW VIT D. LEVELS, Disp: 5 Capsule, Rfl: 5    linaCLOtide (Linzess) 145 mcg cap capsule, Take 1 Capsule by mouth Daily (before breakfast). , Disp: 30 Capsule, Rfl: 5    esomeprazole (NEXIUM) 40 mg capsule, TAKE 1 CAPSULE BY MOUTH twice daily  FOR HEARTBURN GENERIC FOR NEXIUM, Disp: 60 Capsule, Rfl: 5    cyanocobalamin 1,000 mcg tablet, Take 1,000 mcg by mouth daily. As needed, Disp: , Rfl:     cyclobenzaprine (FLEXERIL) 10 mg tablet, Take 1 Tablet by mouth three (3) times daily as needed for Muscle Spasm(s). (Patient not taking: Reported on 2/15/2023), Disp: 20 Tablet, Rfl: 0    diclofenac EC (VOLTAREN) 75 mg EC tablet, Take 1 Tablet by mouth two (2) times a day. (Patient not taking: Reported on 2/15/2023), Disp: 30 Tablet, Rfl: 0    acetaminophen (Tylenol Extra Strength) 500 mg tablet, Take 2 Tablets by mouth every six (6) hours as needed for Pain. (Patient not taking: Reported on 2/15/2023), Disp: 20 Tablet, Rfl: 0    vitamin e (E GEMS) 100 unit capsule, Take 100 Units by mouth daily. Unsure of the strength. (Patient not taking: Reported on 2/15/2023), Disp: , Rfl:     fluticasone propionate (FLONASE) 50 mcg/actuation nasal spray, 1 [place] by Both Nostrils route daily.  (Patient not taking: Reported on 2/15/2023), Disp: 1 Bottle, Rfl: 3    Allergies   Allergen Reactions    Skelaxin [Metaxalone] Other (comments)     Too groggy       Social History     Socioeconomic History    Marital status: SINGLE     Spouse name: Not on file    Number of children: 1    Years of education: Not on file    Highest education level: Not on file   Occupational History    Occupation:      Employer: [employer]   Tobacco Use    Smoking status: Never     Passive exposure: Yes    Smokeless tobacco: Never    Tobacco comments:     lives with a smoker dad x 18 yrs and history of living with smoker boyfriend x 2 years   Vaping Use    Vaping Use: Never used   Substance and Sexual Activity    Alcohol use: Yes     Alcohol/week: 2.0 standard drinks     Types: 2 Shots of liquor per week    Drug use: No    Sexual activity: Yes     Partners: Male     Birth control/protection: None   Other Topics Concern    Not on file   Social History Narrative    ** Merged History Encounter **          Social Determinants of Health     Financial Resource Strain: Not on file   Food Insecurity: Not on file   Transportation Needs: Not on file   Physical Activity: Not on file   Stress: Not on file   Social Connections: Not on file   Intimate Partner Violence: Not on file   Housing Stability: Not on file       Family History   Problem Relation Age of Onset    Hypertension Mother     Lung Disease Mother         chronic bronchitis    Sleep Apnea Mother     Hypertension Father     Diabetes Paternal Grandmother     No Known Problems Sister     No Known Problems Sister     No Known Problems Sister     No Known Problems Brother         murdered    Breast Cancer Maternal Aunt     Other Sister         severe scoliosis. txd with back brace.     Breast Cancer Maternal Aunt     Breast Cancer Maternal Aunt     No Known Problems Maternal Grandmother     No Known Problems Maternal Grandfather        ROS   Constitutional: negative  Ears, Nose, Mouth, Throat, and Face: negative  Respiratory: negative  Cardiovascular: negative  Gastrointestinal: negative  Genitourinary:negative  Integument/Breast: negative  Hematologic/Lymphatic: negative  Behavioral/Psychiatric: negative  Allergic/Immunologic: negative      Physical Exam:     Visit Vitals  BP (!) 145/82 (BP 1 Location: Left arm, BP Patient Position: Sitting, BP Cuff Size: Adult)   Pulse 88   Temp 98.1 °F (36.7 °C) (Oral)   Resp 18   Ht 5' 3\" (1.6 m)   Wt 232 lb 12.8 oz (105.6 kg)   SpO2 99%   BMI 41.24 kg/m²       General - alert and oriented, no apparent distress  HEENT - no jaundice, no hearing imparement  Pulm - CTAB, no C/W/R  CV - RRR, no M/R/G  Abd -soft, nondistended, bowel sounds present, nontender to palpation  Ext - pulses intact in UE and LE bilaterally, no edema  Skin - supple, no rashes  Psychiatric - normal affect, good mood    Labs  Lab Results   Component Value Date/Time    Sodium 140 06/29/2022 02:57 PM    Potassium 4.5 06/29/2022 02:57 PM    Chloride 107 06/29/2022 02:57 PM    CO2 27 06/29/2022 02:57 PM    Anion gap 6 06/29/2022 02:57 PM    Glucose 90 06/29/2022 02:57 PM    BUN 8 06/29/2022 02:57 PM    Creatinine 0.83 06/29/2022 02:57 PM    BUN/Creatinine ratio 10 (L) 06/29/2022 02:57 PM    GFR est AA >60 06/29/2022 02:57 PM    GFR est non-AA >60 06/29/2022 02:57 PM    Calcium 9.0 06/29/2022 02:57 PM    Bilirubin, total 0.5 06/29/2022 02:57 PM    Alk. phosphatase 100 06/29/2022 02:57 PM    Protein, total 6.8 06/29/2022 02:57 PM    Albumin 3.4 (L) 06/29/2022 02:57 PM    Globulin 3.4 06/29/2022 02:57 PM    A-G Ratio 1.0 (L) 06/29/2022 02:57 PM    ALT (SGPT) 16 06/29/2022 02:57 PM    AST (SGOT) 10 (L) 06/29/2022 02:57 PM     Lab Results   Component Value Date/Time    WBC 7.8 06/29/2022 02:57 PM    Hemoglobin (POC) 12.3 06/08/2015 11:44 AM    HGB 11.5 06/29/2022 02:57 PM    HCT 38.0 06/29/2022 02:57 PM    PLATELET 941 91/19/8644 02:57 PM    MCV 88.6 06/29/2022 02:57 PM         Imaging  Upper GI-FINDINGS:  The preliminary radiograph of the abdomen demonstrates a nonobstructive gas  pattern. A biphasic examination was performed. The patient ingested effervescent granules  followed by barium without difficulty. The esophagus is normal in caliber and contour with no mass, constricting lesion  or mucosal abnormality. The esophageal motility is slightly diminished. There  is a mild size hiatal hernia. There is a moderate amount of reflux into the  proximal thoracic esophagus.      The stomach is normal in size, shape and position with no mass, ulcer or other  abnormality. The duodenal bulb, sweep and proximal small bowel are normal.      IMPRESSION  1. There is a hiatal hernia with a moderate amount of reflux into the proximal  thoracic esophagus. Remainder of the study is within normal limits. I have reviewed and agree with all of the pertinent images      EGD report-2 cm hiatal hernia, no esophagitis, normal esophagus appearance, mild patchy gastritis, no ulcer, normal duodenum    Assessment:     Charles Winter is a 37 y.o. female with morbid obesity BMI 41, GERD, small bowel obstruction    Recommendations:     1. Originally the patient was thinking about sleeve and due to her reflux issues was thinking more about having gastric bypass. Unfortunately about 1 year ago she had a small bowel obstruction and was admitted to [facility].  Fortunately she did improve without needing operative intervention and was treated conservatively. She has not had issues with small bowel obstruction since. She says she has had some sort of intestinal surgery when she had her  like may be some bowel was injured. On her CT scan there is a small bowel anastomosis noted on the CT scan. She was felt to have small bowel obstruction due to adhesions. Because of her history of small bowel obstruction and intestinal surgery and the risk involved with gastric bypass with that I do not believe gastric bypass is in her best interest due to future small bowel obstructions that could be more severe. She also has some potential contraindications for sleeve with having some acid reflux and heartburn issues. She is on Nexium. She says in general it is occasional and is not every day. Her upper GI does show a moderate amount of acid reflux. Her EGD does not appear to show any esophagitis or inflammation of the esophagus fortunately. She does have a small hiatal hernia demonstrated.   She has gained about 5 pounds since her last visit and would like her to lose those 5 pounds. Potentially a sleeve could be done with repair of hiatal hernia. The only question would be whether this worsens her acid reflux and in the future. Conversion to a bypass would not be a good idea. I will have her come back and see me in about a month have her lose some weight and then will think about it and talk again. Joni Villatoro MD    Greater than half of the time: 45 minutes was used in counciling the patient about diagnosis and treatment plan    Ms. Kvng Tabares has a reminder for a \"due or due soon\" health maintenance. I have asked that she contact her primary care provider for follow-up on this health maintenance.

## 2023-02-16 LAB
H. PYLORI BREATH COLLECTION, 998167: NORMAL
UREA BREATH TEST QL: NEGATIVE

## 2023-02-16 RX ORDER — ESOMEPRAZOLE MAGNESIUM 40 MG/1
CAPSULE, DELAYED RELEASE ORAL
Qty: 60 CAPSULE | Refills: 5 | Status: SHIPPED | OUTPATIENT
Start: 2023-02-16

## 2023-03-15 ENCOUNTER — OFFICE VISIT (OUTPATIENT)
Dept: SURGERY | Age: 43
End: 2023-03-15
Payer: COMMERCIAL

## 2023-03-15 VITALS
HEART RATE: 69 BPM | SYSTOLIC BLOOD PRESSURE: 138 MMHG | BODY MASS INDEX: 40.64 KG/M2 | DIASTOLIC BLOOD PRESSURE: 82 MMHG | TEMPERATURE: 97.9 F | WEIGHT: 229.4 LBS | OXYGEN SATURATION: 97 % | HEIGHT: 63 IN | RESPIRATION RATE: 20 BRPM

## 2023-03-15 DIAGNOSIS — E66.01 MORBID OBESITY WITH BMI OF 40.0-44.9, ADULT (HCC): ICD-10-CM

## 2023-03-15 DIAGNOSIS — K21.9 GASTROESOPHAGEAL REFLUX DISEASE WITHOUT ESOPHAGITIS: Primary | ICD-10-CM

## 2023-03-15 PROCEDURE — 99214 OFFICE O/P EST MOD 30 MIN: CPT | Performed by: SURGERY

## 2023-03-15 NOTE — LETTER
3/15/2023    Patient: Alesia Vásquez   YOB: 1980   Date of Visit: 3/15/2023     Rinku Hairston MD  [address] [place] [address]    Dear Rinku Hairston MD,      Thank you for referring Ms. Julia Calvo to [facility] for evaluation. My notes for this consultation are attached. If you have questions, please do not hesitate to call me. I look forward to following your patient along with you.       Sincerely,    Zaki Gutierrez MD

## 2023-03-15 NOTE — PROGRESS NOTES
Identified pt with two pt identifiers (name and ). Reviewed chart in preparation for visit and have obtained necessary documentation. Chelsea Olivera is a 37 y.o. female  Chief Complaint   Patient presents with    Follow-up     Weight check     Visit Vitals  /82 (BP 1 Location: Right arm, BP Patient Position: Sitting, BP Cuff Size: Adult)   Pulse 69   Temp 97.9 °F (36.6 °C) (Oral)   Resp 20   Ht 5' 3\" (1.6 m)   Wt 229 lb 6.4 oz (104.1 kg)   SpO2 97%   BMI 40.64 kg/m²       1. Have you been to the ER, urgent care clinic since your last visit? Hospitalized since your last visit? No    2. Have you seen or consulted any other health care providers outside of the [address] [place] [address] since your last visit? Include any pap smears or colon screening.  No

## 2023-03-15 NOTE — PROGRESS NOTES
[facility] at [facility] Surgery History and Physical    History of Present Illness:      Yo Henry is a 37 y.o. female who has been undergoing the process to consider bariatric surgery. She has mostly completed the process for surgery. She has been trying to lose weight to meet the requirements. Originally the patient was thinking about sleeve and due to her reflux issues was thinking more about having gastric bypass. Unfortunately about 1 year ago she had a small bowel obstruction and was admitted to [facility].  Fortunately she did improve without needing operative intervention and was treated conservatively. She has not had issues with small bowel obstruction since. She says she has had some sort of intestinal surgery when she had her  like may be some bowel was injured. On her CT scan there is a small bowel anastomosis noted on the CT scan. She was felt to have small bowel obstruction due to adhesions    Past Medical History:   Diagnosis Date    Abnormal Pap smear of cervix 1990s    Breast cyst     benign. Left. 1.7 cm and multiple others. Dr. Sophia Goodman    Chicken pox childhood    Chronic idiopathic constipation 2015    Dr. Ulysses Harris. Chronic pain     HELM (dyspnea on exertion) 2015    Dr. Scott Soriano    EBV positive mononucleosis syndrome 10/2013,     chronic or reactivated    Enlarged thyroid 2010, 2015    with Right tracheal deviation. Retrosternal goiter. Dr. Constantine Mills. Enlarged tonsils and adenoids 2010    GERD (gastroesophageal reflux disease)     Hernia, abdominal 2022    Gastrointestinal Specialist     Iodine-deficiency related goiter     Left. Dr. Constantine Mills. YUNIER (obstructive sleep apnea) 2015    Dr. Karyna Fisher, uses cpap. Dr. Collene Brittle. States cpap broken. Dr. Hermelindo Garcia. Thyroid cyst     Right.       Thyroid nodule 2019       Past Surgical History:   Procedure Laterality Date HX BREAST BIOPSY Left 2018    benign    HX  SECTION  2004    [facility]. HX CYST INCISION AND DRAINAGE Left 2016    benign. Dr. David Kraus Right 09/15/2017    benign. Dr. Larita Merlin. HX HERNIA REPAIR  [id number]    umbilical.    HX OTHER SURGICAL  2014    thyroid biopsy. Dr. Oliver Vasquez. HX THYROIDECTOMY Left 2015    due to [address] Dr. Medina Cea. benign. Current Outpatient Medications:     esomeprazole (NEXIUM) 40 mg capsule, TAKE 1 CAPSULE BY MOUTH twice daily  FOR HEARTBURN GENERIC FOR NEXIUM, Disp: 60 Capsule, Rfl: 5    Vitamin D2 1,250 mcg (50,000 unit) capsule, TAKE 1 CAPSULE BY MOUTH ONCE EVERY 7 DAYS FOR LOW VIT D. LEVELS, Disp: 5 Capsule, Rfl: 5    linaCLOtide (Linzess) 145 mcg cap capsule, Take 1 Capsule by mouth Daily (before breakfast). , Disp: 30 Capsule, Rfl: 5    vitamin e (E GEMS) 100 unit capsule, Take 100 Units by mouth daily. Unsure of the strength., Disp: , Rfl:     cyanocobalamin 1,000 mcg tablet, Take 1,000 mcg by mouth daily. As needed, Disp: , Rfl:     esomeprazole (NEXIUM) 40 mg capsule, TAKE 1 CAPSULE BY MOUTH 2 TIMES A DAY FOR HEARTBURN (Patient not taking: Reported on 3/15/2023), Disp: 60 Capsule, Rfl: 5    cyclobenzaprine (FLEXERIL) 10 mg tablet, Take 1 Tablet by mouth three (3) times daily as needed for Muscle Spasm(s). (Patient not taking: No sig reported), Disp: 20 Tablet, Rfl: 0    diclofenac EC (VOLTAREN) 75 mg EC tablet, Take 1 Tablet by mouth two (2) times a day. (Patient not taking: No sig reported), Disp: 30 Tablet, Rfl: 0    acetaminophen (Tylenol Extra Strength) 500 mg tablet, Take 2 Tablets by mouth every six (6) hours as needed for Pain. (Patient not taking: No sig reported), Disp: 20 Tablet, Rfl: 0    fluticasone propionate (FLONASE) 50 mcg/actuation nasal spray, 1 [place] by Both Nostrils route daily.  (Patient not taking: No sig reported), Disp: 1 Bottle, Rfl: 3    Allergies   Allergen Reactions    Skelaxin [Metaxalone] Other (comments)     Too groggy       Social History     Socioeconomic History    Marital status: SINGLE     Spouse name: Not on file    Number of children: 1    Years of education: Not on file    Highest education level: Not on file   Occupational History    Occupation:      Employer: [employer]   Tobacco Use    Smoking status: Never     Passive exposure: Yes    Smokeless tobacco: Never    Tobacco comments:     lives with a smoker dad x 18 yrs and history of living with smoker boyfriend x 2 years   Vaping Use    Vaping Use: Never used   Substance and Sexual Activity    Alcohol use: Yes     Alcohol/week: 2.0 standard drinks     Types: 2 Shots of liquor per week    Drug use: No    Sexual activity: Yes     Partners: Male     Birth control/protection: None   Other Topics Concern    Not on file   Social History Narrative    ** Merged History Encounter **          Social Determinants of Health     Financial Resource Strain: Not on file   Food Insecurity: Not on file   Transportation Needs: Not on file   Physical Activity: Not on file   Stress: Not on file   Social Connections: Not on file   Intimate Partner Violence: Not on file   Housing Stability: Not on file       Family History   Problem Relation Age of Onset    Hypertension Mother     Lung Disease Mother         chronic bronchitis    Sleep Apnea Mother     Hypertension Father     Diabetes Paternal Grandmother     No Known Problems Sister     No Known Problems Sister     No Known Problems Sister     No Known Problems Brother         murdered    Breast Cancer Maternal Aunt     Other Sister         severe scoliosis. txd with back brace.     Breast Cancer Maternal Aunt     Breast Cancer Maternal Aunt     No Known Problems Maternal Grandmother     No Known Problems Maternal Grandfather        ROS   Constitutional: negative  Ears, Nose, Mouth, Throat, and Face: negative  Respiratory: negative  Cardiovascular: negative  Gastrointestinal: positive for mild GERD symptoms  Genitourinary:negative  Integument/Breast: negative  Hematologic/Lymphatic: negative  Behavioral/Psychiatric: negative  Allergic/Immunologic: negative      Physical Exam:     Visit Vitals  /82 (BP 1 Location: Right arm, BP Patient Position: Sitting, BP Cuff Size: Adult)   Pulse 69   Temp 97.9 °F (36.6 °C) (Oral)   Resp 20   Ht 5' 3\" (1.6 m)   Wt 229 lb 6.4 oz (104.1 kg)   SpO2 97%   BMI 40.64 kg/m²       General - alert and oriented, no apparent distress  HEENT - no jaundice, no hearing imparement  Pulm - CTAB, no C/W/R  CV - RRR, no M/R/G  Abd -soft, nondistended, bowel sounds present  Ext - pulses intact in UE and LE bilaterally, no edema  Skin - supple, no rashes  Psychiatric - normal affect, good mood    Labs  Lab Results   Component Value Date/Time    Sodium 140 06/29/2022 02:57 PM    Potassium 4.5 06/29/2022 02:57 PM    Chloride 107 06/29/2022 02:57 PM    CO2 27 06/29/2022 02:57 PM    Anion gap 6 06/29/2022 02:57 PM    Glucose 90 06/29/2022 02:57 PM    BUN 8 06/29/2022 02:57 PM    Creatinine 0.83 06/29/2022 02:57 PM    BUN/Creatinine ratio 10 (L) 06/29/2022 02:57 PM    GFR est AA >60 06/29/2022 02:57 PM    GFR est non-AA >60 06/29/2022 02:57 PM    Calcium 9.0 06/29/2022 02:57 PM    Bilirubin, total 0.5 06/29/2022 02:57 PM    Alk.  phosphatase 100 06/29/2022 02:57 PM    Protein, total 6.8 06/29/2022 02:57 PM    Albumin 3.4 (L) 06/29/2022 02:57 PM    Globulin 3.4 06/29/2022 02:57 PM    A-G Ratio 1.0 (L) 06/29/2022 02:57 PM    ALT (SGPT) 16 06/29/2022 02:57 PM    AST (SGOT) 10 (L) 06/29/2022 02:57 PM     Lab Results   Component Value Date/Time    WBC 7.8 06/29/2022 02:57 PM    Hemoglobin (POC) 12.3 06/08/2015 11:44 AM    HGB 11.5 06/29/2022 02:57 PM    HCT 38.0 06/29/2022 02:57 PM    PLATELET 833 66/15/0411 02:57 PM    MCV 88.6 06/29/2022 02:57 PM         Imaging  Upper GI-FINDINGS:  The preliminary radiograph of the abdomen demonstrates a nonobstructive gas  pattern. A biphasic examination was performed. The patient ingested effervescent granules  followed by barium without difficulty. The esophagus is normal in caliber and contour with no mass, constricting lesion  or mucosal abnormality. The esophageal motility is slightly diminished. There  is a mild size hiatal hernia. There is a moderate amount of reflux into the  proximal thoracic esophagus. The stomach is normal in size, shape and position with no mass, ulcer or other  abnormality. The duodenal bulb, sweep and proximal small bowel are normal.      IMPRESSION  1. There is a hiatal hernia with a moderate amount of reflux into the proximal  thoracic esophagus. Remainder of the study is within normal limits. I have reviewed and agree with all of the pertinent images        EGD report-2 cm hiatal hernia, no esophagitis, normal esophagus appearance, mild patchy gastritis, no ulcer, normal duodenum  I have reviewed and agree with all of the pertinent images    Assessment:     Rafal Weller is a 37 y.o. female with morbid obesity BMI of 40.6    Recommendations:     1. Unfortunately the patient is not a good candidate for gastric bypass after having history of small bowel obstruction and some sort of bowel resection many years ago. I am still evaluating to see if she is a candidate for sleeve gastrectomy. She does have some reflux seen on upper GI. Symptomatically she has mild reflux but is also on antiacid medications. I would like to trial her decreasing her dose or coming off of it for a period. I will also schedule her for gastric emptying study to make sure she does not have any symptoms of gastroparesis. She also needs to lose at least 2 more pounds to be able to consider approving her for surgery. The patient understands the risk of reflux with sleeve but seems to be adamant about having sleeve.   I will see how she is with her next visit and will discuss surgery for possible sleeve at that appointment. Alec Turner MD    Greater than half of the time: 30 minutes was used in counciling the patient about diagnosis and treatment plan    Ms. Heather Crocker has a reminder for a \"due or due soon\" health maintenance. I have asked that she contact her primary care provider for follow-up on this health maintenance.

## 2023-03-22 ENCOUNTER — HOSPITAL ENCOUNTER (OUTPATIENT)
Dept: NUCLEAR MEDICINE | Age: 43
Discharge: HOME | End: 2023-03-22
Attending: SURGERY
Payer: COMMERCIAL

## 2023-03-22 DIAGNOSIS — K21.9 GASTROESOPHAGEAL REFLUX DISEASE WITHOUT ESOPHAGITIS: ICD-10-CM

## 2023-03-22 DIAGNOSIS — E66.01 MORBID OBESITY WITH BMI OF 40.0-44.9, ADULT (HCC): ICD-10-CM

## 2023-03-22 PROCEDURE — 78264 GASTRIC EMPTYING IMG STUDY: CPT

## 2023-03-22 RX ORDER — TECHNETIUM TC 99M SULFUR COLLOID 2 MG
0.5 KIT MISCELLANEOUS
Status: COMPLETED | OUTPATIENT
Start: 2023-03-22 | End: 2023-03-22

## 2023-03-22 RX ADMIN — TECHNETIUM TC 99M SULFUR COLLOID 0.5 MILLICURIE: KIT at 14:00

## 2023-04-19 ENCOUNTER — OFFICE VISIT (OUTPATIENT)
Dept: SURGERY | Age: 43
End: 2023-04-19
Payer: COMMERCIAL

## 2023-04-19 VITALS
WEIGHT: 222.6 LBS | TEMPERATURE: 98.9 F | SYSTOLIC BLOOD PRESSURE: 121 MMHG | RESPIRATION RATE: 18 BRPM | HEART RATE: 94 BPM | BODY MASS INDEX: 39.44 KG/M2 | DIASTOLIC BLOOD PRESSURE: 85 MMHG | OXYGEN SATURATION: 96 % | HEIGHT: 63 IN

## 2023-04-19 DIAGNOSIS — E66.01 SEVERE OBESITY (BMI 35.0-35.9 WITH COMORBIDITY) (HCC): Primary | ICD-10-CM

## 2023-04-19 DIAGNOSIS — K21.9 GASTROESOPHAGEAL REFLUX DISEASE, UNSPECIFIED WHETHER ESOPHAGITIS PRESENT: ICD-10-CM

## 2023-04-19 DIAGNOSIS — G47.33 OSA (OBSTRUCTIVE SLEEP APNEA): ICD-10-CM

## 2023-04-19 PROCEDURE — 99214 OFFICE O/P EST MOD 30 MIN: CPT | Performed by: SURGERY

## 2023-04-19 NOTE — PROGRESS NOTES
Identified pt with two pt identifiers (name and ). Reviewed chart in preparation for visit and have obtained necessary documentation. Karlee Wall is a 37 y.o. female  Chief Complaint   Patient presents with    Follow-up     undergoing the process to consider bariatric surgery     Visit Vitals  /85 (BP 1 Location: Left arm, BP Patient Position: Sitting, BP Cuff Size: Adult)   Pulse 94   Temp 98.9 °F (37.2 °C) (Oral)   Resp 18   Ht 5' 3\" (1.6 m)   Wt 222 lb 9.6 oz (101 kg)   SpO2 96%   BMI 39.43 kg/m²       1. Have you been to the ER, urgent care clinic since your last visit? Hospitalized since your last visit? No    2. Have you seen or consulted any other health care providers outside of the [address] [place] [address] since your last visit? Include any pap smears or colon screening.  No

## 2023-04-19 NOTE — PROGRESS NOTES
[facility] at [facility] Surgery History and Physical    History of Present Illness:      Felicia Darling is a 37 y.o. female who has been getting worked up for bariatric surgery for sleeve gastrectomy. She has been trying to lose weight and was going through the GI work-up to see if sleeve gastrectomy is a possibility. She has been in good health. Past Medical History:   Diagnosis Date    Abnormal Pap smear of cervix 1990s    Breast cyst     benign. Left. 1.7 cm and multiple others. Dr. Katerina Ocasio    Chicken pox childhood    Chronic idiopathic constipation 2015    Dr. Eduardo Bill. Chronic pain     HELM (dyspnea on exertion) 2015    Dr. Baltazar Bravo    EBV positive mononucleosis syndrome 10/2013,     chronic or reactivated    Enlarged thyroid 2010, 2015    with Right tracheal deviation. Retrosternal goiter. Dr. Vonnie Akhtar. Enlarged tonsils and adenoids 2010    GERD (gastroesophageal reflux disease)     Hernia, abdominal 2022    Gastrointestinal Specialist     Iodine-deficiency related goiter     Left. Dr. Vonnie Akhtar. YUNIER (obstructive sleep apnea) 2015    Dr. Zane Sorensen, uses cpap. Dr. Mehreen Hamlin. States cpap broken. Dr. Arian Duron. Thyroid cyst     Right. Thyroid nodule 2019       Past Surgical History:   Procedure Laterality Date    HX BREAST BIOPSY Left 2018    benign    HX  SECTION  2004    [facility]. HX CYST INCISION AND DRAINAGE Left 2016    benign. Dr. Silver Hong Right 09/15/2017    benign. Dr. Eulalia Hinkle. HX HERNIA REPAIR  [id number]    umbilical.    HX OTHER SURGICAL  2014    thyroid biopsy. Dr. Dread Jordan. HX THYROIDECTOMY Left 2015    due to [address] Dr. Vonnie Akhtar. benign.          Current Outpatient Medications:     Vitamin D2 1,250 mcg (50,000 unit) capsule, TAKE 1 CAPSULE BY MOUTH ONCE EVERY 7 DAYS FOR LOW VIT D. LEVELS, Disp: 5 Capsule, Rfl: 5    linaCLOtide (Linzess) 145 mcg cap capsule, Take 1 Capsule by mouth Daily (before breakfast). , Disp: 30 Capsule, Rfl: 5    vitamin e (E GEMS) 100 unit capsule, Take 1 Capsule by mouth daily. Unsure of the strength., Disp: , Rfl:     fluticasone propionate (FLONASE) 50 mcg/actuation nasal spray, 1 [place] by Both Nostrils route daily. , Disp: 1 Bottle, Rfl: 3    cyanocobalamin 1,000 mcg tablet, Take 1 Tablet by mouth daily. As needed, Disp: , Rfl:     esomeprazole (NEXIUM) 40 mg capsule, TAKE 1 CAPSULE BY MOUTH 2 TIMES A DAY FOR HEARTBURN (Patient not taking: Reported on 3/15/2023), Disp: 60 Capsule, Rfl: 5    esomeprazole (NEXIUM) 40 mg capsule, TAKE 1 CAPSULE BY MOUTH twice daily  FOR HEARTBURN GENERIC FOR NEXIUM (Patient not taking: Reported on 4/19/2023), Disp: 60 Capsule, Rfl: 5    cyclobenzaprine (FLEXERIL) 10 mg tablet, Take 1 Tablet by mouth three (3) times daily as needed for Muscle Spasm(s). (Patient not taking: Reported on 2/15/2023), Disp: 20 Tablet, Rfl: 0    diclofenac EC (VOLTAREN) 75 mg EC tablet, Take 1 Tablet by mouth two (2) times a day. (Patient not taking: Reported on 2/15/2023), Disp: 30 Tablet, Rfl: 0    acetaminophen (Tylenol Extra Strength) 500 mg tablet, Take 2 Tablets by mouth every six (6) hours as needed for Pain.  (Patient not taking: Reported on 2/15/2023), Disp: 20 Tablet, Rfl: 0    Allergies   Allergen Reactions    Skelaxin [Metaxalone] Other (comments)     Too groggy       Social History     Socioeconomic History    Marital status: SINGLE     Spouse name: Not on file    Number of children: 1    Years of education: Not on file    Highest education level: Not on file   Occupational History    Occupation:      Employer: [employer]   Tobacco Use    Smoking status: Never     Passive exposure: Yes    Smokeless tobacco: Never    Tobacco comments:     lives with a smoker dad x 18 yrs and history of living with smoker boyfriend x 2 years   Vaping Use    Vaping Use: Never used   Substance and Sexual Activity    Alcohol use: Yes     Alcohol/week: 2.0 standard drinks     Types: 2 Shots of liquor per week    Drug use: No    Sexual activity: Yes     Partners: Male     Birth control/protection: None   Other Topics Concern    Not on file   Social History Narrative    ** Merged History Encounter **          Social Determinants of Health     Financial Resource Strain: Not on file   Food Insecurity: Not on file   Transportation Needs: Not on file   Physical Activity: Not on file   Stress: Not on file   Social Connections: Not on file   Intimate Partner Violence: Not on file   Housing Stability: Not on file       Family History   Problem Relation Age of Onset    Hypertension Mother     Lung Disease Mother         chronic bronchitis    Sleep Apnea Mother     Hypertension Father     Diabetes Paternal Grandmother     No Known Problems Sister     No Known Problems Sister     No Known Problems Sister     No Known Problems Brother         murdered    Breast Cancer Maternal Aunt     Other Sister         severe scoliosis. txd with back brace.     Breast Cancer Maternal Aunt     Breast Cancer Maternal Aunt     No Known Problems Maternal Grandmother     No Known Problems Maternal Grandfather        ROS   Constitutional: negative  Ears, Nose, Mouth, Throat, and Face: negative  Respiratory: negative  Cardiovascular: negative  Gastrointestinal: negative  Genitourinary:negative  Integument/Breast: negative  Hematologic/Lymphatic: negative  Behavioral/Psychiatric: negative  Allergic/Immunologic: negative      Physical Exam:     Visit Vitals  /85 (BP 1 Location: Left arm, BP Patient Position: Sitting, BP Cuff Size: Adult)   Pulse 94   Temp 98.9 °F (37.2 °C) (Oral)   Resp 18   Ht 5' 3\" (1.6 m)   Wt 222 lb 9.6 oz (101 kg)   SpO2 96%   BMI 39.43 kg/m²       General - alert and oriented, no apparent distress  HEENT - no jaundice, no hearing imparement  Pulm - CTAB, no C/W/R  CV - RRR, no M/R/G  Abd -soft, nondistended, bowel sounds present, nontender to palpation  Ext - pulses intact in UE and LE bilaterally, no edema  Skin - supple, no rashes  Psychiatric - normal affect, good mood    Labs  Lab Results   Component Value Date/Time    Sodium 140 06/29/2022 02:57 PM    Potassium 4.5 06/29/2022 02:57 PM    Chloride 107 06/29/2022 02:57 PM    CO2 27 06/29/2022 02:57 PM    Anion gap 6 06/29/2022 02:57 PM    Glucose 90 06/29/2022 02:57 PM    BUN 8 06/29/2022 02:57 PM    Creatinine 0.83 06/29/2022 02:57 PM    BUN/Creatinine ratio 10 (L) 06/29/2022 02:57 PM    GFR est AA >60 06/29/2022 02:57 PM    GFR est non-AA >60 06/29/2022 02:57 PM    Calcium 9.0 06/29/2022 02:57 PM    Bilirubin, total 0.5 06/29/2022 02:57 PM    Alk. phosphatase 100 06/29/2022 02:57 PM    Protein, total 6.8 06/29/2022 02:57 PM    Albumin 3.4 (L) 06/29/2022 02:57 PM    Globulin 3.4 06/29/2022 02:57 PM    A-G Ratio 1.0 (L) 06/29/2022 02:57 PM    ALT (SGPT) 16 06/29/2022 02:57 PM    AST (SGOT) 10 (L) 06/29/2022 02:57 PM     Lab Results   Component Value Date/Time    WBC 7.8 06/29/2022 02:57 PM    Hemoglobin (POC) 12.3 06/08/2015 11:44 AM    HGB 11.5 06/29/2022 02:57 PM    HCT 38.0 06/29/2022 02:57 PM    PLATELET 649 28/93/7549 02:57 PM    MCV 88.6 06/29/2022 02:57 PM           Imaging  Gastric emptying study-EXAM: Nuclear gastric emptying study is performed with imaging of the abdomen  following p.o. administration of 0.5 mCi technetium 99m sulfur colloid in  solids. FINDINGS: The calculated gastric emptying half-time is 27 minutes (normal <90  minutes for solids). Review of the raw images shows unremarkable distribution of  small bowel  radiotracer activity. There is no scintigraphically apparent gastroesophageal  reflux. IMPRESSION  Normal Nuclear Gastric Emptying Study. Upper GI-FINDINGS:  The preliminary radiograph of the abdomen demonstrates a nonobstructive gas  pattern.      A biphasic examination was performed. The patient ingested effervescent granules  followed by barium without difficulty. The esophagus is normal in caliber and contour with no mass, constricting lesion  or mucosal abnormality. The esophageal motility is slightly diminished. There  is a mild size hiatal hernia. There is a moderate amount of reflux into the  proximal thoracic esophagus. The stomach is normal in size, shape and position with no mass, ulcer or other  abnormality. The duodenal bulb, sweep and proximal small bowel are normal.      IMPRESSION  1. There is a hiatal hernia with a moderate amount of reflux into the proximal  thoracic esophagus. Remainder of the study is within normal limits. I have reviewed and agree with all of the pertinent images        EGD report-2 cm hiatal hernia, no esophagitis, normal esophagus appearance, mild patchy gastritis, no ulcer, normal duodenum      I have reviewed and agree with all of the pertinent images    Assessment:     Isak Blount is a 37 y.o. female with severe obesity and comorbidities    Recommendations:     1. She now has lost the necessary weight to get approved for sleeve gastrectomy. Her gastric emptying study appears to be normal.  She may have a small hiatal hernia which we may need to fix. Otherwise she appears to be in adequate candidate for sleeve gastrectomy. Gastric bypass is not a choice due to her previous history of small bowel surgery and small bowel obstruction. Her GERD appears to be well controlled she has been off her GERD medications periodically with no increase in her symptoms. I believe she is a good candidate for sleeve gastrectomy and hiatal hernia repair. We will now submit for insurance approval.    Willy Nolasco MD    Greater than half of the time: 30 minutes was used in counciling the patient about diagnosis and treatment plan    Ms. Zulema Allen has a reminder for a \"due or due soon\" health maintenance.  I have asked that she contact her primary care provider for follow-up on this health maintenance.

## 2023-04-25 ENCOUNTER — DOCUMENTATION ONLY (OUTPATIENT)
Dept: SURGERY | Age: 43
End: 2023-04-25

## 2023-04-25 NOTE — PROGRESS NOTES
Submitted Authorization to [facility] via phone/fax [phone] for Lap Sleeve Gastrectomy, EGD with Dr. Pasha Good    Case Ref # [id number]    Spoke to [facility] to start the case.  Call ref# [id number]

## 2023-05-02 ENCOUNTER — TELEPHONE (OUTPATIENT)
Dept: SURGERY | Age: 43
End: 2023-05-02

## 2023-05-03 ENCOUNTER — TELEPHONE (OUTPATIENT)
Dept: SURGERY | Age: 43
End: 2023-05-03

## 2023-05-17 ENCOUNTER — OFFICE VISIT (OUTPATIENT)
Age: 43
End: 2023-05-17
Payer: COMMERCIAL

## 2023-05-17 VITALS
HEART RATE: 99 BPM | WEIGHT: 224.7 LBS | TEMPERATURE: 98.6 F | SYSTOLIC BLOOD PRESSURE: 128 MMHG | BODY MASS INDEX: 39.81 KG/M2 | OXYGEN SATURATION: 95 % | DIASTOLIC BLOOD PRESSURE: 82 MMHG | HEIGHT: 63 IN

## 2023-05-17 DIAGNOSIS — G47.33 OSA (OBSTRUCTIVE SLEEP APNEA): Primary | ICD-10-CM

## 2023-05-17 PROCEDURE — 99213 OFFICE O/P EST LOW 20 MIN: CPT | Performed by: INTERNAL MEDICINE

## 2023-05-17 ASSESSMENT — SLEEP AND FATIGUE QUESTIONNAIRES
HOW LIKELY ARE YOU TO NOD OFF OR FALL ASLEEP WHILE WATCHING TV: MODERATE CHANCE OF DOZING
HOW LIKELY ARE YOU TO NOD OFF OR FALL ASLEEP IN A CAR, WHILE STOPPED FOR A FEW MINUTES IN TRAFFIC: WOULD NEVER DOZE
HOW LIKELY ARE YOU TO NOD OFF OR FALL ASLEEP WHEN YOU ARE A PASSENGER IN A CAR FOR AN HOUR WITHOUT A BREAK: SLIGHT CHANCE OF DOZING
HOW LIKELY ARE YOU TO NOD OFF OR FALL ASLEEP WHILE SITTING AND READING: MODERATE CHANCE OF DOZING
HOW LIKELY ARE YOU TO NOD OFF OR FALL ASLEEP WHILE SITTING QUIETLY AFTER LUNCH WITHOUT ALCOHOL: SLIGHT CHANCE OF DOZING
DO YOU HAVE DIFFICULTY VISITING YOUR FAMILY OR FRIENDS IN THEIR HOME BECAUSE YOU BECOME SLEEPY OR TIRED: NO
HAS YOUR MOOD BEEN AFFECTED BECAUSE YOU ARE SLEEPY OR TIRED: YES, LITTLE
HOW LIKELY ARE YOU TO NOD OFF OR FALL ASLEEP IN A CAR, WHILE STOPPED FOR A FEW MINUTES IN TRAFFIC: 0
HOW LIKELY ARE YOU TO NOD OFF OR FALL ASLEEP WHILE SITTING INACTIVE IN A PUBLIC PLACE: SLIGHT CHANCE OF DOZING
HOW LIKELY ARE YOU TO NOD OFF OR FALL ASLEEP WHILE WATCHING TV: 2
HOW LIKELY ARE YOU TO NOD OFF OR FALL ASLEEP WHEN YOU ARE A PASSENGER IN A CAR FOR AN HOUR WITHOUT A BREAK: 1
DO YOU HAVE DIFFICULTY CONCENTRATING ON THE THINGS YOU DO BECAUSE YOU ARE SLEEPY OR TIRED: YES, A LITTLE
FOSQ SCORE: 17.5
ESS TOTAL SCORE: 9
HOW LIKELY ARE YOU TO NOD OFF OR FALL ASLEEP WHILE SITTING AND TALKING TO SOMEONE: 0
HOW LIKELY ARE YOU TO NOD OFF OR FALL ASLEEP WHILE SITTING AND READING: 2
DO YOU HAVE DIFFICULTY WATCHING A MOVIE OR VIDEO BECAUSE YOU BECOME SLEEPY OR TIRED: YES, A LITTLE
HOW LIKELY ARE YOU TO NOD OFF OR FALL ASLEEP WHILE SITTING AND TALKING TO SOMEONE: WOULD NEVER DOZE
DO YOU HAVE DIFFICULTY OPERATING A MOTOR VEHICLE FOR LONG DISTANCES (GREATER THAN 100 MILES) BECAUSE YOU BECOME SLEEPY: YES, A LITTLE
DO YOU GENERALLY HAVE DIFFICULTY REMEMBERING THINGS BECAUSE YOU ARE SLEEPY OR TIRED: NO
DO YOU HAVE DIFFICULTY BEING AS ACTIVE AS YOU WANT TO BE IN THE EVENING BECAUSE YOU ARE SLEEPY OR TIRED: YES, LITTLE
HOW LIKELY ARE YOU TO NOD OFF OR FALL ASLEEP WHILE LYING DOWN TO REST IN THE AFTERNOON WHEN CIRCUMSTANCES PERMIT: MODERATE CHANCE OF DOZING
HOW LIKELY ARE YOU TO NOD OFF OR FALL ASLEEP WHILE LYING DOWN TO REST IN THE AFTERNOON WHEN CIRCUMSTANCES PERMIT: 2
HOW LIKELY ARE YOU TO NOD OFF OR FALL ASLEEP WHILE SITTING QUIETLY AFTER LUNCH WITHOUT ALCOHOL: 1
HAS YOUR RELATIONSHIP WITH FAMILY, FRIENDS OR WORK COLLEAGUES BEEN AFFECTED BECAUSE YOU ARE SLEEPY OR TIRED: NO
DO YOU HAVE DIFFICULTY BEING AS ACTIVE AS YOU WANT TO BE IN THE MORNING BECAUSE YOU ARE SLEEPY OR TIRED: NO
HOW LIKELY ARE YOU TO NOD OFF OR FALL ASLEEP WHILE SITTING INACTIVE IN A PUBLIC PLACE: 1
DO YOU HAVE DIFFICULTY OPERATING A MOTOR VEHICLE FOR SHORT DISTANCES (LESS THAN 100 MILES) BECAUSE YOU BECOME SLEEPY: NO

## 2023-05-17 NOTE — PROGRESS NOTES
[facility], [address]  Tel.  [phone]    Fax. [address] [facility],   [place], [address] [facility]  Tel.  [phone]    Fax. [phone] [address] Angelina Ohara  Tel.  [phone]    Fax. 345 Judith Haque (: 1980) is a 37 y.o. female, established patient, seen for positive airway pressure follow-up and evaluation of the following chief complaint(s):   Sleep Problem (3mth F/U)       Goldy Holland was last seen by me on 02-15-23, prior notes reviewed in detail. Polysomnogram (PSG) performed on 20 showed an average AHI of 59 per hour with an SpO2  nataliya of 77% and SpO2 of < 88% being 15 minutes. ASSESSMENT/PLAN:   Diagnosis Orders   1. KISHORE (obstructive sleep apnea)  POLYSOMNOGRAPHY W/CPAP      2. BMI 39.0-39.9,adult            On Respironics :  DreamStation Auto-CPAP. Set up date  08-10-20. Settings:  Min EPAP: 06 cmH2O  Max EPAP: 09 cmH2O      Sleep Apnea -   * She is adherent with PAP therapy and PAP continues to benefit patient and remains necessary for control of her sleep apnea. Continue on current pressures which are comfortable and adequate with patient sleeping in non-supine position. * Sleep testing ordered to re-qualify for continued therapy and to obtain optimal pressure in supine position to prepare patient for the upcoming surgery. Orders Placed This Encounter   Procedures    POLYSOMNOGRAPHY W/CPAP     Standing Status:   Future     Standing Expiration Date:   2024     Scheduling Instructions:      Perform testing with patient in supine position, patient due for bariatric surgery 23. * Counseling was provided regarding the importance of regular PAP use with emphasis on ensuring sufficient total sleep time, proper sleep hygiene, and safe driving. * Re-enforced proper and regular cleaning for the device.     * She was asked to contact our office for any

## 2023-05-17 NOTE — PATIENT INSTRUCTIONS
[address] [facility] [place]. [place], [address]  Tel.  [phone]  Fax. 100 [facility] 60  [place], [address]  Tel.  [phone]  Fax. [phone] [address]  Tel.  [phone]  Fax. [phone]     Learning About CPAP for Sleep Apnea  What is CPAP? CPAP is a small machine that you use at home every night while you sleep. It increases air pressure in your throat to keep your airway open. When you have sleep apnea, this can help you sleep better so you feel much better. CPAP stands for \"continuous positive airway pressure. \"  The CPAP machine will have one of the following:  A mask that covers your nose and mouth  Prongs that fit into your nose  A mask that covers your nose only, the most common type. This type is called NCPAP. The N stands for \"nasal.\"  Why is it done? CPAP is usually the best treatment for obstructive sleep apnea. It is the first treatment choice and the most widely used. Your doctor may suggest CPAP if you have: Moderate to severe sleep apnea. Sleep apnea and coronary artery disease (CAD) or heart failure. How does it help? CPAP can help you have more normal sleep, so you feel less sleepy and more alert during the daytime. CPAP may help keep heart failure or other heart problems from getting worse. NCPAP may help lower your blood pressure. If you use CPAP, your bed partner may also sleep better because you are not snoring or restless. What are the side effects? Some people who use CPAP have:  A dry or stuffy nose and a sore throat. Irritated skin on the face. Sore eyes. Bloating. If you have any of these problems, work with your doctor to fix them. Here are some things you can try:  Be sure the mask or nasal prongs fit well. See if your doctor can adjust the pressure of your CPAP. If your nose is dry, try a humidifier.   If your nose is runny or stuffy, try decongestant medicine or a steroid

## 2023-05-19 ENCOUNTER — HOSPITAL ENCOUNTER (OUTPATIENT)
Facility: HOSPITAL | Age: 43
Discharge: HOME | End: 2023-05-22
Payer: COMMERCIAL

## 2023-05-19 PROCEDURE — 95811 POLYSOM 6/>YRS CPAP 4/> PARM: CPT | Performed by: INTERNAL MEDICINE

## 2023-05-20 VITALS
TEMPERATURE: 99 F | HEART RATE: 105 BPM | WEIGHT: 224 LBS | OXYGEN SATURATION: 95 % | DIASTOLIC BLOOD PRESSURE: 82 MMHG | HEIGHT: 63 IN | BODY MASS INDEX: 39.69 KG/M2 | SYSTOLIC BLOOD PRESSURE: 135 MMHG

## 2023-05-26 ENCOUNTER — TELEPHONE (OUTPATIENT)
Facility: HOSPITAL | Age: 43
End: 2023-05-26

## 2023-05-31 NOTE — TELEPHONE ENCOUNTER
Titration study interpreted. * Device pressure change to CPAP  11 cmH2O by lead technologist either remotely or at PAP clinic (notify patient). * PAP card download in 4 weeks. * Office visit in 6 months or as needed.

## 2023-06-07 ENCOUNTER — HOSPITAL ENCOUNTER (OUTPATIENT)
Facility: HOSPITAL | Age: 43
Discharge: HOME | End: 2023-06-10
Payer: COMMERCIAL

## 2023-06-07 ENCOUNTER — HOSPITAL ENCOUNTER (OUTPATIENT)
Age: 43
Discharge: HOME | End: 2023-06-10
Payer: COMMERCIAL

## 2023-06-07 VITALS
HEIGHT: 63 IN | HEART RATE: 102 BPM | WEIGHT: 225 LBS | SYSTOLIC BLOOD PRESSURE: 124 MMHG | TEMPERATURE: 98.3 F | BODY MASS INDEX: 39.87 KG/M2 | DIASTOLIC BLOOD PRESSURE: 76 MMHG

## 2023-06-07 LAB
25(OH)D3 SERPL-MCNC: 54.8 NG/ML (ref 30–100)
ALBUMIN SERPL-MCNC: 3.5 G/DL (ref 3.5–5)
ALBUMIN/GLOB SERPL: 0.9 (ref 1.1–2.2)
ALP SERPL-CCNC: 97 U/L (ref 45–117)
ALT SERPL-CCNC: 17 U/L (ref 12–78)
ANION GAP SERPL CALC-SCNC: 6 MMOL/L (ref 5–15)
APPEARANCE UR: CLEAR
AST SERPL-CCNC: 9 U/L (ref 15–37)
BACTERIA URNS QL MICRO: ABNORMAL /HPF
BASOPHILS # BLD: 0 K/UL (ref 0–0.1)
BASOPHILS NFR BLD: 0 % (ref 0–1)
BILIRUB SERPL-MCNC: 0.3 MG/DL (ref 0.2–1)
BILIRUB UR QL: NEGATIVE
BUN SERPL-MCNC: 11 MG/DL (ref 6–20)
BUN/CREAT SERPL: 14 (ref 12–20)
CALCIUM SERPL-MCNC: 8.9 MG/DL (ref 8.5–10.1)
CHLORIDE SERPL-SCNC: 110 MMOL/L (ref 97–108)
CO2 SERPL-SCNC: 25 MMOL/L (ref 21–32)
COLOR UR: ABNORMAL
CREAT SERPL-MCNC: 0.76 MG/DL (ref 0.55–1.02)
DIFFERENTIAL METHOD BLD: NORMAL
EKG ATRIAL RATE: 80 BPM
EKG DIAGNOSIS: NORMAL
EKG P AXIS: 50 DEGREES
EKG P-R INTERVAL: 166 MS
EKG Q-T INTERVAL: 386 MS
EKG QRS DURATION: 76 MS
EKG QTC CALCULATION (BAZETT): 445 MS
EKG R AXIS: 54 DEGREES
EKG T AXIS: 40 DEGREES
EKG VENTRICULAR RATE: 80 BPM
EOSINOPHIL # BLD: 0.2 K/UL (ref 0–0.4)
EOSINOPHIL NFR BLD: 2 % (ref 0–7)
EPITH CASTS URNS QL MICRO: ABNORMAL /LPF
ERYTHROCYTE [DISTWIDTH] IN BLOOD BY AUTOMATED COUNT: 12.5 % (ref 11.5–14.5)
EST. AVERAGE GLUCOSE BLD GHB EST-MCNC: 117 MG/DL
GLOBULIN SER CALC-MCNC: 3.8 G/DL (ref 2–4)
GLUCOSE SERPL-MCNC: 97 MG/DL (ref 65–100)
GLUCOSE UR STRIP.AUTO-MCNC: NEGATIVE MG/DL
HBA1C MFR BLD: 5.7 % (ref 4–5.6)
HCT VFR BLD AUTO: 38.8 % (ref 35–47)
HGB BLD-MCNC: 11.7 G/DL (ref 11.5–16)
HGB UR QL STRIP: NEGATIVE
HYALINE CASTS URNS QL MICRO: ABNORMAL /LPF (ref 0–5)
IMM GRANULOCYTES # BLD AUTO: 0 K/UL (ref 0–0.04)
IMM GRANULOCYTES NFR BLD AUTO: 0 % (ref 0–0.5)
IRON SATN MFR SERPL: 24 % (ref 20–50)
IRON SERPL-MCNC: 82 UG/DL (ref 35–150)
KETONES UR QL STRIP.AUTO: NEGATIVE MG/DL
LEUKOCYTE ESTERASE UR QL STRIP.AUTO: NEGATIVE
LYMPHOCYTES # BLD: 1.9 K/UL (ref 0.8–3.5)
LYMPHOCYTES NFR BLD: 27 % (ref 12–49)
MCH RBC QN AUTO: 26.4 PG (ref 26–34)
MCHC RBC AUTO-ENTMCNC: 30.2 G/DL (ref 30–36.5)
MCV RBC AUTO: 87.6 FL (ref 80–99)
MONOCYTES # BLD: 0.5 K/UL (ref 0–1)
MONOCYTES NFR BLD: 7 % (ref 5–13)
NEUTS SEG # BLD: 4.5 K/UL (ref 1.8–8)
NEUTS SEG NFR BLD: 64 % (ref 32–75)
NITRITE UR QL STRIP.AUTO: NEGATIVE
NRBC # BLD: 0 K/UL (ref 0–0.01)
NRBC BLD-RTO: 0 PER 100 WBC
PH UR STRIP: 6 (ref 5–8)
PLATELET # BLD AUTO: 213 K/UL (ref 150–400)
PMV BLD AUTO: 10.7 FL (ref 8.9–12.9)
POTASSIUM SERPL-SCNC: 3.9 MMOL/L (ref 3.5–5.1)
PROT SERPL-MCNC: 7.3 G/DL (ref 6.4–8.2)
PROT UR STRIP-MCNC: NEGATIVE MG/DL
RBC # BLD AUTO: 4.43 M/UL (ref 3.8–5.2)
RBC #/AREA URNS HPF: ABNORMAL /HPF (ref 0–5)
SODIUM SERPL-SCNC: 141 MMOL/L (ref 136–145)
SP GR UR REFRACTOMETRY: 1.02 (ref 1–1.03)
TIBC SERPL-MCNC: 342 UG/DL (ref 250–450)
TSH SERPL DL<=0.05 MIU/L-ACNC: 2.57 UIU/ML (ref 0.36–3.74)
URINE CULTURE IF INDICATED: ABNORMAL
UROBILINOGEN UR QL STRIP.AUTO: 1 EU/DL (ref 0.2–1)
VIT B12 SERPL-MCNC: 449 PG/ML (ref 193–986)
WBC # BLD AUTO: 7.1 K/UL (ref 3.6–11)
WBC URNS QL MICRO: ABNORMAL /HPF (ref 0–4)

## 2023-06-07 PROCEDURE — 84425 ASSAY OF VITAMIN B-1: CPT

## 2023-06-07 PROCEDURE — 83540 ASSAY OF IRON: CPT

## 2023-06-07 PROCEDURE — 84446 ASSAY OF VITAMIN E: CPT

## 2023-06-07 PROCEDURE — 84630 ASSAY OF ZINC: CPT

## 2023-06-07 PROCEDURE — 84590 ASSAY OF VITAMIN A: CPT

## 2023-06-07 PROCEDURE — 71046 X-RAY EXAM CHEST 2 VIEWS: CPT

## 2023-06-07 PROCEDURE — 82306 VITAMIN D 25 HYDROXY: CPT

## 2023-06-07 PROCEDURE — 83036 HEMOGLOBIN GLYCOSYLATED A1C: CPT

## 2023-06-07 PROCEDURE — 81001 URINALYSIS AUTO W/SCOPE: CPT

## 2023-06-07 PROCEDURE — 82607 VITAMIN B-12: CPT

## 2023-06-07 PROCEDURE — 84443 ASSAY THYROID STIM HORMONE: CPT

## 2023-06-07 PROCEDURE — 85025 COMPLETE CBC W/AUTO DIFF WBC: CPT

## 2023-06-07 PROCEDURE — 86677 HELICOBACTER PYLORI ANTIBODY: CPT

## 2023-06-07 PROCEDURE — 83550 IRON BINDING TEST: CPT

## 2023-06-07 PROCEDURE — 80053 COMPREHEN METABOLIC PANEL: CPT

## 2023-06-07 PROCEDURE — 36415 COLL VENOUS BLD VENIPUNCTURE: CPT

## 2023-06-07 NOTE — PERIOP NOTE
PATIENT GIVEN WRITTEN INSTRUCTIONS TO GO TO THE IMAGING CENTER/CANCER CENTER [address] TO HAVE CHEST XRAY COMPLETED.
loose or down. Please no pony-tails, buns, or any metal hair accessories. If you shower the morning of surgery, please do not apply any lotions or powders afterwards. You may wear deodorant. Do not shave any body area within 24 hours of your surgery. Please follow all instructions to avoid any potential surgical cancellation. Should your physical condition change, (i.e. fever, cold, flu, etc.) please notify your surgeon as soon as possible. It is important to be on time. If a situation occurs where you may be delayed, please call:  [phone] / [phone] on the day of surgery. The Preadmission Testing staff can be reached at [phone]. Special instructions: [address] [facility]; [address]      Current Outpatient Medications   Medication Sig    cyclobenzaprine (FLEXERIL) 10 MG tablet Take 1 tablet by mouth 3 times daily as needed    ergocalciferol (ERGOCALCIFEROL) 1.25 MG (11537 UT) capsule once a week THURSDAY    esomeprazole (NEXIUM) 40 MG delayed release capsule as needed    fluticasone (FLONASE) 50 MCG/ACT nasal spray 1 spray by Nasal route daily    linaclotide (LINZESS) 145 MCG capsule Take 1 capsule by mouth every morning (before breakfast)    vitamin E 45 MG (100 UNIT) capsule Take 1 capsule by mouth daily     No current facility-administered medications for this encounter. YOU MUST ONLY TAKE THESE MEDICATIONS THE MORNING OF SURGERY WITH A SIP OF WATER: NONE    MEDICATIONS TO TAKE THE MORNING OF SURGERY ONLY IF NEEDED: [address] [facility], [facility] [place] these prescription medications BEFORE Surgery: STOP VIT E ON 6/22/23    Ask your surgeon/prescribing physician about when/if to STOP taking these medications: NONE    Stop all vitamins, herbal medicines, Aspirin containing products and any non-steroidal anti-inflammatory drugs (i.e. Ibuprofen, Naproxen, Advil, Aleve) 7 days prior to surgery. You may take Tylenol.     If you are currently taking Plavix, Coumadin, or any

## 2023-06-08 LAB — H PYLORI IGA SER-ACNC: <9 UNITS (ref 0–8.9)

## 2023-06-09 LAB
VIT B1 BLD-SCNC: 93 NMOL/L (ref 66.5–200)
ZINC BLD-MCNC: 529 UG/DL (ref 440–860)

## 2023-06-20 ENCOUNTER — CLINICAL DOCUMENTATION (OUTPATIENT)
Age: 43
End: 2023-06-20

## 2023-06-20 NOTE — PROGRESS NOTES
[facility]  paperwork was faxed to [facility] with confirmation received release.

## 2023-06-21 ENCOUNTER — OFFICE VISIT (OUTPATIENT)
Age: 43
End: 2023-06-21
Payer: COMMERCIAL

## 2023-06-21 VITALS
HEIGHT: 63 IN | SYSTOLIC BLOOD PRESSURE: 128 MMHG | DIASTOLIC BLOOD PRESSURE: 87 MMHG | WEIGHT: 223.2 LBS | OXYGEN SATURATION: 96 % | RESPIRATION RATE: 18 BRPM | BODY MASS INDEX: 39.55 KG/M2 | HEART RATE: 103 BPM | TEMPERATURE: 98.2 F

## 2023-06-21 DIAGNOSIS — E66.01 MORBID (SEVERE) OBESITY DUE TO EXCESS CALORIES (HCC): Primary | ICD-10-CM

## 2023-06-21 PROCEDURE — 99213 OFFICE O/P EST LOW 20 MIN: CPT | Performed by: SURGERY

## 2023-06-21 ASSESSMENT — PATIENT HEALTH QUESTIONNAIRE - PHQ9
SUM OF ALL RESPONSES TO PHQ QUESTIONS 1-9: 0
2. FEELING DOWN, DEPRESSED OR HOPELESS: 0
1. LITTLE INTEREST OR PLEASURE IN DOING THINGS: 0
SUM OF ALL RESPONSES TO PHQ9 QUESTIONS 1 & 2: 0
SUM OF ALL RESPONSES TO PHQ QUESTIONS 1-9: 0

## 2023-06-21 ASSESSMENT — ANXIETY QUESTIONNAIRES
IF YOU CHECKED OFF ANY PROBLEMS ON THIS QUESTIONNAIRE, HOW DIFFICULT HAVE THESE PROBLEMS MADE IT FOR YOU TO DO YOUR WORK, TAKE CARE OF THINGS AT HOME, OR GET ALONG WITH OTHER PEOPLE: NOT DIFFICULT AT ALL
6. BECOMING EASILY ANNOYED OR IRRITABLE: 0
2. NOT BEING ABLE TO STOP OR CONTROL WORRYING: 0
1. FEELING NERVOUS, ANXIOUS, OR ON EDGE: 0
7. FEELING AFRAID AS IF SOMETHING AWFUL MIGHT HAPPEN: 0
3. WORRYING TOO MUCH ABOUT DIFFERENT THINGS: 0
4. TROUBLE RELAXING: 0
5. BEING SO RESTLESS THAT IT IS HARD TO SIT STILL: 0
GAD7 TOTAL SCORE: 0

## 2023-06-21 NOTE — PROGRESS NOTES
Identified patient with two patient identifiers (name and ). Reviewed chart in preparation for visit and have obtained necessary documentation. Mia Brown is a 37 y.o. female  Chief Complaint   Patient presents with    Follow-up     Consent LSG surgery     /87 (Site: Right Upper Arm, Position: Sitting, Cuff Size: Medium Adult)   Pulse (!) 103   Temp 98.2 °F (36.8 °C) (Oral)   Resp 18   Ht 5' 3\" (1.6 m)   Wt 223 lb 3.2 oz (101.2 kg)   LMP 2023 (Exact Date)   SpO2 96%   BMI 39.54 kg/m²     1. Have you been to the ER, urgent care clinic since your last visit? Hospitalized since your last visit?no    2. Have you seen or consulted any other health care providers outside of the [address] [place] [address] since your last visit? Include any pap smears or colon screening.  no

## 2023-06-21 NOTE — PROGRESS NOTES
[address] [facility] at [facility] Surgery History and Physical    History of Present Illness:      Forrest Antonio is a 37 y.o. female who is now ready for laparoscopic sleeve gastrectomy. She has been in good health. Past Medical History:   Diagnosis Date    Abnormal Pap smear of cervix 1990s    Arthritis     IN BACK    Breast cyst     benign. Left. 1.7 cm and multiple others. Dr. Melani Anglin    Chicken pox childhood    Chronic idiopathic constipation 2015    Dr. Meggan Fong. Chronic pain     VALLADARES (dyspnea on exertion) 2015    Dr. 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 Retrosternal goiter. Dr. Rm Andrade. Enlarged tonsils and adenoids 2010    GERD (gastroesophageal reflux disease)     Hernia, abdominal 2022    Gastrointestinal Specialist     History of blood transfusion     AFTER CHILDBIRTH    Iodine-deficiency related goiter     Left. Dr. Rm Andrade. KISHORE (obstructive sleep apnea) 2015    Dr. Aaliyah Lino, uses cpap. Dr. Marichuy Klein. WEARS CPAP Dr. Diamond Ireland. Thyroid cyst     Right. Thyroid nodule 2019       Past Surgical History:   Procedure Laterality Date    BREAST BIOPSY Left 2018    benign     SECTION      [facility]. CYST INCISION AND DRAINAGE Left 2016    benign. Dr. Judy Lima Right 09/15/2017    benign. Dr. Yari James. HERNIA REPAIR  3417    umbilical.    OTHER SURGICAL HISTORY  2014    thyroid biopsy. Dr. Jazmine Garg. THYROIDECTOMY Left 2015    due to [address] Dr. Rm Andrade. benign.     US BREAST BIOPSY W LOC DEVICE 1ST LESION LEFT Left 2018    US BREAST NEEDLE BIOPSY LEFT 2018 [facility] RAD MAMMO         Current Outpatient Medications:     polyethylene glycol (GLYCOLAX) 17 GM/SCOOP

## 2023-06-22 ENCOUNTER — CLINICAL DOCUMENTATION (OUTPATIENT)
Age: 43
End: 2023-06-22

## 2023-06-22 NOTE — PROGRESS NOTES
Patient has a new breast lump.  Called patient left a message for patient to call back and also advised patient she will need to see breast surgeon for lump, because Esau Calvin does not see patient swith lumps

## 2023-06-29 ENCOUNTER — ANESTHESIA (OUTPATIENT)
Facility: HOSPITAL | Age: 43
End: 2023-06-29
Payer: COMMERCIAL

## 2023-06-29 ENCOUNTER — ANESTHESIA EVENT (OUTPATIENT)
Facility: HOSPITAL | Age: 43
End: 2023-06-29
Payer: COMMERCIAL

## 2023-06-29 ENCOUNTER — HOSPITAL ENCOUNTER (INPATIENT)
Facility: HOSPITAL | Age: 43
LOS: 2 days | Discharge: HOME | DRG: 619 | End: 2023-07-01
Attending: SURGERY | Admitting: SURGERY
Payer: COMMERCIAL

## 2023-06-29 DIAGNOSIS — G89.18 POSTOPERATIVE PAIN: Primary | ICD-10-CM

## 2023-06-29 PROBLEM — K44.9 PARAESOPHAGEAL HERNIA: Status: ACTIVE | Noted: 2023-06-29

## 2023-06-29 PROBLEM — E66.01 SEVERE OBESITY (BMI 35.0-35.9 WITH COMORBIDITY) (HCC): Status: ACTIVE | Noted: 2023-06-29

## 2023-06-29 LAB — HCG UR QL: NEGATIVE

## 2023-06-29 PROCEDURE — 2500000003 HC RX 250 WO HCPCS: Performed by: NURSE ANESTHETIST, CERTIFIED REGISTERED

## 2023-06-29 PROCEDURE — 2580000003 HC RX 258: Performed by: NURSE ANESTHETIST, CERTIFIED REGISTERED

## 2023-06-29 PROCEDURE — 88307 TISSUE EXAM BY PATHOLOGIST: CPT

## 2023-06-29 PROCEDURE — 6370000000 HC RX 637 (ALT 250 FOR IP): Performed by: SURGERY

## 2023-06-29 PROCEDURE — 2720000010 HC SURG SUPPLY STERILE: Performed by: SURGERY

## 2023-06-29 PROCEDURE — 2709999900 HC NON-CHARGEABLE SUPPLY: Performed by: SURGERY

## 2023-06-29 PROCEDURE — 6360000002 HC RX W HCPCS: Performed by: SURGERY

## 2023-06-29 PROCEDURE — 2580000003 HC RX 258: Performed by: ANESTHESIOLOGY

## 2023-06-29 PROCEDURE — 0DB64Z3 EXCISION OF STOMACH, PERCUTANEOUS ENDOSCOPIC APPROACH, VERTICAL: ICD-10-PCS | Performed by: SURGERY

## 2023-06-29 PROCEDURE — 7100000001 HC PACU RECOVERY - ADDTL 15 MIN: Performed by: SURGERY

## 2023-06-29 PROCEDURE — 3600000014 HC SURGERY LEVEL 4 ADDTL 15MIN: Performed by: SURGERY

## 2023-06-29 PROCEDURE — 3700000001 HC ADD 15 MINUTES (ANESTHESIA): Performed by: SURGERY

## 2023-06-29 PROCEDURE — 6360000002 HC RX W HCPCS: Performed by: NURSE ANESTHETIST, CERTIFIED REGISTERED

## 2023-06-29 PROCEDURE — 81025 URINE PREGNANCY TEST: CPT

## 2023-06-29 PROCEDURE — 2580000003 HC RX 258: Performed by: SURGERY

## 2023-06-29 PROCEDURE — 6360000002 HC RX W HCPCS: Performed by: ANESTHESIOLOGY

## 2023-06-29 PROCEDURE — 1200000000 HC SEMI PRIVATE

## 2023-06-29 PROCEDURE — 7100000000 HC PACU RECOVERY - FIRST 15 MIN: Performed by: SURGERY

## 2023-06-29 PROCEDURE — 43775 LAP SLEEVE GASTRECTOMY: CPT | Performed by: SURGERY

## 2023-06-29 PROCEDURE — C1713 ANCHOR/SCREW BN/BN,TIS/BN: HCPCS | Performed by: SURGERY

## 2023-06-29 PROCEDURE — 3600000004 HC SURGERY LEVEL 4 BASE: Performed by: SURGERY

## 2023-06-29 PROCEDURE — 0DJ08ZZ INSPECTION OF UPPER INTESTINAL TRACT, VIA NATURAL OR ARTIFICIAL OPENING ENDOSCOPIC: ICD-10-PCS | Performed by: SURGERY

## 2023-06-29 PROCEDURE — 43281 LAP PARAESOPHAG HERN REPAIR: CPT | Performed by: SURGERY

## 2023-06-29 PROCEDURE — 6360000002 HC RX W HCPCS

## 2023-06-29 PROCEDURE — 0BQT4ZZ REPAIR DIAPHRAGM, PERCUTANEOUS ENDOSCOPIC APPROACH: ICD-10-PCS | Performed by: SURGERY

## 2023-06-29 PROCEDURE — 3700000000 HC ANESTHESIA ATTENDED CARE: Performed by: SURGERY

## 2023-06-29 PROCEDURE — 2500000003 HC RX 250 WO HCPCS: Performed by: SURGERY

## 2023-06-29 PROCEDURE — P9045 ALBUMIN (HUMAN), 5%, 250 ML: HCPCS | Performed by: NURSE ANESTHETIST, CERTIFIED REGISTERED

## 2023-06-29 PROCEDURE — 2500000003 HC RX 250 WO HCPCS: Performed by: ANESTHESIOLOGY

## 2023-06-29 RX ORDER — HYDROMORPHONE HYDROCHLORIDE 1 MG/ML
0.5 INJECTION, SOLUTION INTRAMUSCULAR; INTRAVENOUS; SUBCUTANEOUS ONCE
Status: COMPLETED | OUTPATIENT
Start: 2023-06-29 | End: 2023-06-29

## 2023-06-29 RX ORDER — FENTANYL CITRATE 50 UG/ML
50 INJECTION, SOLUTION INTRAMUSCULAR; INTRAVENOUS EVERY 5 MIN PRN
Status: DISCONTINUED | OUTPATIENT
Start: 2023-06-29 | End: 2023-06-29 | Stop reason: HOSPADM

## 2023-06-29 RX ORDER — KETAMINE HCL IN NACL, ISO-OSM 100MG/10ML
SYRINGE (ML) INJECTION PRN
Status: DISCONTINUED | OUTPATIENT
Start: 2023-06-29 | End: 2023-06-29 | Stop reason: SDUPTHER

## 2023-06-29 RX ORDER — SODIUM CHLORIDE 0.9 % (FLUSH) 0.9 %
5-40 SYRINGE (ML) INJECTION EVERY 12 HOURS SCHEDULED
Status: DISCONTINUED | OUTPATIENT
Start: 2023-06-29 | End: 2023-06-29 | Stop reason: HOSPADM

## 2023-06-29 RX ORDER — NEOSTIGMINE METHYLSULFATE 1 MG/ML
INJECTION, SOLUTION INTRAVENOUS PRN
Status: DISCONTINUED | OUTPATIENT
Start: 2023-06-29 | End: 2023-06-29 | Stop reason: SDUPTHER

## 2023-06-29 RX ORDER — MAGNESIUM SULFATE IN WATER 40 MG/ML
INJECTION, SOLUTION INTRAVENOUS PRN
Status: DISCONTINUED | OUTPATIENT
Start: 2023-06-29 | End: 2023-06-29 | Stop reason: SDUPTHER

## 2023-06-29 RX ORDER — DIPHENHYDRAMINE HYDROCHLORIDE 50 MG/ML
25 INJECTION INTRAMUSCULAR; INTRAVENOUS EVERY 6 HOURS PRN
Status: DISCONTINUED | OUTPATIENT
Start: 2023-06-29 | End: 2023-07-01 | Stop reason: HOSPADM

## 2023-06-29 RX ORDER — SODIUM CHLORIDE 0.9 % (FLUSH) 0.9 %
5-40 SYRINGE (ML) INJECTION EVERY 12 HOURS SCHEDULED
Status: DISCONTINUED | OUTPATIENT
Start: 2023-06-29 | End: 2023-07-01 | Stop reason: HOSPADM

## 2023-06-29 RX ORDER — MIDAZOLAM HYDROCHLORIDE 2 MG/2ML
2 INJECTION, SOLUTION INTRAMUSCULAR; INTRAVENOUS
Status: DISCONTINUED | OUTPATIENT
Start: 2023-06-29 | End: 2023-06-29 | Stop reason: HOSPADM

## 2023-06-29 RX ORDER — ACETAMINOPHEN 160 MG/5ML
1000 SOLUTION ORAL ONCE
Status: COMPLETED | OUTPATIENT
Start: 2023-06-29 | End: 2023-06-29

## 2023-06-29 RX ORDER — GLYCOPYRROLATE 0.2 MG/ML
INJECTION, SOLUTION INTRAMUSCULAR; INTRAVENOUS PRN
Status: DISCONTINUED | OUTPATIENT
Start: 2023-06-29 | End: 2023-06-29 | Stop reason: SDUPTHER

## 2023-06-29 RX ORDER — PROPOFOL 10 MG/ML
INJECTION, EMULSION INTRAVENOUS PRN
Status: DISCONTINUED | OUTPATIENT
Start: 2023-06-29 | End: 2023-06-29 | Stop reason: SDUPTHER

## 2023-06-29 RX ORDER — PHENYLEPHRINE HCL IN 0.9% NACL 0.4MG/10ML
SYRINGE (ML) INTRAVENOUS PRN
Status: DISCONTINUED | OUTPATIENT
Start: 2023-06-29 | End: 2023-06-29 | Stop reason: SDUPTHER

## 2023-06-29 RX ORDER — MEPERIDINE HYDROCHLORIDE 25 MG/ML
12.5 INJECTION INTRAMUSCULAR; INTRAVENOUS; SUBCUTANEOUS EVERY 5 MIN PRN
Status: DISCONTINUED | OUTPATIENT
Start: 2023-06-29 | End: 2023-06-29 | Stop reason: HOSPADM

## 2023-06-29 RX ORDER — KETOROLAC TROMETHAMINE 30 MG/ML
15 INJECTION, SOLUTION INTRAMUSCULAR; INTRAVENOUS EVERY 6 HOURS
Status: DISCONTINUED | OUTPATIENT
Start: 2023-06-29 | End: 2023-07-01 | Stop reason: HOSPADM

## 2023-06-29 RX ORDER — HYDRALAZINE HYDROCHLORIDE 20 MG/ML
20 INJECTION INTRAMUSCULAR; INTRAVENOUS EVERY 6 HOURS PRN
Status: DISCONTINUED | OUTPATIENT
Start: 2023-06-29 | End: 2023-07-01 | Stop reason: HOSPADM

## 2023-06-29 RX ORDER — MIDAZOLAM HYDROCHLORIDE 1 MG/ML
INJECTION INTRAMUSCULAR; INTRAVENOUS PRN
Status: DISCONTINUED | OUTPATIENT
Start: 2023-06-29 | End: 2023-06-29 | Stop reason: SDUPTHER

## 2023-06-29 RX ORDER — SODIUM CHLORIDE, SODIUM LACTATE, POTASSIUM CHLORIDE, CALCIUM CHLORIDE 600; 310; 30; 20 MG/100ML; MG/100ML; MG/100ML; MG/100ML
INJECTION, SOLUTION INTRAVENOUS CONTINUOUS
Status: DISCONTINUED | OUTPATIENT
Start: 2023-06-29 | End: 2023-06-29 | Stop reason: HOSPADM

## 2023-06-29 RX ORDER — PROCHLORPERAZINE EDISYLATE 5 MG/ML
5 INJECTION INTRAMUSCULAR; INTRAVENOUS
Status: DISCONTINUED | OUTPATIENT
Start: 2023-06-29 | End: 2023-06-29 | Stop reason: HOSPADM

## 2023-06-29 RX ORDER — ONDANSETRON 2 MG/ML
INJECTION INTRAMUSCULAR; INTRAVENOUS PRN
Status: DISCONTINUED | OUTPATIENT
Start: 2023-06-29 | End: 2023-06-29 | Stop reason: SDUPTHER

## 2023-06-29 RX ORDER — ONDANSETRON 2 MG/ML
4 INJECTION INTRAMUSCULAR; INTRAVENOUS ONCE
Status: DISCONTINUED | OUTPATIENT
Start: 2023-06-29 | End: 2023-06-29 | Stop reason: HOSPADM

## 2023-06-29 RX ORDER — DIPHENHYDRAMINE HYDROCHLORIDE 50 MG/ML
12.5 INJECTION INTRAMUSCULAR; INTRAVENOUS
Status: DISCONTINUED | OUTPATIENT
Start: 2023-06-29 | End: 2023-06-29 | Stop reason: HOSPADM

## 2023-06-29 RX ORDER — GABAPENTIN 250 MG/5ML
250 SOLUTION ORAL ONCE
Status: COMPLETED | OUTPATIENT
Start: 2023-06-29 | End: 2023-06-29

## 2023-06-29 RX ORDER — ROCURONIUM BROMIDE 10 MG/ML
INJECTION, SOLUTION INTRAVENOUS PRN
Status: DISCONTINUED | OUTPATIENT
Start: 2023-06-29 | End: 2023-06-29 | Stop reason: SDUPTHER

## 2023-06-29 RX ORDER — LIDOCAINE HYDROCHLORIDE ANHYDROUS AND DEXTROSE MONOHYDRATE 5; 400 G/100ML; MG/100ML
INJECTION, SOLUTION INTRAVENOUS CONTINUOUS PRN
Status: DISCONTINUED | OUTPATIENT
Start: 2023-06-29 | End: 2023-06-29 | Stop reason: SDUPTHER

## 2023-06-29 RX ORDER — FENTANYL CITRATE 50 UG/ML
INJECTION, SOLUTION INTRAMUSCULAR; INTRAVENOUS
Status: COMPLETED
Start: 2023-06-29 | End: 2023-06-29

## 2023-06-29 RX ORDER — ONDANSETRON 2 MG/ML
4 INJECTION INTRAMUSCULAR; INTRAVENOUS EVERY 6 HOURS PRN
Status: DISCONTINUED | OUTPATIENT
Start: 2023-06-29 | End: 2023-07-01 | Stop reason: HOSPADM

## 2023-06-29 RX ORDER — DEXAMETHASONE SODIUM PHOSPHATE 4 MG/ML
INJECTION, SOLUTION INTRA-ARTICULAR; INTRALESIONAL; INTRAMUSCULAR; INTRAVENOUS; SOFT TISSUE PRN
Status: DISCONTINUED | OUTPATIENT
Start: 2023-06-29 | End: 2023-06-29 | Stop reason: SDUPTHER

## 2023-06-29 RX ORDER — SODIUM CHLORIDE 0.9 % (FLUSH) 0.9 %
5-40 SYRINGE (ML) INJECTION PRN
Status: DISCONTINUED | OUTPATIENT
Start: 2023-06-29 | End: 2023-07-01 | Stop reason: HOSPADM

## 2023-06-29 RX ORDER — ALBUMIN, HUMAN INJ 5% 5 %
SOLUTION INTRAVENOUS PRN
Status: DISCONTINUED | OUTPATIENT
Start: 2023-06-29 | End: 2023-06-29 | Stop reason: SDUPTHER

## 2023-06-29 RX ORDER — SODIUM CHLORIDE 9 MG/ML
INJECTION, SOLUTION INTRAVENOUS PRN
Status: DISCONTINUED | OUTPATIENT
Start: 2023-06-29 | End: 2023-06-29 | Stop reason: HOSPADM

## 2023-06-29 RX ORDER — DIPHENHYDRAMINE HCL 25 MG
25 CAPSULE ORAL EVERY 6 HOURS PRN
Status: DISCONTINUED | OUTPATIENT
Start: 2023-06-29 | End: 2023-07-01 | Stop reason: HOSPADM

## 2023-06-29 RX ORDER — SODIUM CHLORIDE 0.9 % (FLUSH) 0.9 %
5-40 SYRINGE (ML) INJECTION PRN
Status: DISCONTINUED | OUTPATIENT
Start: 2023-06-29 | End: 2023-06-29 | Stop reason: HOSPADM

## 2023-06-29 RX ORDER — FLUTICASONE PROPIONATE 50 MCG
1 SPRAY, SUSPENSION (ML) NASAL DAILY
Status: DISCONTINUED | OUTPATIENT
Start: 2023-06-30 | End: 2023-07-01 | Stop reason: HOSPADM

## 2023-06-29 RX ORDER — HYDRALAZINE HYDROCHLORIDE 20 MG/ML
10 INJECTION INTRAMUSCULAR; INTRAVENOUS
Status: DISCONTINUED | OUTPATIENT
Start: 2023-06-29 | End: 2023-06-29 | Stop reason: HOSPADM

## 2023-06-29 RX ORDER — SODIUM CHLORIDE 9 MG/ML
INJECTION, SOLUTION INTRAVENOUS PRN
Status: DISCONTINUED | OUTPATIENT
Start: 2023-06-29 | End: 2023-07-01 | Stop reason: HOSPADM

## 2023-06-29 RX ORDER — ENOXAPARIN SODIUM 100 MG/ML
40 INJECTION SUBCUTANEOUS ONCE
Status: COMPLETED | OUTPATIENT
Start: 2023-06-29 | End: 2023-06-29

## 2023-06-29 RX ORDER — PANTOPRAZOLE SODIUM 40 MG/1
40 TABLET, DELAYED RELEASE ORAL DAILY
Status: DISCONTINUED | OUTPATIENT
Start: 2023-06-29 | End: 2023-07-01 | Stop reason: HOSPADM

## 2023-06-29 RX ORDER — ENOXAPARIN SODIUM 100 MG/ML
40 INJECTION SUBCUTANEOUS DAILY
Status: DISCONTINUED | OUTPATIENT
Start: 2023-06-30 | End: 2023-07-01 | Stop reason: HOSPADM

## 2023-06-29 RX ORDER — ACETAMINOPHEN 500 MG
1000 TABLET ORAL EVERY 6 HOURS PRN
COMMUNITY

## 2023-06-29 RX ORDER — SUCCINYLCHOLINE/SOD CL,ISO/PF 200MG/10ML
SYRINGE (ML) INTRAVENOUS PRN
Status: DISCONTINUED | OUTPATIENT
Start: 2023-06-29 | End: 2023-06-29 | Stop reason: SDUPTHER

## 2023-06-29 RX ORDER — ACETAMINOPHEN 325 MG/1
650 TABLET ORAL EVERY 6 HOURS
Status: DISCONTINUED | OUTPATIENT
Start: 2023-06-29 | End: 2023-07-01 | Stop reason: HOSPADM

## 2023-06-29 RX ORDER — EPHEDRINE SULFATE 50 MG/ML
INJECTION INTRAVENOUS PRN
Status: DISCONTINUED | OUTPATIENT
Start: 2023-06-29 | End: 2023-06-29 | Stop reason: SDUPTHER

## 2023-06-29 RX ORDER — DEXMEDETOMIDINE HYDROCHLORIDE 100 UG/ML
INJECTION, SOLUTION INTRAVENOUS PRN
Status: DISCONTINUED | OUTPATIENT
Start: 2023-06-29 | End: 2023-06-29 | Stop reason: SDUPTHER

## 2023-06-29 RX ORDER — BUPIVACAINE HYDROCHLORIDE AND EPINEPHRINE 5; 5 MG/ML; UG/ML
INJECTION, SOLUTION EPIDURAL; INTRACAUDAL; PERINEURAL PRN
Status: DISCONTINUED | OUTPATIENT
Start: 2023-06-29 | End: 2023-06-29 | Stop reason: HOSPADM

## 2023-06-29 RX ORDER — SODIUM CHLORIDE 9 MG/ML
INJECTION, SOLUTION INTRAVENOUS CONTINUOUS
Status: DISCONTINUED | OUTPATIENT
Start: 2023-06-29 | End: 2023-06-29 | Stop reason: HOSPADM

## 2023-06-29 RX ORDER — HYDROMORPHONE HYDROCHLORIDE 1 MG/ML
0.25 INJECTION, SOLUTION INTRAMUSCULAR; INTRAVENOUS; SUBCUTANEOUS EVERY 5 MIN PRN
Status: COMPLETED | OUTPATIENT
Start: 2023-06-29 | End: 2023-06-29

## 2023-06-29 RX ORDER — HYDROMORPHONE HYDROCHLORIDE 1 MG/ML
0.5 INJECTION, SOLUTION INTRAMUSCULAR; INTRAVENOUS; SUBCUTANEOUS
Status: DISCONTINUED | OUTPATIENT
Start: 2023-06-29 | End: 2023-07-01 | Stop reason: HOSPADM

## 2023-06-29 RX ORDER — FENTANYL CITRATE 50 UG/ML
INJECTION, SOLUTION INTRAMUSCULAR; INTRAVENOUS PRN
Status: DISCONTINUED | OUTPATIENT
Start: 2023-06-29 | End: 2023-06-29 | Stop reason: SDUPTHER

## 2023-06-29 RX ORDER — SCOLOPAMINE TRANSDERMAL SYSTEM 1 MG/1
1 PATCH, EXTENDED RELEASE TRANSDERMAL
Status: DISCONTINUED | OUTPATIENT
Start: 2023-06-29 | End: 2023-06-29

## 2023-06-29 RX ORDER — ACETAMINOPHEN 500 MG
1000 TABLET ORAL ONCE
Status: DISCONTINUED | OUTPATIENT
Start: 2023-06-29 | End: 2023-06-29

## 2023-06-29 RX ORDER — LIDOCAINE HYDROCHLORIDE 20 MG/ML
INJECTION, SOLUTION EPIDURAL; INFILTRATION; INTRACAUDAL; PERINEURAL PRN
Status: DISCONTINUED | OUTPATIENT
Start: 2023-06-29 | End: 2023-06-29 | Stop reason: SDUPTHER

## 2023-06-29 RX ORDER — FENTANYL CITRATE 50 UG/ML
100 INJECTION, SOLUTION INTRAMUSCULAR; INTRAVENOUS
Status: DISCONTINUED | OUTPATIENT
Start: 2023-06-29 | End: 2023-06-29 | Stop reason: HOSPADM

## 2023-06-29 RX ORDER — SODIUM CHLORIDE, SODIUM LACTATE, POTASSIUM CHLORIDE, CALCIUM CHLORIDE 600; 310; 30; 20 MG/100ML; MG/100ML; MG/100ML; MG/100ML
INJECTION, SOLUTION INTRAVENOUS CONTINUOUS
Status: DISCONTINUED | OUTPATIENT
Start: 2023-06-29 | End: 2023-07-01 | Stop reason: HOSPADM

## 2023-06-29 RX ADMIN — SODIUM CHLORIDE, POTASSIUM CHLORIDE, SODIUM LACTATE AND CALCIUM CHLORIDE: 600; 310; 30; 20 INJECTION, SOLUTION INTRAVENOUS at 06:44

## 2023-06-29 RX ADMIN — HYDROMORPHONE HYDROCHLORIDE 0.25 MG: 1 INJECTION, SOLUTION INTRAMUSCULAR; INTRAVENOUS; SUBCUTANEOUS at 11:07

## 2023-06-29 RX ADMIN — GLYCOPYRROLATE 0.4 MG: 0.2 INJECTION, SOLUTION INTRAMUSCULAR; INTRAVENOUS at 10:15

## 2023-06-29 RX ADMIN — HYDROMORPHONE HYDROCHLORIDE 0.5 MG: 1 INJECTION, SOLUTION INTRAMUSCULAR; INTRAVENOUS; SUBCUTANEOUS at 11:22

## 2023-06-29 RX ADMIN — ACETAMINOPHEN 1000 MG: 650 SOLUTION ORAL at 06:32

## 2023-06-29 RX ADMIN — ROCURONIUM BROMIDE 35 MG: 10 SOLUTION INTRAVENOUS at 07:49

## 2023-06-29 RX ADMIN — ENOXAPARIN SODIUM 40 MG: 100 INJECTION SUBCUTANEOUS at 06:32

## 2023-06-29 RX ADMIN — HYDROMORPHONE HYDROCHLORIDE 0.5 MG: 1 INJECTION, SOLUTION INTRAMUSCULAR; INTRAVENOUS; SUBCUTANEOUS at 14:25

## 2023-06-29 RX ADMIN — PHENYLEPHRINE HYDROCHLORIDE 50 MCG/MIN: 10 INJECTION INTRAVENOUS at 09:35

## 2023-06-29 RX ADMIN — ROCURONIUM BROMIDE 20 MG: 10 SOLUTION INTRAVENOUS at 08:50

## 2023-06-29 RX ADMIN — HYDROMORPHONE HYDROCHLORIDE 0.25 MG: 1 INJECTION, SOLUTION INTRAMUSCULAR; INTRAVENOUS; SUBCUTANEOUS at 10:52

## 2023-06-29 RX ADMIN — FENTANYL CITRATE 25 MCG: 50 INJECTION, SOLUTION INTRAMUSCULAR; INTRAVENOUS at 10:47

## 2023-06-29 RX ADMIN — ALBUMIN (HUMAN) 250 ML: 12.5 INJECTION, SOLUTION INTRAVENOUS at 09:40

## 2023-06-29 RX ADMIN — ONDANSETRON HYDROCHLORIDE 4 MG: 2 INJECTION, SOLUTION INTRAMUSCULAR; INTRAVENOUS at 10:10

## 2023-06-29 RX ADMIN — Medication 3 MG: at 10:15

## 2023-06-29 RX ADMIN — DEXMEDETOMIDINE HYDROCHLORIDE 15 MCG: 100 INJECTION, SOLUTION, CONCENTRATE INTRAVENOUS at 08:59

## 2023-06-29 RX ADMIN — Medication 250 MG: at 06:33

## 2023-06-29 RX ADMIN — Medication 80 MCG: at 09:34

## 2023-06-29 RX ADMIN — MIDAZOLAM 2 MG: 1 INJECTION INTRAMUSCULAR; INTRAVENOUS at 07:28

## 2023-06-29 RX ADMIN — ACETAMINOPHEN 650 MG: 325 TABLET ORAL at 14:33

## 2023-06-29 RX ADMIN — Medication 80 MCG: at 08:54

## 2023-06-29 RX ADMIN — EPHEDRINE SULFATE 10 MG: 50 INJECTION INTRAVENOUS at 08:08

## 2023-06-29 RX ADMIN — ROCURONIUM BROMIDE 5 MG: 10 SOLUTION INTRAVENOUS at 07:33

## 2023-06-29 RX ADMIN — ROCURONIUM BROMIDE 10 MG: 10 SOLUTION INTRAVENOUS at 09:40

## 2023-06-29 RX ADMIN — MAGNESIUM SULFATE IN WATER 2000 MG: 2 INJECTION, SOLUTION INTRAVENOUS at 07:40

## 2023-06-29 RX ADMIN — EPHEDRINE SULFATE 15 MG: 50 INJECTION INTRAVENOUS at 08:03

## 2023-06-29 RX ADMIN — FENTANYL CITRATE 50 MCG: 50 INJECTION, SOLUTION INTRAMUSCULAR; INTRAVENOUS at 07:33

## 2023-06-29 RX ADMIN — KETOROLAC TROMETHAMINE 15 MG: 30 INJECTION, SOLUTION INTRAMUSCULAR; INTRAVENOUS at 14:33

## 2023-06-29 RX ADMIN — KETOROLAC TROMETHAMINE 15 MG: 30 INJECTION, SOLUTION INTRAMUSCULAR; INTRAVENOUS at 20:34

## 2023-06-29 RX ADMIN — LIDOCAINE HYDROCHLORIDE 1.5 MG/MIN: 4 INJECTION, SOLUTION INTRAVENOUS at 07:33

## 2023-06-29 RX ADMIN — DEXMEDETOMIDINE HYDROCHLORIDE 15 MCG: 100 INJECTION, SOLUTION, CONCENTRATE INTRAVENOUS at 10:07

## 2023-06-29 RX ADMIN — Medication 80 MCG: at 08:08

## 2023-06-29 RX ADMIN — Medication 120 MCG: at 09:12

## 2023-06-29 RX ADMIN — LIDOCAINE HYDROCHLORIDE 100 MG: 20 INJECTION, SOLUTION EPIDURAL; INFILTRATION; INTRACAUDAL; PERINEURAL at 07:33

## 2023-06-29 RX ADMIN — ACETAMINOPHEN 650 MG: 325 TABLET ORAL at 20:34

## 2023-06-29 RX ADMIN — DEXAMETHASONE SODIUM PHOSPHATE 8 MG: 4 INJECTION, SOLUTION INTRAMUSCULAR; INTRAVENOUS at 07:40

## 2023-06-29 RX ADMIN — FENTANYL CITRATE 50 MCG: 50 INJECTION, SOLUTION INTRAMUSCULAR; INTRAVENOUS at 07:40

## 2023-06-29 RX ADMIN — Medication 160 MG: at 07:34

## 2023-06-29 RX ADMIN — Medication 50 MG: at 07:33

## 2023-06-29 RX ADMIN — WATER 2000 MG: 1 INJECTION INTRAMUSCULAR; INTRAVENOUS; SUBCUTANEOUS at 07:40

## 2023-06-29 RX ADMIN — PANTOPRAZOLE SODIUM 40 MG: 40 TABLET, DELAYED RELEASE ORAL at 14:34

## 2023-06-29 RX ADMIN — PROPOFOL 200 MG: 10 INJECTION, EMULSION INTRAVENOUS at 07:33

## 2023-06-29 ASSESSMENT — PAIN SCALES - GENERAL
PAINLEVEL_OUTOF10: 8
PAINLEVEL_OUTOF10: 8
PAINLEVEL_OUTOF10: 10
PAINLEVEL_OUTOF10: 6
PAINLEVEL_OUTOF10: 7
PAINLEVEL_OUTOF10: 0
PAINLEVEL_OUTOF10: 10
PAINLEVEL_OUTOF10: 7

## 2023-06-29 ASSESSMENT — PAIN DESCRIPTION - LOCATION
LOCATION: ABDOMEN

## 2023-06-29 ASSESSMENT — PAIN DESCRIPTION - ORIENTATION
ORIENTATION: MID
ORIENTATION: ANTERIOR
ORIENTATION: MID;UPPER
ORIENTATION: MID;UPPER
ORIENTATION: UPPER;MID
ORIENTATION: MID
ORIENTATION: MID

## 2023-06-29 ASSESSMENT — ENCOUNTER SYMPTOMS: SHORTNESS OF BREATH: 1

## 2023-06-29 ASSESSMENT — PAIN DESCRIPTION - DESCRIPTORS
DESCRIPTORS: SORE
DESCRIPTORS: SORE
DESCRIPTORS: ACHING
DESCRIPTORS: SORE
DESCRIPTORS: SORE

## 2023-06-29 ASSESSMENT — PAIN - FUNCTIONAL ASSESSMENT
PAIN_FUNCTIONAL_ASSESSMENT: ACTIVITIES ARE NOT PREVENTED
PAIN_FUNCTIONAL_ASSESSMENT: 0-10
PAIN_FUNCTIONAL_ASSESSMENT: ACTIVITIES ARE NOT PREVENTED
PAIN_FUNCTIONAL_ASSESSMENT: NONE - DENIES PAIN

## 2023-06-29 ASSESSMENT — PAIN DESCRIPTION - PAIN TYPE
TYPE: SURGICAL PAIN
TYPE: SURGICAL PAIN

## 2023-06-30 LAB
ANION GAP SERPL CALC-SCNC: 6 MMOL/L (ref 5–15)
BUN SERPL-MCNC: 10 MG/DL (ref 6–20)
BUN/CREAT SERPL: 14 (ref 12–20)
CALCIUM SERPL-MCNC: 9.1 MG/DL (ref 8.5–10.1)
CHLORIDE SERPL-SCNC: 108 MMOL/L (ref 97–108)
CO2 SERPL-SCNC: 26 MMOL/L (ref 21–32)
CREAT SERPL-MCNC: 0.74 MG/DL (ref 0.55–1.02)
ERYTHROCYTE [DISTWIDTH] IN BLOOD BY AUTOMATED COUNT: 12.7 % (ref 11.5–14.5)
GLUCOSE BLD STRIP.AUTO-MCNC: 112 MG/DL (ref 65–117)
GLUCOSE SERPL-MCNC: 103 MG/DL (ref 65–100)
HCT VFR BLD AUTO: 33.2 % (ref 35–47)
HGB BLD-MCNC: 10 G/DL (ref 11.5–16)
MCH RBC QN AUTO: 26.2 PG (ref 26–34)
MCHC RBC AUTO-ENTMCNC: 30.1 G/DL (ref 30–36.5)
MCV RBC AUTO: 86.9 FL (ref 80–99)
NRBC # BLD: 0 K/UL (ref 0–0.01)
NRBC BLD-RTO: 0 PER 100 WBC
PLATELET # BLD AUTO: 326 K/UL (ref 150–400)
PMV BLD AUTO: 9.3 FL (ref 8.9–12.9)
POTASSIUM SERPL-SCNC: 3.9 MMOL/L (ref 3.5–5.1)
RBC # BLD AUTO: 3.82 M/UL (ref 3.8–5.2)
SERVICE CMNT-IMP: NORMAL
SODIUM SERPL-SCNC: 140 MMOL/L (ref 136–145)
WBC # BLD AUTO: 7.6 K/UL (ref 3.6–11)

## 2023-06-30 PROCEDURE — 1200000000 HC SEMI PRIVATE

## 2023-06-30 PROCEDURE — 2580000003 HC RX 258: Performed by: SURGERY

## 2023-06-30 PROCEDURE — 80048 BASIC METABOLIC PNL TOTAL CA: CPT

## 2023-06-30 PROCEDURE — 36415 COLL VENOUS BLD VENIPUNCTURE: CPT

## 2023-06-30 PROCEDURE — 82962 GLUCOSE BLOOD TEST: CPT

## 2023-06-30 PROCEDURE — 6370000000 HC RX 637 (ALT 250 FOR IP): Performed by: SURGERY

## 2023-06-30 PROCEDURE — 85027 COMPLETE CBC AUTOMATED: CPT

## 2023-06-30 PROCEDURE — 6370000000 HC RX 637 (ALT 250 FOR IP): Performed by: NURSE PRACTITIONER

## 2023-06-30 PROCEDURE — 6360000002 HC RX W HCPCS: Performed by: SURGERY

## 2023-06-30 RX ORDER — HYDROMORPHONE HYDROCHLORIDE 2 MG/1
2 TABLET ORAL EVERY 6 HOURS PRN
Qty: 16 TABLET | Refills: 0 | Status: SHIPPED | OUTPATIENT
Start: 2023-06-30 | End: 2023-07-04

## 2023-06-30 RX ORDER — HYDROMORPHONE HYDROCHLORIDE 4 MG/1
4 TABLET ORAL
Status: DISCONTINUED | OUTPATIENT
Start: 2023-06-30 | End: 2023-07-01 | Stop reason: HOSPADM

## 2023-06-30 RX ORDER — SIMETHICONE 80 MG
80 TABLET,CHEWABLE ORAL EVERY 6 HOURS PRN
Status: DISCONTINUED | OUTPATIENT
Start: 2023-06-30 | End: 2023-07-01 | Stop reason: HOSPADM

## 2023-06-30 RX ORDER — METOCLOPRAMIDE HYDROCHLORIDE 5 MG/ML
10 INJECTION INTRAMUSCULAR; INTRAVENOUS EVERY 8 HOURS
Status: DISCONTINUED | OUTPATIENT
Start: 2023-06-30 | End: 2023-07-01 | Stop reason: HOSPADM

## 2023-06-30 RX ADMIN — SODIUM CHLORIDE, POTASSIUM CHLORIDE, SODIUM LACTATE AND CALCIUM CHLORIDE: 600; 310; 30; 20 INJECTION, SOLUTION INTRAVENOUS at 15:57

## 2023-06-30 RX ADMIN — KETOROLAC TROMETHAMINE 15 MG: 30 INJECTION, SOLUTION INTRAMUSCULAR; INTRAVENOUS at 03:53

## 2023-06-30 RX ADMIN — ENOXAPARIN SODIUM 40 MG: 100 INJECTION SUBCUTANEOUS at 08:52

## 2023-06-30 RX ADMIN — FLUTICASONE PROPIONATE 1 SPRAY: 50 SPRAY, METERED NASAL at 08:57

## 2023-06-30 RX ADMIN — KETOROLAC TROMETHAMINE 15 MG: 30 INJECTION, SOLUTION INTRAMUSCULAR; INTRAVENOUS at 08:52

## 2023-06-30 RX ADMIN — ACETAMINOPHEN 650 MG: 325 TABLET ORAL at 03:52

## 2023-06-30 RX ADMIN — KETOROLAC TROMETHAMINE 15 MG: 30 INJECTION, SOLUTION INTRAMUSCULAR; INTRAVENOUS at 21:20

## 2023-06-30 RX ADMIN — SODIUM CHLORIDE, POTASSIUM CHLORIDE, SODIUM LACTATE AND CALCIUM CHLORIDE: 600; 310; 30; 20 INJECTION, SOLUTION INTRAVENOUS at 08:51

## 2023-06-30 RX ADMIN — HYDROMORPHONE HYDROCHLORIDE 0.5 MG: 1 INJECTION, SOLUTION INTRAMUSCULAR; INTRAVENOUS; SUBCUTANEOUS at 04:07

## 2023-06-30 RX ADMIN — ACETAMINOPHEN 650 MG: 325 TABLET ORAL at 21:20

## 2023-06-30 RX ADMIN — ACETAMINOPHEN 650 MG: 325 TABLET ORAL at 08:51

## 2023-06-30 RX ADMIN — PANTOPRAZOLE SODIUM 40 MG: 40 TABLET, DELAYED RELEASE ORAL at 08:51

## 2023-06-30 RX ADMIN — KETOROLAC TROMETHAMINE 15 MG: 30 INJECTION, SOLUTION INTRAMUSCULAR; INTRAVENOUS at 15:55

## 2023-06-30 RX ADMIN — SODIUM CHLORIDE, PRESERVATIVE FREE 10 ML: 5 INJECTION INTRAVENOUS at 21:20

## 2023-06-30 RX ADMIN — SIMETHICONE 80 MG: 80 TABLET, CHEWABLE ORAL at 16:48

## 2023-06-30 RX ADMIN — METOCLOPRAMIDE 10 MG: 5 INJECTION, SOLUTION INTRAMUSCULAR; INTRAVENOUS at 18:19

## 2023-06-30 RX ADMIN — ACETAMINOPHEN 650 MG: 325 TABLET ORAL at 15:55

## 2023-06-30 ASSESSMENT — PAIN DESCRIPTION - LOCATION
LOCATION: ABDOMEN

## 2023-06-30 ASSESSMENT — PAIN SCALES - GENERAL
PAINLEVEL_OUTOF10: 8
PAINLEVEL_OUTOF10: 0
PAINLEVEL_OUTOF10: 4
PAINLEVEL_OUTOF10: 6

## 2023-06-30 ASSESSMENT — PAIN DESCRIPTION - ORIENTATION
ORIENTATION: ANTERIOR
ORIENTATION: MID

## 2023-06-30 ASSESSMENT — PAIN DESCRIPTION - DESCRIPTORS
DESCRIPTORS: ACHING;SORE
DESCRIPTORS: ACHING

## 2023-06-30 ASSESSMENT — PAIN DESCRIPTION - PAIN TYPE: TYPE: SURGICAL PAIN

## 2023-07-01 VITALS
SYSTOLIC BLOOD PRESSURE: 132 MMHG | HEART RATE: 99 BPM | HEIGHT: 63 IN | WEIGHT: 225.53 LBS | RESPIRATION RATE: 18 BRPM | DIASTOLIC BLOOD PRESSURE: 89 MMHG | TEMPERATURE: 98.8 F | OXYGEN SATURATION: 96 % | BODY MASS INDEX: 39.96 KG/M2

## 2023-07-01 PROCEDURE — 6370000000 HC RX 637 (ALT 250 FOR IP): Performed by: SURGERY

## 2023-07-01 PROCEDURE — 99024 POSTOP FOLLOW-UP VISIT: CPT | Performed by: SURGERY

## 2023-07-01 PROCEDURE — 6360000002 HC RX W HCPCS: Performed by: SURGERY

## 2023-07-01 RX ADMIN — ENOXAPARIN SODIUM 40 MG: 100 INJECTION SUBCUTANEOUS at 10:56

## 2023-07-01 RX ADMIN — FLUTICASONE PROPIONATE 1 SPRAY: 50 SPRAY, METERED NASAL at 10:51

## 2023-07-01 RX ADMIN — METOCLOPRAMIDE 10 MG: 5 INJECTION, SOLUTION INTRAMUSCULAR; INTRAVENOUS at 10:52

## 2023-07-01 RX ADMIN — ACETAMINOPHEN 650 MG: 325 TABLET ORAL at 03:45

## 2023-07-01 RX ADMIN — KETOROLAC TROMETHAMINE 15 MG: 30 INJECTION, SOLUTION INTRAMUSCULAR; INTRAVENOUS at 10:47

## 2023-07-01 RX ADMIN — METOCLOPRAMIDE 10 MG: 5 INJECTION, SOLUTION INTRAMUSCULAR; INTRAVENOUS at 01:08

## 2023-07-01 RX ADMIN — PANTOPRAZOLE SODIUM 40 MG: 40 TABLET, DELAYED RELEASE ORAL at 10:50

## 2023-07-01 RX ADMIN — KETOROLAC TROMETHAMINE 15 MG: 30 INJECTION, SOLUTION INTRAMUSCULAR; INTRAVENOUS at 03:45

## 2023-07-01 RX ADMIN — ACETAMINOPHEN 650 MG: 325 TABLET ORAL at 10:50

## 2023-07-01 ASSESSMENT — PAIN DESCRIPTION - ORIENTATION: ORIENTATION: RIGHT

## 2023-07-01 ASSESSMENT — PAIN DESCRIPTION - DESCRIPTORS: DESCRIPTORS: ACHING

## 2023-07-01 ASSESSMENT — PAIN DESCRIPTION - LOCATION: LOCATION: ABDOMEN

## 2023-07-03 ENCOUNTER — CARE COORDINATION (OUTPATIENT)
Dept: OTHER | Facility: CLINIC | Age: 43
End: 2023-07-03

## 2023-07-03 ENCOUNTER — TELEPHONE (OUTPATIENT)
Age: 43
End: 2023-07-03

## 2023-07-03 NOTE — TELEPHONE ENCOUNTER
Bariatric Post Op Call 48 hour    Hydration: Less than 32 ounces of water daily is fair to poor (Goal is 64 ounces per day)  Poor [] Fair []  Good [x] Great []    Amount: Drank 72 oz water     Comment:     Ambulation:( walking throughout the day, at least every 2 hours while awake. Patient should be up and out of bed most of the day.)   Poor [] Fair [x]  Good [] Great []    Comment: She did some walking on Saturday 1 hr    Urine Color: Question of any odor and color (should be elvin, pale, and clear)   Dark [] Elvin [] Pale [] Clear [x]    Comment: No issues voiding    Diet: Question any nausea and/or vomiting. Protein intake (ultimately goal is 60 grams of protein daily, but at 2 days post op they should be working towards this and may not be at goal yet)  Poor [] Fair [x]  Good [] Great []    Comment: No N/V consumed 45 gms protein shake      Bowel movements: Question of any constipation- haven't had any bowel movements for more than 3 days. This could be related to protein intake and/or narcotic pain medication usage. Comment: Last BM was Wednesday passing gas no abdominal bloating or cramping. I advised to take miralex if still no BM take MOM drink fluids and walk. Use of incentive spirometer:  Yes [x]  No []    Comment:     Incision: (No redness, pain, swelling or fever)     Healing Well [x] Redness [] Pain [] Drainage [] Swelling []    Comment: No redness swelling or drainage    Pain: Right sided incisional (usually the largest incision with deep stitch) abdominal pain is normal (should be less than 3). Pain 0 - 10: C/O intermittent left sided abdominal pain 5/10    Comment (are they taking pain medication and is it helping? Abdominal support / splinting/ ice or heat?): Taking hydromorphone alternating with tylenol. I advised to apply heating pad or ice pack splint area wear support under garment.      Referred to provider:      Next appointment: 7/13/23 at 9:40 AM      Red Flags =

## 2023-07-05 ENCOUNTER — OFFICE VISIT (OUTPATIENT)
Age: 43
End: 2023-07-05
Payer: COMMERCIAL

## 2023-07-05 VITALS — BODY MASS INDEX: 39.87 KG/M2 | HEIGHT: 63 IN | WEIGHT: 225 LBS

## 2023-07-05 DIAGNOSIS — N60.02 BILATERAL BREAST CYSTS: Primary | ICD-10-CM

## 2023-07-05 DIAGNOSIS — N60.01 BILATERAL BREAST CYSTS: Primary | ICD-10-CM

## 2023-07-05 PROCEDURE — 76642 ULTRASOUND BREAST LIMITED: CPT | Performed by: SURGERY

## 2023-07-05 PROCEDURE — 99214 OFFICE O/P EST MOD 30 MIN: CPT | Performed by: SURGERY

## 2023-07-05 NOTE — PROGRESS NOTES
Physician Progress Note      PATIENT:               Ang Martinez  CSN #:                  [id number]  :                       1980  ADMIT DATE:       2023 5:09 AM  DISCH DATE:        2023 1:10 PM  RESPONDING  PROVIDER #:        Esmer Jorge MD          QUERY TEXT:    Pt admitted for laparoscopic sleeve gastrectomy. Noted documentation of lot   of adhesions in the mediastinum, making the dissection a bit more difficult   than average in Op note on . If possible, please document in progress   notes and discharge summary:    The medical record reflects the following:  Risk Factors: 42yo with hx of SBO and small bowel resection    Clinical Indicators: Op note  We noted that the patient had a lot of adhesions in the mediastinum, making   the dissection a bit more difficult than average, taking greater time to   dissect out that area very thoroughly. Treatment: dissection of adhesions    Thank you,  Kelly Medley  [phone]  I am also available via [employer]. 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     Query created by: Kelly Medley on 7/3/2023 9:36 AM      Electronically signed by:  Esmer Jorge MD 2023 8:34 AM

## 2023-07-06 ENCOUNTER — TELEPHONE (OUTPATIENT)
Age: 43
End: 2023-07-06

## 2023-07-06 ENCOUNTER — APPOINTMENT (OUTPATIENT)
Facility: HOSPITAL | Age: 43
End: 2023-07-06
Payer: COMMERCIAL

## 2023-07-06 ENCOUNTER — OFFICE VISIT (OUTPATIENT)
Age: 43
End: 2023-07-06

## 2023-07-06 ENCOUNTER — HOSPITAL ENCOUNTER (EMERGENCY)
Facility: HOSPITAL | Age: 43
Discharge: HOME | End: 2023-07-06
Attending: EMERGENCY MEDICINE
Payer: COMMERCIAL

## 2023-07-06 VITALS
HEART RATE: 100 BPM | DIASTOLIC BLOOD PRESSURE: 92 MMHG | SYSTOLIC BLOOD PRESSURE: 144 MMHG | TEMPERATURE: 97.9 F | OXYGEN SATURATION: 98 % | RESPIRATION RATE: 16 BRPM

## 2023-07-06 VITALS
OXYGEN SATURATION: 96 % | SYSTOLIC BLOOD PRESSURE: 126 MMHG | RESPIRATION RATE: 19 BRPM | HEART RATE: 121 BPM | HEIGHT: 63 IN | DIASTOLIC BLOOD PRESSURE: 83 MMHG | BODY MASS INDEX: 37.39 KG/M2 | TEMPERATURE: 98.1 F | WEIGHT: 211 LBS

## 2023-07-06 DIAGNOSIS — E86.0 DEHYDRATION: Primary | ICD-10-CM

## 2023-07-06 DIAGNOSIS — J18.9 LINGULAR PNEUMONIA: ICD-10-CM

## 2023-07-06 DIAGNOSIS — R07.1 CHEST PAIN ON BREATHING: ICD-10-CM

## 2023-07-06 DIAGNOSIS — E86.0 DEHYDRATION: ICD-10-CM

## 2023-07-06 DIAGNOSIS — Z09 SURGICAL FOLLOW-UP CARE: Primary | ICD-10-CM

## 2023-07-06 DIAGNOSIS — Z90.3 S/P GASTRIC SLEEVE PROCEDURE: ICD-10-CM

## 2023-07-06 DIAGNOSIS — K59.01 SLOW TRANSIT CONSTIPATION: ICD-10-CM

## 2023-07-06 DIAGNOSIS — E66.9 OBESITY (BMI 30-39.9): ICD-10-CM

## 2023-07-06 DIAGNOSIS — R06.02 SOB (SHORTNESS OF BREATH): ICD-10-CM

## 2023-07-06 LAB
ALBUMIN SERPL-MCNC: 3.6 G/DL (ref 3.5–5)
ALBUMIN/GLOB SERPL: 0.8 (ref 1.1–2.2)
ALP SERPL-CCNC: 92 U/L (ref 45–117)
ALT SERPL-CCNC: 42 U/L (ref 12–78)
ANION GAP SERPL CALC-SCNC: 7 MMOL/L (ref 5–15)
AST SERPL-CCNC: 16 U/L (ref 15–37)
BASOPHILS # BLD: 0.1 K/UL (ref 0–0.1)
BASOPHILS NFR BLD: 1 % (ref 0–1)
BILIRUB SERPL-MCNC: 0.5 MG/DL (ref 0.2–1)
BUN SERPL-MCNC: 12 MG/DL (ref 6–20)
BUN/CREAT SERPL: 17 (ref 12–20)
CALCIUM SERPL-MCNC: 10 MG/DL (ref 8.5–10.1)
CHLORIDE SERPL-SCNC: 102 MMOL/L (ref 97–108)
CO2 SERPL-SCNC: 25 MMOL/L (ref 21–32)
COMMENT:: NORMAL
CREAT SERPL-MCNC: 0.7 MG/DL (ref 0.55–1.02)
DIFFERENTIAL METHOD BLD: ABNORMAL
EOSINOPHIL # BLD: 0.1 K/UL (ref 0–0.4)
EOSINOPHIL NFR BLD: 2 % (ref 0–7)
ERYTHROCYTE [DISTWIDTH] IN BLOOD BY AUTOMATED COUNT: 12.4 % (ref 11.5–14.5)
GLOBULIN SER CALC-MCNC: 4.6 G/DL (ref 2–4)
GLUCOSE SERPL-MCNC: 93 MG/DL (ref 65–100)
HCT VFR BLD AUTO: 39.7 % (ref 35–47)
HGB BLD-MCNC: 12.3 G/DL (ref 11.5–16)
IMM GRANULOCYTES # BLD AUTO: 0 K/UL (ref 0–0.04)
IMM GRANULOCYTES NFR BLD AUTO: 0 % (ref 0–0.5)
LYMPHOCYTES # BLD: 2.1 K/UL (ref 0.8–3.5)
LYMPHOCYTES NFR BLD: 24 % (ref 12–49)
MAGNESIUM SERPL-MCNC: 2.2 MG/DL (ref 1.6–2.4)
MCH RBC QN AUTO: 26.3 PG (ref 26–34)
MCHC RBC AUTO-ENTMCNC: 31 G/DL (ref 30–36.5)
MCV RBC AUTO: 85 FL (ref 80–99)
MONOCYTES # BLD: 0.6 K/UL (ref 0–1)
MONOCYTES NFR BLD: 6 % (ref 5–13)
NEUTS SEG # BLD: 5.9 K/UL (ref 1.8–8)
NEUTS SEG NFR BLD: 67 % (ref 32–75)
NRBC # BLD: 0 K/UL (ref 0–0.01)
NRBC BLD-RTO: 0 PER 100 WBC
PLATELET # BLD AUTO: 440 K/UL (ref 150–400)
PMV BLD AUTO: 9.2 FL (ref 8.9–12.9)
POTASSIUM SERPL-SCNC: 3.9 MMOL/L (ref 3.5–5.1)
PROT SERPL-MCNC: 8.2 G/DL (ref 6.4–8.2)
RBC # BLD AUTO: 4.67 M/UL (ref 3.8–5.2)
SODIUM SERPL-SCNC: 134 MMOL/L (ref 136–145)
SPECIMEN HOLD: NORMAL
TROPONIN I SERPL HS-MCNC: <3 NG/L (ref 0–37)
WBC # BLD AUTO: 8.7 K/UL (ref 3.6–11)

## 2023-07-06 PROCEDURE — 96361 HYDRATE IV INFUSION ADD-ON: CPT

## 2023-07-06 PROCEDURE — 71275 CT ANGIOGRAPHY CHEST: CPT

## 2023-07-06 PROCEDURE — 2580000003 HC RX 258: Performed by: EMERGENCY MEDICINE

## 2023-07-06 PROCEDURE — 83735 ASSAY OF MAGNESIUM: CPT

## 2023-07-06 PROCEDURE — 84484 ASSAY OF TROPONIN QUANT: CPT

## 2023-07-06 PROCEDURE — 99285 EMERGENCY DEPT VISIT HI MDM: CPT

## 2023-07-06 PROCEDURE — 6360000004 HC RX CONTRAST MEDICATION: Performed by: RADIOLOGY

## 2023-07-06 PROCEDURE — 80053 COMPREHEN METABOLIC PANEL: CPT

## 2023-07-06 PROCEDURE — 93005 ELECTROCARDIOGRAM TRACING: CPT | Performed by: EMERGENCY MEDICINE

## 2023-07-06 PROCEDURE — 96360 HYDRATION IV INFUSION INIT: CPT

## 2023-07-06 PROCEDURE — 85025 COMPLETE CBC W/AUTO DIFF WBC: CPT

## 2023-07-06 PROCEDURE — 36415 COLL VENOUS BLD VENIPUNCTURE: CPT

## 2023-07-06 PROCEDURE — 99024 POSTOP FOLLOW-UP VISIT: CPT | Performed by: NURSE PRACTITIONER

## 2023-07-06 RX ORDER — DOXYCYCLINE HYCLATE 100 MG
100 TABLET ORAL 2 TIMES DAILY
Qty: 14 TABLET | Refills: 0 | Status: SHIPPED | OUTPATIENT
Start: 2023-07-06 | End: 2023-07-13

## 2023-07-06 RX ORDER — 0.9 % SODIUM CHLORIDE 0.9 %
1000 INTRAVENOUS SOLUTION INTRAVENOUS ONCE
Status: COMPLETED | OUTPATIENT
Start: 2023-07-06 | End: 2023-07-06

## 2023-07-06 RX ADMIN — SODIUM CHLORIDE 1000 ML: 9 INJECTION, SOLUTION INTRAVENOUS at 15:54

## 2023-07-06 RX ADMIN — IOPAMIDOL 80 ML: 755 INJECTION, SOLUTION INTRAVENOUS at 16:20

## 2023-07-06 ASSESSMENT — ENCOUNTER SYMPTOMS
DIARRHEA: 0
ABDOMINAL PAIN: 0
COUGH: 0
VOMITING: 0
NAUSEA: 1
SHORTNESS OF BREATH: 1
COLOR CHANGE: 0
SORE THROAT: 0
BACK PAIN: 0

## 2023-07-06 ASSESSMENT — PATIENT HEALTH QUESTIONNAIRE - PHQ9
SUM OF ALL RESPONSES TO PHQ QUESTIONS 1-9: 0
SUM OF ALL RESPONSES TO PHQ9 QUESTIONS 1 & 2: 0
SUM OF ALL RESPONSES TO PHQ QUESTIONS 1-9: 0
2. FEELING DOWN, DEPRESSED OR HOPELESS: 0
1. LITTLE INTEREST OR PLEASURE IN DOING THINGS: 0
SUM OF ALL RESPONSES TO PHQ QUESTIONS 1-9: 0
SUM OF ALL RESPONSES TO PHQ QUESTIONS 1-9: 0

## 2023-07-06 NOTE — TELEPHONE ENCOUNTER
I called Ms Gerardo Hu verified patient with 2 patient identifiers. Patient is s/p laparoscopic sleeve gastrectomy surgery on 6/29/23. Patient states she is having dull left sided abdominal pain 7/10. Patient sates the pain comes and goes the pain has worsened since we last spoke. The pain is worse at night when she lay down. Ms Gerardo Hu states she hasn't taken the pain medication in a few days. She taking tylenol. I advised patient to take the hydromorphone alternate with tylenol. Apply ice pack or heating pad wear support undergarment splint area. She has had a small BM. She is taking miralex. I advised to take MOM. She is voiding good. She denies any c/o SOB/Chest pain no swelling to extremities. She is up moving around as much as tolerated. I informed patient I will speak with Mariana Vicente and call her back later. I followed up call to Ms Gerardo Hu advised per Mariana Vicente come into the office to see her today at 2 PM.     The  made aware.

## 2023-07-06 NOTE — ED TRIAGE NOTES
Pt had gastric sleeve on 6/29, pt c/o left sided abd pain, denies fever, denies n/v, denies urinary symptoms, +constipation, last bm today, hard , sob with walking , +fatigue

## 2023-07-06 NOTE — ED PROVIDER NOTES
[facility] EMERGENCY DEP  EMERGENCY DEPARTMENT ENCOUNTER      Pt Name: Ana Sandoval  MRN: [id number]  [address] [facility] 1980  Date of evaluation: 7/6/2023  Provider: Diann Dunn       Chief Complaint   Patient presents with    Abdominal Pain         HISTORY OF PRESENT ILLNESS   (Location/Symptom, Timing/Onset, Context/Setting, Quality, Duration, Modifying Factors, Severity)  Note limiting factors. Ana Sandoval is a 37 y.o. female with past medical history as listed below who presents to the emergency department upon referral from her bariatric surgeon. She was in the office for a follow-up today, she is 1 week s/p gastric sleeve procedure. States that for the past several days she has not been wanting to drink much and they are concerned she may be dehydrated. She is also been experiencing some left-sided chest pain and shortness of breath. She denies any worsening abdominal pain, vomiting, just states \"I do not feel like drinking water\". Nursing Notes were reviewed. REVIEW OF SYSTEMS    (2-9 systems for level 4, 10 or more for level 5)     Review of Systems   Constitutional:  Negative for fever. HENT:  Negative for congestion and sore throat. Eyes:  Negative for visual disturbance. Respiratory:  Positive for shortness of breath. Negative for cough. Cardiovascular:  Positive for chest pain. Gastrointestinal:  Positive for nausea. Negative for abdominal pain, diarrhea and vomiting. Genitourinary:  Negative for dysuria. Musculoskeletal:  Negative for back pain and neck pain. Skin:  Negative for color change. Neurological:  Negative for dizziness and headaches. Psychiatric/Behavioral:  Negative for confusion. Except as noted above the remainder of the review of systems was reviewed and negative.        PAST MEDICAL HISTORY     Past Medical History:   Diagnosis Date    Abnormal Pap smear of cervix 1990s    Arthritis     IN BACK    Breast cyst 2009

## 2023-07-06 NOTE — ED NOTES
Patient (s)  given copy of dc instructions and  script(s). Patient (s)  verbalized understanding of instructions and script (s). Patient given a current medication reconciliation form and verbalized understanding of their medications. Patient (s) verbalized understanding of the importance of discussing medications with  his or her physician or clinic they will be following up with. Patient alert and oriented and in no acute distress. Patient discharged home ambulatory with all belongings.         Kim Ramirez RN  07/06/23 [id number]

## 2023-07-06 NOTE — PATIENT INSTRUCTIONS
ER today for IV fluids and CT of your chest to assess for blood clot.     Please resume the Nexium, Vitamin D, and Linzess    Stay on liquids and we will check on you tomorrow

## 2023-07-06 NOTE — TELEPHONE ENCOUNTER
----- Message from [address] [place] [address]. Renate Lima sent at 7/5/2023 10:08 PM EDT -----  Regarding: Pain  Contact: [phone]  I have a sharp pain on my left side I guess like rib cage area I just want to know if that is normal or should I come in for a check. Just let me know thank you so much.

## 2023-07-07 ENCOUNTER — CARE COORDINATION (OUTPATIENT)
Dept: OTHER | Facility: CLINIC | Age: 43
End: 2023-07-07

## 2023-07-07 ENCOUNTER — TELEPHONE (OUTPATIENT)
Age: 43
End: 2023-07-07

## 2023-07-07 NOTE — CARE COORDINATION
Care Transition Follow up Note     ACM attempted to reach patient for follow up transitions of care call r/t ED visit yesterday. Voicemail full, will continue to outreach.      Plan for follow-up call in 1-2 days    Future Appointments   Date Time Provider [address]   7/13/2023  9:40 AM Zenia Sumner APRN -  [address] BS AMB   7/27/2023  9:40 AM Ai Moura APRN -  [address] BS AMB   8/1/2023  8:40 AM Mike Leavitt MD [facility] BS AMB   8/10/2023  9:40 AM Zenia Sumner APRN -  [address] BS AMB   7/8/2024  8:45 AM LEANNE Bland - ISMAEL C BS AMB

## 2023-07-07 NOTE — TELEPHONE ENCOUNTER
Called patient to follow up on ED visit. 2 patient identifers were used. Patient stated she feels a lot better, she stated she just picked up her medication from the pharmacy because it wasn't ready earlier. She stated she still is experiencing some left sided pain rates it 4/10 at the moment but stated she hopes when she take the antibiotics she will start to feel better. Patient stated she is doing better with her fluids, she stated she drank two 16.9 ox water bottles and a 12 oz Gatorade. Patient denies nausea and vomiting, she stated she holding down her liquids a lot better. Patient stated she was told to take Miralax or Milk of mag for constipation but stated she took Dulcolax pill instead because the Miralax was nasty. Asked patient if she has her bariatric book and she stated she do but not on her at the moment. Made patient aware if she turns to page 21 it shows what to take for constipation. Patient verbalized understanding and also stated if she has any questions and concerns to call our office, and made aware of on-call surgeon. Patient verbalized understanding anf thanked for the call. Made provider aware of message above.

## 2023-07-07 NOTE — TELEPHONE ENCOUNTER
----- Message from LEANNE Sales NP sent at 7/7/2023  1:50 PM EDT -----  Regarding: follow up on ER visit yesterday  Zi Joseph - will you please call to check in on this patient from yesterday. She should still be on bariatric full liquids and hopefully doing better with drinking. In the ER, no PE and maybe early pneumonia. She was put on antibiotics.  If not doing better with fluid intake, will need to see about getting her to [facility] on Monday - thanks RALEIGH

## 2023-07-08 LAB
EKG ATRIAL RATE: 100 BPM
EKG DIAGNOSIS: NORMAL
EKG P AXIS: 73 DEGREES
EKG P-R INTERVAL: 136 MS
EKG Q-T INTERVAL: 344 MS
EKG QRS DURATION: 66 MS
EKG QTC CALCULATION (BAZETT): 443 MS
EKG R AXIS: 78 DEGREES
EKG T AXIS: 71 DEGREES
EKG VENTRICULAR RATE: 100 BPM

## 2023-07-08 PROCEDURE — 93010 ELECTROCARDIOGRAM REPORT: CPT | Performed by: INTERNAL MEDICINE

## 2023-07-10 ENCOUNTER — CARE COORDINATION (OUTPATIENT)
Dept: OTHER | Facility: CLINIC | Age: 43
End: 2023-07-10

## 2023-07-10 NOTE — CARE COORDINATION
[facility] Care Transitions Follow Up Call    Patient Current Location:  [facility] Transition Nurse contacted the patient by telephone to follow up after admission on 2023. Verified name and  with patient as identifiers. Patient: Gabino Booker  Patient : 1980   MRN: [id number]  Reason for Admission: morbid obesity   Discharge Date: 23 RARS: Readmission Risk Score: 2.9      Needs to be reviewed by the provider   Additional needs identified to be addressed with provider: No  none             Method of communication with provider: none. Able to reach pt. Discussed post-op follow up and ED visit. Pt maid post-op follow with surgery for  and surgery sent pt to ED for dehydration and possible pneumonia. Pt also started on Doxy in ED. Pt able to  and taking as ordered. Discussed importance of completing all doses. Pt also started on Nexium and resumed Linzess. Pt started as ordered. Discussed BM and pt is becoming more regular. Discussed diet and hydration. Pt reports she is feeling much better with only slight side pain, no respiratory symptoms otherwise. Pt denied cough or congestion. Pt reports vitamins arrived in mail and she is taking as ordered. Pt has follow up with surgery 2023. Pt reports plans to return to work around 2023 and working with supervisor to get days off for needed follow ups after return. Pt agreeable to [facility] follow up. Addressed changes since last contact:  none  Discussed follow-up appointments. If no appointment was previously scheduled, appointment scheduling offered: Yes. Is follow up appointment scheduled within 7 days of discharge? Yes.     Follow Up  Future Appointments   Date Time Provider [address]   2023  9:40 AM Bar Peguero APRN -  [address] BS AMB   2023  9:40 AM Hyacinth Ramirez APRN -  [address] BS AMB   2023  8:40 AM MD HENRY Phelps BS AMB   8/10/2023  9:40 AM Bar Peguero APRN -  [address]

## 2023-07-13 ENCOUNTER — OFFICE VISIT (OUTPATIENT)
Age: 43
End: 2023-07-13

## 2023-07-13 VITALS
BODY MASS INDEX: 37.07 KG/M2 | RESPIRATION RATE: 15 BRPM | DIASTOLIC BLOOD PRESSURE: 83 MMHG | TEMPERATURE: 98.1 F | SYSTOLIC BLOOD PRESSURE: 121 MMHG | OXYGEN SATURATION: 96 % | HEIGHT: 63 IN | HEART RATE: 72 BPM | WEIGHT: 209.2 LBS

## 2023-07-13 DIAGNOSIS — Z90.3 S/P GASTRIC SLEEVE PROCEDURE: ICD-10-CM

## 2023-07-13 DIAGNOSIS — E66.9 OBESITY (BMI 30-39.9): ICD-10-CM

## 2023-07-13 DIAGNOSIS — Z09 SURGICAL FOLLOW-UP CARE: Primary | ICD-10-CM

## 2023-07-13 PROCEDURE — 99024 POSTOP FOLLOW-UP VISIT: CPT | Performed by: NURSE PRACTITIONER

## 2023-07-13 ASSESSMENT — PATIENT HEALTH QUESTIONNAIRE - PHQ9
SUM OF ALL RESPONSES TO PHQ QUESTIONS 1-9: 0
1. LITTLE INTEREST OR PLEASURE IN DOING THINGS: 0
SUM OF ALL RESPONSES TO PHQ QUESTIONS 1-9: 0
SUM OF ALL RESPONSES TO PHQ QUESTIONS 1-9: 0
2. FEELING DOWN, DEPRESSED OR HOPELESS: 0
SUM OF ALL RESPONSES TO PHQ9 QUESTIONS 1 & 2: 0
SUM OF ALL RESPONSES TO PHQ QUESTIONS 1-9: 0

## 2023-07-17 NOTE — PROGRESS NOTES
I faxed [facility] paperwork to [facility] with RTW date 7/27/23 confirmation received.

## 2023-07-18 ENCOUNTER — CARE COORDINATION (OUTPATIENT)
Dept: OTHER | Facility: CLINIC | Age: 43
End: 2023-07-18

## 2023-07-18 NOTE — CARE COORDINATION
[facility] Care Transitions Follow Up Call    Patient Current Location:  [facility] Transition Nurse contacted the patient by telephone to follow up after admission on 2023. Verified name and  with patient as identifiers. Patient: Liss Lomax  Patient : 1980   MRN: [id number]  Reason for Admission: morbid obesity   Discharge Date: 23 RARS: Readmission Risk Score: 2.9      Needs to be reviewed by the provider   Additional needs identified to be addressed with provider: No  none             Method of communication with provider: none. Able to reach pt. Discussed post op follow up. Pt reports she is doing well and following all recommendations post op. Pt does feel she is managing PO intake much better. Pt feels she has everything she needs. Pt declines any further ACM needs. Will sign off. No further outreach scheduled with this ACM, ACM will sign off care team at this time. Episode of Care resolved. Patient  has this ACM's contact information if future needs arise. Addressed changes since last contact:  none  Discussed follow-up appointments. If no appointment was previously scheduled, appointment scheduling offered: Yes. Is follow up appointment scheduled within 7 days of discharge? Yes. Follow Up  Future Appointments   Date Time Provider [address]   2023  9:40 AM Roberto Romero APRN -  [address]  AMB   2023  8:40 AM aRchna Miguel MD [facility] BS [facility]   8/10/2023  9:40 AM Jeannie Mcgregor APRN -  [address]  AMB   2024  8:45 AM LEANNE Carrion NP [facility]     Non-[facility] follow up appointment(s): n/a     Care Transition Nurse reviewed discharge instructions and red flags with patient and discussed any barriers to care and/or understanding of plan of care after discharge. Discussed appropriate site of care based on symptoms and resources available to patient including: PCP  Specialist  Urgent care clinics  When to call Mich Powell.  The

## 2023-07-20 ENCOUNTER — TELEPHONE (OUTPATIENT)
Age: 43
End: 2023-07-20

## 2023-07-20 NOTE — TELEPHONE ENCOUNTER
has any questions or concerns. ____________________________________________________________    If more than one parameter is not met or considered poor, nurse needs to discuss with provider recommend for patient to be seen in the office as soon as possible or refer to the provider for follow-up. Reinforce to patient to use bariatric educational booklet as guide. It is appropriate to refer patient to the nutritionist to discuss more in detail of diet and nutrition.

## 2023-07-27 ENCOUNTER — OFFICE VISIT (OUTPATIENT)
Age: 43
End: 2023-07-27

## 2023-07-27 VITALS
OXYGEN SATURATION: 96 % | HEIGHT: 63 IN | TEMPERATURE: 98.4 F | RESPIRATION RATE: 18 BRPM | WEIGHT: 204.2 LBS | BODY MASS INDEX: 36.18 KG/M2 | HEART RATE: 107 BPM

## 2023-07-27 DIAGNOSIS — Z09 SURGICAL FOLLOW-UP CARE: ICD-10-CM

## 2023-07-27 DIAGNOSIS — Z90.3 S/P GASTRIC SLEEVE PROCEDURE: ICD-10-CM

## 2023-07-27 DIAGNOSIS — E66.9 OBESITY (BMI 30-39.9): Primary | ICD-10-CM

## 2023-07-27 PROCEDURE — 99024 POSTOP FOLLOW-UP VISIT: CPT | Performed by: NURSE PRACTITIONER

## 2023-07-27 ASSESSMENT — ANXIETY QUESTIONNAIRES
4. TROUBLE RELAXING: 0
GAD7 TOTAL SCORE: 0
2. NOT BEING ABLE TO STOP OR CONTROL WORRYING: 0
3. WORRYING TOO MUCH ABOUT DIFFERENT THINGS: 0
6. BECOMING EASILY ANNOYED OR IRRITABLE: 0
7. FEELING AFRAID AS IF SOMETHING AWFUL MIGHT HAPPEN: 0
1. FEELING NERVOUS, ANXIOUS, OR ON EDGE: 0
5. BEING SO RESTLESS THAT IT IS HARD TO SIT STILL: 0
IF YOU CHECKED OFF ANY PROBLEMS ON THIS QUESTIONNAIRE, HOW DIFFICULT HAVE THESE PROBLEMS MADE IT FOR YOU TO DO YOUR WORK, TAKE CARE OF THINGS AT HOME, OR GET ALONG WITH OTHER PEOPLE: NOT DIFFICULT AT ALL

## 2023-07-27 ASSESSMENT — PATIENT HEALTH QUESTIONNAIRE - PHQ9
SUM OF ALL RESPONSES TO PHQ QUESTIONS 1-9: 0
1. LITTLE INTEREST OR PLEASURE IN DOING THINGS: 0
SUM OF ALL RESPONSES TO PHQ9 QUESTIONS 1 & 2: 0
SUM OF ALL RESPONSES TO PHQ QUESTIONS 1-9: 0
2. FEELING DOWN, DEPRESSED OR HOPELESS: 0

## 2023-08-01 ENCOUNTER — OFFICE VISIT (OUTPATIENT)
Age: 43
End: 2023-08-01
Payer: COMMERCIAL

## 2023-08-01 VITALS
DIASTOLIC BLOOD PRESSURE: 79 MMHG | HEIGHT: 63 IN | BODY MASS INDEX: 35.61 KG/M2 | OXYGEN SATURATION: 97 % | SYSTOLIC BLOOD PRESSURE: 110 MMHG | WEIGHT: 201 LBS | TEMPERATURE: 98.2 F | HEART RATE: 105 BPM | RESPIRATION RATE: 16 BRPM

## 2023-08-01 DIAGNOSIS — Z71.1 CONCERN ABOUT STD IN FEMALE WITHOUT DIAGNOSIS: ICD-10-CM

## 2023-08-01 DIAGNOSIS — Z11.4 ENCOUNTER FOR SCREENING FOR HIV: ICD-10-CM

## 2023-08-01 DIAGNOSIS — Z00.00 ENCOUNTER FOR WELL ADULT EXAM WITHOUT ABNORMAL FINDINGS: Primary | ICD-10-CM

## 2023-08-01 DIAGNOSIS — E78.00 HYPERCHOLESTEROLEMIA: ICD-10-CM

## 2023-08-01 DIAGNOSIS — D47.3 ESSENTIAL (HEMORRHAGIC) THROMBOCYTHEMIA (HCC): ICD-10-CM

## 2023-08-01 LAB
CHOLEST SERPL-MCNC: 126 MG/DL
HDLC SERPL-MCNC: 38 MG/DL
HDLC SERPL: 3.3 (ref 0–5)
LDLC SERPL CALC-MCNC: 69 MG/DL (ref 0–100)
TRIGL SERPL-MCNC: 95 MG/DL
VLDLC SERPL CALC-MCNC: 19 MG/DL

## 2023-08-01 PROCEDURE — 90746 HEPB VACCINE 3 DOSE ADULT IM: CPT | Performed by: FAMILY MEDICINE

## 2023-08-01 PROCEDURE — 99396 PREV VISIT EST AGE 40-64: CPT | Performed by: FAMILY MEDICINE

## 2023-08-01 PROCEDURE — 90471 IMMUNIZATION ADMIN: CPT | Performed by: FAMILY MEDICINE

## 2023-08-01 RX ORDER — MULTIVIT-MIN/IRON/FOLIC ACID/K 45-800-120
CAPSULE ORAL
COMMUNITY

## 2023-08-01 SDOH — ECONOMIC STABILITY: FOOD INSECURITY: WITHIN THE PAST 12 MONTHS, YOU WORRIED THAT YOUR FOOD WOULD RUN OUT BEFORE YOU GOT MONEY TO BUY MORE.: NEVER TRUE

## 2023-08-01 SDOH — ECONOMIC STABILITY: INCOME INSECURITY: HOW HARD IS IT FOR YOU TO PAY FOR THE VERY BASICS LIKE FOOD, HOUSING, MEDICAL CARE, AND HEATING?: NOT HARD AT ALL

## 2023-08-01 SDOH — ECONOMIC STABILITY: FOOD INSECURITY: WITHIN THE PAST 12 MONTHS, THE FOOD YOU BOUGHT JUST DIDN'T LAST AND YOU DIDN'T HAVE MONEY TO GET MORE.: NEVER TRUE

## 2023-08-01 SDOH — ECONOMIC STABILITY: HOUSING INSECURITY
IN THE LAST 12 MONTHS, WAS THERE A TIME WHEN YOU DID NOT HAVE A STEADY PLACE TO SLEEP OR SLEPT IN A SHELTER (INCLUDING NOW)?: NO

## 2023-08-01 ASSESSMENT — PATIENT HEALTH QUESTIONNAIRE - PHQ9
SUM OF ALL RESPONSES TO PHQ QUESTIONS 1-9: 0
SUM OF ALL RESPONSES TO PHQ9 QUESTIONS 1 & 2: 0
SUM OF ALL RESPONSES TO PHQ QUESTIONS 1-9: 0
2. FEELING DOWN, DEPRESSED OR HOPELESS: 0
1. LITTLE INTEREST OR PLEASURE IN DOING THINGS: 0

## 2023-08-02 LAB
HIV 1+2 AB+HIV1 P24 AG SERPL QL IA: NONREACTIVE
HIV 1/2 RESULT COMMENT: NORMAL

## 2023-08-05 LAB
C TRACH RRNA SPEC QL NAA+PROBE: NEGATIVE
N GONORRHOEA RRNA SPEC QL NAA+PROBE: NEGATIVE
SPECIMEN SOURCE: NORMAL
T VAGINALIS RRNA SPEC QL NAA+PROBE: NEGATIVE

## 2023-08-10 ENCOUNTER — TELEMEDICINE (OUTPATIENT)
Age: 43
End: 2023-08-10

## 2023-08-10 VITALS — HEIGHT: 63 IN | WEIGHT: 200 LBS | BODY MASS INDEX: 35.44 KG/M2

## 2023-08-10 DIAGNOSIS — Z90.3 S/P GASTRIC SLEEVE PROCEDURE: Primary | ICD-10-CM

## 2023-08-10 DIAGNOSIS — Z09 SURGICAL FOLLOW-UP CARE: ICD-10-CM

## 2023-08-10 DIAGNOSIS — E66.9 OBESITY (BMI 30-39.9): ICD-10-CM

## 2023-08-10 PROCEDURE — 99024 POSTOP FOLLOW-UP VISIT: CPT | Performed by: NURSE PRACTITIONER

## 2023-08-10 ASSESSMENT — PATIENT HEALTH QUESTIONNAIRE - PHQ9
1. LITTLE INTEREST OR PLEASURE IN DOING THINGS: 0
SUM OF ALL RESPONSES TO PHQ QUESTIONS 1-9: 0
2. FEELING DOWN, DEPRESSED OR HOPELESS: 0
SUM OF ALL RESPONSES TO PHQ QUESTIONS 1-9: 0
SUM OF ALL RESPONSES TO PHQ QUESTIONS 1-9: 0
SUM OF ALL RESPONSES TO PHQ9 QUESTIONS 1 & 2: 0
SUM OF ALL RESPONSES TO PHQ QUESTIONS 1-9: 0

## 2023-09-21 NOTE — TELEPHONE ENCOUNTER
Last appointment: 8/1/23  Next appointment: none  Previous refill encounter(s): 9/27/22 #5 with 5 refills    Requested Prescriptions     Pending Prescriptions Disp Refills    ergocalciferol (ERGOCALCIFEROL) 1.25 MG (82300 UT) capsule 12 capsule 1     Sig: Take 1 capsule by mouth once a week [address]    Program: Medication Refill  CPA in place:    Recommendation Provided To:    Intervention Detail: New Rx: 1, reason: Patient Preference  Intervention Accepted By:   Sana Asif Closed?:    Time Spent (min): 5

## 2023-09-25 DIAGNOSIS — D47.3 ESSENTIAL (HEMORRHAGIC) THROMBOCYTHEMIA (HCC): ICD-10-CM

## 2023-09-25 RX ORDER — ERGOCALCIFEROL 1.25 MG/1
50000 CAPSULE ORAL WEEKLY
Qty: 12 CAPSULE | Refills: 1 | Status: SHIPPED | OUTPATIENT
Start: 2023-09-25

## 2023-09-28 ENCOUNTER — TELEPHONE (OUTPATIENT)
Age: 43
End: 2023-09-28

## 2023-09-28 RX ORDER — CAL/D3/MAG11/ZINC/COP/MANG/BOR 600 MG-800
1 TABLET ORAL DAILY
Qty: 90 TABLET | Refills: 3 | Status: SHIPPED | OUTPATIENT
Start: 2023-09-28

## 2023-09-28 NOTE — TELEPHONE ENCOUNTER
Pt left a vm regarding setting up an appt with one of our dietitians. Returned call to schedule, no answer. Left a vm.

## 2023-10-10 ENCOUNTER — TELEPHONE (OUTPATIENT)
Age: 43
End: 2023-10-10

## 2023-10-11 ENCOUNTER — TELEMEDICINE (OUTPATIENT)
Age: 43
End: 2023-10-11
Payer: COMMERCIAL

## 2023-10-11 ENCOUNTER — TELEPHONE (OUTPATIENT)
Age: 43
End: 2023-10-11

## 2023-10-11 DIAGNOSIS — Z00.00 PREVENTATIVE HEALTH CARE: ICD-10-CM

## 2023-10-11 DIAGNOSIS — E53.8 VITAMIN B12 DEFICIENCY: ICD-10-CM

## 2023-10-11 DIAGNOSIS — E55.9 VITAMIN D DEFICIENCY: ICD-10-CM

## 2023-10-11 DIAGNOSIS — E66.01 MORBID OBESITY (HCC): Primary | ICD-10-CM

## 2023-10-11 DIAGNOSIS — Z90.3 S/P GASTRIC SLEEVE PROCEDURE: ICD-10-CM

## 2023-10-11 DIAGNOSIS — D50.8 IRON DEFICIENCY ANEMIA SECONDARY TO INADEQUATE DIETARY IRON INTAKE: ICD-10-CM

## 2023-10-11 PROCEDURE — 99212 OFFICE O/P EST SF 10 MIN: CPT | Performed by: NURSE PRACTITIONER

## 2023-10-11 ASSESSMENT — ENCOUNTER SYMPTOMS
NAUSEA: 0
ABDOMINAL PAIN: 0
SHORTNESS OF BREATH: 0
VOMITING: 0

## 2023-10-11 ASSESSMENT — PATIENT HEALTH QUESTIONNAIRE - PHQ9
SUM OF ALL RESPONSES TO PHQ QUESTIONS 1-9: 0
2. FEELING DOWN, DEPRESSED OR HOPELESS: 0
SUM OF ALL RESPONSES TO PHQ QUESTIONS 1-9: 0
SUM OF ALL RESPONSES TO PHQ QUESTIONS 1-9: 0
SUM OF ALL RESPONSES TO PHQ9 QUESTIONS 1 & 2: 0
1. LITTLE INTEREST OR PLEASURE IN DOING THINGS: 0
SUM OF ALL RESPONSES TO PHQ QUESTIONS 1-9: 0

## 2023-10-11 NOTE — PROGRESS NOTES
Identified patient with two patient identifiers (name and ). Reviewed chart in preparation for visit and have obtained necessary documentation. Beena Jean is a 37 y.o. female  Chief Complaint   Patient presents with    Follow-up     4 MONTHS S/P  LAPAROSCOPIC SLEEVE GASTRECTOMY, EGD, PARAESOPHAGEAL HERNIA REPAIR  (E R A S)     There were no vitals taken for this visit. 1. Have you been to the ER, urgent care clinic since your last visit? Hospitalized since your last visit? Thursday slipped and fell [facility] clinic     2. Have you seen or consulted any other health care providers outside of the [address] [place] [address] Avenue since your last visit? Include any pap smears or colon screening.  yes

## 2023-10-11 NOTE — PROGRESS NOTES
Bijan Poole (:  1980) is a 37 y.o. female,Established patient, here for evaluation of the following chief complaint(s):  Follow-up (4 MONTHS S/P  LAPAROSCOPIC SLEEVE GASTRECTOMY, EGD, PARAESOPHAGEAL HERNIA REPAIR  (E R A S))        SUBJECTIVE/OBJECTIVE:    HPI:  Bijan Poole is a 37 y.o. female with previous Sleeve gastrectomy and paraesophageal hernia repair  surgery 4 months. . She has lost a total of 29 pounds since surgery. She  has lost 5 lbs since the last ov. There is no height or weight on file to calculate BMI. . no nausea and no vomiting . occassionally Acid reflux/heartburn, trialin catracho Nexium. Drinking  64 ounces of water daily. ?30 grams protein intake daily. + BM's. Pt is walking for exercise. Dietary recall -    Breakfast- eggs, oatmeal  Lunch-skip, tuna packet, yogurt  Dinner- tuna, chicken, potatoes    She is  snacking between meals; yogurt or applesauce . Vitamins:  MVI : yes  Calcium : yes  B-Vit 12: yes  Vit D: Yes          Ms. Hany Gonzalez has a reminder for a \"due or due soon\" health maintenance. I have asked that she contact her primary care provider for follow-up on this health maintenance.         COMORBIDITY     SLEEP APNEA                 YES        GERD  (req.meds)            YES  HYPERLIPIDEMIA            NO  HYPERTENSION              NO         DIABETES                         NO           Current Outpatient Medications:     Caltrate 600+D Plus Minerals (CALTRATE) 600-800 MG-UNIT TABS tablet, Take 1 tablet by mouth daily, Disp: 90 tablet, Rfl: 3    ergocalciferol (ERGOCALCIFEROL) 1.25 MG (38927 UT) capsule, Take 1 capsule by mouth once a week THURSDAY, Disp: 12 capsule, Rfl: 1    Calcium-Magnesium-Vitamin D (OPURITY CALCIUM CITRATE PLUS) 300- MG-MG-UNIT CHEW, Take 1 tablet by mouth daily Take by mouth, Disp: 30 tablet, Rfl: 2    Multiple Vitamins-Minerals (BARIATRIC MULTIVITAMINS/IRON) CAPS, Take by mouth, Disp: , Rfl:     linaclotide (LINZESS) 145 MCG capsule, Take

## 2023-10-12 ENCOUNTER — OFFICE VISIT (OUTPATIENT)
Age: 43
End: 2023-10-12

## 2023-10-12 DIAGNOSIS — E66.01 MORBID OBESITY (HCC): Primary | ICD-10-CM

## 2023-10-31 ENCOUNTER — TELEPHONE (OUTPATIENT)
Age: 43
End: 2023-10-31

## 2023-10-31 NOTE — TELEPHONE ENCOUNTER
Leatha Conley [facility] [employer] Desk Staff  Subject: Appointment Request     Reason for Call: Established Patient Appointment needed: Urgent (Patient   Request) No Script     QUESTIONS     Reason for appointment request? Available appointments did not meet   patient need       Additional Information for Provider? Patient needs an appointment as soon   as possible for a pregnancy test and std testing. Patient stated she can   also be read on her work number.  She would prefer an appointment this   Wednesday 11/1   ---------------------------------------------------------------------------   --------------   Bibiana Chao INFO   [phone]; OK to leave message on voicemail   ---------------------------------------------------------------------------

## 2023-11-09 ENCOUNTER — OFFICE VISIT (OUTPATIENT)
Age: 43
End: 2023-11-09

## 2023-11-09 DIAGNOSIS — E66.01 MORBID OBESITY (HCC): Primary | ICD-10-CM

## 2023-11-09 NOTE — PROGRESS NOTES
Post-Operative Bariatric Nutrition Counseling (subsequent)    Physician/Surgeon: Dr. Adelso Santana  Name: Marlena Martínez                       : 1980                                      Reason for visit: Follow up nutrition education and counseling post sleeve gastrectomy        ASSESSMENT:  Date of surgery:2023     Past Medical History: KISHORE, GERD  Medications/Supplements:   Prior to Admission medications    Medication Sig Start Date End Date Taking?  Authorizing Provider   Caltrate 600+D Plus Minerals (CALTRATE) 600-800 MG-UNIT TABS tablet Take 1 tablet by mouth daily 23   Braden Gaytan MD   ergocalciferol (ERGOCALCIFEROL) 1.25 MG (25753 UT) capsule Take 1 capsule by mouth once a week 23   Braden Gaytan MD   Calcium-Magnesium-Vitamin D (OPURITY CALCIUM CITRATE PLUS) 300- MG-MG-UNIT CHEW Take 1 tablet by mouth daily Take by mouth 23   Braden Gaytan MD   Multiple Vitamins-Minerals (BARIATRIC MULTIVITAMINS/IRON) CAPS Take by mouth    Doroteo Vaca MD   acetaminophen (TYLENOL) 500 MG tablet Take 2 tablets by mouth every 6 hours as needed for Pain  Patient not taking: Reported on 2023    ProviderDoroteo MD   linaclotide Tika Grew) 145 MCG capsule Take 1 capsule by mouth every morning (before breakfast) 23   Braden Gaytan MD   esomeprazole (NEXIUM) 40 MG delayed release capsule as needed 23   Automatic Reconciliation, Ar   fluticasone (FLONASE) 50 MCG/ACT nasal spray 1 spray by Nasal route daily 21   Automatic Reconciliation, Ar       Pt takes recommended supplements as prescribed: yes      Anthropometrics:     Ht: 63 inches   Wt: 195 lbs (last months wt)        Pre-op wt: 227 lbs   Net Wt Loss: 14 (%)       IBW: 115 lbs    %IBW: 169        BMI:35             Category: Obesity class II      Exercise/Physical Actvity: 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

## 2023-11-29 ENCOUNTER — OFFICE VISIT (OUTPATIENT)
Age: 43
End: 2023-11-29
Payer: COMMERCIAL

## 2023-11-29 VITALS
TEMPERATURE: 98 F | DIASTOLIC BLOOD PRESSURE: 86 MMHG | WEIGHT: 180 LBS | OXYGEN SATURATION: 98 % | RESPIRATION RATE: 18 BRPM | HEART RATE: 74 BPM | BODY MASS INDEX: 31.89 KG/M2 | HEIGHT: 63 IN | SYSTOLIC BLOOD PRESSURE: 128 MMHG

## 2023-11-29 DIAGNOSIS — R73.03 PREDIABETES: ICD-10-CM

## 2023-11-29 DIAGNOSIS — E78.00 HYPERCHOLESTEROLEMIA: ICD-10-CM

## 2023-11-29 DIAGNOSIS — Z00.00 ENCOUNTER FOR WELL ADULT EXAM WITHOUT ABNORMAL FINDINGS: Primary | ICD-10-CM

## 2023-11-29 DIAGNOSIS — Z86.39 HISTORY OF THYROID NODULE: ICD-10-CM

## 2023-11-29 DIAGNOSIS — E53.8 B12 DEFICIENCY: ICD-10-CM

## 2023-11-29 DIAGNOSIS — E61.1 IRON DEFICIENCY: ICD-10-CM

## 2023-11-29 DIAGNOSIS — E55.9 VITAMIN D DEFICIENCY: ICD-10-CM

## 2023-11-29 LAB
25(OH)D3 SERPL-MCNC: 116.1 NG/ML (ref 30–100)
ALBUMIN SERPL-MCNC: 4 G/DL (ref 3.5–5)
ALBUMIN/GLOB SERPL: 1.1 (ref 1.1–2.2)
ALP SERPL-CCNC: 107 U/L (ref 45–117)
ALT SERPL-CCNC: 13 U/L (ref 12–78)
ANION GAP SERPL CALC-SCNC: 2 MMOL/L (ref 5–15)
AST SERPL-CCNC: 10 U/L (ref 15–37)
BILIRUB SERPL-MCNC: 0.4 MG/DL (ref 0.2–1)
BUN SERPL-MCNC: 10 MG/DL (ref 6–20)
BUN/CREAT SERPL: 13 (ref 12–20)
CALCIUM SERPL-MCNC: 9.6 MG/DL (ref 8.5–10.1)
CHLORIDE SERPL-SCNC: 107 MMOL/L (ref 97–108)
CO2 SERPL-SCNC: 29 MMOL/L (ref 21–32)
CREAT SERPL-MCNC: 0.75 MG/DL (ref 0.55–1.02)
ERYTHROCYTE [DISTWIDTH] IN BLOOD BY AUTOMATED COUNT: 12.8 % (ref 11.5–14.5)
EST. AVERAGE GLUCOSE BLD GHB EST-MCNC: 97 MG/DL
FERRITIN SERPL-MCNC: 45 NG/ML (ref 8–252)
FOLATE SERPL-MCNC: 13.9 NG/ML (ref 5–21)
GLOBULIN SER CALC-MCNC: 3.7 G/DL (ref 2–4)
GLUCOSE SERPL-MCNC: 92 MG/DL (ref 65–100)
HBA1C MFR BLD: 5 % (ref 4–5.6)
HCT VFR BLD AUTO: 40.7 % (ref 35–47)
HGB BLD-MCNC: 12.3 G/DL (ref 11.5–16)
MCH RBC QN AUTO: 26.4 PG (ref 26–34)
MCHC RBC AUTO-ENTMCNC: 30.2 G/DL (ref 30–36.5)
MCV RBC AUTO: 87.3 FL (ref 80–99)
NRBC # BLD: 0 K/UL (ref 0–0.01)
NRBC BLD-RTO: 0 PER 100 WBC
PLATELET # BLD AUTO: 355 K/UL (ref 150–400)
PMV BLD AUTO: 10.2 FL (ref 8.9–12.9)
POTASSIUM SERPL-SCNC: 4.4 MMOL/L (ref 3.5–5.1)
PROT SERPL-MCNC: 7.7 G/DL (ref 6.4–8.2)
RBC # BLD AUTO: 4.66 M/UL (ref 3.8–5.2)
SODIUM SERPL-SCNC: 138 MMOL/L (ref 136–145)
TSH SERPL DL<=0.05 MIU/L-ACNC: 1.1 UIU/ML (ref 0.36–3.74)
VIT B12 SERPL-MCNC: 672 PG/ML (ref 193–986)
WBC # BLD AUTO: 6.3 K/UL (ref 3.6–11)

## 2023-11-29 PROCEDURE — 90471 IMMUNIZATION ADMIN: CPT | Performed by: FAMILY MEDICINE

## 2023-11-29 PROCEDURE — 90746 HEPB VACCINE 3 DOSE ADULT IM: CPT | Performed by: FAMILY MEDICINE

## 2023-11-29 PROCEDURE — 99396 PREV VISIT EST AGE 40-64: CPT | Performed by: FAMILY MEDICINE

## 2023-11-29 RX ORDER — SOY PROTEIN
POWDER (GRAM) ORAL
COMMUNITY
Start: 2016-11-29

## 2023-11-29 ASSESSMENT — PATIENT HEALTH QUESTIONNAIRE - PHQ9
1. LITTLE INTEREST OR PLEASURE IN DOING THINGS: 0
SUM OF ALL RESPONSES TO PHQ QUESTIONS 1-9: 0
SUM OF ALL RESPONSES TO PHQ9 QUESTIONS 1 & 2: 0
SUM OF ALL RESPONSES TO PHQ QUESTIONS 1-9: 0
2. FEELING DOWN, DEPRESSED OR HOPELESS: 0

## 2023-11-29 NOTE — PROGRESS NOTES
Well Adult Note  Name: Leda Rascon Date: 2023   MRN: [id number] Sex: Female   Age: 37 y.o. Ethnicity: Non- / Non    : 1980 Race: Adrianne Lurie / African American      Daniela Jesus is here for well adult exam, Present for CPE,  patient is currently Up todate w/ all vaccination, Pt stating that she had a normal Pap smear last year , she goes to OB/Gyn,  Pt never had a colonoscopy, and has no fhx of colon cancer in addition pt never had a mammog and has had no fhx of breast nor ovarian cancer,    No past surgical hx,  last bone dexa scan was never  Patient is  having 1 kid same states, is sexaully active , patient is physically active, no alcohol like tobacco abuser  compliant w/ meds, + Rf needed for today        Review of Systems      Constitutional: no chills and fever,  , nad     HENT: no ear pain or nosebleeds. No blurred vision  Respiratory: no shortness of breath, wheezing cough   Cardiovascular: Has no chest pain, ,and racing heart . Gastrointestinal: No constipation, diarrhea, nausea and vomiting. Genitourinary: No frequency. Musculoskeletal: Negative for joint pain. Skin: no itching, no rash. Neurological: Negative for dizziness, no tremors  Psychiatric/Behavioral: Negative for depression, is not nervous/anxious.  '  Allergies   Allergen Reactions    Metaxalone Other (See Comments)     Too groggy  Other reaction(s): Other (see comments)  Too groggy         Prior to Visit Medications    Medication Sig Taking?  Authorizing Provider   Cobalamin Combinations (VITAMIN B12-FOLIC ACID) 950-103 MCG TABS 1 tablet by mouth as directed Oral for 0 Yes ProviderDoroteo MD   Caltrate 600+D Plus Minerals (CALTRATE) 600-800 MG-UNIT TABS tablet Take 1 tablet by mouth daily Yes Mike Leavitt MD   ergocalciferol (ERGOCALCIFEROL) 1.25 MG (22995 UT) capsule Take 1 capsule by mouth once a week THURSDAY Yes Mike Leavitt MD   Multiple Vitamins-Minerals (BARIATRIC

## 2023-11-29 NOTE — PROGRESS NOTES
Per orders of Dr. Madi Freedman, injection of Hep B was given in the Left arm . Patient tolerated it well. Patient instructed to report any adverse reaction to me immediately.

## 2023-12-13 ENCOUNTER — HOSPITAL ENCOUNTER (OUTPATIENT)
Facility: HOSPITAL | Age: 43
Discharge: HOME | End: 2023-12-16

## 2023-12-13 ENCOUNTER — OFFICE VISIT (OUTPATIENT)
Age: 43
End: 2023-12-13
Payer: COMMERCIAL

## 2023-12-13 VITALS
DIASTOLIC BLOOD PRESSURE: 89 MMHG | HEART RATE: 105 BPM | HEIGHT: 63 IN | RESPIRATION RATE: 18 BRPM | TEMPERATURE: 98.5 F | WEIGHT: 180 LBS | OXYGEN SATURATION: 97 % | SYSTOLIC BLOOD PRESSURE: 132 MMHG | BODY MASS INDEX: 31.89 KG/M2

## 2023-12-13 DIAGNOSIS — R07.81 RIB PAIN ON RIGHT SIDE: Primary | ICD-10-CM

## 2023-12-13 DIAGNOSIS — R07.81 RIB PAIN ON RIGHT SIDE: ICD-10-CM

## 2023-12-13 PROCEDURE — 71046 X-RAY EXAM CHEST 2 VIEWS: CPT

## 2023-12-13 PROCEDURE — 99204 OFFICE O/P NEW MOD 45 MIN: CPT

## 2023-12-13 NOTE — PROGRESS NOTES
Chief Complaint   Patient presents with    Breast Pain     Pt reports having pain under a right breast that started yesterday. HISTORY OF PRESENT ILLNESS  Terresa Hamman is a 37 y.o. female. Patient reports pain under her right breast for 2 days. She reports the pain is 7/10 and is constant and dull. She reports breathing does not increase pain however movement of arm behind back and flexion of pectoral muscles increases pain. She reports gastric sleeve surgery in June of 2023. She has taken Tylenol without relief. Review of Systems   Musculoskeletal:  Positive for myalgias. All other systems reviewed and are negative. Physical Exam  Vitals and nursing note reviewed. Cardiovascular:      Rate and Rhythm: Regular rhythm. Tachycardia present. Heart sounds: Normal heart sounds. Pulmonary:      Effort: Pulmonary effort is normal.      Breath sounds: Normal breath sounds. No stridor. No wheezing, rhonchi or rales. Musculoskeletal:        Arms:       Comments: Pain with palpation of ribcage beginning 5th rib/intercostal space. Past Medical History:   Diagnosis Date    Abnormal Pap smear of cervix 1990s    Arthritis     IN BACK    Breast cyst 2009    benign. Left. 1.7 cm and multiple others. Dr. Maya Martinez    Chicken pox childhood    Chronic idiopathic constipation 07/2015    Dr. Cori Solo. Chronic pain     VALLADARES (dyspnea on exertion) 02/2015    Dr. Valencia Keating    EBV positive mononucleosis syndrome 10/2013, 2014    chronic or reactivated    Enlarged thyroid 09/2010, 06/2015    with Right tracheal deviation. Retrosternal goiter. Dr. Betina Ro. Enlarged tonsils and adenoids 09/2010    GERD (gastroesophageal reflux disease)     Hernia, abdominal 06/28/2022    Gastrointestinal Specialist     History of blood transfusion 2004    AFTER CHILDBIRTH    Iodine-deficiency related goiter     Left. Dr. Betina Ro.     KISHORE (obstructive sleep apnea) 02/25/2015    Dr. Sarah Gill, uses

## 2023-12-14 LAB
ALBUMIN SERPL-MCNC: 4 G/DL (ref 3.5–5)
ALBUMIN/GLOB SERPL: 1 (ref 1.1–2.2)
ALP SERPL-CCNC: 92 U/L (ref 45–117)
ALT SERPL-CCNC: 16 U/L (ref 12–78)
AST SERPL-CCNC: 9 U/L (ref 15–37)
BASOPHILS # BLD: 0 K/UL (ref 0–0.1)
BASOPHILS NFR BLD: 0 % (ref 0–1)
BILIRUB DIRECT SERPL-MCNC: 0.2 MG/DL (ref 0–0.2)
BILIRUB SERPL-MCNC: 0.4 MG/DL (ref 0.2–1)
DIFFERENTIAL METHOD BLD: NORMAL
EOSINOPHIL # BLD: 0.1 K/UL (ref 0–0.4)
EOSINOPHIL NFR BLD: 2 % (ref 0–7)
ERYTHROCYTE [DISTWIDTH] IN BLOOD BY AUTOMATED COUNT: 12.5 % (ref 11.5–14.5)
GLOBULIN SER CALC-MCNC: 4 G/DL (ref 2–4)
HCT VFR BLD AUTO: 40 % (ref 35–47)
HGB BLD-MCNC: 12.1 G/DL (ref 11.5–16)
IMM GRANULOCYTES # BLD AUTO: 0 K/UL (ref 0–0.04)
IMM GRANULOCYTES NFR BLD AUTO: 0 % (ref 0–0.5)
LYMPHOCYTES # BLD: 2.4 K/UL (ref 0.8–3.5)
LYMPHOCYTES NFR BLD: 32 % (ref 12–49)
MCH RBC QN AUTO: 26 PG (ref 26–34)
MCHC RBC AUTO-ENTMCNC: 30.3 G/DL (ref 30–36.5)
MCV RBC AUTO: 85.8 FL (ref 80–99)
MONOCYTES # BLD: 0.5 K/UL (ref 0–1)
MONOCYTES NFR BLD: 7 % (ref 5–13)
NEUTS SEG # BLD: 4.4 K/UL (ref 1.8–8)
NEUTS SEG NFR BLD: 59 % (ref 32–75)
NRBC # BLD: 0 K/UL (ref 0–0.01)
NRBC BLD-RTO: 0 PER 100 WBC
PLATELET # BLD AUTO: 367 K/UL (ref 150–400)
PMV BLD AUTO: 10 FL (ref 8.9–12.9)
PROT SERPL-MCNC: 8 G/DL (ref 6.4–8.2)
RBC # BLD AUTO: 4.66 M/UL (ref 3.8–5.2)
WBC # BLD AUTO: 7.4 K/UL (ref 3.6–11)

## 2023-12-29 ENCOUNTER — OFFICE VISIT (OUTPATIENT)
Age: 43
End: 2023-12-29

## 2023-12-29 VITALS
SYSTOLIC BLOOD PRESSURE: 125 MMHG | BODY MASS INDEX: 31.29 KG/M2 | HEIGHT: 63 IN | RESPIRATION RATE: 16 BRPM | OXYGEN SATURATION: 96 % | HEART RATE: 87 BPM | TEMPERATURE: 97.9 F | DIASTOLIC BLOOD PRESSURE: 87 MMHG | WEIGHT: 176.6 LBS

## 2023-12-29 DIAGNOSIS — R82.90 CLOUDY URINE: ICD-10-CM

## 2023-12-29 DIAGNOSIS — K57.92 DIVERTICULITIS: ICD-10-CM

## 2023-12-29 DIAGNOSIS — R10.32 LLQ ABDOMINAL PAIN: Primary | ICD-10-CM

## 2023-12-29 DIAGNOSIS — B96.89 BACTERIAL VAGINOSIS: ICD-10-CM

## 2023-12-29 DIAGNOSIS — N89.8 VAGINAL IRRITATION: ICD-10-CM

## 2023-12-29 DIAGNOSIS — N76.0 BACTERIAL VAGINOSIS: ICD-10-CM

## 2023-12-29 LAB
BILIRUBIN, URINE, POC: NEGATIVE
BLOOD URINE, POC: NEGATIVE
GLUCOSE URINE, POC: NEGATIVE
HCG, PREGNANCY, URINE, POC: NEGATIVE
KETONES, URINE, POC: NEGATIVE
LEUKOCYTE ESTERASE, URINE, POC: NORMAL
NITRITE, URINE, POC: NEGATIVE
PH, URINE, POC: 7 (ref 4.6–8)
PROTEIN,URINE, POC: NEGATIVE
SPECIFIC GRAVITY, URINE, POC: 1.01 (ref 1–1.03)
URINALYSIS CLARITY, POC: NORMAL
URINALYSIS COLOR, POC: NORMAL
UROBILINOGEN, POC: NORMAL
VALID INTERNAL CONTROL, POC: YES

## 2023-12-29 RX ORDER — AMOXICILLIN AND CLAVULANATE POTASSIUM 875; 125 MG/1; MG/1
1 TABLET, FILM COATED ORAL 2 TIMES DAILY
Qty: 20 TABLET | Refills: 0 | Status: SHIPPED | OUTPATIENT
Start: 2023-12-29 | End: 2024-01-08

## 2023-12-29 NOTE — PROGRESS NOTES
Chief Complaint   Patient presents with    left side pain     X 1 week, denies any injuries , pain rated 5/10 , not taking anything for pain      HISTORY OF PRESENT ILLNESS  Alex Ruiz is a 37 y.o. female. LLQ pain onset 5-6 days. Pain has been improving. She describes as intermittent dull pain. Nothing makes worse. She is not taking anything OTC. She reports urine is cloudy, associated burning and vaginal irritation. She reports recent BV infection two weeks ago and treated with flagyl- completed therapy. STD tests normal two weeks ago but endorses unprotected sex since testing. She denies fever or chills. Review of Systems   Constitutional: Negative. Gastrointestinal:  Positive for abdominal pain. Negative for blood in stool, diarrhea, nausea and vomiting. Genitourinary:  Negative for difficulty urinating, frequency, vaginal bleeding and vaginal discharge. Past Medical History:   Diagnosis Date    Abnormal Pap smear of cervix 1990s    Arthritis     IN BACK    Breast cyst 2009    benign. Left. 1.7 cm and multiple others. Dr. Stoddard Sensing    Chicken pox childhood    Chronic idiopathic constipation 07/2015    Dr. Mildred Porter. Chronic pain     VALLADARES (dyspnea on exertion) 02/2015    Dr. Hill Leggett    EBV positive mononucleosis syndrome 10/2013, 2014    chronic or reactivated    Enlarged thyroid 09/2010, 06/2015    with Right tracheal deviation. Retrosternal goiter. Dr. William Kennedy. Enlarged tonsils and adenoids 09/2010    GERD (gastroesophageal reflux disease)     Hernia, abdominal 06/28/2022    Gastrointestinal Specialist     History of blood transfusion 2004    AFTER CHILDBIRTH    Iodine-deficiency related goiter     Left. Dr. William Kennedy. KISHORE (obstructive sleep apnea) 02/25/2015    Dr. Shima Nava, uses cpap. Dr. Liyah Putnam. WEARS CPAP Dr. Almeda Canavan. Thyroid cyst 08/219    Right.       Thyroid nodule 08/2019     Past Surgical History:   Procedure Laterality Date    BREAST

## 2023-12-31 LAB
BACTERIA SPEC CULT: NORMAL
SERVICE CMNT-IMP: NORMAL

## 2024-01-01 LAB
A VAGINAE DNA VAG QL NAA+PROBE: ABNORMAL SCORE
BVAB2 DNA VAG QL NAA+PROBE: ABNORMAL SCORE
C ALBICANS DNA VAG QL NAA+PROBE: NEGATIVE
C GLABRATA DNA VAG QL NAA+PROBE: NEGATIVE
C TRACH RRNA SPEC QL NAA+PROBE: NEGATIVE
MEGA1 DNA VAG QL NAA+PROBE: ABNORMAL SCORE
N GONORRHOEA RRNA SPEC QL NAA+PROBE: NEGATIVE
SPECIMEN SOURCE: ABNORMAL
T VAGINALIS RRNA SPEC QL NAA+PROBE: NEGATIVE

## 2024-01-02 ENCOUNTER — TELEPHONE (OUTPATIENT)
Age: 44
End: 2024-01-02

## 2024-01-02 NOTE — TELEPHONE ENCOUNTER
LM for pt to call and schedule 8 month bariatric exam. Letter sent. [facility]

## 2024-01-04 ENCOUNTER — OFFICE VISIT (OUTPATIENT)
Age: 44
End: 2024-01-04

## 2024-01-04 DIAGNOSIS — Z71.3 NUTRITIONAL COUNSELING: Primary | ICD-10-CM

## 2024-01-04 NOTE — PROGRESS NOTES
Post-Operative Bariatric Nutrition Counseling (subsequent)    Physician/Surgeon: Dr. Bart Siegel  Name: Nneka Araujo                       : 1980                                      Reason for visit: Follow up nutrition education and counseling post sleeve gastrectomy        ASSESSMENT:  Date of surgery:2023     Past Medical History: KISHORE, GERD  Medications/Supplements:   Prior to Admission medications    Medication Sig Start Date End Date Taking? Authorizing Provider   amoxicillin-clavulanate (AUGMENTIN) 875-125 MG per tablet Take 1 tablet by mouth 2 times daily for 10 days 23  Salo Escmailla APRN - NP   diclofenac (VOLTAREN) 50 MG EC tablet Take 1 tablet by mouth 3 times daily (with meals) for 14 days 23  Farrukh Nur APRN - NP   Caltrate 600+D Plus Minerals (CALTRATE) 600-800 MG-UNIT TABS tablet Take 1 tablet by mouth daily 23   Jamie Srivastava MD   ergocalciferol (ERGOCALCIFEROL) 1.25 MG (65661 UT) capsule Take 1 capsule by mouth once a week THURSDAY  Patient not taking: Reported on 2023   Jamie Srivastava MD   Multiple Vitamins-Minerals (BARIATRIC MULTIVITAMINS/IRON) CAPS Take by mouth    Provider, MD Doroteo   acetaminophen (TYLENOL) 500 MG tablet Take 2 tablets by mouth every 6 hours as needed for Pain    ProviderDoroteo MD   linaclotide (LINZESS) 145 MCG capsule Take 1 capsule by mouth every morning (before breakfast) 23   Jamie Srivastava MD   esomeprazole (NEXIUM) 40 MG delayed release capsule as needed 23   Automatic Reconciliation, Ar   fluticasone (FLONASE) 50 MCG/ACT nasal spray 1 spray by Nasal route daily 21   Automatic Reconciliation, Ar       Pt takes recommended supplements as prescribed: Unjury MVI- 2 capsules per day, calcium 2x day      Anthropometrics:     Ht: 63 inches   Wt: 176 lbs        Pre-op wt: 227 lbs   Net Wt Loss: 22 (%)     Pt stated goal wt: 150 lbs  IBW: 115 lbs    %IBW: 153

## 2024-01-31 ENCOUNTER — TRANSCRIBE ORDERS (OUTPATIENT)
Facility: HOSPITAL | Age: 44
End: 2024-01-31

## 2024-01-31 DIAGNOSIS — Z12.31 ENCOUNTER FOR SCREENING MAMMOGRAM FOR MALIGNANT NEOPLASM OF BREAST: Primary | ICD-10-CM

## 2024-02-06 ENCOUNTER — OFFICE VISIT (OUTPATIENT)
Age: 44
End: 2024-02-06
Payer: COMMERCIAL

## 2024-02-06 DIAGNOSIS — N60.01 BREAST CYST, RIGHT: Primary | ICD-10-CM

## 2024-02-06 PROCEDURE — 19000 PUNCTURE ASPIR CYST BREAST: CPT | Performed by: NURSE PRACTITIONER

## 2024-02-06 PROCEDURE — 99213 OFFICE O/P EST LOW 20 MIN: CPT | Performed by: NURSE PRACTITIONER

## 2024-02-06 PROCEDURE — 76642 ULTRASOUND BREAST LIMITED: CPT | Performed by: NURSE PRACTITIONER

## 2024-02-06 NOTE — PROGRESS NOTES
HISTORY OF PRESENT ILLNESS  Nneka Araujo is a 44 y.o. female     HPI Established patient presents for evaluation of a RIGHT breast mass that is the size of a golf ball and tender with palpation x 1 week.  Denies redness or warmth to the area.  Denies fevers.        Breast history -   Referring - Dr. Brownlee   2018 - LEFT breast needle biopsy revealed fibroadenoma        Family history-  3 maternal aunts had breast cancer, all in their 50's. 1 , 2 living.  2018 - lifetime risk calculated by Dr. Alvarez - 20.4%.      OB History          1    Para        Term   1            AB        Living             SAB        IAB        Ectopic        Molar        Multiple        Live Births              Obstetric Comments   Menarche:  11.   LMP: 3/1/18.  # of Children:  1.  Age at Delivery of First Child:  24.   Hysterectomy/oophorectomy:  NO/NO.  Breast Bx:  no.  Hx of Breast Feeding:  no.  BCP:  yes. Hormone therapy:  no.                    Past Surgical History:   Procedure Laterality Date    BREAST BIOPSY Left 2018    benign     SECTION      [facility].    CYST INCISION AND DRAINAGE Left 2016    benign.  Dr. Yuliana Bee    CYST INCISION AND DRAINAGE Right 09/15/2017    benign.  Dr. Claudia Alvarez.    HERNIA REPAIR  1981    umbilical.    OTHER SURGICAL HISTORY  2014    thyroid biopsy.  Dr. Altagracia Brandon.    SLEEVE GASTRECTOMY N/A 2023    LAPAROSCOPIC SLEEVE GASTRECTOMY, EGD, PARAESOPHAGEAL HERNIA REPAIR  (E R A S) performed by Bart Siegel MD at [facility] MAIN OR    THYROIDECTOMY Left 2015    due to RETROSTERNAL BORDER WITH TRACHEAL DEVIATION.  Dr. Reid Abbasi.    benign.    UPPER GASTROINTESTINAL ENDOSCOPY N/A 2023    . performed by Bart Siegel MD at [facility] MAIN OR    US BREAST BIOPSY W LOC DEVICE 1ST LESION LEFT Left 2018    US BREAST NEEDLE BIOPSY LEFT 2018 [facility] RAD MAMMO           Mammogram Result (most recent):  WILLIAM CARL DIGITAL SCREEN BILATERAL

## 2024-02-07 ENCOUNTER — TELEMEDICINE (OUTPATIENT)
Age: 44
End: 2024-02-07
Payer: COMMERCIAL

## 2024-02-07 VITALS — BODY MASS INDEX: 31 KG/M2 | WEIGHT: 175 LBS

## 2024-02-07 DIAGNOSIS — E66.01 MORBID OBESITY (HCC): Primary | ICD-10-CM

## 2024-02-07 DIAGNOSIS — E55.9 VITAMIN D DEFICIENCY: ICD-10-CM

## 2024-02-07 DIAGNOSIS — Z90.3 S/P GASTRIC SLEEVE PROCEDURE: ICD-10-CM

## 2024-02-07 DIAGNOSIS — Z00.00 ENCOUNTER FOR PREVENTIVE CARE: ICD-10-CM

## 2024-02-07 PROCEDURE — 99212 OFFICE O/P EST SF 10 MIN: CPT | Performed by: NURSE PRACTITIONER

## 2024-02-07 ASSESSMENT — PATIENT HEALTH QUESTIONNAIRE - PHQ9
SUM OF ALL RESPONSES TO PHQ QUESTIONS 1-9: 0
SUM OF ALL RESPONSES TO PHQ QUESTIONS 1-9: 0
2. FEELING DOWN, DEPRESSED OR HOPELESS: 0
SUM OF ALL RESPONSES TO PHQ QUESTIONS 1-9: 0
1. LITTLE INTEREST OR PLEASURE IN DOING THINGS: 0
SUM OF ALL RESPONSES TO PHQ QUESTIONS 1-9: 0
SUM OF ALL RESPONSES TO PHQ9 QUESTIONS 1 & 2: 0

## 2024-02-07 ASSESSMENT — ENCOUNTER SYMPTOMS
SHORTNESS OF BREATH: 0
NAUSEA: 0
ABDOMINAL PAIN: 0
DIARRHEA: 0
VOMITING: 0

## 2024-02-07 NOTE — PROGRESS NOTES
Identified pt with two pt identifiers (name and ). Reviewed chart in preparation for visit and have obtained necessary documentation.    Nneka Araujo is a 44 y.o. female  Chief Complaint   Patient presents with    Follow-up     7.5 months s/p LAPAROSCOPIC SLEEVE GASTRECTOMY, EGD, PARAESOPHAGEAL HERNIA REPAIR  (E R A S)     Wt 79.4 kg (175 lb)   BMI 31.00 kg/m²     1. Have you been to the ER, urgent care clinic since your last visit?  Hospitalized since your last visit?no    2. Have you seen or consulted any other health care providers outside of the [facility] since your last visit?  Include any pap smears or colon screening. no

## 2024-02-07 NOTE — PROGRESS NOTES
Nneka Araujo (:  1980) is a 44 y.o. female,Established patient, here for evaluation of the following chief complaint(s):  Follow-up (7.5 months s/p LAPAROSCOPIC SLEEVE GASTRECTOMY, EGD, PARAESOPHAGEAL HERNIA REPAIR  (E R A S))        SUBJECTIVE/OBJECTIVE:    HPI:  Nneka Araujo is a 44 y.o. female with previous Sleeve gastrectomy surgery on 8 months ago. . She has lost a total of 52 lbs pounds since surgery. She  has lost 23 lbs since the last ov.  Body mass index is 31 kg/m².. no nausea and no vomiting . She had a little bit of heart burn after eating sometime too spicy. Drinking  64 ounces of water daily. 50-60 grams protein intake daily. + BM's, feels bloated. Pt is walking for exercise.   Dietary recall -     Breakfast- shake, greek yogurt  Lunch- salad, spinach, tuna   Dinner- chicken or fish.   She is not snacking between meals; .      Vitamins: Taking opurity.   MVI : yes  Calcium : yes  B-Vit 12: yes  Vit D: Yes- stopped Ergocaliferol due to high vit D        Ms. Araujo has a reminder for a \"due or due soon\" health maintenance. I have asked that she contact her primary care provider for follow-up on this health maintenance.        COMORBIDITY     SLEEP APNEA                 NO        GERD  (req.meds)            NO  HYPERLIPIDEMIA            NO  HYPERTENSION              NO         DIABETES                         NO           Current Outpatient Medications:     Caltrate 600+D Plus Minerals (CALTRATE) 600-800 MG-UNIT TABS tablet, Take 1 tablet by mouth daily, Disp: 90 tablet, Rfl: 3    Multiple Vitamins-Minerals (BARIATRIC MULTIVITAMINS/IRON) CAPS, Take by mouth, Disp: , Rfl:     acetaminophen (TYLENOL) 500 MG tablet, Take 2 tablets by mouth every 6 hours as needed for Pain, Disp: , Rfl:     linaclotide (LINZESS) 145 MCG capsule, Take 1 capsule by mouth every morning (before breakfast), Disp: 30 capsule, Rfl: 5    esomeprazole (NEXIUM) 40 MG delayed release capsule, as needed, Disp: , Rfl:

## 2024-02-14 ENCOUNTER — HOSPITAL ENCOUNTER (OUTPATIENT)
Facility: HOSPITAL | Age: 44
Discharge: HOME | End: 2024-02-17
Payer: COMMERCIAL

## 2024-02-14 VITALS — WEIGHT: 174 LBS | HEIGHT: 63 IN | BODY MASS INDEX: 30.83 KG/M2

## 2024-02-14 DIAGNOSIS — Z12.31 ENCOUNTER FOR SCREENING MAMMOGRAM FOR MALIGNANT NEOPLASM OF BREAST: ICD-10-CM

## 2024-02-14 PROCEDURE — 77063 BREAST TOMOSYNTHESIS BI: CPT

## 2024-02-15 ENCOUNTER — TRANSCRIBE ORDERS (OUTPATIENT)
Facility: HOSPITAL | Age: 44
End: 2024-02-15

## 2024-02-15 DIAGNOSIS — N63.0 BREAST NODULE: ICD-10-CM

## 2024-02-15 DIAGNOSIS — R92.8 ABNORMAL MAMMOGRAM OF LEFT BREAST: Primary | ICD-10-CM

## 2024-02-28 ENCOUNTER — HOSPITAL ENCOUNTER (OUTPATIENT)
Facility: HOSPITAL | Age: 44
Discharge: HOME | End: 2024-03-02
Payer: COMMERCIAL

## 2024-02-28 DIAGNOSIS — R92.8 ABNORMAL MAMMOGRAM OF LEFT BREAST: ICD-10-CM

## 2024-02-28 DIAGNOSIS — N63.0 BREAST NODULE: ICD-10-CM

## 2024-02-28 PROCEDURE — 76642 ULTRASOUND BREAST LIMITED: CPT

## 2024-03-01 NOTE — TELEPHONE ENCOUNTER
Last appointment: 11/29/23  Next appointment: none  Previous refill encounter(s): 2/16/23    Requested Prescriptions     Pending Prescriptions Disp Refills    esomeprazole (NEXIUM) 40 MG delayed release capsule 60 capsule 5     Sig: Take 1 capsule by mouth 2 times daily         For Pharmacy Admin Tracking Only    Program: Medication Refill  CPA in place:    Recommendation Provided To:   Intervention Detail: New Rx: 1, reason: Patient Preference  Intervention Accepted By:   Gap Closed?:    Time Spent (min): 5

## 2024-03-04 RX ORDER — ESOMEPRAZOLE MAGNESIUM 40 MG/1
40 CAPSULE, DELAYED RELEASE ORAL 2 TIMES DAILY
Qty: 60 CAPSULE | Refills: 5 | Status: SHIPPED | OUTPATIENT
Start: 2024-03-04

## 2024-03-06 ENCOUNTER — OFFICE VISIT (OUTPATIENT)
Age: 44
End: 2024-03-06

## 2024-03-06 DIAGNOSIS — E66.01 MORBID OBESITY (HCC): Primary | ICD-10-CM

## 2024-03-06 NOTE — PROGRESS NOTES
Post-Operative Bariatric Nutrition Counseling (subsequent)      Physician/Surgeon: Dr. Bart Siegel  Name: Nneka Araujo                       : 1980                                      Reason for visit: Follow up nutrition education and counseling post sleeve gastrectomy        ASSESSMENT:  Date of surgery:2023     Past Medical History: KISHORE, GERD  Medications/Supplements:   Prior to Admission medications    Medication Sig Start Date End Date Taking? Authorizing Provider   esomeprazole (NEXIUM) 40 MG delayed release capsule Take 1 capsule by mouth 2 times daily 3/4/24   Jamie Srivastava MD   diclofenac (VOLTAREN) 50 MG EC tablet Take 1 tablet by mouth 3 times daily (with meals) for 14 days 23  Farrukh Nur, APRN - CNP   Caltrate 600+D Plus Minerals (CALTRATE) 600-800 MG-UNIT TABS tablet Take 1 tablet by mouth daily 23   Jamie Srivastava MD   ergocalciferol (ERGOCALCIFEROL) 1.25 MG (13259 UT) capsule Take 1 capsule by mouth once a week THURSDAY  Patient not taking: Reported on 2023   Jamie Srivastava MD   Multiple Vitamins-Minerals (BARIATRIC MULTIVITAMINS/IRON) CAPS Take by mouth    Provider, MD Doroteo   acetaminophen (TYLENOL) 500 MG tablet Take 2 tablets by mouth every 6 hours as needed for Pain    ProviderDoroteo MD   linaclotide (LINZESS) 145 MCG capsule Take 1 capsule by mouth every morning (before breakfast) 23   Jamie Srivastava MD   fluticasone (FLONASE) 50 MCG/ACT nasal spray 1 spray by Nasal route daily 21   Automatic Reconciliation, Ar       Pt takes recommended supplements as prescribed:Unjury MVI- 2 capsules per day, caltrate  2x day         Anthropometrics:     Ht: 63 inches   Wt: 169 lbs        Pre-op wt: 227 lbs   Net Wt Loss: 26 (%)     Pt stated goal wt: 150 lbs  IBW: 115 lbs    %IBW: 146        BMI:30             Category: Obesity class I        Exercise/Physical Activity: 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

## 2024-04-10 ENCOUNTER — OFFICE VISIT (OUTPATIENT)
Age: 44
End: 2024-04-10
Payer: COMMERCIAL

## 2024-04-10 VITALS
SYSTOLIC BLOOD PRESSURE: 135 MMHG | WEIGHT: 169.8 LBS | RESPIRATION RATE: 18 BRPM | DIASTOLIC BLOOD PRESSURE: 81 MMHG | HEART RATE: 84 BPM | HEIGHT: 63 IN | BODY MASS INDEX: 30.09 KG/M2 | TEMPERATURE: 98.2 F | OXYGEN SATURATION: 98 %

## 2024-04-10 DIAGNOSIS — Z90.3 S/P GASTRIC SLEEVE PROCEDURE: ICD-10-CM

## 2024-04-10 DIAGNOSIS — R10.32 LLQ PAIN: ICD-10-CM

## 2024-04-10 DIAGNOSIS — E66.01 MORBID OBESITY (HCC): Primary | ICD-10-CM

## 2024-04-10 PROCEDURE — 99212 OFFICE O/P EST SF 10 MIN: CPT | Performed by: NURSE PRACTITIONER

## 2024-04-10 ASSESSMENT — PATIENT HEALTH QUESTIONNAIRE - PHQ9
SUM OF ALL RESPONSES TO PHQ QUESTIONS 1-9: 0
SUM OF ALL RESPONSES TO PHQ QUESTIONS 1-9: 0
SUM OF ALL RESPONSES TO PHQ9 QUESTIONS 1 & 2: 0
1. LITTLE INTEREST OR PLEASURE IN DOING THINGS: NOT AT ALL
2. FEELING DOWN, DEPRESSED OR HOPELESS: NOT AT ALL
SUM OF ALL RESPONSES TO PHQ QUESTIONS 1-9: 0
SUM OF ALL RESPONSES TO PHQ QUESTIONS 1-9: 0

## 2024-04-10 ASSESSMENT — ENCOUNTER SYMPTOMS
CONSTIPATION: 1
VOMITING: 0
SHORTNESS OF BREATH: 0
ABDOMINAL PAIN: 1
NAUSEA: 0

## 2024-04-10 NOTE — PROGRESS NOTES
Identified pt with two pt identifiers (name and ). Reviewed chart in preparation for visit and have obtained necessary documentation.    Nneka Araujo is a 44 y.o. female  Chief Complaint   Patient presents with    Follow-up     Stomach pain     Weight Management     11 month s/p LAPAROSCOPIC SLEEVE GASTRECTOMY, EGD, PARAESOPHAGEAL HERNIA REPAIR  (E R A S)     /81 (Site: Right Upper Arm, Position: Sitting, Cuff Size: Medium Adult)   Pulse 84   Temp 98.2 °F (36.8 °C) (Oral)   Resp 18   Ht 1.6 m (5' 3\")   Wt 77 kg (169 lb 12.8 oz)   SpO2 98%   BMI 30.08 kg/m²     1. Have you been to the ER, urgent care clinic since your last visit?  Hospitalized since your last visit?no    2. Have you seen or consulted any other health care providers outside of the [facility] since your last visit?  Include any pap smears or colon screening. yes - gynecologist    Xeljanz Counseling: I discussed with the patient the risks of Xeljanz therapy including increased risk of infection, liver issues, headache, diarrhea, or cold symptoms. Live vaccines should be avoided. They were instructed to call if they have any problems.

## 2024-04-10 NOTE — PROGRESS NOTES
Nneka Araujo (:  1980) is a 44 y.o. female,Established patient, here for evaluation of the following chief complaint(s):  Follow-up (Stomach pain ) and Weight Management (11 month s/p LAPAROSCOPIC SLEEVE GASTRECTOMY, EGD, PARAESOPHAGEAL HERNIA REPAIR  (E R A S))        SUBJECTIVE/OBJECTIVE:    HPI:  Nneka Araujo is a 44 y.o. female with previous sleeve gastrectomy surgery on 11 months ago. . She has lost a total of 58 pounds since surgery. She  has lost 6 lbs since the last ov.  Body mass index is 30.08 kg/m².. no nausea and no vomiting  A little Acid reflux/heartburn.. Drinking  60 ounces of water daily. 50+ protein intake daily. + BM's., takes Linzess. She states that she was treated 3 times for BV in the past few months. She has been on a lot of antibiotics. She started having left lower/mid abd pain that comes and goes. Sometimes feels like gas.Sometimes she can feel it when she strecthes.  It got better when she resume her nexium. She states that her urine sometimes has an ammonia smell.   Dietary recall -    Breakfast- shrimp and eggs  Lunch- shrimp   Dinner- similar  Vitamins:  MVI : yes  Calcium : yes  B-Vit 12: yes  Vit D: Yes          Ms. Araujo has a reminder for a \"due or due soon\" health maintenance. I have asked that she contact her primary care provider for follow-up on this health maintenance.        COMORBIDITY     SLEEP APNEA                 NO        GERD  (req.meds)            NO  HYPERLIPIDEMIA            NO  HYPERTENSION              NO         DIABETES                         NO           Current Outpatient Medications:     esomeprazole (NEXIUM) 40 MG delayed release capsule, Take 1 capsule by mouth 2 times daily, Disp: 60 capsule, Rfl: 5    Caltrate 600+D Plus Minerals (CALTRATE) 600-800 MG-UNIT TABS tablet, Take 1 tablet by mouth daily, Disp: 90 tablet, Rfl: 3    Multiple Vitamins-Minerals (BARIATRIC MULTIVITAMINS/IRON) CAPS, Take by mouth, Disp: , Rfl:     acetaminophen

## 2024-04-30 ENCOUNTER — TELEPHONE (OUTPATIENT)
Age: 44
End: 2024-04-30

## 2024-04-30 NOTE — TELEPHONE ENCOUNTER
LM for pt to call and schedule annual bariatric exam.  Letter sent. [facility]

## 2024-05-08 LAB — 25(OH)D3+25(OH)D2 SERPL-MCNC: 83.2 NG/ML (ref 30–100)

## 2024-05-24 ENCOUNTER — OFFICE VISIT (OUTPATIENT)
Age: 44
End: 2024-05-24
Payer: COMMERCIAL

## 2024-05-24 VITALS
BODY MASS INDEX: 29.66 KG/M2 | SYSTOLIC BLOOD PRESSURE: 113 MMHG | OXYGEN SATURATION: 98 % | RESPIRATION RATE: 17 BRPM | DIASTOLIC BLOOD PRESSURE: 70 MMHG | TEMPERATURE: 98.8 F | HEIGHT: 63 IN | HEART RATE: 90 BPM | WEIGHT: 167.4 LBS

## 2024-05-24 DIAGNOSIS — E66.01 MORBID OBESITY (HCC): Primary | ICD-10-CM

## 2024-05-24 DIAGNOSIS — Z90.3 S/P GASTRIC SLEEVE PROCEDURE: ICD-10-CM

## 2024-05-24 PROCEDURE — 99212 OFFICE O/P EST SF 10 MIN: CPT | Performed by: NURSE PRACTITIONER

## 2024-05-24 ASSESSMENT — ENCOUNTER SYMPTOMS
NAUSEA: 0
CONSTIPATION: 1
ABDOMINAL PAIN: 0
SHORTNESS OF BREATH: 0
VOMITING: 0

## 2024-05-24 ASSESSMENT — PATIENT HEALTH QUESTIONNAIRE - PHQ9
SUM OF ALL RESPONSES TO PHQ QUESTIONS 1-9: 0
SUM OF ALL RESPONSES TO PHQ QUESTIONS 1-9: 0
2. FEELING DOWN, DEPRESSED OR HOPELESS: NOT AT ALL
1. LITTLE INTEREST OR PLEASURE IN DOING THINGS: NOT AT ALL
SUM OF ALL RESPONSES TO PHQ QUESTIONS 1-9: 0
SUM OF ALL RESPONSES TO PHQ9 QUESTIONS 1 & 2: 0
SUM OF ALL RESPONSES TO PHQ QUESTIONS 1-9: 0

## 2024-05-24 NOTE — PROGRESS NOTES
Identified patient with two patient identifiers (name and ). Reviewed chart in preparation for visit and have obtained necessary documentation.    Nneka Araujo is a 44 y.o. female  Chief Complaint   Patient presents with    Follow-up     1 year s/p lap sleeve gastrectomy     Obesity     /70 (Site: Right Upper Arm, Position: Sitting, Cuff Size: Large Adult)   Pulse 90   Temp 98.8 °F (37.1 °C)   Resp 17   Ht 1.6 m (5' 3\")   Wt 75.9 kg (167 lb 6.4 oz)   SpO2 98%   BMI 29.65 kg/m²     1. Have you been to the ER, urgent care clinic since your last visit?  Hospitalized since your last visit? Recently had surgery    2. Have you seen or consulted any other health care providers outside of the [facility] since your last visit?  Include any pap smears or colon screening. Yes [facility]

## 2024-05-24 NOTE — PROGRESS NOTES
Nneka Araujo (:  1980) is a 44 y.o. female,Established patient, here for evaluation of the following chief complaint(s):  Follow-up (1 year s/p lap sleeve gastrectomy ) and Obesity        SUBJECTIVE/OBJECTIVE:    HPI:  Nneka Araujo is a 44 y.o. female with previous Sleeve gastrectomy surgery on 1 year ago. . She has lost a total of 55.6 pounds since surgery. She  has lost 2 lbs since the last ov.  Body mass index is 29.65 kg/m².. no nausea and no vomiting . A little Acid reflux/heartburn, takes once a day. .. Drinking  60 ounces of water daily. 50+ protein intake daily. + BM's, takes linzess. Pt is walking and going to the gym for exercise. She states that the probiotic helped her gas.   Dietary recall -    Breaking- yogurt or eggs  Lunch- tuna or chicken, soup  Dinner- chicken or fish    She is  snacking between meals; yogurt or pistachios.      Vitamins:  MVI : yes  Calcium : yes  B-Vit 12: yes  Vit D: Yes      Ms. Araujo has a reminder for a \"due or due soon\" health maintenance. I have asked that she contact her primary care provider for follow-up on this health maintenance.        COMORBIDITY     SLEEP APNEA                 NO        GERD  (req.meds)            NO  HYPERLIPIDEMIA            NO  HYPERTENSION              NO         DIABETES                         NO           Current Outpatient Medications:     esomeprazole (NEXIUM) 40 MG delayed release capsule, Take 1 capsule by mouth 2 times daily, Disp: 60 capsule, Rfl: 5    Caltrate 600+D Plus Minerals (CALTRATE) 600-800 MG-UNIT TABS tablet, Take 1 tablet by mouth daily, Disp: 90 tablet, Rfl: 3    Multiple Vitamins-Minerals (BARIATRIC MULTIVITAMINS/IRON) CAPS, Take by mouth, Disp: , Rfl:     linaclotide (LINZESS) 145 MCG capsule, Take 1 capsule by mouth every morning (before breakfast), Disp: 30 capsule, Rfl: 5    fluticasone (FLONASE) 50 MCG/ACT nasal spray, 1 spray by Nasal route daily, Disp: , Rfl:     diclofenac (VOLTAREN) 50 MG EC tablet,

## 2024-07-17 ENCOUNTER — OFFICE VISIT (OUTPATIENT)
Age: 44
End: 2024-07-17
Payer: COMMERCIAL

## 2024-07-17 VITALS
TEMPERATURE: 98 F | SYSTOLIC BLOOD PRESSURE: 107 MMHG | RESPIRATION RATE: 16 BRPM | BODY MASS INDEX: 28.81 KG/M2 | WEIGHT: 162.6 LBS | OXYGEN SATURATION: 95 % | HEART RATE: 90 BPM | HEIGHT: 63 IN | DIASTOLIC BLOOD PRESSURE: 71 MMHG

## 2024-07-17 DIAGNOSIS — R10.13 EPIGASTRIC PAIN: Primary | ICD-10-CM

## 2024-07-17 DIAGNOSIS — Z90.3 HISTORY OF SLEEVE GASTRECTOMY: ICD-10-CM

## 2024-07-17 PROCEDURE — 99214 OFFICE O/P EST MOD 30 MIN: CPT | Performed by: SURGERY

## 2024-07-17 ASSESSMENT — PATIENT HEALTH QUESTIONNAIRE - PHQ9
SUM OF ALL RESPONSES TO PHQ QUESTIONS 1-9: 0
1. LITTLE INTEREST OR PLEASURE IN DOING THINGS: NOT AT ALL
SUM OF ALL RESPONSES TO PHQ9 QUESTIONS 1 & 2: 0
SUM OF ALL RESPONSES TO PHQ QUESTIONS 1-9: 0
2. FEELING DOWN, DEPRESSED OR HOPELESS: NOT AT ALL

## 2024-07-17 ASSESSMENT — ANXIETY QUESTIONNAIRES
7. FEELING AFRAID AS IF SOMETHING AWFUL MIGHT HAPPEN: NOT AT ALL
1. FEELING NERVOUS, ANXIOUS, OR ON EDGE: NOT AT ALL
3. WORRYING TOO MUCH ABOUT DIFFERENT THINGS: NOT AT ALL
GAD7 TOTAL SCORE: 0
2. NOT BEING ABLE TO STOP OR CONTROL WORRYING: NOT AT ALL
5. BEING SO RESTLESS THAT IT IS HARD TO SIT STILL: NOT AT ALL
6. BECOMING EASILY ANNOYED OR IRRITABLE: NOT AT ALL
IF YOU CHECKED OFF ANY PROBLEMS ON THIS QUESTIONNAIRE, HOW DIFFICULT HAVE THESE PROBLEMS MADE IT FOR YOU TO DO YOUR WORK, TAKE CARE OF THINGS AT HOME, OR GET ALONG WITH OTHER PEOPLE: NOT DIFFICULT AT ALL
4. TROUBLE RELAXING: NOT AT ALL

## 2024-07-17 NOTE — PROGRESS NOTES
[facility] History and Physical    History of Present Illness:      Nneka Araujo is a 44 y.o. female who has a history of laparoscopic sleeve gastrectomy about a year ago.  She had a small paraesophageal hernia repair as well.  Overall she has been doing well but does admit having some epigastric pain occasionally.  She says the pain can sometimes last a few days but is generally relatively mild pain a 2 out of 10 at most.  She is not really having much of any discomfort right now.  She has had no changes in bowel movements nausea or vomiting.  She has had no GERD and no dysphagia symptoms.    Past Medical History:   Diagnosis Date    Abnormal Pap smear of cervix 1990s    Arthritis     IN BACK    Breast cyst     benign.  Left.  1.7 cm and multiple others.  Dr. Maira Kearns    Chicken pox childhood    Chronic idiopathic constipation 2015    Dr. Albino De Jesus.    Chronic pain     VALLADARES (dyspnea on exertion) 2015    Dr. 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  Retrosternal goiter.  Dr. Reid Abbasi.    Enlarged tonsils and adenoids 2010    GERD (gastroesophageal reflux disease)     Hernia, abdominal 2022    [facility]ist     History of blood transfusion     AFTER CHILDBIRTH    Iodine-deficiency related goiter     Left.  Dr. Reid Abbasi.    KISHORE (obstructive sleep apnea) 2015    Dr. Roby Mcgill, uses cpap.  Dr. Krystal Araya.  WEARS CPAP Dr. Omar Platt.    Thyroid cyst     Right.      Thyroid nodule 2019       Past Surgical History:   Procedure Laterality Date    BREAST BIOPSY Left 2018    benign     SECTION  2004    [facility].    CYST INCISION AND DRAINAGE Left 2016    benign.  Dr. Yuliana Bee    CYST INCISION AND DRAINAGE Right 09/15/2017    benign.  Dr. Claudia Alvarez.    HERNIA REPAIR

## 2024-07-17 NOTE — PROGRESS NOTES
Identified patient with two patient identifiers (name and ). Reviewed chart in preparation for visit and have obtained necessary documentation.    Nneka Araujo is a 44 y.o. female  Chief Complaint   Patient presents with    Follow-up    Abdominal Pain     /71 (Site: Left Upper Arm, Position: Sitting, Cuff Size: Medium Adult)   Pulse 90   Temp 98 °F (36.7 °C) (Oral)   Resp 16   Ht 1.6 m (5' 3\")   Wt 73.8 kg (162 lb 9.6 oz)   SpO2 95%   BMI 28.80 kg/m²     1. Have you been to the ER, urgent care clinic since your last visit?  Hospitalized since your last visit? yes seen 7/15/24 urgent care treated for strep throat     2. Have you seen or consulted any other health care providers outside of the [facility] since your last visit?  Include any pap smears or colon screening. no

## 2024-07-18 NOTE — PROGRESS NOTES
HISTORY OF PRESENT ILLNESS  Nneka Araujo is a 44 y.o. female     HPI  Established patient presents for follow-up as a high risk patient due to her family history of breast cancer.  Denies breast mass, skin changes, nipple discharge and pain.          Breast history -   Referring - Dr. Brownlee   2018 - LEFT breast needle biopsy revealed fibroadenoma        Family history-  3 maternal aunts had breast cancer, all in their 50's. 1 , 2 living.  2018 - lifetime risk calculated by Dr. Alvarez - 20.4%.        OB History               Para        Term                AB        Living             SAB        IAB        Ectopic        Molar        Multiple        Live Births   1          Obstetric Comments   Menarche:  11.   LMP: 3/1/18.  # of Children:  1.  Age at Delivery of First Child:  24.   Hysterectomy/oophorectomy:  NO/NO.  Breast Bx:  no.  Hx of Breast Feeding:  no.  BCP:  yes. Hormone therapy:  no.                    Past Surgical History:   Procedure Laterality Date    BREAST BIOPSY Left 2018    benign     SECTION  2004    [facility].    CYST INCISION AND DRAINAGE Left 2016    benign.  Dr. Yuliana Bee    CYST INCISION AND DRAINAGE Right 09/15/2017    benign.  Dr. Claudia Alvarez.    HERNIA REPAIR  1981    umbilical.    OTHER SURGICAL HISTORY  2014    thyroid biopsy.  Dr. Altagrcaia Brandon.    SLEEVE GASTRECTOMY N/A 2023    LAPAROSCOPIC SLEEVE GASTRECTOMY, EGD, PARAESOPHAGEAL HERNIA REPAIR  (E R A S) performed by Bart Siegel MD at [facility] MAIN OR    THYROIDECTOMY Left 2015    due to RETROSTERNAL BORDER WITH TRACHEAL DEVIATION.  Dr. Reid Abbasi.    benign.    UPPER GASTROINTESTINAL ENDOSCOPY N/A 2023    . performed by Bart Siegel MD at [facility] MAIN OR    US BREAST BIOPSY W LOC DEVICE 1ST LESION LEFT Left 2018    US BREAST NEEDLE BIOPSY LEFT 2018 [facility] RAD MAMMO    US BREAST FINE NEEDLE ASPIRATION Right 2024    right breast cyst aspiration

## 2024-07-22 ENCOUNTER — OFFICE VISIT (OUTPATIENT)
Age: 44
End: 2024-07-22
Payer: COMMERCIAL

## 2024-07-22 VITALS — HEIGHT: 63 IN | WEIGHT: 162 LBS | BODY MASS INDEX: 28.7 KG/M2

## 2024-07-22 DIAGNOSIS — Z91.89 AT HIGH RISK FOR BREAST CANCER: ICD-10-CM

## 2024-07-22 DIAGNOSIS — N60.11 FIBROCYSTIC BREAST CHANGES OF BOTH BREASTS: ICD-10-CM

## 2024-07-22 DIAGNOSIS — N60.02 BILATERAL BREAST CYSTS: Primary | ICD-10-CM

## 2024-07-22 DIAGNOSIS — N60.01 BILATERAL BREAST CYSTS: Primary | ICD-10-CM

## 2024-07-22 DIAGNOSIS — N60.12 FIBROCYSTIC BREAST CHANGES OF BOTH BREASTS: ICD-10-CM

## 2024-07-22 DIAGNOSIS — Z12.31 ENCOUNTER FOR SCREENING MAMMOGRAM FOR BREAST CANCER: ICD-10-CM

## 2024-07-22 PROCEDURE — 99213 OFFICE O/P EST LOW 20 MIN: CPT | Performed by: NURSE PRACTITIONER

## 2024-07-31 ENCOUNTER — HOSPITAL ENCOUNTER (OUTPATIENT)
Facility: HOSPITAL | Age: 44
Discharge: HOME | End: 2024-08-03
Attending: SURGERY
Payer: COMMERCIAL

## 2024-07-31 DIAGNOSIS — R10.13 EPIGASTRIC PAIN: ICD-10-CM

## 2024-07-31 DIAGNOSIS — Z90.3 HISTORY OF SLEEVE GASTRECTOMY: ICD-10-CM

## 2024-07-31 PROCEDURE — 76705 ECHO EXAM OF ABDOMEN: CPT

## 2024-08-02 ENCOUNTER — TELEPHONE (OUTPATIENT)
Age: 44
End: 2024-08-02

## 2024-08-02 NOTE — TELEPHONE ENCOUNTER
Returned call to Ms Araujo V/VANCE informed patient Dr Siegel left a V/M he is out the office for the next week. I advised patient to please check her V/M from previously.

## 2024-08-02 NOTE — TELEPHONE ENCOUNTER
Patient's ultrasound shows multiple gallstones and a normal common bile duct.  I believe her pain is likely from the gallstones.  I will schedule her for laparoscopic cholecystectomy here in the near future.  I will also try and do an EGD just to make sure everything with her sleeve and everything looks fine.  I was unable to reach the patient in person and left a voicemail for her describing the situation    Bart Siegel

## 2024-08-06 ENCOUNTER — TELEPHONE (OUTPATIENT)
Age: 44
End: 2024-08-06

## 2024-08-06 ENCOUNTER — PREP FOR PROCEDURE (OUTPATIENT)
Age: 44
End: 2024-08-06

## 2024-08-06 DIAGNOSIS — K80.20 SYMPTOMATIC CHOLELITHIASIS: ICD-10-CM

## 2024-08-06 NOTE — TELEPHONE ENCOUNTER
Patient called back to schedule surgery. Patient decided on 09/02 but had questions regarding surgery and would like a call back from a nurse.

## 2024-08-08 NOTE — TELEPHONE ENCOUNTER
Returned call to patient.  Two patient identifiers used.  Patient has several questions regarding upcoming surgery and would like to see Dr. Siegel prior to surgery.  Transferred patient to PSR to schedule appt with Dr. Siegel to discuss surgery planned for 8/30/24.

## 2024-08-21 ENCOUNTER — OFFICE VISIT (OUTPATIENT)
Age: 44
End: 2024-08-21
Payer: COMMERCIAL

## 2024-08-21 VITALS
OXYGEN SATURATION: 99 % | RESPIRATION RATE: 18 BRPM | DIASTOLIC BLOOD PRESSURE: 80 MMHG | SYSTOLIC BLOOD PRESSURE: 130 MMHG | TEMPERATURE: 99 F | WEIGHT: 164.2 LBS | HEART RATE: 80 BPM | BODY MASS INDEX: 29.09 KG/M2 | HEIGHT: 63 IN

## 2024-08-21 DIAGNOSIS — Z90.3 HISTORY OF SLEEVE GASTRECTOMY: ICD-10-CM

## 2024-08-21 DIAGNOSIS — K80.20 SYMPTOMATIC CHOLELITHIASIS: Primary | ICD-10-CM

## 2024-08-21 PROCEDURE — 99214 OFFICE O/P EST MOD 30 MIN: CPT | Performed by: SURGERY

## 2024-08-21 ASSESSMENT — PATIENT HEALTH QUESTIONNAIRE - PHQ9
SUM OF ALL RESPONSES TO PHQ QUESTIONS 1-9: 0
2. FEELING DOWN, DEPRESSED OR HOPELESS: NOT AT ALL
SUM OF ALL RESPONSES TO PHQ QUESTIONS 1-9: 0
1. LITTLE INTEREST OR PLEASURE IN DOING THINGS: NOT AT ALL
SUM OF ALL RESPONSES TO PHQ9 QUESTIONS 1 & 2: 0

## 2024-08-21 NOTE — PROGRESS NOTES
Rm    Chief Complaint   Patient presents with    Follow-up     Discuss procedure        /80 (Site: Left Upper Arm)   Pulse 80   Temp 99 °F (37.2 °C)   Resp 18   Ht 1.6 m (5' 3\")   Wt 74.5 kg (164 lb 3.2 oz)   SpO2 99%   BMI 29.09 kg/m²      1. Have you been to the ER, urgent care clinic since your last visit?  Hospitalized since your last visit? No     2. Have you seen or consulted any other health care providers outside of the [facility] since your last visit?  Include any pap smears or colon screening. No     Health Maintenance Due   Topic Date Due    Pneumococcal 0-64 years Vaccine (1 of 2 - PCV) Never done    Varicella vaccine (1 of 2 - 13+ 2-dose series) Never done    Cervical cancer screen  03/12/2022    Flu vaccine (1) 08/01/2024             No data to display                 Failed to redirect to the Timeline version of the [employer] SmartLink.    Failed to redirect to the Timeline version of the Riskified SmartLink.             
to proceed with the surgery.        Bart Siegel MD    Greater than half of the time: 30 minutes was used in counciling the patient about diagnosis and treatment plan    Ms. Araujo has a reminder for a \"due or due soon\" health maintenance. I have asked that she contact her primary care provider for follow-up on this health maintenance.

## 2024-08-26 NOTE — PERIOP NOTE
PAT PHONE INTERVIEW COMPLETED W/ PT.  PT VERBALIZED UNDERSTANDING OF PAT INSTRUCTIONS AND THESE WERE SENT TO PT'S MY CHART ACCT.    PT TO PURCHASE ANTIBACTERIAL SOAP TO SHOWER W/ THE NIGHT BEFORE AND MORNING OF SURGERY.  
germs:  Hand washing is the most important thing you and your caregivers can do to prevent infections.  Keep your bandage clean and dry!  Do not touch your surgical wound.  Use clean, freshly washed towels and washcloths every time you shower; do not share bath linens with others.  Until your surgical wound is healed, wear clothing and sleep on bed linens that are clean.  Do not allow pets to sleep in your bed with you or touch your surgical wound.  Do not smoke - smoking delays wound healing. This may be a good time to stop smoking.  If you have diabetes, it is important for you to manage your blood sugar levels properly before your surgery as well as after your surgery. Poorly managed blood sugar levels slow down wound healing and prevent you from healing completely.       Follow all instructions so your surgery won’t be cancelled.  Please, be on time.                    If a situation occurs and you are delayed the day of surgery, call [phone]/ [phone].    If your physical condition changes (like a fever, cold, flu, etc.) call your surgeon as soon as possible.    Home medication(s) reviewed and verified via during PAT appointment/call.    The patient was contacted via telephone.     She verbalized understanding of all instructions does not need reinforcement.

## 2024-08-30 ENCOUNTER — ANESTHESIA EVENT (OUTPATIENT)
Facility: HOSPITAL | Age: 44
End: 2024-08-30
Payer: COMMERCIAL

## 2024-08-30 ENCOUNTER — HOSPITAL ENCOUNTER (OUTPATIENT)
Facility: HOSPITAL | Age: 44
Setting detail: OUTPATIENT SURGERY
Discharge: HOME | End: 2024-08-30
Attending: SURGERY | Admitting: SURGERY
Payer: COMMERCIAL

## 2024-08-30 ENCOUNTER — ANESTHESIA (OUTPATIENT)
Facility: HOSPITAL | Age: 44
End: 2024-08-30
Payer: COMMERCIAL

## 2024-08-30 VITALS
DIASTOLIC BLOOD PRESSURE: 70 MMHG | SYSTOLIC BLOOD PRESSURE: 112 MMHG | RESPIRATION RATE: 12 BRPM | TEMPERATURE: 98.4 F | HEIGHT: 63 IN | BODY MASS INDEX: 29.06 KG/M2 | WEIGHT: 164 LBS | HEART RATE: 83 BPM | OXYGEN SATURATION: 100 %

## 2024-08-30 DIAGNOSIS — K80.20 SYMPTOMATIC CHOLELITHIASIS: Primary | ICD-10-CM

## 2024-08-30 LAB — HCG UR QL: NEGATIVE

## 2024-08-30 PROCEDURE — 2709999900 HC NON-CHARGEABLE SUPPLY: Performed by: SURGERY

## 2024-08-30 PROCEDURE — 3600000014 HC SURGERY LEVEL 4 ADDTL 15MIN: Performed by: SURGERY

## 2024-08-30 PROCEDURE — 7100000000 HC PACU RECOVERY - FIRST 15 MIN: Performed by: SURGERY

## 2024-08-30 PROCEDURE — 3700000000 HC ANESTHESIA ATTENDED CARE: Performed by: SURGERY

## 2024-08-30 PROCEDURE — 3600000004 HC SURGERY LEVEL 4 BASE: Performed by: SURGERY

## 2024-08-30 PROCEDURE — 2580000003 HC RX 258: Performed by: ANESTHESIOLOGY

## 2024-08-30 PROCEDURE — 6360000002 HC RX W HCPCS: Performed by: NURSE ANESTHETIST, CERTIFIED REGISTERED

## 2024-08-30 PROCEDURE — 7100000011 HC PHASE II RECOVERY - ADDTL 15 MIN: Performed by: SURGERY

## 2024-08-30 PROCEDURE — 6370000000 HC RX 637 (ALT 250 FOR IP): Performed by: SURGERY

## 2024-08-30 PROCEDURE — 6360000002 HC RX W HCPCS: Performed by: SURGERY

## 2024-08-30 PROCEDURE — 3700000001 HC ADD 15 MINUTES (ANESTHESIA): Performed by: SURGERY

## 2024-08-30 PROCEDURE — 2500000003 HC RX 250 WO HCPCS: Performed by: NURSE ANESTHETIST, CERTIFIED REGISTERED

## 2024-08-30 PROCEDURE — 2580000003 HC RX 258: Performed by: SURGERY

## 2024-08-30 PROCEDURE — 88305 TISSUE EXAM BY PATHOLOGIST: CPT

## 2024-08-30 PROCEDURE — 2500000003 HC RX 250 WO HCPCS: Performed by: SURGERY

## 2024-08-30 PROCEDURE — 2580000003 HC RX 258: Performed by: NURSE ANESTHETIST, CERTIFIED REGISTERED

## 2024-08-30 PROCEDURE — 2720000010 HC SURG SUPPLY STERILE: Performed by: SURGERY

## 2024-08-30 PROCEDURE — 81025 URINE PREGNANCY TEST: CPT

## 2024-08-30 PROCEDURE — 6360000002 HC RX W HCPCS: Performed by: ANESTHESIOLOGY

## 2024-08-30 PROCEDURE — 7100000010 HC PHASE II RECOVERY - FIRST 15 MIN: Performed by: SURGERY

## 2024-08-30 PROCEDURE — 88304 TISSUE EXAM BY PATHOLOGIST: CPT

## 2024-08-30 PROCEDURE — 7100000001 HC PACU RECOVERY - ADDTL 15 MIN: Performed by: SURGERY

## 2024-08-30 RX ORDER — SODIUM CHLORIDE 9 MG/ML
INJECTION, SOLUTION INTRAVENOUS CONTINUOUS
Status: DISCONTINUED | OUTPATIENT
Start: 2024-08-30 | End: 2024-08-30 | Stop reason: HOSPADM

## 2024-08-30 RX ORDER — SODIUM CHLORIDE 0.9 % (FLUSH) 0.9 %
5-40 SYRINGE (ML) INJECTION EVERY 12 HOURS SCHEDULED
Status: DISCONTINUED | OUTPATIENT
Start: 2024-08-30 | End: 2024-08-30 | Stop reason: HOSPADM

## 2024-08-30 RX ORDER — PHENYLEPHRINE HYDROCHLORIDE 10 MG/ML
INJECTION INTRAVENOUS PRN
Status: DISCONTINUED | OUTPATIENT
Start: 2024-08-30 | End: 2024-08-30 | Stop reason: SDUPTHER

## 2024-08-30 RX ORDER — DEXAMETHASONE SODIUM PHOSPHATE 4 MG/ML
INJECTION, SOLUTION INTRA-ARTICULAR; INTRALESIONAL; INTRAMUSCULAR; INTRAVENOUS; SOFT TISSUE PRN
Status: DISCONTINUED | OUTPATIENT
Start: 2024-08-30 | End: 2024-08-30 | Stop reason: SDUPTHER

## 2024-08-30 RX ORDER — EPHEDRINE SULFATE 50 MG/ML
INJECTION INTRAVENOUS PRN
Status: DISCONTINUED | OUTPATIENT
Start: 2024-08-30 | End: 2024-08-30 | Stop reason: SDUPTHER

## 2024-08-30 RX ORDER — SODIUM CHLORIDE, SODIUM LACTATE, POTASSIUM CHLORIDE, CALCIUM CHLORIDE 600; 310; 30; 20 MG/100ML; MG/100ML; MG/100ML; MG/100ML
INJECTION, SOLUTION INTRAVENOUS CONTINUOUS PRN
Status: DISCONTINUED | OUTPATIENT
Start: 2024-08-30 | End: 2024-08-30 | Stop reason: SDUPTHER

## 2024-08-30 RX ORDER — HYDROMORPHONE HYDROCHLORIDE 1 MG/ML
0.5 INJECTION, SOLUTION INTRAMUSCULAR; INTRAVENOUS; SUBCUTANEOUS EVERY 5 MIN PRN
Status: DISCONTINUED | OUTPATIENT
Start: 2024-08-30 | End: 2024-08-30 | Stop reason: HOSPADM

## 2024-08-30 RX ORDER — FENTANYL CITRATE 50 UG/ML
50 INJECTION, SOLUTION INTRAMUSCULAR; INTRAVENOUS EVERY 5 MIN PRN
Status: DISCONTINUED | OUTPATIENT
Start: 2024-08-30 | End: 2024-08-30 | Stop reason: HOSPADM

## 2024-08-30 RX ORDER — ONDANSETRON 2 MG/ML
INJECTION INTRAMUSCULAR; INTRAVENOUS PRN
Status: DISCONTINUED | OUTPATIENT
Start: 2024-08-30 | End: 2024-08-30 | Stop reason: SDUPTHER

## 2024-08-30 RX ORDER — DEXMEDETOMIDINE HYDROCHLORIDE 100 UG/ML
INJECTION, SOLUTION INTRAVENOUS PRN
Status: DISCONTINUED | OUTPATIENT
Start: 2024-08-30 | End: 2024-08-30 | Stop reason: SDUPTHER

## 2024-08-30 RX ORDER — SODIUM CHLORIDE 0.9 % (FLUSH) 0.9 %
5-40 SYRINGE (ML) INJECTION PRN
Status: DISCONTINUED | OUTPATIENT
Start: 2024-08-30 | End: 2024-08-30 | Stop reason: HOSPADM

## 2024-08-30 RX ORDER — SODIUM CHLORIDE 9 MG/ML
INJECTION, SOLUTION INTRAVENOUS PRN
Status: DISCONTINUED | OUTPATIENT
Start: 2024-08-30 | End: 2024-08-30 | Stop reason: HOSPADM

## 2024-08-30 RX ORDER — NALOXONE HYDROCHLORIDE 0.4 MG/ML
INJECTION, SOLUTION INTRAMUSCULAR; INTRAVENOUS; SUBCUTANEOUS PRN
Status: DISCONTINUED | OUTPATIENT
Start: 2024-08-30 | End: 2024-08-30 | Stop reason: HOSPADM

## 2024-08-30 RX ORDER — DIPHENHYDRAMINE HYDROCHLORIDE 50 MG/ML
12.5 INJECTION INTRAMUSCULAR; INTRAVENOUS
Status: DISCONTINUED | OUTPATIENT
Start: 2024-08-30 | End: 2024-08-30 | Stop reason: HOSPADM

## 2024-08-30 RX ORDER — ONDANSETRON 2 MG/ML
4 INJECTION INTRAMUSCULAR; INTRAVENOUS ONCE
Status: DISCONTINUED | OUTPATIENT
Start: 2024-08-30 | End: 2024-08-30 | Stop reason: HOSPADM

## 2024-08-30 RX ORDER — MEPERIDINE HYDROCHLORIDE 25 MG/ML
12.5 INJECTION INTRAMUSCULAR; INTRAVENOUS; SUBCUTANEOUS EVERY 5 MIN PRN
Status: DISCONTINUED | OUTPATIENT
Start: 2024-08-30 | End: 2024-08-30 | Stop reason: HOSPADM

## 2024-08-30 RX ORDER — SODIUM CHLORIDE, SODIUM LACTATE, POTASSIUM CHLORIDE, CALCIUM CHLORIDE 600; 310; 30; 20 MG/100ML; MG/100ML; MG/100ML; MG/100ML
INJECTION, SOLUTION INTRAVENOUS CONTINUOUS
Status: DISCONTINUED | OUTPATIENT
Start: 2024-08-30 | End: 2024-08-30 | Stop reason: HOSPADM

## 2024-08-30 RX ORDER — MIDAZOLAM HYDROCHLORIDE 1 MG/ML
INJECTION INTRAMUSCULAR; INTRAVENOUS PRN
Status: DISCONTINUED | OUTPATIENT
Start: 2024-08-30 | End: 2024-08-30 | Stop reason: SDUPTHER

## 2024-08-30 RX ORDER — PROCHLORPERAZINE EDISYLATE 5 MG/ML
5 INJECTION INTRAMUSCULAR; INTRAVENOUS
Status: DISCONTINUED | OUTPATIENT
Start: 2024-08-30 | End: 2024-08-30 | Stop reason: HOSPADM

## 2024-08-30 RX ORDER — PROPOFOL 10 MG/ML
INJECTION, EMULSION INTRAVENOUS PRN
Status: DISCONTINUED | OUTPATIENT
Start: 2024-08-30 | End: 2024-08-30 | Stop reason: SDUPTHER

## 2024-08-30 RX ORDER — MIDAZOLAM HYDROCHLORIDE 2 MG/2ML
2 INJECTION, SOLUTION INTRAMUSCULAR; INTRAVENOUS
Status: DISCONTINUED | OUTPATIENT
Start: 2024-08-30 | End: 2024-08-30 | Stop reason: HOSPADM

## 2024-08-30 RX ORDER — HYDRALAZINE HYDROCHLORIDE 20 MG/ML
10 INJECTION INTRAMUSCULAR; INTRAVENOUS
Status: DISCONTINUED | OUTPATIENT
Start: 2024-08-30 | End: 2024-08-30 | Stop reason: HOSPADM

## 2024-08-30 RX ORDER — OXYCODONE AND ACETAMINOPHEN 5; 325 MG/1; MG/1
1 TABLET ORAL EVERY 6 HOURS PRN
Qty: 20 TABLET | Refills: 0 | Status: SHIPPED | OUTPATIENT
Start: 2024-08-30 | End: 2024-09-06

## 2024-08-30 RX ORDER — LIDOCAINE HYDROCHLORIDE 20 MG/ML
INJECTION, SOLUTION EPIDURAL; INFILTRATION; INTRACAUDAL; PERINEURAL PRN
Status: DISCONTINUED | OUTPATIENT
Start: 2024-08-30 | End: 2024-08-30 | Stop reason: SDUPTHER

## 2024-08-30 RX ORDER — FENTANYL CITRATE 50 UG/ML
100 INJECTION, SOLUTION INTRAMUSCULAR; INTRAVENOUS
Status: DISCONTINUED | OUTPATIENT
Start: 2024-08-30 | End: 2024-08-30 | Stop reason: HOSPADM

## 2024-08-30 RX ORDER — ACETAMINOPHEN 500 MG
1000 TABLET ORAL ONCE
Status: COMPLETED | OUTPATIENT
Start: 2024-08-30 | End: 2024-08-30

## 2024-08-30 RX ORDER — BUPIVACAINE HYDROCHLORIDE AND EPINEPHRINE 5; 5 MG/ML; UG/ML
INJECTION, SOLUTION EPIDURAL; INTRACAUDAL; PERINEURAL PRN
Status: DISCONTINUED | OUTPATIENT
Start: 2024-08-30 | End: 2024-08-30 | Stop reason: HOSPADM

## 2024-08-30 RX ORDER — ONDANSETRON 4 MG/1
4 TABLET, ORALLY DISINTEGRATING ORAL 3 TIMES DAILY PRN
Qty: 21 TABLET | Refills: 0 | Status: SHIPPED | OUTPATIENT
Start: 2024-08-30

## 2024-08-30 RX ORDER — ROCURONIUM BROMIDE 10 MG/ML
INJECTION, SOLUTION INTRAVENOUS PRN
Status: DISCONTINUED | OUTPATIENT
Start: 2024-08-30 | End: 2024-08-30 | Stop reason: SDUPTHER

## 2024-08-30 RX ORDER — FENTANYL CITRATE 50 UG/ML
INJECTION, SOLUTION INTRAMUSCULAR; INTRAVENOUS PRN
Status: DISCONTINUED | OUTPATIENT
Start: 2024-08-30 | End: 2024-08-30 | Stop reason: SDUPTHER

## 2024-08-30 RX ORDER — ONDANSETRON 2 MG/ML
4 INJECTION INTRAMUSCULAR; INTRAVENOUS
Status: COMPLETED | OUTPATIENT
Start: 2024-08-30 | End: 2024-08-30

## 2024-08-30 RX ORDER — SUCCINYLCHOLINE/SOD CL,ISO/PF 200MG/10ML
SYRINGE (ML) INTRAVENOUS PRN
Status: DISCONTINUED | OUTPATIENT
Start: 2024-08-30 | End: 2024-08-30 | Stop reason: SDUPTHER

## 2024-08-30 RX ADMIN — ONDANSETRON 4 MG: 2 INJECTION INTRAMUSCULAR; INTRAVENOUS at 08:51

## 2024-08-30 RX ADMIN — HYDROMORPHONE HYDROCHLORIDE 1 MG: 1 INJECTION, SOLUTION INTRAMUSCULAR; INTRAVENOUS; SUBCUTANEOUS at 07:43

## 2024-08-30 RX ADMIN — ROCURONIUM BROMIDE 10 MG: 10 SOLUTION INTRAVENOUS at 08:16

## 2024-08-30 RX ADMIN — MIDAZOLAM 2 MG: 1 INJECTION INTRAMUSCULAR; INTRAVENOUS at 07:26

## 2024-08-30 RX ADMIN — PHENYLEPHRINE HYDROCHLORIDE 80 MCG: 10 INJECTION INTRAVENOUS at 07:45

## 2024-08-30 RX ADMIN — FENTANYL CITRATE 50 MCG: 50 INJECTION, SOLUTION INTRAMUSCULAR; INTRAVENOUS at 07:26

## 2024-08-30 RX ADMIN — WATER 2000 MG: 1 INJECTION INTRAMUSCULAR; INTRAVENOUS; SUBCUTANEOUS at 07:41

## 2024-08-30 RX ADMIN — PROPOFOL 50 MG: 10 INJECTION, EMULSION INTRAVENOUS at 07:38

## 2024-08-30 RX ADMIN — LIDOCAINE HYDROCHLORIDE 100 MG: 20 INJECTION, SOLUTION EPIDURAL; INFILTRATION; INTRACAUDAL; PERINEURAL at 07:35

## 2024-08-30 RX ADMIN — Medication 120 MG: at 07:35

## 2024-08-30 RX ADMIN — SODIUM CHLORIDE, POTASSIUM CHLORIDE, SODIUM LACTATE AND CALCIUM CHLORIDE: 600; 310; 30; 20 INJECTION, SOLUTION INTRAVENOUS at 07:07

## 2024-08-30 RX ADMIN — PHENYLEPHRINE HYDROCHLORIDE 80 MCG: 10 INJECTION INTRAVENOUS at 07:49

## 2024-08-30 RX ADMIN — DEXAMETHASONE SODIUM PHOSPHATE 8 MG: 4 INJECTION, SOLUTION INTRAMUSCULAR; INTRAVENOUS at 07:45

## 2024-08-30 RX ADMIN — ROCURONIUM BROMIDE 5 MG: 10 SOLUTION INTRAVENOUS at 07:35

## 2024-08-30 RX ADMIN — ACETAMINOPHEN 1000 MG: 500 TABLET ORAL at 07:10

## 2024-08-30 RX ADMIN — SODIUM CHLORIDE, POTASSIUM CHLORIDE, SODIUM LACTATE AND CALCIUM CHLORIDE: 600; 310; 30; 20 INJECTION, SOLUTION INTRAVENOUS at 07:26

## 2024-08-30 RX ADMIN — ROCURONIUM BROMIDE 35 MG: 10 SOLUTION INTRAVENOUS at 07:41

## 2024-08-30 RX ADMIN — EPHEDRINE SULFATE 10 MG: 50 INJECTION INTRAVENOUS at 08:02

## 2024-08-30 RX ADMIN — ONDANSETRON 4 MG: 2 INJECTION INTRAMUSCULAR; INTRAVENOUS at 09:25

## 2024-08-30 RX ADMIN — FENTANYL CITRATE 50 MCG: 50 INJECTION, SOLUTION INTRAMUSCULAR; INTRAVENOUS at 07:35

## 2024-08-30 RX ADMIN — PHENYLEPHRINE HYDROCHLORIDE 80 MCG: 10 INJECTION INTRAVENOUS at 08:03

## 2024-08-30 RX ADMIN — PROPOFOL 150 MG: 10 INJECTION, EMULSION INTRAVENOUS at 07:35

## 2024-08-30 RX ADMIN — DEXMEDETOMIDINE 8 MCG: 100 INJECTION, SOLUTION INTRAVENOUS at 07:43

## 2024-08-30 RX ADMIN — FENTANYL CITRATE 50 MCG: 50 INJECTION INTRAMUSCULAR; INTRAVENOUS at 09:45

## 2024-08-30 RX ADMIN — SUGAMMADEX 200 MG: 100 INJECTION, SOLUTION INTRAVENOUS at 08:57

## 2024-08-30 RX ADMIN — DEXMEDETOMIDINE 4 MCG: 100 INJECTION, SOLUTION INTRAVENOUS at 07:49

## 2024-08-30 ASSESSMENT — PAIN DESCRIPTION - LOCATION
LOCATION: ABDOMEN

## 2024-08-30 ASSESSMENT — PAIN DESCRIPTION - DESCRIPTORS
DESCRIPTORS: SORE
DESCRIPTORS: SORE
DESCRIPTORS: ACHING

## 2024-08-30 ASSESSMENT — PAIN - FUNCTIONAL ASSESSMENT
PAIN_FUNCTIONAL_ASSESSMENT: NONE - DENIES PAIN
PAIN_FUNCTIONAL_ASSESSMENT: ACTIVITIES ARE NOT PREVENTED
PAIN_FUNCTIONAL_ASSESSMENT: ACTIVITIES ARE NOT PREVENTED

## 2024-08-30 ASSESSMENT — PAIN DESCRIPTION - PAIN TYPE
TYPE: SURGICAL PAIN
TYPE: SURGICAL PAIN

## 2024-08-30 ASSESSMENT — ENCOUNTER SYMPTOMS: SHORTNESS OF BREATH: 1

## 2024-08-30 ASSESSMENT — PAIN DESCRIPTION - ORIENTATION
ORIENTATION: ANTERIOR

## 2024-08-30 ASSESSMENT — PAIN SCALES - GENERAL
PAINLEVEL_OUTOF10: 6
PAINLEVEL_OUTOF10: 5
PAINLEVEL_OUTOF10: 2

## 2024-08-30 NOTE — DISCHARGE INSTRUCTIONS
Laparoscopic cholecystectomy      Patient Discharge Instructions    Nneka Araujo / [id number] : 1980    Admitted 2024 Discharged: 2024       PATIENT INSTRUCTIONS  GALLBLADDER SURGERY  (CHOLECYSTECTOMY)    FOLLOW-UP:  Please make an appointment with your physician in 10 - 14 day(s).  Call your physician immediately if you have any fevers greater than 101.5, drainage from your wound that is not clear or looks infected, persistent bleeding, increasing abdominal pain, problems urinating, or persistent nausea/vomiting.  You should be aware that you may have right shoulder pain after surgery and that this will progressively go away.  This is called 'referred pain' and is from the area of the gallbladder.  It can also be caused by gas that may be trapped under the diaphragm from the surgery, especially if it was performed laparoscopically through mini-incisions.  This gas will progressively get reabsorbed by your body.     WOUND CARE INSTRUCTIONS:   You may shower at home. If clothing rubs against the wound or causes irritation and the wound is not draining you may cover it with a dry dressing during the daytime.  Try to keep the wound dry and avoid ointments on the wound unless directed to do so.  If the wound becomes bright red and painful or starts to drain infected material that is not clear, please contact your physician immediately. You should also call if you begin to drain fluid that is thin and greenish-brown from the wound and appears to look like bile.  If the wound though is mildly pink and has a thick firm ridge underneath it, this is normal, and is referred to as a healing ridge.  This will resolve over the next 4-6 weeks.    Place an ice pack on the navel incision for the next 48 hours. After that, you may use a heating pad if you feel muscle tightening or pulling.    DIET:  You may eat any foods that you can tolerate.  It is a good idea to eat a high fiber diet and take in plenty of

## 2024-08-30 NOTE — H&P
[facility] History and Physical     History of Present Illness:      Nneka Araujo is a 44 y.o. female who has a history of laparoscopic sleeve gastrectomy about a year ago. She had a small paraesophageal hernia repair as well. Overall she has been doing well but does admit having some epigastric pain occasionally. She says the pain can sometimes last a few days but is generally relatively mild pain a 2 out of 10 at most. She is not really having much of any discomfort right now. She has had no changes in bowel movements nausea or vomiting. She has had no GERD and no dysphagia symptoms.      She is back to talk with me today about surgery for laparoscopic cholecystectomy.  Her ultrasound showed stones and I called and discussed doing a laparoscopic cholecystectomy with EGD.  She would like to asked me questions today about surgery.     Past Medical History        Past Medical History:   Diagnosis Date    Abnormal Pap smear of cervix 1990s    Arthritis       IN BACK    Breast cyst 2009     benign.  Left.  1.7 cm and multiple others.  Dr. Maira Kearns    Chicken pox childhood    Chronic idiopathic constipation 07/2015     Dr. Albino De eJsus.    Chronic pain      VALLADARES (dyspnea on exertion) 02/2015     Dr. Mcgill    EBV positive mononucleosis syndrome 10/2013, 2014     chronic or reactivated    Enlarged thyroid 09/2010, 06/2015     with Right tracheal deviation.  Retrosternal goiter.  Dr. Reid Abbasi.    Enlarged tonsils and adenoids 09/2010    GERD (gastroesophageal reflux disease)      Hernia, abdominal 06/28/2022     Gastrointestinal Specialist     History of blood transfusion 2004     AFTER CHILDBIRTH    Iodine-deficiency related goiter       Left.  Dr. Reid Abbasi.    KISHORE (obstructive sleep apnea) 02/25/2015     Dr. Roby Mcgill, uses cpap.  Dr. Krystal Araya.  WEARS CPAP Dr. Omar Platt.    Thyroid cyst 08/219     Right.      Thyroid nodule 08/2019            Past  above procedure with the patient in detail.  We reviewed the benefits and possible complications of the surgery which include bleeding, infection, damage to adjacent organs, venous thromboembolism, need for repeat surgery, death and other unforseen complications.  The patient agreed to proceed with the surgery.          Bart Siegel MD

## 2024-08-30 NOTE — ANESTHESIA POSTPROCEDURE EVALUATION
Department of Anesthesiology  Postprocedure Note    Patient: Nneka Araujo  MRN: [id number]  YOB: 1980  Date of evaluation: 8/30/2024    Procedure Summary       Date: 08/30/24 Room / Location: [facility] MAIN OR [address] [place] [address] MAIN OR    Anesthesia Start: 0726 Anesthesia Stop: 0910    Procedure: LAPAROSCOPIC CHOLECYSTECTOMY, ESOPHAGOGASTRODUODENOSCOPY (Abdomen) Diagnosis:       Symptomatic cholelithiasis      (Symptomatic cholelithiasis [K80.20])    Providers: Bart Siegel MD Responsible Provider: Benedicto Morin DO    Anesthesia Type: general ASA Status: 3            Anesthesia Type: No value filed.    Florencio Phase I: Florencio Score: 8    Florencio Phase II:      Anesthesia Post Evaluation    Patient location during evaluation: PACU  Patient participation: complete - patient participated  Level of consciousness: awake  Pain score: 0  Airway patency: patent  Nausea & Vomiting: no nausea  Cardiovascular status: hemodynamically stable  Respiratory status: acceptable  Hydration status: euvolemic  Comments: Seen, no complaints   Pain management: adequate    No notable events documented.   Grace Red states pt has another bladder infection and requesting medication. Pls call. Thank you.

## 2024-08-30 NOTE — BRIEF OP NOTE
Brief Postoperative Note      Patient: Nneka Araujo  YOB: 1980  MRN: [id number]    Date of Procedure: 8/30/2024    Pre-Op Diagnosis Codes:      * Symptomatic cholelithiasis [K80.20]    Post-Op Diagnosis: Same       Procedure(s):  LAPAROSCOPIC CHOLECYSTECTOMY, ESOPHAGOGASTRODUODENOSCOPY and BIOPSY    Surgeon(s):  Bart Siegel MD    Assistant:  Resident: Tarik Dillon MD    Anesthesia: General    Estimated Blood Loss (mL): Minimal    Complications: None    Specimens:   ID Type Source Tests Collected by Time Destination   1 : GALLBLADDER Tissue Gallbladder SURGICAL PATHOLOGY Bart Siegel MD 8/30/2024 0818    2 : DUODENAL POLYP Tissue Duodenum SURGICAL PATHOLOGY Bart Siegel MD 8/30/2024 0856        Implants:  * No implants in log *      Drains: * No LDAs found *    Findings:  Infection Present At Time Of Surgery (PATOS) (choose all levels that have infection present):  No infection present  Other Findings: normal appearing GB, small stones, EGD showed normal sleeve gastrectomy, biopsy of flat nodular polyps of the first portion of the duodenum present with bile present into the stomach    Electronically signed by Bart Siegel MD on 8/30/2024 at 9:02 AM

## 2024-08-30 NOTE — ANESTHESIA PRE PROCEDURE
Department of Anesthesiology  Preprocedure Note       Name:  Nneka Araujo   Age:  44 y.o.  :  1980                                          MRN:  [id number]         Date:  2024      Surgeon: Surgeon(s):  Bart Siegel MD    Procedure: Procedure(s):  LAPAROSCOPIC CHOLECYSTECTOMY, ESOPHAGOGASTRODUODENOSCOPY    Medications prior to admission:   Prior to Admission medications    Medication Sig Start Date End Date Taking? Authorizing Provider   esomeprazole (NEXIUM) 40 MG delayed release capsule Take 1 capsule by mouth 2 times daily  Patient taking differently: Take 1 capsule by mouth as needed 3/4/24  Yes Jamie Srivastava MD   linaclotide (LINZESS) 145 MCG capsule Take 1 capsule by mouth every morning (before breakfast)  Patient taking differently: Take 1 capsule by mouth every other day 23  Yes Jamie Srivastava MD   Caltrate 600+D Plus Minerals (CALTRATE) 600-800 MG-UNIT TABS tablet Take 1 tablet by mouth daily 23   Jamie Srivastava MD   Multiple Vitamins-Minerals (BARIATRIC MULTIVITAMINS/IRON) CAPS Take 1 tablet by mouth daily    Provider, MD Doroteo   fluticasone (FLONASE) 50 MCG/ACT nasal spray 2 sprays by Nasal route as needed for Rhinitis or Allergies 21   Automatic Reconciliation, Ar       Current medications:    Current Facility-Administered Medications   Medication Dose Route Frequency Provider Last Rate Last Admin   • sodium chloride flush 0.9 % injection 5-40 mL  5-40 mL IntraVENous 2 times per day Bart Siegel MD       • sodium chloride flush 0.9 % injection 5-40 mL  5-40 mL IntraVENous PRN Bart Siegel MD       • 0.9 % sodium chloride infusion   IntraVENous PRN Bart Siegel MD       • fentaNYL (SUBLIMAZE) injection 100 mcg  100 mcg IntraVENous Once PRN Benedicto Morin DO       • ondansetron (ZOFRAN) injection 4 mg  4 mg IntraVENous Once Benedicto Morin, DO       • 0.9 % sodium chloride infusion   IntraVENous Continuous Benedicto Morin, DO       • lactated

## 2024-08-30 NOTE — OP NOTE
[address]  [address]                            OPERATIVE REPORT      PATIENT NAME: KIMBERLEE MAJOR              : 1980  MED REC NO: [id number]                       ROOM: OR  ACCOUNT NO: [id number]                       ADMIT DATE: 2024  PROVIDER: Bart Siegel MD    DATE OF SERVICE:  2024    PREOPERATIVE DIAGNOSES:  Symptomatic cholelithiasis.    POSTOPERATIVE DIAGNOSES:  Symptomatic cholelithiasis.    PROCEDURES PERFORMED:  Laparoscopic cholecystectomy with esophagogastroduodenoscopy and biopsy.    SURGEON:  Bart Siegel MD    ASSISTANT:  Dr. Tarik Dillon, PGY-5.    ANESTHESIA:  General.    ESTIMATED BLOOD LOSS:  Minimal.    SPECIMENS REMOVED:  Gallbladder and duodenal polyp.    INTRAOPERATIVE FINDINGS:  Normal appearing gallbladder and small stones.  EGD showed normal sleeve gastrectomy.  Biopsy of some flat nodular polyps of the first portion of the duodenum were biopsied with a little bit of bile present in the stomach.     COMPLICATIONS:  None.    IMPLANTS:  None.    INDICATIONS:  The patient is a 44-year-old female who has a history of sleeve gastrectomy, who has been having issues with symptomatic cholelithiasis and also going to be doing an endoscopy due to the right upper quadrant abdominal pain.  She has made sure there were no other issues related to her sleeve or stomach.    DESCRIPTION OF PROCEDURE:  The patient was met in the preoperative holding area.  H and P was updated.  Consent was signed.  All risks and benefits were explained to the patient prior to start of the operation.  She was taken back to the operating room.  She was lying in a supine position.  The abdomen was prepped and draped in a standard sterile fashion.  Time-out was called.  Antibiotics were given.  SCDs on lower extremities.  Started the operation by making a 5 mm incision into the right upper quadrant, inserting a Visiport

## 2024-09-05 ENCOUNTER — PATIENT MESSAGE (OUTPATIENT)
Age: 44
End: 2024-09-05

## 2024-09-06 ENCOUNTER — HOSPITAL ENCOUNTER (EMERGENCY)
Facility: HOSPITAL | Age: 44
Discharge: HOME | End: 2024-09-07
Attending: EMERGENCY MEDICINE
Payer: COMMERCIAL

## 2024-09-06 ENCOUNTER — APPOINTMENT (OUTPATIENT)
Facility: HOSPITAL | Age: 44
End: 2024-09-06
Payer: COMMERCIAL

## 2024-09-06 ENCOUNTER — CLINICAL DOCUMENTATION (OUTPATIENT)
Age: 44
End: 2024-09-06

## 2024-09-06 DIAGNOSIS — R10.11 ABDOMINAL PAIN, RIGHT UPPER QUADRANT: ICD-10-CM

## 2024-09-06 DIAGNOSIS — R07.9 CHEST PAIN, UNSPECIFIED TYPE: Primary | ICD-10-CM

## 2024-09-06 LAB
ALBUMIN SERPL-MCNC: 3.6 G/DL (ref 3.5–5)
ALBUMIN/GLOB SERPL: 0.8 (ref 1.1–2.2)
ALP SERPL-CCNC: 102 U/L (ref 45–117)
ALT SERPL-CCNC: 17 U/L (ref 12–78)
ANION GAP SERPL CALC-SCNC: 2 MMOL/L (ref 2–12)
AST SERPL-CCNC: 11 U/L (ref 15–37)
BASOPHILS # BLD: 0 K/UL (ref 0–0.1)
BASOPHILS NFR BLD: 1 % (ref 0–1)
BILIRUB SERPL-MCNC: 0.5 MG/DL (ref 0.2–1)
BUN SERPL-MCNC: 10 MG/DL (ref 6–20)
BUN/CREAT SERPL: 13 (ref 12–20)
CALCIUM SERPL-MCNC: 10.1 MG/DL (ref 8.5–10.1)
CHLORIDE SERPL-SCNC: 106 MMOL/L (ref 97–108)
CO2 SERPL-SCNC: 30 MMOL/L (ref 21–32)
COMMENT:: NORMAL
CREAT SERPL-MCNC: 0.77 MG/DL (ref 0.55–1.02)
D DIMER PPP FEU-MCNC: 1.31 MG/L FEU (ref 0–0.65)
DIFFERENTIAL METHOD BLD: NORMAL
EOSINOPHIL # BLD: 0.2 K/UL (ref 0–0.4)
EOSINOPHIL NFR BLD: 2 % (ref 0–7)
ERYTHROCYTE [DISTWIDTH] IN BLOOD BY AUTOMATED COUNT: 12.5 % (ref 11.5–14.5)
GLOBULIN SER CALC-MCNC: 4.3 G/DL (ref 2–4)
GLUCOSE SERPL-MCNC: 90 MG/DL (ref 65–100)
HCG SERPL-ACNC: <1 MIU/ML (ref 0–6)
HCT VFR BLD AUTO: 38.5 % (ref 35–47)
HGB BLD-MCNC: 11.9 G/DL (ref 11.5–16)
IMM GRANULOCYTES # BLD AUTO: 0 K/UL (ref 0–0.04)
IMM GRANULOCYTES NFR BLD AUTO: 0 % (ref 0–0.5)
LYMPHOCYTES # BLD: 2.1 K/UL (ref 0.8–3.5)
LYMPHOCYTES NFR BLD: 29 % (ref 12–49)
MAGNESIUM SERPL-MCNC: 2.4 MG/DL (ref 1.6–2.4)
MCH RBC QN AUTO: 26.9 PG (ref 26–34)
MCHC RBC AUTO-ENTMCNC: 30.9 G/DL (ref 30–36.5)
MCV RBC AUTO: 87.1 FL (ref 80–99)
MONOCYTES # BLD: 0.5 K/UL (ref 0–1)
MONOCYTES NFR BLD: 7 % (ref 5–13)
NEUTS SEG # BLD: 4.4 K/UL (ref 1.8–8)
NEUTS SEG NFR BLD: 61 % (ref 32–75)
NRBC # BLD: 0 K/UL (ref 0–0.01)
NRBC BLD-RTO: 0 PER 100 WBC
PLATELET # BLD AUTO: 350 K/UL (ref 150–400)
PMV BLD AUTO: 9.1 FL (ref 8.9–12.9)
POTASSIUM SERPL-SCNC: 3.9 MMOL/L (ref 3.5–5.1)
PROT SERPL-MCNC: 7.9 G/DL (ref 6.4–8.2)
RBC # BLD AUTO: 4.42 M/UL (ref 3.8–5.2)
SODIUM SERPL-SCNC: 138 MMOL/L (ref 136–145)
SPECIMEN HOLD: NORMAL
TROPONIN I SERPL HS-MCNC: <4 NG/L (ref 0–51)
WBC # BLD AUTO: 7.2 K/UL (ref 3.6–11)

## 2024-09-06 PROCEDURE — 99285 EMERGENCY DEPT VISIT HI MDM: CPT

## 2024-09-06 PROCEDURE — 93005 ELECTROCARDIOGRAM TRACING: CPT

## 2024-09-06 PROCEDURE — 85025 COMPLETE CBC W/AUTO DIFF WBC: CPT

## 2024-09-06 PROCEDURE — 71275 CT ANGIOGRAPHY CHEST: CPT

## 2024-09-06 PROCEDURE — 76705 ECHO EXAM OF ABDOMEN: CPT

## 2024-09-06 PROCEDURE — 71046 X-RAY EXAM CHEST 2 VIEWS: CPT

## 2024-09-06 PROCEDURE — 80053 COMPREHEN METABOLIC PANEL: CPT

## 2024-09-06 PROCEDURE — 83735 ASSAY OF MAGNESIUM: CPT

## 2024-09-06 PROCEDURE — 84484 ASSAY OF TROPONIN QUANT: CPT

## 2024-09-06 PROCEDURE — 36415 COLL VENOUS BLD VENIPUNCTURE: CPT

## 2024-09-06 PROCEDURE — 84702 CHORIONIC GONADOTROPIN TEST: CPT

## 2024-09-06 PROCEDURE — 85379 FIBRIN DEGRADATION QUANT: CPT

## 2024-09-06 RX ORDER — IOPAMIDOL 755 MG/ML
100 INJECTION, SOLUTION INTRAVASCULAR
Status: DISCONTINUED | OUTPATIENT
Start: 2024-09-06 | End: 2024-09-07 | Stop reason: HOSPADM

## 2024-09-06 ASSESSMENT — ENCOUNTER SYMPTOMS
DIARRHEA: 0
VOMITING: 0
SORE THROAT: 0
ABDOMINAL PAIN: 1
RHINORRHEA: 0
COUGH: 0
SHORTNESS OF BREATH: 1
NAUSEA: 0

## 2024-09-06 ASSESSMENT — PAIN - FUNCTIONAL ASSESSMENT: PAIN_FUNCTIONAL_ASSESSMENT: 0-10

## 2024-09-06 ASSESSMENT — PAIN SCALES - GENERAL
PAINLEVEL_OUTOF10: 0
PAINLEVEL_OUTOF10: 6

## 2024-09-06 ASSESSMENT — PAIN DESCRIPTION - LOCATION: LOCATION: CHEST

## 2024-09-06 NOTE — ED TRIAGE NOTES
Pt arrives from home with cc of substernal chest pain for the last 3 days. The pain is non radiating and intermittent.     Denies nausea/vomiting/change in bms.     Pt just had cholecystectomy performed last Friday.

## 2024-09-06 NOTE — PROGRESS NOTES
Spoke with patient.  Patient states that she is having chest pain and feels like it is difficult to take a deep breath.  She does not feel that this is incision pain.  She does also complain of some right sided abdominal pain that radiates to her back.  She complains of some chest pressure and even when we were talking she said that she has to remind herself to take a breath because she is short of breath.  Advised patient that she should come to the emergency room for workup.  Patient in agreement.

## 2024-09-06 NOTE — TELEPHONE ENCOUNTER
I called and spoke with the patient. I asked her if there was any tightness in her chest and she stated, \"No!\" I asked her if she had been experiencing any nausea or vomiting and she stated, \"No!\" I asked her is she is passing gas and if she is having BM's, she stated, \"Yes!\" I asked her about a fever and she stated, \"No fever!\" I asked her about her diet and consuming foods high in fat and she stated, \"No!\" I asked her if the pain occurred after eating or drinking something and she stated, \"Yes and it comes and goes.\" The pain is on the right side. She did report feeling short of breath when she went for a walk. I told her I will speak with one of the NP's and call you back. She acknowledged understanding and thanked me for the call.

## 2024-09-06 NOTE — ED PROVIDER NOTES
[facility] EMERGENCY DEP  EMERGENCY DEPARTMENT ENCOUNTER      Pt Name: Nneka Araujo  MRN: [id number]  Birthdate 1980  Date of evaluation: 9/6/2024  Provider: LEANNE Allen NP    CHIEF COMPLAINT       Chief Complaint   Patient presents with    Chest Pain         HISTORY OF PRESENT ILLNESS   (Location/Symptom, Timing/Onset, Context/Setting, Quality, Duration, Modifying Factors, Severity)  Note limiting factors.   Nneka Araujo is a 44 y.o. female presenting to the ED c/o mid sternal chest pain and sob for the past 3 days. Pt reports recently had surgery on Friday (X 1 week). Pt also reports having RUQ that radiates to the back. Pt reports calling her surgeon's team and was told to come to the ED. Pt reports pain is sharp intermittent pain.     Denies abnormal leg swelling, hx blood clots, pregnancy, fever, new cough, rhinorrhea, hx heart disease, nausea, vomiting, diarrhea.     Social hx: denies smoking, admits to etoh, denies marijuana, denies drug use.     The history is provided by the patient. No  was used.         Review of External Medical Records:     Nursing Notes were reviewed.    REVIEW OF SYSTEMS    (2-9 systems for level 4, 10 or more for level 5)     Review of Systems   Constitutional:  Negative for activity change, appetite change and fever.   HENT:  Negative for rhinorrhea and sore throat.    Respiratory:  Positive for shortness of breath. Negative for cough.    Cardiovascular:  Positive for chest pain.   Gastrointestinal:  Positive for abdominal pain. Negative for diarrhea, nausea and vomiting.   Musculoskeletal:  Negative for myalgias.   Skin:  Negative for rash.   All other systems reviewed and are negative.      Except as noted above the remainder of the review of systems was reviewed and negative.       PAST MEDICAL HISTORY     Past Medical History:   Diagnosis Date    Abnormal Pap smear of cervix 1990s    Arthritis     IN BACK    Breast cyst 2009    benign.   wave: non-specific changes; Other findings: abnormal.  EKG interpreted by Margarita Solorzano MD.  [CH]      ED Course User Index  [CH] Margarita Solorzano MD       CONSULTS:  None    PROCEDURES:  Unless otherwise noted below, none     Procedures      FINAL IMPRESSION      1. Chest pain, unspecified type    2. Abdominal pain, right upper quadrant          DISPOSITION/PLAN   DISPOSITION Decision To Discharge 09/07/2024 12:14:57 AM      PATIENT REFERRED TO:  surgeon    Schedule an appointment as soon as possible for a visit         DISCHARGE MEDICATIONS:  Discharge Medication List as of 9/7/2024 12:16 AM            (Please note that portions of this note were completed with a voice recognition program.  Efforts were made to edit the dictations but occasionally words are mis-transcribed.)    LEANNE Allen NP (electronically signed)  Emergency Attending Physician / Physician Assistant / Nurse Practitioner             Susan Rolon APRN - NP  09/07/24 0311

## 2024-09-07 VITALS
OXYGEN SATURATION: 99 % | SYSTOLIC BLOOD PRESSURE: 112 MMHG | DIASTOLIC BLOOD PRESSURE: 78 MMHG | TEMPERATURE: 98.8 F | HEART RATE: 90 BPM | RESPIRATION RATE: 16 BRPM

## 2024-09-07 LAB
EKG ATRIAL RATE: 76 BPM
EKG DIAGNOSIS: NORMAL
EKG P AXIS: 51 DEGREES
EKG P-R INTERVAL: 154 MS
EKG Q-T INTERVAL: 364 MS
EKG QRS DURATION: 72 MS
EKG QTC CALCULATION (BAZETT): 409 MS
EKG R AXIS: 57 DEGREES
EKG T AXIS: 52 DEGREES
EKG VENTRICULAR RATE: 76 BPM

## 2024-09-07 ASSESSMENT — HEART SCORE: ECG: NORMAL

## 2024-09-09 ENCOUNTER — CARE COORDINATION (OUTPATIENT)
Dept: OTHER | Facility: CLINIC | Age: 44
End: 2024-09-09

## 2024-09-10 ENCOUNTER — TELEPHONE (OUTPATIENT)
Age: 44
End: 2024-09-10

## 2024-09-11 ENCOUNTER — HOSPITAL ENCOUNTER (OUTPATIENT)
Facility: HOSPITAL | Age: 44
Discharge: HOME | End: 2024-09-14
Payer: COMMERCIAL

## 2024-09-11 DIAGNOSIS — Z91.89 AT HIGH RISK FOR BREAST CANCER: ICD-10-CM

## 2024-09-11 PROCEDURE — 2580000003 HC RX 258: Performed by: NURSE PRACTITIONER

## 2024-09-11 PROCEDURE — C8908 MRI W/O FOL W/CONT, BREAST,: HCPCS

## 2024-09-11 PROCEDURE — A9579 GAD-BASE MR CONTRAST NOS,1ML: HCPCS | Performed by: NURSE PRACTITIONER

## 2024-09-11 PROCEDURE — 6360000004 HC RX CONTRAST MEDICATION: Performed by: NURSE PRACTITIONER

## 2024-09-11 RX ORDER — 0.9 % SODIUM CHLORIDE 0.9 %
100 INTRAVENOUS SOLUTION INTRAVENOUS ONCE
Status: COMPLETED | OUTPATIENT
Start: 2024-09-11 | End: 2024-09-11

## 2024-09-11 RX ADMIN — SODIUM CHLORIDE 100 ML: 9 INJECTION, SOLUTION INTRAVENOUS at 10:21

## 2024-09-11 RX ADMIN — GADOTERIDOL 15 ML: 279.3 INJECTION, SOLUTION INTRAVENOUS at 10:21

## 2024-09-12 ENCOUNTER — OFFICE VISIT (OUTPATIENT)
Age: 44
End: 2024-09-12

## 2024-09-12 VITALS
RESPIRATION RATE: 19 BRPM | OXYGEN SATURATION: 99 % | SYSTOLIC BLOOD PRESSURE: 133 MMHG | BODY MASS INDEX: 28.49 KG/M2 | WEIGHT: 160.8 LBS | TEMPERATURE: 97.8 F | HEART RATE: 75 BPM | DIASTOLIC BLOOD PRESSURE: 86 MMHG | HEIGHT: 63 IN

## 2024-09-12 DIAGNOSIS — Z90.3 HISTORY OF SLEEVE GASTRECTOMY: ICD-10-CM

## 2024-09-12 DIAGNOSIS — K80.20 SYMPTOMATIC CHOLELITHIASIS: Primary | ICD-10-CM

## 2024-09-12 PROCEDURE — 99024 POSTOP FOLLOW-UP VISIT: CPT | Performed by: SURGERY

## 2024-09-12 ASSESSMENT — ANXIETY QUESTIONNAIRES
7. FEELING AFRAID AS IF SOMETHING AWFUL MIGHT HAPPEN: NOT AT ALL
6. BECOMING EASILY ANNOYED OR IRRITABLE: NOT AT ALL
GAD7 TOTAL SCORE: 0
3. WORRYING TOO MUCH ABOUT DIFFERENT THINGS: NOT AT ALL
5. BEING SO RESTLESS THAT IT IS HARD TO SIT STILL: NOT AT ALL
IF YOU CHECKED OFF ANY PROBLEMS ON THIS QUESTIONNAIRE, HOW DIFFICULT HAVE THESE PROBLEMS MADE IT FOR YOU TO DO YOUR WORK, TAKE CARE OF THINGS AT HOME, OR GET ALONG WITH OTHER PEOPLE: NOT DIFFICULT AT ALL
1. FEELING NERVOUS, ANXIOUS, OR ON EDGE: NOT AT ALL
4. TROUBLE RELAXING: NOT AT ALL
2. NOT BEING ABLE TO STOP OR CONTROL WORRYING: NOT AT ALL

## 2024-09-12 ASSESSMENT — PATIENT HEALTH QUESTIONNAIRE - PHQ9
1. LITTLE INTEREST OR PLEASURE IN DOING THINGS: NOT AT ALL
2. FEELING DOWN, DEPRESSED OR HOPELESS: NOT AT ALL
SUM OF ALL RESPONSES TO PHQ QUESTIONS 1-9: 0
SUM OF ALL RESPONSES TO PHQ9 QUESTIONS 1 & 2: 0
SUM OF ALL RESPONSES TO PHQ QUESTIONS 1-9: 0

## 2024-09-13 ENCOUNTER — CLINICAL DOCUMENTATION (OUTPATIENT)
Age: 44
End: 2024-09-13

## 2024-09-13 ENCOUNTER — TELEPHONE (OUTPATIENT)
Age: 44
End: 2024-09-13

## 2024-09-16 ENCOUNTER — CARE COORDINATION (OUTPATIENT)
Dept: OTHER | Facility: CLINIC | Age: 44
End: 2024-09-16

## 2024-09-17 SDOH — ECONOMIC STABILITY: FOOD INSECURITY: WITHIN THE PAST 12 MONTHS, YOU WORRIED THAT YOUR FOOD WOULD RUN OUT BEFORE YOU GOT MONEY TO BUY MORE.: NEVER TRUE

## 2024-09-17 SDOH — ECONOMIC STABILITY: FOOD INSECURITY: WITHIN THE PAST 12 MONTHS, THE FOOD YOU BOUGHT JUST DIDN'T LAST AND YOU DIDN'T HAVE MONEY TO GET MORE.: NEVER TRUE

## 2024-09-17 SDOH — ECONOMIC STABILITY: INCOME INSECURITY: HOW HARD IS IT FOR YOU TO PAY FOR THE VERY BASICS LIKE FOOD, HOUSING, MEDICAL CARE, AND HEATING?: NOT HARD AT ALL

## 2024-09-17 SDOH — ECONOMIC STABILITY: TRANSPORTATION INSECURITY
IN THE PAST 12 MONTHS, HAS LACK OF TRANSPORTATION KEPT YOU FROM MEETINGS, WORK, OR FROM GETTING THINGS NEEDED FOR DAILY LIVING?: NO

## 2024-09-18 ENCOUNTER — OFFICE VISIT (OUTPATIENT)
Age: 44
End: 2024-09-18
Payer: COMMERCIAL

## 2024-09-18 VITALS
TEMPERATURE: 98.1 F | OXYGEN SATURATION: 97 % | SYSTOLIC BLOOD PRESSURE: 122 MMHG | RESPIRATION RATE: 16 BRPM | BODY MASS INDEX: 29.77 KG/M2 | DIASTOLIC BLOOD PRESSURE: 85 MMHG | HEIGHT: 63 IN | HEART RATE: 76 BPM | WEIGHT: 168 LBS

## 2024-09-18 DIAGNOSIS — K44.9 PARAESOPHAGEAL HERNIA: ICD-10-CM

## 2024-09-18 DIAGNOSIS — J20.9 ACUTE BRONCHITIS, UNSPECIFIED ORGANISM: ICD-10-CM

## 2024-09-18 DIAGNOSIS — E55.9 VITAMIN D DEFICIENCY: ICD-10-CM

## 2024-09-18 DIAGNOSIS — E78.00 HYPERCHOLESTEROLEMIA: Primary | ICD-10-CM

## 2024-09-18 DIAGNOSIS — H93.8X1: ICD-10-CM

## 2024-09-18 DIAGNOSIS — R73.03 PREDIABETES: ICD-10-CM

## 2024-09-18 DIAGNOSIS — E78.00 HYPERCHOLESTEROLEMIA: ICD-10-CM

## 2024-09-18 DIAGNOSIS — D47.3 ESSENTIAL (HEMORRHAGIC) THROMBOCYTHEMIA (HCC): ICD-10-CM

## 2024-09-18 PROCEDURE — 99396 PREV VISIT EST AGE 40-64: CPT | Performed by: FAMILY MEDICINE

## 2024-09-18 RX ORDER — CAL/D3/MAG11/ZINC/COP/MANG/BOR 600 MG-800
1 TABLET ORAL DAILY
Qty: 90 TABLET | Refills: 3 | Status: SHIPPED | OUTPATIENT
Start: 2024-09-18 | End: 2024-09-19 | Stop reason: SDUPTHER

## 2024-09-18 RX ORDER — ESOMEPRAZOLE MAGNESIUM 40 MG/1
40 CAPSULE, DELAYED RELEASE ORAL
Qty: 90 CAPSULE | Refills: 3 | Status: SHIPPED | OUTPATIENT
Start: 2024-09-18

## 2024-09-18 RX ORDER — CIPROFLOXACIN AND DEXAMETHASONE 3; 1 MG/ML; MG/ML
4 SUSPENSION/ DROPS AURICULAR (OTIC) 2 TIMES DAILY
Qty: 4 ML | Refills: 0 | Status: SHIPPED | OUTPATIENT
Start: 2024-09-18 | End: 2024-09-28

## 2024-09-18 RX ORDER — AZITHROMYCIN 250 MG/1
TABLET, FILM COATED ORAL
Qty: 6 TABLET | Refills: 0 | Status: SHIPPED | OUTPATIENT
Start: 2024-09-18 | End: 2024-09-28

## 2024-09-18 SDOH — ECONOMIC STABILITY: FOOD INSECURITY: WITHIN THE PAST 12 MONTHS, THE FOOD YOU BOUGHT JUST DIDN'T LAST AND YOU DIDN'T HAVE MONEY TO GET MORE.: NEVER TRUE

## 2024-09-18 SDOH — ECONOMIC STABILITY: INCOME INSECURITY: HOW HARD IS IT FOR YOU TO PAY FOR THE VERY BASICS LIKE FOOD, HOUSING, MEDICAL CARE, AND HEATING?: NOT HARD AT ALL

## 2024-09-18 SDOH — ECONOMIC STABILITY: FOOD INSECURITY: WITHIN THE PAST 12 MONTHS, YOU WORRIED THAT YOUR FOOD WOULD RUN OUT BEFORE YOU GOT MONEY TO BUY MORE.: NEVER TRUE

## 2024-09-18 ASSESSMENT — PATIENT HEALTH QUESTIONNAIRE - PHQ9
1. LITTLE INTEREST OR PLEASURE IN DOING THINGS: NOT AT ALL
SUM OF ALL RESPONSES TO PHQ QUESTIONS 1-9: 0
2. FEELING DOWN, DEPRESSED OR HOPELESS: NOT AT ALL
SUM OF ALL RESPONSES TO PHQ QUESTIONS 1-9: 0
SUM OF ALL RESPONSES TO PHQ QUESTIONS 1-9: 0
SUM OF ALL RESPONSES TO PHQ9 QUESTIONS 1 & 2: 0
SUM OF ALL RESPONSES TO PHQ QUESTIONS 1-9: 0

## 2024-09-18 NOTE — PROGRESS NOTES
Chief Complaint   Patient presents with    Cough    documentation      Be Well    Otalgia       \"Have you been to the ER, urgent care clinic since your last visit?  Hospitalized since your last visit?\"    YES [facility] ER    “Have you seen or consulted any other health care providers outside of [facility] since your last visit?”    YES     “Have you had a pap smear?”    NO    Date of last Cervical Cancer screen (HPV or PAP): 3/12/2019             Click Here for Release of Records Request     No results found for this visit on 24.   Vitals:    24 1417   BP: 122/85   Pulse: 76   Resp: 16   Temp: 98.1 °F (36.7 °C)   TempSrc: Infrared   SpO2: 97%   Weight: 76.2 kg (168 lb)   Height: 1.6 m (5' 3\")      Health Maintenance Due   Topic Date Due    Cervical cancer screen  2022        The patient, Nneka Araujo, identity was verified by name and .   
alcohol     Types: 3 Glasses of wine per week    Drug use: No           Objective   Vital Signs  /85   Pulse 76   Temp 98.1 °F (36.7 °C) (Infrared)   Resp 16   Ht 1.6 m (5' 3\")   Wt 76.2 kg (168 lb)   LMP 08/19/2024 (Approximate) Comment: PT DOES HAVE A MIRENA  SpO2 97%   BMI 29.76 kg/m²     Wt Readings from Last 3 Encounters:   09/18/24 76.2 kg (168 lb)   09/12/24 72.9 kg (160 lb 12.8 oz)   08/26/24 74.4 kg (164 lb)       Physical Exam     Constitutional: Well developed, well nourished.  non-toxic in appearance, not diaphoretic.   HEENT: PERRL. EOMI. The left TM is unremarkable. The right TM is unremarkable. No nasal  erythema noted.  THROAT: Posterior pharynx has no erythema, no exudates.    Neck:  no cervical lymphadenopathy. Neck is supple   Cardiovascular: Regular rate and rhythm, no murmurs, rubs, or gallops.   Pulmonary: Clear to auscultation bilaterally. Has no wheezing, rales or rhonchi.,  speaking in full sentences, has no accessory muscle used.  Abdomen: Bowel sounds are normal. Having no distension, no palpable mass. Soft,  No tenderness, rebound or guarding.   Musculoskeletal: No peripheral edema, having normal ROM  Skin:   warm and dry. No diaphoresis, rashes, or lesions.   Neurological: Alert, awake,  oriented x3 ,  normal speech.        Assessment & Plan   Hypercholesterolemia  -     esomeprazole (NEXIUM) 40 MG delayed release capsule; Take 1 capsule by mouth every morning (before breakfast), Disp-90 capsule, R-3Normal  -     Comprehensive Metabolic Panel; Future  -     Lipid Panel; Future  -     CBC; Future  -     Comprehensive Metabolic Panel; Future  -     Lipid Panel; Future  -     TSH; Future  -     Urinalysis; Future  -     Hemoglobin A1C; Future  Vitamin D deficiency  -     esomeprazole (NEXIUM) 40 MG delayed release capsule; Take 1 capsule by mouth every morning (before breakfast), Disp-90 capsule, R-3Normal  -     Comprehensive Metabolic Panel; Future  -     Lipid Panel;

## 2024-09-19 DIAGNOSIS — E55.9 VITAMIN D DEFICIENCY: ICD-10-CM

## 2024-09-19 DIAGNOSIS — H93.8X1: ICD-10-CM

## 2024-09-19 DIAGNOSIS — K44.9 PARAESOPHAGEAL HERNIA: ICD-10-CM

## 2024-09-19 DIAGNOSIS — R73.03 PREDIABETES: ICD-10-CM

## 2024-09-19 DIAGNOSIS — J20.9 ACUTE BRONCHITIS, UNSPECIFIED ORGANISM: ICD-10-CM

## 2024-09-19 DIAGNOSIS — E78.00 HYPERCHOLESTEROLEMIA: ICD-10-CM

## 2024-09-19 LAB
ALBUMIN SERPL-MCNC: 3.8 G/DL (ref 3.5–5)
ALBUMIN/GLOB SERPL: 1.1 (ref 1.1–2.2)
ALP SERPL-CCNC: 102 U/L (ref 45–117)
ALT SERPL-CCNC: 13 U/L (ref 12–78)
ANION GAP SERPL CALC-SCNC: 2 MMOL/L (ref 2–12)
APPEARANCE UR: CLEAR
AST SERPL-CCNC: 12 U/L (ref 15–37)
BACTERIA URNS QL MICRO: ABNORMAL /HPF
BILIRUB SERPL-MCNC: 0.4 MG/DL (ref 0.2–1)
BILIRUB UR QL: NEGATIVE
BUN SERPL-MCNC: 16 MG/DL (ref 6–20)
BUN/CREAT SERPL: 23 (ref 12–20)
CALCIUM SERPL-MCNC: 9.6 MG/DL (ref 8.5–10.1)
CHLORIDE SERPL-SCNC: 103 MMOL/L (ref 97–108)
CHOLEST SERPL-MCNC: 115 MG/DL
CO2 SERPL-SCNC: 31 MMOL/L (ref 21–32)
COLOR UR: ABNORMAL
CREAT SERPL-MCNC: 0.71 MG/DL (ref 0.55–1.02)
EPITH CASTS URNS QL MICRO: ABNORMAL /LPF
ERYTHROCYTE [DISTWIDTH] IN BLOOD BY AUTOMATED COUNT: 12.7 % (ref 11.5–14.5)
EST. AVERAGE GLUCOSE BLD GHB EST-MCNC: 100 MG/DL
GLOBULIN SER CALC-MCNC: 3.4 G/DL (ref 2–4)
GLUCOSE SERPL-MCNC: 92 MG/DL (ref 65–100)
GLUCOSE UR STRIP.AUTO-MCNC: NEGATIVE MG/DL
HBA1C MFR BLD: 5.1 % (ref 4–5.6)
HCT VFR BLD AUTO: 36.2 % (ref 35–47)
HDLC SERPL-MCNC: 57 MG/DL
HDLC SERPL: 2 (ref 0–5)
HGB BLD-MCNC: 11.1 G/DL (ref 11.5–16)
HGB UR QL STRIP: NEGATIVE
KETONES UR QL STRIP.AUTO: NEGATIVE MG/DL
LDLC SERPL CALC-MCNC: 46.8 MG/DL (ref 0–100)
LEUKOCYTE ESTERASE UR QL STRIP.AUTO: ABNORMAL
MCH RBC QN AUTO: 27.1 PG (ref 26–34)
MCHC RBC AUTO-ENTMCNC: 30.7 G/DL (ref 30–36.5)
MCV RBC AUTO: 88.3 FL (ref 80–99)
NITRITE UR QL STRIP.AUTO: NEGATIVE
NRBC # BLD: 0 K/UL (ref 0–0.01)
NRBC BLD-RTO: 0 PER 100 WBC
PH UR STRIP: 6.5 (ref 5–8)
PLATELET # BLD AUTO: 392 K/UL (ref 150–400)
PMV BLD AUTO: 10.1 FL (ref 8.9–12.9)
POTASSIUM SERPL-SCNC: 4.3 MMOL/L (ref 3.5–5.1)
PROT SERPL-MCNC: 7.2 G/DL (ref 6.4–8.2)
PROT UR STRIP-MCNC: NEGATIVE MG/DL
RBC # BLD AUTO: 4.1 M/UL (ref 3.8–5.2)
RBC #/AREA URNS HPF: ABNORMAL /HPF (ref 0–5)
SODIUM SERPL-SCNC: 136 MMOL/L (ref 136–145)
SP GR UR REFRACTOMETRY: 1.01 (ref 1–1.03)
TRIGL SERPL-MCNC: 56 MG/DL
TSH SERPL DL<=0.05 MIU/L-ACNC: 1.76 UIU/ML (ref 0.36–3.74)
UROBILINOGEN UR QL STRIP.AUTO: 0.2 EU/DL (ref 0.2–1)
VLDLC SERPL CALC-MCNC: 11.2 MG/DL
WBC # BLD AUTO: 6.5 K/UL (ref 3.6–11)
WBC URNS QL MICRO: ABNORMAL /HPF (ref 0–4)

## 2024-09-20 RX ORDER — CAL/D3/MAG11/ZINC/COP/MANG/BOR 600 MG-800
1 TABLET ORAL DAILY
Qty: 90 TABLET | Refills: 3 | Status: SHIPPED | OUTPATIENT
Start: 2024-09-20

## 2024-09-23 DIAGNOSIS — E78.00 HYPERCHOLESTEROLEMIA: ICD-10-CM

## 2024-09-23 DIAGNOSIS — R73.03 PREDIABETES: ICD-10-CM

## 2024-09-23 DIAGNOSIS — J20.9 ACUTE BRONCHITIS, UNSPECIFIED ORGANISM: ICD-10-CM

## 2024-09-23 DIAGNOSIS — K44.9 PARAESOPHAGEAL HERNIA: ICD-10-CM

## 2024-09-23 DIAGNOSIS — H93.8X1: ICD-10-CM

## 2024-09-23 DIAGNOSIS — E55.9 VITAMIN D DEFICIENCY: ICD-10-CM

## 2024-09-24 RX ORDER — CAL/D3/MAG11/ZINC/COP/MANG/BOR 600 MG-800
1 TABLET ORAL DAILY
Qty: 90 TABLET | Refills: 3 | Status: SHIPPED | OUTPATIENT
Start: 2024-09-24

## 2024-10-02 ENCOUNTER — OFFICE VISIT (OUTPATIENT)
Age: 44
End: 2024-10-02
Payer: COMMERCIAL

## 2024-10-02 VITALS
TEMPERATURE: 97.5 F | WEIGHT: 166 LBS | HEIGHT: 63 IN | OXYGEN SATURATION: 100 % | RESPIRATION RATE: 16 BRPM | BODY MASS INDEX: 29.41 KG/M2 | HEART RATE: 75 BPM

## 2024-10-02 DIAGNOSIS — D51.9 ANEMIA DUE TO VITAMIN B12 DEFICIENCY, UNSPECIFIED B12 DEFICIENCY TYPE: ICD-10-CM

## 2024-10-02 DIAGNOSIS — R53.82 CHRONIC FATIGUE: Primary | ICD-10-CM

## 2024-10-02 DIAGNOSIS — J45.990 EXERCISE-INDUCED ASTHMA: ICD-10-CM

## 2024-10-02 PROCEDURE — 99213 OFFICE O/P EST LOW 20 MIN: CPT | Performed by: FAMILY MEDICINE

## 2024-10-02 PROCEDURE — 96372 THER/PROPH/DIAG INJ SC/IM: CPT | Performed by: FAMILY MEDICINE

## 2024-10-02 RX ORDER — CYANOCOBALAMIN 1000 UG/ML
1000 INJECTION, SOLUTION INTRAMUSCULAR; SUBCUTANEOUS ONCE
Status: COMPLETED | OUTPATIENT
Start: 2024-10-02 | End: 2024-10-02

## 2024-10-02 RX ORDER — FLUTICASONE PROPIONATE 50 MCG
2 SPRAY, SUSPENSION (ML) NASAL PRN
Qty: 16 G | Refills: 5 | Status: SHIPPED | OUTPATIENT
Start: 2024-10-02

## 2024-10-02 RX ADMIN — CYANOCOBALAMIN 1000 MCG: 1000 INJECTION, SOLUTION INTRAMUSCULAR; SUBCUTANEOUS at 10:36

## 2024-10-02 ASSESSMENT — PATIENT HEALTH QUESTIONNAIRE - PHQ9
SUM OF ALL RESPONSES TO PHQ9 QUESTIONS 1 & 2: 0
2. FEELING DOWN, DEPRESSED OR HOPELESS: NOT AT ALL
SUM OF ALL RESPONSES TO PHQ QUESTIONS 1-9: 0
1. LITTLE INTEREST OR PLEASURE IN DOING THINGS: NOT AT ALL
SUM OF ALL RESPONSES TO PHQ QUESTIONS 1-9: 0

## 2024-10-02 NOTE — PATIENT INSTRUCTIONS
fluttering in your chest, increased thirst or urination, numbness or tingling, muscle weakness or limp feeling.  Common side effects may include:  diarrhea; or  swelling anywhere in your body.  This is not a complete list of side effects and others may occur. Call your doctor for medical advice about side effects. You may report side effects to FDA at 8-449-OJG-4562.  What other drugs will affect cyanocobalamin injection?  Other drugs may affect cyanocobalamin, including prescription and over-the-counter medicines, vitamins, and herbal products. Tell your doctor about all your current medicines and any medicine you start or stop using.  Where can I get more information?  Your pharmacist can provide more information about cyanocobalamin injection.  Remember, keep this and all other medicines out of the reach of children, never share your medicines with others, and use this medication only for the indication prescribed.   Every effort has been made to ensure that the information provided by [employer] is accurate, up-to-date, and complete, but no guarantee is made to that effect. Drug information contained herein may be time sensitive. [employer] information has been compiled for use by healthcare practitioners and consumers in the [place] and therefore [employer] does not warrant that uses outside of the [place] are appropriate, unless specifically indicated otherwise. [employer] drug information does not endorse drugs, diagnose patients or recommend therapy. [employer] drug information is an informational resource designed to assist licensed healthcare practitioners in caring for their patients and/or to serve consumers viewing this service as a supplement to, and not a substitute for, the expertise, skill, knowledge and judgment of healthcare practitioners. The absence of a warning for a given drug or drug combination in no way should be construed to indicate that the drug or drug combination is

## 2024-10-02 NOTE — ASSESSMENT & PLAN NOTE
Orders:    fluticasone (FLONASE) 50 MCG/ACT nasal spray; 2 sprays by Nasal route as needed for Rhinitis or Allergies

## 2024-10-02 NOTE — PROGRESS NOTES
Nneka Araujo (:  1980) is a 44 y.o. female,Established patient, here for evaluation of the following chief complaint(s):  Ear Problem (Follow up right ear) and Medication Refill    Ear discomfort,  who presents for evaluation of a plugged painful Rt ear, much better, denies ear ache patient is happy with progress, except for history of B12 deficiency with tiredness taking oral multivitamin not a vegetarian with history of seasonal allergy and asthmatic state denies any recent attack      Constitutional: no fever,    nad     HENT: no ear pain or nosebleeds. No blurred vision  Respiratory: no shortness of breath, wheezing cough   Cardiovascular: Has no chest pain, ,and racing heart .   Gastrointestinal: No constipation, diarrhea, nausea and vomiting.   Genitourinary: No frequency.   Musculoskeletal: Negative for joint pain.   Skin: no itching, no rash.   Neurological: Negative for dizziness, no tremors  Psychiatric/Behavioral: no for depression  no nervous/anxious, nl stress levels, and overall emotional well-being .      Constitutional: Well developed, well nourished.  non-toxic in appearance, not diaphoretic.   HEENT: PERRL. EOMI. The left TM is unremarkable. The right TM is unremarkable. No nasal  erythema noted.  THROAT: Posterior pharynx has no erythema, no exudates.    Neck:  no cervical lymphadenopathy. Neck is supple   Cardiovascular: Regular rate and rhythm, no murmurs, rubs, or gallops.   Pulmonary: Clear to auscultation bilaterally. Has no wheezing, rales or rhonchi.,  speaking in full sentences, has no accessory muscle used.  Abdomen: Bowel sounds are normal. Having no distension, no palpable mass. Soft,  No tenderness, rebound or guarding.   Musculoskeletal: No peripheral edema, having normal ROM  Skin:   warm and dry. No diaphoresis, rashes, or lesions.   Neurological: Alert, awake,  oriented x3 ,  normal speech.       has noticed the symptoms ove the last 3 day, There is no a prior history

## 2024-10-02 NOTE — PROGRESS NOTES
Chief Complaint   Patient presents with    Ear Problem     Follow up right ear    Medication Refill       \"Have you been to the ER, urgent care clinic since your last visit?  Hospitalized since your last visit?\"    NO    “Have you seen or consulted any other health care providers outside of [facility] since your last visit?”    NO     “Have you had a pap smear?”    NO    Date of last Cervical Cancer screen (HPV or PAP): 3/12/2019             Click Here for Release of Records Request     No results found for this visit on 10/02/24.   Vitals:    10/02/24 0930   Pulse: 75   Resp: 16   Temp: 97.5 °F (36.4 °C)   TempSrc: Infrared   SpO2: 100%   Weight: 75.3 kg (166 lb)   Height: 1.6 m (5' 3\")      Health Maintenance Due   Topic Date Due    Cervical cancer screen  2022        The patient, Nneka Araujo, identity was verified by name and .

## 2024-10-07 DIAGNOSIS — R73.03 PREDIABETES: ICD-10-CM

## 2024-10-07 DIAGNOSIS — E55.9 VITAMIN D DEFICIENCY: ICD-10-CM

## 2024-10-07 DIAGNOSIS — E78.00 HYPERCHOLESTEROLEMIA: ICD-10-CM

## 2024-10-07 DIAGNOSIS — K44.9 PARAESOPHAGEAL HERNIA: ICD-10-CM

## 2024-10-07 DIAGNOSIS — H93.8X1: ICD-10-CM

## 2024-10-07 DIAGNOSIS — J20.9 ACUTE BRONCHITIS, UNSPECIFIED ORGANISM: ICD-10-CM

## 2024-10-09 ENCOUNTER — CARE COORDINATION (OUTPATIENT)
Dept: OTHER | Facility: CLINIC | Age: 44
End: 2024-10-09

## 2024-10-09 RX ORDER — CAL/D3/MAG11/ZINC/COP/MANG/BOR 600 MG-800
1 TABLET ORAL DAILY
Qty: 90 TABLET | Refills: 3 | Status: SHIPPED | OUTPATIENT
Start: 2024-10-09

## 2024-10-09 NOTE — CARE COORDINATION
Ambulatory Care Coordination Note     10/9/2024 4:56 PM     Patient Current Location:  [facility] contacted the patient by telephone. Verified name and  with patient as identifiers.         ACM: Comfort Abbasi RN     Challenges to be reviewed by the provider   Additional needs identified to be addressed with provider No  none               Method of communication with provider: none.    Has the patient been seen in the ED since your last call? no    Care Summary Note:   Pt was seen at [facility] ER 24 - 24.   Diagnosis: CP s/p lap michael 24    Pt denies CP or s/s of infection with surgical site, but does report 1 lap sat being sore. Encouraged pt to contact surgeon to update. No questions or concerns for ACM at this time.       Offered patient enrollment in the Remote Patient Monitoring (RPM) program for in-home monitoring: Patient is not eligible for RPM program because: insurance coverage.     Assessments Completed:   Ambulatory Care Coordination Assessment    Care Coordination Protocol  Referral from Primary Care Provider: No  Week 1 - Initial Assessment     Do you have all of your prescriptions and are they filled?: Yes  Are you able to afford your medications?: Yes  How often do you have trouble taking your medications the way you have been told to take them?: I always take them as prescribed.           Current Housing: Apartment  Who do you live with?: Child  Are you an active caregiver in your home?: No                 Suggested Interventions and Community Resources  No Identified Needs                   Medications Reviewed:   Completed during a previous call     Advance Care Planning:   Not reviewed during this call     Care Planning:      Goals        Establish PCP relationships and regularly scheduled appointments.      PCP - TBD  Surgeon - TBD  RTW - 24 Call placed to patient, no answer. Voicemail left to return call.     10/9/24  PCP - 24  Surgeon - 11/15/24

## 2024-10-21 ENCOUNTER — TELEPHONE (OUTPATIENT)
Age: 44
End: 2024-10-21

## 2024-10-21 NOTE — TELEPHONE ENCOUNTER
Patient would like B-12 injection to be done 11/6/24 instead of 11/4/2  for 12 injection Patient can be reached at [phone].

## 2024-11-06 ENCOUNTER — OFFICE VISIT (OUTPATIENT)
Age: 44
End: 2024-11-06
Payer: COMMERCIAL

## 2024-11-06 VITALS
HEART RATE: 76 BPM | OXYGEN SATURATION: 96 % | TEMPERATURE: 97.9 F | RESPIRATION RATE: 17 BRPM | SYSTOLIC BLOOD PRESSURE: 119 MMHG | DIASTOLIC BLOOD PRESSURE: 74 MMHG

## 2024-11-06 DIAGNOSIS — E53.8 B12 DEFICIENCY: Primary | ICD-10-CM

## 2024-11-06 PROCEDURE — 96372 THER/PROPH/DIAG INJ SC/IM: CPT | Performed by: FAMILY MEDICINE

## 2024-11-06 PROCEDURE — 99211 OFF/OP EST MAY X REQ PHY/QHP: CPT | Performed by: FAMILY MEDICINE

## 2024-11-06 RX ORDER — CYANOCOBALAMIN 1000 UG/ML
1000 INJECTION, SOLUTION INTRAMUSCULAR; SUBCUTANEOUS ONCE
Status: COMPLETED | OUTPATIENT
Start: 2024-11-06 | End: 2024-11-06

## 2024-11-06 RX ADMIN — CYANOCOBALAMIN 1000 MCG: 1000 INJECTION, SOLUTION INTRAMUSCULAR; SUBCUTANEOUS at 15:51

## 2024-11-06 NOTE — PROGRESS NOTES
Chief Complaint   Patient presents with    Injections     B-12 injection        \"Have you been to the ER, urgent care clinic since your last visit?  Hospitalized since your last visit?\"    NO    “Have you seen or consulted any other health care providers outside of [facility] since your last visit?”    NO     “Have you had a pap smear?”    NO    Date of last Cervical Cancer screen (HPV or PAP): 3/12/2019             Click Here for Release of Records Request     No results found for this visit on 24.   Vitals:    24 0843   BP: 119/74   Pulse: 76   Resp: 17   Temp: 97.9 °F (36.6 °C)   TempSrc: Infrared   SpO2: 96%      Health Maintenance Due   Topic Date Due    Varicella vaccine (1 of 2 - 13+ 2-dose series) Never done    Cervical cancer screen  2022        The patient, Nneka Araujo, identity was verified by name and .

## 2024-11-06 NOTE — PATIENT INSTRUCTIONS
Patient Education        cyanocobalamin (injection)  Pronunciation:  lacie TORIBIO oh koe BAL a min  Brand:  Vitamin B12, Vitamin B-12  What is the most important information I should know about cyanocobalamin injection?  You should not use this medicine if you are allergic to cobalt, or if you have Eduar's disease.  What is cyanocobalamin injection ?  Cyanocobalamin is used to treat vitamin B12 deficiency in people with pernicious anemia and other conditions.  Cyanocobalamin may also be used for purposes not listed in this medication guide.  What should I discuss with my healthcare provider before using cyanocobalamin injection?  You should not use this medicine if you are allergic to cyanocobalamin or cobalt, or if you have Eduar's disease (an inherited form of vision loss). Cyanocobalamin can lead to optic nerve damage (and possibly blindness) in people with Eduar's disease.  Tell your doctor if you have ever had:  eye problems or Eduar's disease (in you or a family member);  kidney or liver disease;  iron or folic acid deficiency;  any type of infection; or  if you are receiving any medication or treatment that affects bone marrow.  Tell your doctor if you are pregnant or breastfeeding. Your dose needs may be different during pregnancy or while you are nursing.  How is cyanocobalamin injection given?  Follow all directions on your prescription label and read all medication guides or instruction sheets. Use the medicine exactly as directed.  Cyanocobalamin injection is injected into a muscle or under the skin. A healthcare provider may teach you how to properly use the medication by yourself.  Your dose needs may change if you become pregnant, if you breastfeed, or if you eat a vegetarian diet. Tell your doctor about any changes in your diet or medical condition.  Always follow directions on the medicine label about giving cyanocobalamin to a child.  Your child's dose will depend on age, weight, diet, and other

## 2024-11-07 ENCOUNTER — CARE COORDINATION (OUTPATIENT)
Dept: OTHER | Facility: CLINIC | Age: 44
End: 2024-11-07

## 2024-11-07 NOTE — CARE COORDINATION
Health - [facility].depict[employer] or on the [employer] mobile didier to sign up. For questions or support, visit [employer]/support or call [phone](press 2 for 24/7 crisis support).   Kristine - [phone] Monday-Friday 8 a.m. to 11 p.m. EST and Saturday-Sunday 9 a.m. to 5 p.m. EST.         Understands red flags post discharge.      You passed out (lost consciousness).  You pass maroon or very bloody stools.  You vomit blood or what looks like coffee grounds.  You have severe belly pain.  Your pain gets worse, especially if it becomes focused in one area of your belly.  You have a new or higher fever.  Your stools are black and look like tar, or they have streaks of blood.  You have unexpected vaginal bleeding.  You have symptoms of a urinary tract infection. These may include:  Pain when you urinate.  Urinating more often than usual.  Blood in your urine.  You are dizzy or lightheaded, or you feel like you may faint.            Comfort Abbasi RN, AAS Associate Care Manager  [facility]   [address] [id number]  Cell [phone] Fax [phone]

## 2024-11-15 ENCOUNTER — OFFICE VISIT (OUTPATIENT)
Age: 44
End: 2024-11-15
Payer: COMMERCIAL

## 2024-11-15 VITALS
BODY MASS INDEX: 29.41 KG/M2 | HEIGHT: 63 IN | OXYGEN SATURATION: 99 % | WEIGHT: 166 LBS | RESPIRATION RATE: 20 BRPM | TEMPERATURE: 97.8 F | HEART RATE: 80 BPM | DIASTOLIC BLOOD PRESSURE: 82 MMHG | SYSTOLIC BLOOD PRESSURE: 123 MMHG

## 2024-11-15 DIAGNOSIS — Z90.3 S/P GASTRIC SLEEVE PROCEDURE: ICD-10-CM

## 2024-11-15 DIAGNOSIS — E66.01 MORBID OBESITY: Primary | ICD-10-CM

## 2024-11-15 PROCEDURE — 99212 OFFICE O/P EST SF 10 MIN: CPT | Performed by: NURSE PRACTITIONER

## 2024-11-15 ASSESSMENT — PATIENT HEALTH QUESTIONNAIRE - PHQ9
2. FEELING DOWN, DEPRESSED OR HOPELESS: NOT AT ALL
SUM OF ALL RESPONSES TO PHQ QUESTIONS 1-9: 0
SUM OF ALL RESPONSES TO PHQ9 QUESTIONS 1 & 2: 0
SUM OF ALL RESPONSES TO PHQ QUESTIONS 1-9: 0
SUM OF ALL RESPONSES TO PHQ QUESTIONS 1-9: 0
1. LITTLE INTEREST OR PLEASURE IN DOING THINGS: NOT AT ALL
SUM OF ALL RESPONSES TO PHQ QUESTIONS 1-9: 0

## 2024-11-15 ASSESSMENT — ENCOUNTER SYMPTOMS
ABDOMINAL PAIN: 0
NAUSEA: 0
SHORTNESS OF BREATH: 0
VOMITING: 0

## 2024-11-15 NOTE — PROGRESS NOTES
Nneka Araujo (:  1980) is a 44 y.o. female,Established patient, here for evaluation of the following chief complaint(s):  Follow-up (Six - Month Follow Up S/P Gallbladder Removal)        SUBJECTIVE/OBJECTIVE:    HPI:  Nneka Araujo is a 44 y.o. 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 . She has lost a total of 53.6  pounds since surgery. She  has lost gained 2 lbs since the last ov.  Body mass index is 29.41 kg/m².. no nausea and no vomiting . Very little  Acid reflux/heartburn, takes Nexium every other day. Drinking  50 ounces of water daily. ? protein intake daily. + BM's. Pt is walking and going to the gym for exercise.   Dietary recall -    Breakfast- cereal, muffin  Lunch- yogurt, salad, wings  Dinner- skip    She is  snacking between meals; yogurt/fruit, cheese.      Vitamins:  MVI : yes  Calcium : yes  B-Vit 12: yes  Vit D: Yes          Ms. Araujo has a reminder for a \"due or due soon\" health maintenance. I have asked that she contact her primary care provider for follow-up on this health maintenance.        COMORBIDITY     SLEEP APNEA                 YES        GERD  (req.meds)            YES  HYPERLIPIDEMIA            NO  HYPERTENSION              NO         DIABETES                         NO           Current Outpatient Medications:     Caltrate 600+D Plus Minerals (CALTRATE) 600-800 MG-UNIT TABS tablet, Take 1 tablet by mouth daily, Disp: 90 tablet, Rfl: 3    fluticasone (FLONASE) 50 MCG/ACT nasal spray, 2 sprays by Nasal route as needed for Rhinitis or Allergies, Disp: 16 g, Rfl: 5    esomeprazole (NEXIUM) 40 MG delayed release capsule, Take 1 capsule by mouth every morning (before breakfast), Disp: 90 capsule, Rfl: 3    Multiple Vitamins-Minerals (BARIATRIC MULTIVITAMINS/IRON) CAPS, Take 1 tablet by mouth daily, Disp: , Rfl:     linaclotide (LINZESS) 145 MCG capsule, Take 1 capsule by mouth every morning (before breakfast) (Patient taking differently: Take 1 capsule

## 2024-11-26 ENCOUNTER — CARE COORDINATION (OUTPATIENT)
Dept: OTHER | Facility: CLINIC | Age: 44
End: 2024-11-26

## 2024-11-26 NOTE — CARE COORDINATION
Ambulatory Care Coordination Note     11/26/2024 5:52 PM     Patient outreach attempt by this ACM today to perform care management follow up . ACM was unable to reach the patient by telephone today;   left voice message requesting a return phone call to this ACM.     ACM: Comfort Abbasi RN     Care Summary Note:   Pt was seen at [facility] ER 9/6/24 - 9/7/24.   Diagnosis: CP s/p lap michael 8/30/24    PCP/Specialist follow up:   Future Appointments         Provider Specialty Dept Phone    5/14/2025 9:00 AM Nohelia Covarrubias, APRN - NP General Surgery [phone]            Follow Up:   Plan for next ACM outreach in approximately 3 weeks to complete:  - goal progression.            Goals        Establish PCP relationships and regularly scheduled appointments.      PCP - TBD  Surgeon - TBD  RTW - 9/13/24 9/16/24 Call placed to patient, no answer. Voicemail left to return call.     10/9/24  PCP - 11/4/24  Surgeon - 11/15/24    11/7/24 Call placed to patient, no answer. Voicemail left to return call.     11/26/24 Call placed to patient, no answer. Voicemail left to return call.            Knowledge and adherence to medication plan.      Taking prescribed meds       Supportive resources in place to maintain patient in the community (ie., home health, equipment, DME, refer to, etc.)      Nurse Access line 24/7 located on the back of the insurance card  TeleDoc # 1 - [phone] Jeronimo available   [facility] [address] [place] [address] # [phone]  [facility], [place] # [phone] Monday - Sunday 8:00 AM - 8:00 PM  [facility] [place] Address: [address] [place] [address] Phone: [phone]  [facility], [facility] Address: [address] [place] [address] Phone: [phone]  [facility], [facility] Address: [address] [place] [address] Hours: 8 AM - 8 PM Phone: [phone]  [employer] Help - # 1-

## 2024-12-03 ENCOUNTER — TELEPHONE (OUTPATIENT)
Age: 44
End: 2024-12-03

## 2024-12-03 NOTE — TELEPHONE ENCOUNTER
Appointment Request From: Nneka Araujo     With Provider: Dr. Jamie Srivastava MD [[facility] MAIN OFFICE-[facility]]     Preferred Date Range: Any     Preferred Times: Any Time     Reason for visit: Request an Appointment     Comments:  B12 shot and pain in my right knee

## 2024-12-27 ENCOUNTER — CARE COORDINATION (OUTPATIENT)
Dept: OTHER | Facility: CLINIC | Age: 44
End: 2024-12-27

## 2024-12-27 NOTE — CARE COORDINATION
Ambulatory Care Coordination Note     12/27/2024 5:01 PM     Patient outreach attempt by this ACM to perform care management follow up . ACM was unable to reach the patient by telephone;   left voice message requesting a return phone call to this ACM.  [employer] message sent requesting patient to contact this ACM.     ACM: Comfort Abbasi RN     Care Summary Note:   Pt was seen at [facility] ER 9/6/24 - 9/7/24.   Diagnosis: CP s/p lap michael 8/30/24    PCP/Specialist follow up:   Future Appointments         Provider Specialty Dept Phone    5/14/2025 9:00 AM Nohelia Covarrubias, APRN - NP General Surgery [phone]            Follow Up:   Plan for next ACM outreach in approximately 2 weeks to complete:  - goal progression.       Goals        Establish PCP relationships and regularly scheduled appointments.      PCP - TBD  Surgeon - TBD  RTW - 9/13/24 9/16/24 Call placed to patient, no answer. Voicemail left to return call.     10/9/24  PCP - 11/4/24  Surgeon - 11/15/24    11/7/24 Call placed to patient, no answer. Voicemail left to return call.     11/26/24 Call placed to patient, no answer. Voicemail left to return call.     12/27/24 [employer] LTF letter sent           Knowledge and adherence to medication plan.      Taking prescribed meds       Supportive resources in place to maintain patient in the community (ie., home health, equipment, DME, refer to, etc.)      Nurse Access line 24/7 located on the back of the insurance card  TeleDoc # 1 - [phone] Jeronimo available   [facility] [address] [place] [address] # [phone]  [facility], [place] # [phone] Monday - Sunday 8:00 AM - 8:00 PM  [facility] [place] Address: [address] [place] [address] Phone: [phone]  [facility], [facility] Address: [address] [place] [address] Phone: [phone]  [facility], [facility] Address: [id number] [place] [address], Jeremy Vega,

## 2025-01-10 ENCOUNTER — CARE COORDINATION (OUTPATIENT)
Dept: OTHER | Facility: CLINIC | Age: 45
End: 2025-01-10

## 2025-01-10 NOTE — CARE COORDINATION
Ambulatory Care Coordination Note     1/10/2025 6:22 PM     patient outreach attempt by this ACM today to perform care management follow up . ACM was unable to reach the patient by telephone today;   left voice message requesting a return phone call to this ACM.  [employer] message sent requesting patient to contact this ACM.     Patient closed (patient disengaged) from the High Risk Care Management program on 1/10/2025.  Patient unable to progress towards self management. .  Care management goals have been completed. No further Ambulatory Care Manager follow up scheduled.     Comfort Abbasi RN, [facility] Associate Care Manager  [facility]   [address] [address]  Cell [phone] Fax [phone]

## 2025-01-17 DIAGNOSIS — E55.9 VITAMIN D DEFICIENCY: ICD-10-CM

## 2025-01-17 DIAGNOSIS — R73.03 PREDIABETES: ICD-10-CM

## 2025-01-17 DIAGNOSIS — E78.00 HYPERCHOLESTEROLEMIA: ICD-10-CM

## 2025-01-17 DIAGNOSIS — K44.9 PARAESOPHAGEAL HERNIA: ICD-10-CM

## 2025-01-17 NOTE — TELEPHONE ENCOUNTER
[facility] is requesting refills. Previous Rx was sent to [facility].    Last appointment: 10/2/24  Next appointment: none  Previous refill encounter(s): 9/18/24    Requested Prescriptions     Pending Prescriptions Disp Refills    esomeprazole (NEXIUM) 40 MG delayed release capsule 90 capsule 3     Sig: Take 1 capsule by mouth every morning (before breakfast)         For Pharmacy Admin Tracking Only    Program: Medication Refill  CPA in place:    Recommendation Provided To:   Intervention Detail: New Rx: 1, reason: Patient Preference  Intervention Accepted By:   Gap Closed?:    Time Spent (min): 5

## 2025-01-20 RX ORDER — ESOMEPRAZOLE MAGNESIUM 40 MG/1
40 CAPSULE, DELAYED RELEASE ORAL
Qty: 90 CAPSULE | Refills: 3 | Status: SHIPPED | OUTPATIENT
Start: 2025-01-20

## 2025-02-03 DIAGNOSIS — N63.20 MASS OF LEFT BREAST, UNSPECIFIED QUADRANT: Primary | ICD-10-CM

## 2025-02-03 DIAGNOSIS — N63.10 MASS OF RIGHT BREAST, UNSPECIFIED QUADRANT: ICD-10-CM

## 2025-02-11 ENCOUNTER — TELEPHONE (OUTPATIENT)
Age: 45
End: 2025-02-11

## 2025-02-11 ENCOUNTER — OFFICE VISIT (OUTPATIENT)
Age: 45
End: 2025-02-11
Payer: COMMERCIAL

## 2025-02-11 VITALS — BODY MASS INDEX: 30.12 KG/M2 | HEIGHT: 63 IN | WEIGHT: 170 LBS

## 2025-02-11 DIAGNOSIS — N60.01 BILATERAL BREAST CYSTS: Primary | ICD-10-CM

## 2025-02-11 DIAGNOSIS — N60.02 BILATERAL BREAST CYSTS: Primary | ICD-10-CM

## 2025-02-11 PROCEDURE — 99213 OFFICE O/P EST LOW 20 MIN: CPT | Performed by: SURGERY

## 2025-02-11 PROCEDURE — 19001 PUNCTURE ASPIR CYST BRST EA: CPT | Performed by: SURGERY

## 2025-02-11 PROCEDURE — 19000 PUNCTURE ASPIR CYST BREAST: CPT | Performed by: SURGERY

## 2025-02-11 NOTE — PROGRESS NOTES
HISTORY OF PRESENT ILLNESS  Nneka Araujo is a 45 y.o. female     HPI ESTABLISHED Patient here for bilateral breast cysts.  Both have been drained in the past.LEFT breast pain x 1 week, dull ache with possible mass, and RIGHT breast intermittent pain x 1 week.      Family history-  3 maternal aunts had breast cancer, all in their 50's. 1 , 2 living.  2018 - lifetime risk calculated by Dr. Alvarez - 20.4%.    Mammogram Result (most recent):  WILLIAM CARL DIGITAL SCREEN BILATERAL 2024    Narrative  STUDY: Bilateral digital screening mammogram with 3-D tomosynthesis    INDICATION:  Screening.    COMPARISON: , [id number]    BREAST COMPOSITION: The breasts are heterogeneously dense, which may obscure  small masses.    FINDINGS: Bilateral digital screening mammography was performed and is  interpreted in conjunction with a computer assisted detection (CAD) system.  Additionally, tomosynthesis of both breasts in the CC and MLO projections was  performed. The patient has a background multiple bilateral oval equal density  circumscribed masses which have waxed and waned. The dominant mass in the left  central to inferior breast has significantly increased since , measuring up  to 10 cm.    Impression  BI-RADS 0: Incomplete. Needs additional imaging evaluation. Dominant left breast  mass, significantly increased since .    RECOMMENDATIONS:  Left breast ultrasound    The patient will be notified of these results.     Breast imaging-   MRI Result (most recent):  MRI BREAST BILATERAL W WO CONTRAST 2024    Narrative  INDICATION: High risk screening.    COMPARISON: 2024 screening tomography.    TECHNIQUE:  Multisequence, multiplanar, bilateral breast MRI was performed in prone position  using a dedicated breast coil. Images were obtained without contrast and dynamic  postcontrast images were obtained in multiple phases. 15 mL IV gadoteridol  (ProHance) was administered. Subtraction images were

## 2025-02-13 ENCOUNTER — TELEPHONE (OUTPATIENT)
Age: 45
End: 2025-02-13

## 2025-02-18 SDOH — ECONOMIC STABILITY: FOOD INSECURITY: WITHIN THE PAST 12 MONTHS, THE FOOD YOU BOUGHT JUST DIDN'T LAST AND YOU DIDN'T HAVE MONEY TO GET MORE.: SOMETIMES TRUE

## 2025-02-18 SDOH — ECONOMIC STABILITY: FOOD INSECURITY: WITHIN THE PAST 12 MONTHS, YOU WORRIED THAT YOUR FOOD WOULD RUN OUT BEFORE YOU GOT MONEY TO BUY MORE.: SOMETIMES TRUE

## 2025-02-18 SDOH — ECONOMIC STABILITY: TRANSPORTATION INSECURITY
IN THE PAST 12 MONTHS, HAS THE LACK OF TRANSPORTATION KEPT YOU FROM MEDICAL APPOINTMENTS OR FROM GETTING MEDICATIONS?: NO

## 2025-02-18 SDOH — ECONOMIC STABILITY: INCOME INSECURITY: IN THE LAST 12 MONTHS, WAS THERE A TIME WHEN YOU WERE NOT ABLE TO PAY THE MORTGAGE OR RENT ON TIME?: YES

## 2025-02-19 ENCOUNTER — OFFICE VISIT (OUTPATIENT)
Age: 45
End: 2025-02-19
Payer: COMMERCIAL

## 2025-02-19 DIAGNOSIS — N95.1 HOT FLASHES DUE TO MENOPAUSE: ICD-10-CM

## 2025-02-19 DIAGNOSIS — Z12.11 SCREENING FOR MALIGNANT NEOPLASM OF COLON: Primary | ICD-10-CM

## 2025-02-19 DIAGNOSIS — N95.2 POSTMENOPAUSAL ATROPHIC VAGINITIS: ICD-10-CM

## 2025-02-19 DIAGNOSIS — D47.3 ESSENTIAL (HEMORRHAGIC) THROMBOCYTHEMIA (HCC): ICD-10-CM

## 2025-02-19 PROCEDURE — 99213 OFFICE O/P EST LOW 20 MIN: CPT | Performed by: FAMILY MEDICINE

## 2025-02-19 RX ORDER — VENLAFAXINE 25 MG/1
25 TABLET ORAL 2 TIMES DAILY
Qty: 60 TABLET | Refills: 3 | Status: SHIPPED | OUTPATIENT
Start: 2025-02-19

## 2025-02-19 ASSESSMENT — PATIENT HEALTH QUESTIONNAIRE - PHQ9
1. LITTLE INTEREST OR PLEASURE IN DOING THINGS: NOT AT ALL
SUM OF ALL RESPONSES TO PHQ QUESTIONS 1-9: 0
2. FEELING DOWN, DEPRESSED OR HOPELESS: NOT AT ALL
SUM OF ALL RESPONSES TO PHQ QUESTIONS 1-9: 0
SUM OF ALL RESPONSES TO PHQ9 QUESTIONS 1 & 2: 0

## 2025-02-19 NOTE — PROGRESS NOTES
Nneka Araujo, was evaluated through a synchronous (real-time) audio-video encounter. The patient (or guardian if applicable) is aware that this is a billable service, which includes applicable co-pays. This Virtual Visit was conducted with patient's (and/or legal guardian's) consent. Patient identification was verified, and a caregiver was present when appropriate.   The patient was located at Home: [address] [place] [address]. [place]  [id number]  Provider was located at Facility (Appt Dept): [address] [place] [phone]  Confirm you are appropriately licensed, registered, or certified to deliver care in the state where the patient is located as indicated above. If you are not or unsure, please re-schedule the visit: Yes, I confirm.     Nneka Araujo (:  1980) is a Established patient, presenting virtually for evaluation of the following:  Pre- menopausal  ++ hot flashes ++ vaginal dryness, decrease sleep and ++ fatigue with dyspareunia,     Constitutional: no fever, nl energy levels, nl sleep patterns, nl appetite, and nl weight fluctuations,  - exercise habits,  nad     HENT: no ear pain or nosebleeds. No blurred vision  Respiratory: no shortness of breath, wheezing cough   Cardiovascular: Has no chest pain, ,and racing heart .   Gastrointestinal: No constipation, diarrhea, nausea and vomiting.   Genitourinary: No frequency.   Musculoskeletal: Negative for joint pain.   Skin: no itching, no rash.   Neurological: Negative for dizziness, no tremors  Psychiatric/Behavioral: no for depression  no nervous/anxious, nl stress levels, and overall emotional well-being .        Below is the assessment and plan developed based on review of pertinent history, physical exam, labs, studies, and medications.     Assessment & Plan  Screening for malignant neoplasm of colon   Chronic, not at goal (unstable), continue current treatment plan    Orders:    Bryson Thibodeaux MD,

## 2025-02-19 NOTE — PROGRESS NOTES
Chief Complaint   Patient presents with    night sweats      X 1-2 months        \"Have you been to the ER, urgent care clinic since your last visit?  Hospitalized since your last visit?\"    NO    “Have you seen or consulted any other health care providers outside of [facility] since your last visit?”    NO    Have you had a mammogram?”   YES    Date of last Mammogram: 2024      “Have you had a pap smear?”    YES    Date of last Cervical Cancer screen (HPV or PAP): 3/12/2019         “Have you had a colorectal cancer screening such as a colonoscopy/FIT/Cologuard?    NO    No colonoscopy on file  No cologuard on file  No FIT/FOBT on file   No flexible sigmoidoscopy on file       The patient, Nneka Araujo, identity was verified by name and .

## 2025-02-19 NOTE — ASSESSMENT & PLAN NOTE
Chronic, at goal (stable), continue current treatment plan and medication adherence emphasized    Orders:    Estrogens, Fractionated; Future

## 2025-03-03 ENCOUNTER — TELEPHONE (OUTPATIENT)
Age: 45
End: 2025-03-03

## 2025-03-03 NOTE — TELEPHONE ENCOUNTER
I spoke with the patient. She is having her mammogram 03/12/25.  She will schedule after that is complete.

## 2025-03-12 ENCOUNTER — HOSPITAL ENCOUNTER (OUTPATIENT)
Facility: HOSPITAL | Age: 45
Discharge: HOME | End: 2025-03-15
Payer: COMMERCIAL

## 2025-03-12 DIAGNOSIS — N63.20 MASS OF LEFT BREAST, UNSPECIFIED QUADRANT: ICD-10-CM

## 2025-03-12 DIAGNOSIS — N63.10 MASS OF RIGHT BREAST, UNSPECIFIED QUADRANT: ICD-10-CM

## 2025-03-12 PROCEDURE — 76642 ULTRASOUND BREAST LIMITED: CPT

## 2025-03-12 PROCEDURE — G0279 TOMOSYNTHESIS, MAMMO: HCPCS

## 2025-03-18 NOTE — ED PROVIDER NOTES
Chief Complaint   Patient presents with    Prenatal Care     [facility] visit after NST      Routine prenatal visit without complaints.  Patient denies any bleeding, leaking fluid, cramping, or regular uterine contractions.  Good fetal movement.  NST: see report.  Reactive  Assessment/Plan:  35w5d doing well  GBS next  AMA/Late PNC/Obesity- monthly growth EMG, weekly NST, NST/BPP twice weekly 38 wks.  Scheduled for cytotec IOL 4/10/25  Kick counts reviewed.  Reviewed  labor signs and symptoms.  Diagnoses and all orders for this visit:    Prenatal care, antepartum ([facility])    Multigravida of advanced maternal age in third trimester ([facility])  -     FETAL NON-STRESS TEST EMG ONLY 51150; Future    Late prenatal care ([facility])  -     FETAL NON-STRESS TEST EMG ONLY 95746; Future    Obesity affecting pregnancy, antepartum, unspecified obesity type ([facility])  -     FETAL NON-STRESS TEST EMG ONLY 48116; Future       Return in about 1 week (around 3/25/2025) for Routine Prenatal Visit, NST growth US and GBS, twice weekly NST/BPP 38 wks.    EMERGENCY DEPARTMENT HISTORY AND PHYSICAL EXAM      Date: 2/15/2022  Patient Name: Regine Rausch  Patient Age and Sex: 43 y.o. female     History of Presenting Illness     Chief Complaint   Patient presents with    Abdominal Pain     Pain in her stomach since last night, middle.  Vomiting     She has also vomited twice. History Provided By: Patient    HPI: Regine Rausch is a 17-year-old female presenting with upper abdominal pain and vomiting since 2:30 AM this morning. Patient states that it started in the periumbilical and epigastric region with nausea and vomiting. Denies any diarrhea constipation or any urinary symptoms. States that she had sausage last night. No history of any abdominal problems. Thinks she might of had a hernia as a child. There are no other complaints, changes, or physical findings at this time. PCP: Mamta Faustin MD    No current facility-administered medications on file prior to encounter. Current Outpatient Medications on File Prior to Encounter   Medication Sig Dispense Refill    esomeprazole (NEXIUM) 40 mg capsule TAKE 1 CAPSULE BY MOUTH twice daily  FOR HEARTBURN GENERIC FOR NEXIUM 60 Capsule 5    ergocalciferol (ERGOCALCIFEROL) 1,250 mcg (50,000 unit) capsule Take 1 Capsule by mouth every seven (7) days. Indications: low vitamin D levels 5 Capsule 5    vitamin e (E GEMS) 100 unit capsule Take 100 Units by mouth daily. Unsure of the strength.  fluticasone propionate (FLONASE) 50 mcg/actuation nasal spray 1 Gilman by Both Nostrils route daily. 1 Bottle 3    cyanocobalamin (VITAMIN B-12) 1,000 mcg tablet Take 1,000 mcg by mouth daily. Past History     Past Medical History:  Past Medical History:   Diagnosis Date    Abnormal Pap smear of cervix 1990s    Breast cyst 2009    benign. Left. 1.7 cm and multiple others. Dr. Rakel Lara Chicken pox childhood    Chronic idiopathic constipation 07/2015    Dr. Martin Hallman.     Chronic pain     HELM (dyspnea on exertion) 2015    Dr. 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 Retrosternal goiter. Dr. Len Martínez.  Enlarged tonsils and adenoids 2010    GERD (gastroesophageal reflux disease)     Iodine-deficiency related goiter     Left. Dr. Len Martínez.  YUNIER (obstructive sleep apnea) 2015    Dr. Chaya Albrecht, uses cpap. Dr. Francois Hong. States cpap broken. Dr. Sarahi Giron.  Thyroid cyst     Right.  Thyroid nodule 2019       Past Surgical History:  Past Surgical History:   Procedure Laterality Date    HX BREAST BIOPSY Left 2018    benign    HX  SECTION      [facility].  HX CYST INCISION AND DRAINAGE Left 2016    benign. Dr. Orestes Magallanes Right 09/15/2017    benign. Dr. Verner Chapel.  HX HERNIA REPAIR  3657    umbilical.    HX OTHER SURGICAL  2014    thyroid biopsy. Dr. Karma Porter.  HX THYROIDECTOMY Left 2015    due to [address] Dr. Len Martínez. benign. Family History:  Family History   Problem Relation Age of Onset    Hypertension Mother     Lung Disease Mother         chronic bronchitis    Sleep Apnea Mother     Hypertension Father     Diabetes Paternal Grandmother     No Known Problems Sister     No Known Problems Sister     No Known Problems Sister     No Known Problems Brother         murdered    Breast Cancer Maternal Aunt     Other Sister         severe scoliosis. txd with back brace.     Breast Cancer Maternal Aunt     Breast Cancer Maternal Aunt     No Known Problems Maternal Grandmother     No Known Problems Maternal Grandfather        Social History:  Social History     Tobacco Use    Smoking status: Passive Smoke Exposure - Never Smoker    Smokeless tobacco: Never Used    Tobacco comment: lives with a smoker dad x 18 yrs and history of living with smoker boyfriend x 2 years   Vaping Use    Vaping Use: Never used   Substance Use Topics    Alcohol use: Yes     Alcohol/week: 2.0 standard drinks     Types: 2 Shots of liquor per week    Drug use: No       Allergies: Allergies   Allergen Reactions    Skelaxin [Metaxalone] Other (comments)     Too groggy         Review of Systems   Review of Systems   Constitutional: Negative for chills and fever. Respiratory: Negative for cough and shortness of breath. Cardiovascular: Negative for chest pain. Gastrointestinal: Positive for abdominal pain, nausea and vomiting. Negative for constipation and diarrhea. Genitourinary: Negative for dysuria, frequency and hematuria. Neurological: Negative for weakness and numbness. All other systems reviewed and are negative. Physical Exam   Physical Exam  Vitals and nursing note reviewed. Constitutional:       Appearance: She is well-developed. HENT:      Head: Normocephalic and atraumatic. Nose: Nose normal.      Mouth/Throat:      Mouth: Mucous membranes are moist.   Eyes:      Extraocular Movements: Extraocular movements intact. Conjunctiva/sclera: Conjunctivae normal.   Cardiovascular:      Rate and Rhythm: Normal rate and regular rhythm. Pulmonary:      Effort: Pulmonary effort is normal. No respiratory distress. Breath sounds: Normal breath sounds. Abdominal:      General: There is no distension. Palpations: Abdomen is soft. Tenderness: There is abdominal tenderness in the epigastric area and periumbilical area. Musculoskeletal:         General: Normal range of motion. Cervical back: Normal range of motion and neck supple. Skin:     General: Skin is warm and dry. Neurological:      General: No focal deficit present. Mental Status: She is alert and oriented to person, place, and time. Mental status is at baseline.    Psychiatric:         Mood and Affect: Mood normal.          Diagnostic Study Results     Labs -     Recent Results (from the past 12 hour(s))   CBC WITH AUTOMATED DIFF    Collection Time: 02/15/22  6:13 PM   Result Value Ref Range    WBC 9.8 3.6 - 11.0 K/uL    RBC 4.75 3.80 - 5.20 M/uL    HGB 12.6 11.5 - 16.0 g/dL    HCT 41.5 35.0 - 47.0 %    MCV 87.4 80.0 - 99.0 FL    MCH 26.5 26.0 - 34.0 PG    MCHC 30.4 30.0 - 36.5 g/dL    RDW 13.0 11.5 - 14.5 %    PLATELET 650 (H) 804 - 400 K/uL    MPV 9.0 8.9 - 12.9 FL    NRBC 0.0 0  WBC    ABSOLUTE NRBC 0.00 0.00 - 0.01 K/uL    NEUTROPHILS 79 (H) 32 - 75 %    LYMPHOCYTES 16 12 - 49 %    MONOCYTES 5 5 - 13 %    EOSINOPHILS 0 0 - 7 %    BASOPHILS 0 0 - 1 %    IMMATURE GRANULOCYTES 0 0.0 - 0.5 %    ABS. NEUTROPHILS 7.8 1.8 - 8.0 K/UL    ABS. LYMPHOCYTES 1.5 0.8 - 3.5 K/UL    ABS. MONOCYTES 0.5 0.0 - 1.0 K/UL    ABS. EOSINOPHILS 0.0 0.0 - 0.4 K/UL    ABS. BASOPHILS 0.0 0.0 - 0.1 K/UL    ABS. IMM. GRANS. 0.0 0.00 - 0.04 K/UL    DF AUTOMATED     METABOLIC PANEL, COMPREHENSIVE    Collection Time: 02/15/22  6:13 PM   Result Value Ref Range    Sodium 138 136 - 145 mmol/L    Potassium 3.7 3.5 - 5.1 mmol/L    Chloride 105 97 - 108 mmol/L    CO2 28 21 - 32 mmol/L    Anion gap 5 5 - 15 mmol/L    Glucose 99 65 - 100 mg/dL    BUN 9 6 - 20 MG/DL    Creatinine 0.80 0.55 - 1.02 MG/DL    BUN/Creatinine ratio 11 (L) 12 - 20      GFR est AA >60 >60 ml/min/1.73m2    GFR est non-AA >60 >60 ml/min/1.73m2    Calcium 9.5 8.5 - 10.1 MG/DL    Bilirubin, total 0.7 0.2 - 1.0 MG/DL    ALT (SGPT) 18 12 - 78 U/L    AST (SGOT) 12 (L) 15 - 37 U/L    Alk.  phosphatase 100 45 - 117 U/L    Protein, total 8.1 6.4 - 8.2 g/dL    Albumin 3.8 3.5 - 5.0 g/dL    Globulin 4.3 (H) 2.0 - 4.0 g/dL    A-G Ratio 0.9 (L) 1.1 - 2.2     LIPASE    Collection Time: 02/15/22  6:13 PM   Result Value Ref Range    Lipase 66 (L) 73 - 393 U/L   URINALYSIS W/ REFLEX CULTURE    Collection Time: 02/15/22  6:13 PM    Specimen: Urine   Result Value Ref Range    Color YELLOW/STRAW      Appearance CLOUDY (A) CLEAR      Specific gravity 1.030 1.003 - 1.030      pH (UA) 5.5 5.0 - 8.0      Protein TRACE (A) NEG mg/dL    Glucose Negative NEG mg/dL    Ketone TRACE (A) NEG mg/dL    Bilirubin Negative NEG      Blood Negative NEG      Urobilinogen 1.0 0.2 - 1.0 EU/dL    Nitrites Negative NEG      Leukocyte Esterase Negative NEG      WBC 5-10 0 - 4 /hpf    RBC 0-5 0 - 5 /hpf    Epithelial cells FEW FEW /lpf    Bacteria Negative NEG /hpf    UA:UC IF INDICATED CULTURE NOT INDICATED BY UA RESULT CNI      Mucus 1+ (A) NEG /lpf   HCG URINE, QL    Collection Time: 02/15/22  7:57 PM   Result Value Ref Range    HCG urine, QL Negative NEG         Radiologic Studies -   CT ABD PELV W CONT   Final Result      1. Localized small bowel distention in left midabdomen concerning for closed   loop obstruction. 2. 2.4 cm inferior right breast lesion, not further determined on this study. No   radiographic correlation recommended. 3. Posterior uterine mass measuring 5.7 cm, possibly large leiomyoma. 4. Trace free peritoneal fluid. CT Results  (Last 48 hours)               02/15/22 2111  CT ABD PELV W CONT Final result    Impression:      1. Localized small bowel distention in left midabdomen concerning for closed   loop obstruction. 2. 2.4 cm inferior right breast lesion, not further determined on this study. No   radiographic correlation recommended. 3. Posterior uterine mass measuring 5.7 cm, possibly large leiomyoma. 4. Trace free peritoneal fluid. Narrative:  EXAM: CT ABD PELV W CONT       INDICATION: periumbilical pain       COMPARISON: Abdomen ultrasound 8/21/2019        CONTRAST: 100 mL of Isovue-370. ORAL CONTRAST: None.  The lack of oral contrast material diminishes the capacity   of CT to evaluate the bowel and adjacent structures       TECHNIQUE:    Following the uneventful intravenous administration of contrast, thin axial   images were obtained through the abdomen and pelvis. Coronal and sagittal   reconstructions were generated. CT dose reduction was achieved through use of a   standardized protocol tailored for this examination and automatic exposure   control for dose modulation. FINDINGS:    LOWER THORAX: Mass of inferior right breast is partly demonstrated measuring 2.4   cm in size. Clinical correlation and radiographic follow-up is recommended. The   most recent mammogram was obtained 20 2021. LIVER: No mass. BILIARY TREE: Gallbladder is within normal limits. CBD is not dilated. SPLEEN: within normal limits. PANCREAS: No mass or ductal dilatation. ADRENALS: Unremarkable. KIDNEYS: No mass, calculus, or hydronephrosis. STOMACH: Normal size. There is a small density in the gastric antrum. Whether   this represents ingested material is uncertain. .   SMALL BOWEL: Dilated loops of mid small bowel are shown in the left midabdomen   with small bowel diameter measuring up to 4.3 cm. In the left upper quadrant   within the transition point into dilated bowel, there are 2 dense opacities, the   larger measuring 11 mm in size, question ingested material or other. The lack of   proximal and distal small bowel dilation and the localized appearance in the   left midabdomen of the dilated small bowel loops space is concern for a closed   loop obstruction. Distal small bowel suture material is shown. COLON: No dilatation or wall thickening. APPENDIX: Not shown. PERITONEUM: Trace free peritoneal fluid. RETROPERITONEUM: No lymphadenopathy or aortic aneurysm. REPRODUCTIVE ORGANS: Posterior fundic mass of uterus measuring 5.7 cm in size. URINARY BLADDER: No mass or calculus. BONES: No destructive bone lesion. ABDOMINAL WALL: No mass or hernia. ADDITIONAL COMMENTS: N/A               CXR Results  (Last 48 hours)    None            Medical Decision Making   I am the first provider for this patient.     I reviewed the vital signs, available nursing notes, past medical history, past surgical history, family history and social history. Vital Signs-Reviewed the patient's vital signs. Patient Vitals for the past 12 hrs:   Temp Pulse Resp BP SpO2   02/15/22 2121  (!) 101 16 131/81 99 %   02/15/22 2014  90 18 (!) 149/85 97 %   02/15/22 1926     100 %   02/15/22 1750 97.6 °F (36.4 °C) 89 16 127/61 100 %       Records Reviewed: Nursing Notes and Old Medical Records    Provider Notes (Medical Decision Making):   Patient presents with abdominal pain with NV.  DDx: Gastroenteritis, SBO, appendicitis, colitis, IBD, diverticulitis, mesenteric ischemia, AAA or descending dissection, ACS, ureteral stone. Will get labs and CT Abdomen. ED Course:   Initial assessment performed. The patients presenting problems have been discussed, and they are in agreement with the care plan formulated and outlined with them. I have encouraged them to ask questions as they arise throughout their visit. ED Course as of 02/15/22 2201   Tue Feb 15, 2022   2153 Patient started having worsening abdominal pain so morphine given. Also complaining of burning abdominal pain and asking for something for that. Not currently vomiting so given GI cocktail while we wait on the CT. [JS]   2154 CT shows that patient has concerns for small bowel obstruction. We will plan to give her some oral contrast and admit. [JS]      ED Course User Index  [JS] Pool Aguilar MD     Critical Care Time:   0    Disposition:    Admission Note:  Patient is being admitted to the hospital by Dr. Mey Kimball, Service: Gen Surg. The results of their tests and reasons for their admission have been discussed with them and available family. They convey agreement and understanding for the need to be admitted and for their admission diagnosis. Diagnosis     Clinical Impression:   1. SBO (small bowel obstruction) ([facility])        Attestations:    Zhanna Pham M.D.         Please note that this dictation was completed with Dragon, the computer voice recognition software. Quite often unanticipated grammatical, syntax, homophones, and other interpretive errors are inadvertently transcribed by the computer software. Please disregard these errors. Please excuse any errors that have escaped final proofreading. Thank you.

## 2025-03-19 ENCOUNTER — OFFICE VISIT (OUTPATIENT)
Age: 45
End: 2025-03-19

## 2025-03-19 VITALS
BODY MASS INDEX: 30.48 KG/M2 | HEART RATE: 84 BPM | OXYGEN SATURATION: 98 % | HEIGHT: 63 IN | RESPIRATION RATE: 17 BRPM | TEMPERATURE: 97.7 F | SYSTOLIC BLOOD PRESSURE: 148 MMHG | WEIGHT: 172 LBS | DIASTOLIC BLOOD PRESSURE: 84 MMHG

## 2025-03-19 DIAGNOSIS — Z00.00 ROUTINE GENERAL MEDICAL EXAMINATION AT A HEALTH CARE FACILITY: Primary | ICD-10-CM

## 2025-03-19 DIAGNOSIS — E53.8 B12 DEFICIENCY: ICD-10-CM

## 2025-03-19 DIAGNOSIS — J20.9 ACUTE BRONCHITIS, UNSPECIFIED ORGANISM: ICD-10-CM

## 2025-03-19 LAB
GROUP A STREP ANTIGEN, POC: NEGATIVE
VALID INTERNAL CONTROL, POC: YES

## 2025-03-19 RX ORDER — CETIRIZINE HYDROCHLORIDE 10 MG/1
10 TABLET ORAL DAILY
Qty: 30 TABLET | Refills: 0 | Status: SHIPPED | OUTPATIENT
Start: 2025-03-19

## 2025-03-19 RX ORDER — CYANOCOBALAMIN 1000 UG/ML
1000 INJECTION, SOLUTION INTRAMUSCULAR; SUBCUTANEOUS ONCE
Status: COMPLETED | OUTPATIENT
Start: 2025-03-19 | End: 2025-03-19

## 2025-03-19 RX ORDER — ALBUTEROL SULFATE 90 UG/1
2 INHALANT RESPIRATORY (INHALATION) 4 TIMES DAILY PRN
Qty: 18 G | Refills: 0 | Status: SHIPPED | OUTPATIENT
Start: 2025-03-19

## 2025-03-19 RX ORDER — AZITHROMYCIN 250 MG/1
TABLET, FILM COATED ORAL
Qty: 6 TABLET | Refills: 0 | Status: SHIPPED | OUTPATIENT
Start: 2025-03-19 | End: 2025-03-29

## 2025-03-19 RX ADMIN — CYANOCOBALAMIN 1000 MCG: 1000 INJECTION, SOLUTION INTRAMUSCULAR; SUBCUTANEOUS at 16:41

## 2025-03-19 NOTE — PROGRESS NOTES
Chief Complaint   Patient presents with    health screening     Be well for employer       \"Have you been to the ER, urgent care clinic since your last visit?  Hospitalized since your last visit?\"    NO    “Have you seen or consulted any other health care providers outside of [facility] since your last visit?”    NO     “Have you had a pap smear?”    YES    Date of last Cervical Cancer screen (HPV or PAP): 3/12/2019         “Have you had a colorectal cancer screening such as a colonoscopy/FIT/Cologuard?    NO    No colonoscopy on file  No cologuard on file  No FIT/FOBT on file   No flexible sigmoidoscopy on file         Click Here for Release of Records Request     Results for orders placed or performed in visit on 25   AMB POC RAPID STREP A   Result Value Ref Range    Valid Internal Control, POC yes     Group A Strep Antigen, POC Negative       Vitals:    25 1601   BP: (!) 148/84   Pulse: 84   Resp: 17   Temp: 97.7 °F (36.5 °C)   TempSrc: Infrared   SpO2: 98%   Weight: 78 kg (172 lb)   Height: 1.6 m (5' 3\")        The patient, Nneka Araujo, identity was verified by name and .

## 2025-03-19 NOTE — PATIENT INSTRUCTIONS
safe, effective or appropriate for any given patient. [facility] does not assume any responsibility for any aspect of healthcare administered with the aid of information [facility] provides. The information contained herein is not intended to cover all possible uses, directions, precautions, warnings, drug interactions, allergic reactions, or adverse effects. If you have questions about the drugs you are taking, check with your doctor, nurse or pharmacist.  Copyright 3167-2513 [facility]. Version: 3.01. Revision date: 12/6/2019.  Care instructions adapted under license by [employer]. If you have questions about a medical condition or this instruction, always ask your healthcare professional. [employer] disclaims any warranty or liability for your use of this information.

## 2025-03-20 DIAGNOSIS — E53.8 B12 DEFICIENCY: ICD-10-CM

## 2025-03-20 DIAGNOSIS — J20.9 ACUTE BRONCHITIS, UNSPECIFIED ORGANISM: ICD-10-CM

## 2025-03-20 LAB
ANION GAP SERPL CALC-SCNC: 6 MMOL/L (ref 2–12)
BUN SERPL-MCNC: 12 MG/DL (ref 6–20)
BUN/CREAT SERPL: 16 (ref 12–20)
CALCIUM SERPL-MCNC: 9.7 MG/DL (ref 8.5–10.1)
CHLORIDE SERPL-SCNC: 105 MMOL/L (ref 97–108)
CHOLEST SERPL-MCNC: 114 MG/DL
CO2 SERPL-SCNC: 25 MMOL/L (ref 21–32)
CREAT SERPL-MCNC: 0.77 MG/DL (ref 0.55–1.02)
EST. AVERAGE GLUCOSE BLD GHB EST-MCNC: 97 MG/DL
GLUCOSE SERPL-MCNC: 93 MG/DL (ref 65–100)
HBA1C MFR BLD: 5 % (ref 4–5.6)
HDLC SERPL-MCNC: 54 MG/DL
HDLC SERPL: 2.1 (ref 0–5)
LDLC SERPL CALC-MCNC: 49.4 MG/DL (ref 0–100)
POTASSIUM SERPL-SCNC: 3.9 MMOL/L (ref 3.5–5.1)
SODIUM SERPL-SCNC: 136 MMOL/L (ref 136–145)
TRIGL SERPL-MCNC: 53 MG/DL
VLDLC SERPL CALC-MCNC: 10.6 MG/DL

## 2025-03-21 ENCOUNTER — RESULTS FOLLOW-UP (OUTPATIENT)
Age: 45
End: 2025-03-21

## 2025-03-21 NOTE — PROGRESS NOTES
Subjective:       Nneka Araujo is a 45 y.o. female and is here for a comprehensive physical exam.  The patient reports problems - Started >6 days ago not better,   otc not helping, have a very bad Sore throat, without a lot of painful Cough , no sneezing no nasal discharge but has sinus pain and a foul order feeling warm, no history of asthma no history of COPD not sleeping well that appetite is down and has some earache, having no diarrhea patient  has had sick exposure currently does not smoke,      Any STD's in the past? none  Past Medical History:   Diagnosis Date    Abnormal Pap smear of cervix 1990s    Arthritis     IN BACK    Breast cyst 2009    benign.  Left.  1.7 cm and multiple others.  Dr. Maira Kearns    Chicken pox childhood    Chronic idiopathic constipation 07/2015    Dr. Albino De Jesus.    Chronic pain     VALLADARES (dyspnea on exertion) 02/2015    Dr. 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  Retrosternal goiter.  Dr. Reid Abbasi.    Enlarged tonsils and adenoids 09/2010    GERD (gastroesophageal reflux disease)     Hernia, abdominal 06/28/2022    [facility]ist     History of blood transfusion 2004    AFTER CHILDBIRTH    Iodine-deficiency related goiter     Left.  Dr. Reid Abbasi.    KISHORE (obstructive sleep apnea) 02/25/2015    Dr. Roby Mcgill, uses cpap.  Dr. Krystal Araya.  WEARS CPAP Dr. Omar Platt.    Thyroid nodule 08/2019     Patient Active Problem List    Diagnosis Date Noted    Symptomatic cholelithiasis 08/06/2024    Essential (hemorrhagic) thrombocythemia (HCC) 08/01/2023    Paraesophageal hernia 06/29/2023    Severe obesity (BMI 35.0-35.9 with comorbidity) 06/29/2023    SBO (small bowel obstruction) (HCC) 02/15/2022    Thyroid cyst     Thyroid nodule 08/01/2019    Severe obesity 06/27/2019    Cyst of left breast 03/16/2018    Exercise-induced asthma 09/26/2017

## 2025-04-17 ENCOUNTER — PREP FOR PROCEDURE (OUTPATIENT)
Age: 45
End: 2025-04-17

## 2025-04-17 ENCOUNTER — TELEPHONE (OUTPATIENT)
Age: 45
End: 2025-04-17

## 2025-04-17 DIAGNOSIS — N60.01 BILATERAL BREAST CYSTS: ICD-10-CM

## 2025-04-17 DIAGNOSIS — N60.02 BILATERAL BREAST CYSTS: ICD-10-CM

## 2025-04-17 NOTE — TELEPHONE ENCOUNTER
Patient Surgery Information Sheet      Patient Name:  Nneka Araujo  Surgery Date:  April 30, 2025    Type of Surgery:  EXCISION BILATERAL BREAST CYST    Estimated arrival time 640AM    Arrival time will be confirmed the afternoon before your surgery.    Pre-procedure: Not Applicable    Pre-Operative Testing Department will call to schedule pre-op testing appointment if needed before surgery    Hospital:  [facility]  Address:  [address] [place], [place] [id number]  Check in location:  Through the Main Entrance of [facility], Take the elevator on the left side to the second floor on your left as you step out of the elevator    Correction: take a RIGHT off the elevator once you are on the second floor.    Pre-Operative Instructions:  Will be given at the pre-op appointment.    Special Instructions if needed:     NPO (nothing by mouth) or drinking after midnight the night before Surgery  Patient may shower the morning of, do not use an lotion, deodorant, powders, perfumes or makeup  Patient will need  the morning of surgery     Surgery Scheduler:  Blanquita Alejandre

## 2025-04-18 ENCOUNTER — PATIENT MESSAGE (OUTPATIENT)
Age: 45
End: 2025-04-18

## 2025-04-18 NOTE — TELEPHONE ENCOUNTER
Pt called with questions regarding surgery.    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  The RIGHT breast cyst has not refilled.  May not do surgery on the RIGHT side.  Will decide at preop

## 2025-04-21 NOTE — PERIOP NOTE
EJ lee/ Blanquita at Dr. Bee's office requesting pre-op orders to be placed in EPIC prior to PAT on 4/23/2025.  DOS 4/30/2025.

## 2025-04-23 ENCOUNTER — HOSPITAL ENCOUNTER (OUTPATIENT)
Facility: HOSPITAL | Age: 45
Discharge: HOME | End: 2025-04-26

## 2025-04-23 VITALS
OXYGEN SATURATION: 100 % | HEIGHT: 64 IN | DIASTOLIC BLOOD PRESSURE: 72 MMHG | SYSTOLIC BLOOD PRESSURE: 131 MMHG | TEMPERATURE: 97.3 F | RESPIRATION RATE: 16 BRPM | WEIGHT: 175.49 LBS | BODY MASS INDEX: 29.96 KG/M2 | HEART RATE: 90 BPM

## 2025-04-23 ASSESSMENT — PAIN DESCRIPTION - DESCRIPTORS: DESCRIPTORS: ACHING

## 2025-04-23 ASSESSMENT — PAIN DESCRIPTION - PAIN TYPE: TYPE: CHRONIC PAIN

## 2025-04-23 ASSESSMENT — PAIN DESCRIPTION - ORIENTATION: ORIENTATION: RIGHT

## 2025-04-23 ASSESSMENT — PAIN DESCRIPTION - LOCATION: LOCATION: SHOULDER

## 2025-04-23 ASSESSMENT — PAIN SCALES - GENERAL: PAINLEVEL_OUTOF10: 2

## 2025-04-23 NOTE — DISCHARGE INSTRUCTIONS
[facility]                   [address] [place] [address]   MAIN OR                                  [phone]   MAIN PRE OP                          [phone]                                                                                AMBULATORY PRE OP          [phone]  PRE-ADMISSION TESTING    [phone]   Surgery Date: 4/30/25 Wednesday      Is surgery arrival time given by surgeon?  YES  NO  If “NO”, [facility] staff will call you between 3 and 7pm the day before your surgery with your arrival time. (If your surgery is on a Monday, we will call you the Friday before.)    Call [phone] after 7pm Monday-Friday if you did not receive this call.    INSTRUCTIONS BEFORE YOUR SURGERY   When You  Arrive Arrive at the 2nd Floor Admitting Desk on the day of your surgery  Have your insurance card, photo ID, and any copayment (if needed)   Food   and   Drink No food or drink (gum , mints, coffee, juice, etc)after midnight the night before surgery.   You may drink WATER ONLY up until 2 hours prior to your surgery time. Please do not add anything to your water as this may result in your surgery being postponed.    No alcohol (beer, wine, liquor) 24 hours before and after surgery   Medications to   TAKE   Morning of Surgery MEDICATIONS TO TAKE THE MORNING OF SURGERY WITH A SIP OF WATER:   Bring albuterol inhaler  Nexium   Medications  To  STOP      7 days before surgery Non-Steroidal anti-inflammatory Drugs (NSAID's): for example, Ibuprofen (Advil, Motrin), Naproxen (Aleve)  Aspirin, if taking for pain   Herbal supplements, vitamins, and fish oil  Other:  (Pain medications not listed above, including Tylenol may be taken)   Blood  Thinners If you take  Aspirin, Plavix, Coumadin, or any blood-thinning or anti-blood clot medicine, talk to the doctor who prescribed the medications for pre-operative instructions.   Bathing Clothing  Jewelry  Valuables     If you

## 2025-04-23 NOTE — PERIOP NOTE
Pt given advance directive paperwork.    Pt stated that she had pain and swelling in right shoulder and back area, instructed pt to see pcp or urgent care prior to surgery to make sure  there is no infection or inflammation.

## 2025-04-27 ENCOUNTER — OFFICE VISIT (OUTPATIENT)
Age: 45
End: 2025-04-27

## 2025-04-27 VITALS
SYSTOLIC BLOOD PRESSURE: 128 MMHG | HEART RATE: 62 BPM | WEIGHT: 176 LBS | OXYGEN SATURATION: 99 % | DIASTOLIC BLOOD PRESSURE: 82 MMHG | TEMPERATURE: 99.3 F | BODY MASS INDEX: 30.69 KG/M2 | RESPIRATION RATE: 16 BRPM

## 2025-04-27 DIAGNOSIS — M54.6 ACUTE RIGHT-SIDED THORACIC BACK PAIN: Primary | ICD-10-CM

## 2025-04-27 DIAGNOSIS — M62.838 MUSCLE SPASM: ICD-10-CM

## 2025-04-27 RX ORDER — METHOCARBAMOL 750 MG/1
750 TABLET, FILM COATED ORAL 3 TIMES DAILY PRN
Qty: 15 TABLET | Refills: 0 | Status: SHIPPED | OUTPATIENT
Start: 2025-04-27 | End: 2025-05-02

## 2025-04-29 NOTE — PERIOP NOTE
Hello,     You are scheduled to have surgery tomorrow at [facility].     We would like for you to arrive at  0700 am  We are located on the second floor, [address]. You will check-in at the registration desk located outside the elevators on the second floor prior to proceeding to [address].  Remember nothing to eat or drink after midnight. If you need to take medications the morning of surgery, please take with a few sips of water.   Wear loose, comfortable clothing and leave all your jewelry at home.   You may bring your cell phone with you.  One family member will be allowed in the pre-op area once you are dressed and your IV has been started.   You will need someone to drive you home and be with you for 24 hours post-anesthesia.     We look forward to seeing you! Call [phone] for questions after hours and [phone] between 5:30AM and 6PM.     Thanks!    [facility] ASU PREOP TEAM

## 2025-04-30 ENCOUNTER — HOSPITAL ENCOUNTER (OUTPATIENT)
Facility: HOSPITAL | Age: 45
Setting detail: OUTPATIENT SURGERY
Discharge: HOME | End: 2025-04-30
Attending: SURGERY | Admitting: SURGERY
Payer: COMMERCIAL

## 2025-04-30 ENCOUNTER — ANESTHESIA (OUTPATIENT)
Facility: HOSPITAL | Age: 45
End: 2025-04-30
Payer: COMMERCIAL

## 2025-04-30 ENCOUNTER — ANESTHESIA EVENT (OUTPATIENT)
Facility: HOSPITAL | Age: 45
End: 2025-04-30
Payer: COMMERCIAL

## 2025-04-30 VITALS
OXYGEN SATURATION: 96 % | BODY MASS INDEX: 31.68 KG/M2 | DIASTOLIC BLOOD PRESSURE: 55 MMHG | HEART RATE: 77 BPM | HEIGHT: 63 IN | RESPIRATION RATE: 15 BRPM | TEMPERATURE: 98.4 F | WEIGHT: 178.79 LBS | SYSTOLIC BLOOD PRESSURE: 97 MMHG

## 2025-04-30 DIAGNOSIS — N60.01 BILATERAL BREAST CYSTS: ICD-10-CM

## 2025-04-30 DIAGNOSIS — N60.02 BILATERAL BREAST CYSTS: ICD-10-CM

## 2025-04-30 PROBLEM — K56.609 SBO (SMALL BOWEL OBSTRUCTION) (HCC): Status: RESOLVED | Noted: 2022-02-15 | Resolved: 2025-04-30

## 2025-04-30 PROBLEM — E66.812 OBESITY, CLASS II, BMI 35-39.9: Status: RESOLVED | Noted: 2017-09-26 | Resolved: 2025-04-30

## 2025-04-30 PROBLEM — E66.01 SEVERE OBESITY (HCC): Status: RESOLVED | Noted: 2019-06-27 | Resolved: 2025-04-30

## 2025-04-30 LAB — HCG UR QL: NEGATIVE

## 2025-04-30 PROCEDURE — 6360000002 HC RX W HCPCS: Performed by: ANESTHESIOLOGY

## 2025-04-30 PROCEDURE — 2709999900 HC NON-CHARGEABLE SUPPLY: Performed by: SURGERY

## 2025-04-30 PROCEDURE — 81025 URINE PREGNANCY TEST: CPT

## 2025-04-30 PROCEDURE — 2500000003 HC RX 250 WO HCPCS: Performed by: NURSE ANESTHETIST, CERTIFIED REGISTERED

## 2025-04-30 PROCEDURE — 3600000013 HC SURGERY LEVEL 3 ADDTL 15MIN: Performed by: SURGERY

## 2025-04-30 PROCEDURE — 19120 REMOVAL OF BREAST LESION: CPT | Performed by: SURGERY

## 2025-04-30 PROCEDURE — 88307 TISSUE EXAM BY PATHOLOGIST: CPT

## 2025-04-30 PROCEDURE — 2580000003 HC RX 258: Performed by: ANESTHESIOLOGY

## 2025-04-30 PROCEDURE — 6360000002 HC RX W HCPCS: Performed by: NURSE ANESTHETIST, CERTIFIED REGISTERED

## 2025-04-30 PROCEDURE — 7100000010 HC PHASE II RECOVERY - FIRST 15 MIN: Performed by: SURGERY

## 2025-04-30 PROCEDURE — 7100000011 HC PHASE II RECOVERY - ADDTL 15 MIN: Performed by: SURGERY

## 2025-04-30 PROCEDURE — 19000 PUNCTURE ASPIR CYST BREAST: CPT | Performed by: SURGERY

## 2025-04-30 PROCEDURE — 2500000003 HC RX 250 WO HCPCS: Performed by: SURGERY

## 2025-04-30 PROCEDURE — 3700000001 HC ADD 15 MINUTES (ANESTHESIA): Performed by: SURGERY

## 2025-04-30 PROCEDURE — 3600000003 HC SURGERY LEVEL 3 BASE: Performed by: SURGERY

## 2025-04-30 PROCEDURE — 3700000000 HC ANESTHESIA ATTENDED CARE: Performed by: SURGERY

## 2025-04-30 RX ORDER — FENTANYL CITRATE 50 UG/ML
100 INJECTION, SOLUTION INTRAMUSCULAR; INTRAVENOUS
Status: COMPLETED | OUTPATIENT
Start: 2025-04-30 | End: 2025-04-30

## 2025-04-30 RX ORDER — DEXMEDETOMIDINE HYDROCHLORIDE 100 UG/ML
INJECTION, SOLUTION INTRAVENOUS
Status: DISCONTINUED | OUTPATIENT
Start: 2025-04-30 | End: 2025-04-30 | Stop reason: SDUPTHER

## 2025-04-30 RX ORDER — NALOXONE HYDROCHLORIDE 0.4 MG/ML
INJECTION, SOLUTION INTRAMUSCULAR; INTRAVENOUS; SUBCUTANEOUS PRN
Status: DISCONTINUED | OUTPATIENT
Start: 2025-04-30 | End: 2025-04-30 | Stop reason: HOSPADM

## 2025-04-30 RX ORDER — SODIUM CHLORIDE, SODIUM LACTATE, POTASSIUM CHLORIDE, CALCIUM CHLORIDE 600; 310; 30; 20 MG/100ML; MG/100ML; MG/100ML; MG/100ML
INJECTION, SOLUTION INTRAVENOUS CONTINUOUS
Status: DISCONTINUED | OUTPATIENT
Start: 2025-04-30 | End: 2025-04-30 | Stop reason: HOSPADM

## 2025-04-30 RX ORDER — DIPHENHYDRAMINE HYDROCHLORIDE 50 MG/ML
12.5 INJECTION, SOLUTION INTRAMUSCULAR; INTRAVENOUS
Status: DISCONTINUED | OUTPATIENT
Start: 2025-04-30 | End: 2025-04-30 | Stop reason: HOSPADM

## 2025-04-30 RX ORDER — PROPOFOL 10 MG/ML
INJECTION, EMULSION INTRAVENOUS
Status: DISCONTINUED | OUTPATIENT
Start: 2025-04-30 | End: 2025-04-30 | Stop reason: SDUPTHER

## 2025-04-30 RX ORDER — LIDOCAINE HYDROCHLORIDE 10 MG/ML
1 INJECTION, SOLUTION EPIDURAL; INFILTRATION; INTRACAUDAL; PERINEURAL
Status: DISCONTINUED | OUTPATIENT
Start: 2025-04-30 | End: 2025-04-30 | Stop reason: HOSPADM

## 2025-04-30 RX ORDER — PHENYLEPHRINE HYDROCHLORIDE 10 MG/ML
INJECTION INTRAVENOUS
Status: DISCONTINUED | OUTPATIENT
Start: 2025-04-30 | End: 2025-04-30 | Stop reason: SDUPTHER

## 2025-04-30 RX ORDER — ONDANSETRON 2 MG/ML
4 INJECTION INTRAMUSCULAR; INTRAVENOUS
Status: DISCONTINUED | OUTPATIENT
Start: 2025-04-30 | End: 2025-04-30 | Stop reason: HOSPADM

## 2025-04-30 RX ORDER — BUPIVACAINE HYDROCHLORIDE AND EPINEPHRINE 5; 5 MG/ML; UG/ML
INJECTION, SOLUTION PERINEURAL PRN
Status: DISCONTINUED | OUTPATIENT
Start: 2025-04-30 | End: 2025-04-30 | Stop reason: HOSPADM

## 2025-04-30 RX ORDER — MIDAZOLAM HYDROCHLORIDE 2 MG/2ML
2 INJECTION, SOLUTION INTRAMUSCULAR; INTRAVENOUS
Status: COMPLETED | OUTPATIENT
Start: 2025-04-30 | End: 2025-04-30

## 2025-04-30 RX ORDER — ONDANSETRON 2 MG/ML
INJECTION INTRAMUSCULAR; INTRAVENOUS
Status: DISCONTINUED | OUTPATIENT
Start: 2025-04-30 | End: 2025-04-30 | Stop reason: SDUPTHER

## 2025-04-30 RX ORDER — GLYCOPYRROLATE 0.2 MG/ML
INJECTION INTRAMUSCULAR; INTRAVENOUS
Status: DISCONTINUED | OUTPATIENT
Start: 2025-04-30 | End: 2025-04-30 | Stop reason: SDUPTHER

## 2025-04-30 RX ORDER — LIDOCAINE HYDROCHLORIDE 20 MG/ML
INJECTION, SOLUTION EPIDURAL; INFILTRATION; INTRACAUDAL; PERINEURAL
Status: DISCONTINUED | OUTPATIENT
Start: 2025-04-30 | End: 2025-04-30 | Stop reason: SDUPTHER

## 2025-04-30 RX ADMIN — MIDAZOLAM HYDROCHLORIDE 2 MG: 1 INJECTION, SOLUTION INTRAMUSCULAR; INTRAVENOUS at 09:42

## 2025-04-30 RX ADMIN — FENTANYL CITRATE 25 MCG: 50 INJECTION, SOLUTION INTRAMUSCULAR; INTRAVENOUS at 09:46

## 2025-04-30 RX ADMIN — DEXMEDETOMIDINE 4 MCG: 100 INJECTION, SOLUTION INTRAVENOUS at 09:42

## 2025-04-30 RX ADMIN — LIDOCAINE HYDROCHLORIDE 80 MG: 20 INJECTION, SOLUTION EPIDURAL; INFILTRATION; INTRACAUDAL; PERINEURAL at 09:47

## 2025-04-30 RX ADMIN — FENTANYL CITRATE 25 MCG: 50 INJECTION, SOLUTION INTRAMUSCULAR; INTRAVENOUS at 09:49

## 2025-04-30 RX ADMIN — GLYCOPYRROLATE 0.1 MG: 0.2 INJECTION INTRAMUSCULAR; INTRAVENOUS at 09:42

## 2025-04-30 RX ADMIN — FENTANYL CITRATE 25 MCG: 50 INJECTION, SOLUTION INTRAMUSCULAR; INTRAVENOUS at 09:44

## 2025-04-30 RX ADMIN — SODIUM CHLORIDE, POTASSIUM CHLORIDE, SODIUM LACTATE AND CALCIUM CHLORIDE: 600; 310; 30; 20 INJECTION, SOLUTION INTRAVENOUS at 08:22

## 2025-04-30 RX ADMIN — FENTANYL CITRATE 25 MCG: 50 INJECTION, SOLUTION INTRAMUSCULAR; INTRAVENOUS at 09:42

## 2025-04-30 RX ADMIN — DEXMEDETOMIDINE 4 MCG: 100 INJECTION, SOLUTION INTRAVENOUS at 09:46

## 2025-04-30 RX ADMIN — HYDROMORPHONE HYDROCHLORIDE 0.5 MG: 1 INJECTION, SOLUTION INTRAMUSCULAR; INTRAVENOUS; SUBCUTANEOUS at 11:05

## 2025-04-30 RX ADMIN — PROPOFOL 30 MG: 10 INJECTION, EMULSION INTRAVENOUS at 09:58

## 2025-04-30 RX ADMIN — ONDANSETRON HYDROCHLORIDE 4 MG: 2 SOLUTION INTRAMUSCULAR; INTRAVENOUS at 10:02

## 2025-04-30 RX ADMIN — PROPOFOL 140 MCG/KG/MIN: 10 INJECTION, EMULSION INTRAVENOUS at 09:47

## 2025-04-30 RX ADMIN — DEXMEDETOMIDINE 4 MCG: 100 INJECTION, SOLUTION INTRAVENOUS at 09:44

## 2025-04-30 RX ADMIN — DEXMEDETOMIDINE 4 MCG: 100 INJECTION, SOLUTION INTRAVENOUS at 09:45

## 2025-04-30 RX ADMIN — PHENYLEPHRINE HYDROCHLORIDE 80 MCG: 10 INJECTION INTRAVENOUS at 10:34

## 2025-04-30 ASSESSMENT — PAIN DESCRIPTION - DIRECTION
RADIATING_TOWARDS: NONRADIATING
RADIATING_TOWARDS: NON RADIATING

## 2025-04-30 ASSESSMENT — PAIN DESCRIPTION - PAIN TYPE
TYPE: SURGICAL PAIN
TYPE: SURGICAL PAIN

## 2025-04-30 ASSESSMENT — PAIN - FUNCTIONAL ASSESSMENT
PAIN_FUNCTIONAL_ASSESSMENT: ACTIVITIES ARE NOT PREVENTED
PAIN_FUNCTIONAL_ASSESSMENT: 0-10
PAIN_FUNCTIONAL_ASSESSMENT: ACTIVITIES ARE NOT PREVENTED

## 2025-04-30 ASSESSMENT — PAIN DESCRIPTION - FREQUENCY
FREQUENCY: CONTINUOUS
FREQUENCY: CONTINUOUS

## 2025-04-30 ASSESSMENT — PAIN DESCRIPTION - ORIENTATION
ORIENTATION: LEFT
ORIENTATION: LEFT

## 2025-04-30 ASSESSMENT — PAIN DESCRIPTION - LOCATION
LOCATION: BREAST
LOCATION: BREAST

## 2025-04-30 ASSESSMENT — PAIN DESCRIPTION - DESCRIPTORS
DESCRIPTORS: ACHING;SORE
DESCRIPTORS: ACHING;BURNING

## 2025-04-30 ASSESSMENT — PAIN SCALES - GENERAL
PAINLEVEL_OUTOF10: 8
PAINLEVEL_OUTOF10: 1

## 2025-04-30 ASSESSMENT — PAIN DESCRIPTION - ONSET: ONSET: OTHER (COMMENT)

## 2025-04-30 NOTE — ANESTHESIA POSTPROCEDURE EVALUATION
Department of Anesthesiology  Postprocedure Note    Patient: Nneka Araujo  MRN: [id number]  YOB: 1980  Date of evaluation: 4/30/2025    Procedure Summary       Date: 04/30/25 Room / Location: [facility] ASU OR  / [facility] AMBULATORY OR    Anesthesia Start: 0942 Anesthesia Stop: 1046    Procedure: EXCISION BILATERAL BREAST CYST (Bilateral: Breast) Diagnosis:       Bilateral breast cysts      (Bilateral breast cysts [N60.01, N60.02])    Surgeons: Yuliana Bee MD Responsible Provider: Lc Patel MD    Anesthesia Type: general ASA Status: 3            Anesthesia Type: No value filed.    Florencio Phase I: Florencio Score: 10    Florencio Phase II: Florencio Score: 10    Anesthesia Post Evaluation    Patient location during evaluation: PACU  Patient participation: complete - patient participated  Level of consciousness: awake  Airway patency: patent  Nausea & Vomiting: no vomiting and no nausea  Cardiovascular status: hemodynamically stable  Respiratory status: acceptable  Hydration status: stable  Pain management: adequate    No notable events documented.

## 2025-04-30 NOTE — ANESTHESIA PRE PROCEDURE
04/30/25 (!) 141/81   04/27/25 128/82   04/23/25 131/72       NPO Status: Time of last liquid consumption: 1900                        Time of last solid consumption: 1900                        Date of last liquid consumption: 04/29/25                        Date of last solid food consumption: 04/29/25    BMI:   Wt Readings from Last 3 Encounters:   04/30/25 81.1 kg (178 lb 12.7 oz)   04/27/25 79.8 kg (176 lb)   04/23/25 79.6 kg (175 lb 7.8 oz)     Body mass index is 31.67 kg/m².    CBC:   Lab Results   Component Value Date/Time    WBC 6.5 09/18/2024 02:54 PM    RBC 4.10 09/18/2024 02:54 PM    HGB 11.1 09/18/2024 02:54 PM    HCT 36.2 09/18/2024 02:54 PM    MCV 88.3 09/18/2024 02:54 PM    RDW 12.7 09/18/2024 02:54 PM     09/18/2024 02:54 PM       CMP:   Lab Results   Component Value Date/Time     03/20/2025 01:54 PM    K 3.9 03/20/2025 01:54 PM     03/20/2025 01:54 PM    CO2 25 03/20/2025 01:54 PM    BUN 12 03/20/2025 01:54 PM    CREATININE 0.77 03/20/2025 01:54 PM    GFRAA >60 06/29/2022 02:57 PM    AGRATIO 1.0 06/29/2022 02:57 PM    LABGLOM >90 03/20/2025 01:54 PM    LABGLOM >60 11/29/2023 11:38 AM    GLUCOSE 93 03/20/2025 01:54 PM    CALCIUM 9.7 03/20/2025 01:54 PM    BILITOT 0.4 09/18/2024 02:54 PM    ALKPHOS 102 09/18/2024 02:54 PM    ALKPHOS 100 06/29/2022 02:57 PM    AST 12 09/18/2024 02:54 PM    ALT 13 09/18/2024 02:54 PM       POC Tests: No results for input(s): \"POCGLU\", \"POCNA\", \"POCK\", \"POCCL\", \"POCBUN\", \"POCHEMO\", \"POCHCT\" in the last 72 hours.    Coags: No results found for: \"PROTIME\", \"INR\", \"APTT\"    HCG (If Applicable):   Lab Results   Component Value Date    PREGTESTUR Negative 04/30/2025    HCGQUANT <1 09/06/2024        ABGs: No results found for: \"PHART\", \"PO2ART\", \"MRV2ARX\", \"HZX8VGX\", \"BEART\", \"Q4NRXSAJ\"     Type & Screen (If Applicable):  No results found for: \"LABABO\"    Drug/Infectious Status (If Applicable):  Lab Results   Component Value Date/Time    HEPCAB EQUIVOCAL

## 2025-04-30 NOTE — OP NOTE
Operative Note  [facility]  [address] [address] [facility], [address]       Patient: Nneka Araujo  YOB: 1980  MRN: [id number]    Date of Procedure: 4/30/2025    Pre-Op Diagnosis Codes:      * Bilateral breast cysts [N60.01, N60.02]    Post-Op Diagnosis: Same       Procedure(s):  EXCISION BILATERAL BREAST CYST    Surgeon(s):  Yuliana Bee MD    Assistant:   Surgical Assistant: Claudine Prince    Anesthesia: Monitor Anesthesia Care    Estimated Blood Loss (mL): Minimal    Complications: None    Specimens:   ID Type Source Tests Collected by Time Destination   1 : Left Breast Cyst (TAGS: Double Short = Posterior; Double Long = Lateral) Tissue Breast SURGICAL PATHOLOGY Yuliana Bee MD 4/30/2025 1005    2 : Left Breast Cyst  Posterior Margin (TAGS: Double Short = Superior; Double Long = Lateral) Tissue Breast SURGICAL PATHOLOGY Yuliana Bee MD 4/30/2025 1010        Implants:  * No implants in log *      Drains: * No LDAs found *    Findings:  Infection Present At Time Of Surgery (PATOS) (choose all levels that have infection present):  No infection present  Other Findings: chronic recurrent LEFT breast cyst excised.  RIGHT breast cyst aspirated  This procedure was not performed to treat cancer       Detailed Description of Procedure:   Pt was brought into the operating room and placed on the table in a supine position.  She was sedated with IV sedation.  The chest was was prepped and draped in the usual sterile fashion.  O.5% marcaine was used for local anesthesia and post op pain relief.  The LEFT breast cyst at 6:00 was palpable and was identified with ultrasound.  A 2cm periareolar incision was made.  The entire cyst wall was excised with sharp dissection and electrocautery.    A hard mass was palpated deep to the cyst wall, and a second specimen, 'posterior margin' was excised.   Specimens were oriented with sutures, and sent to pathology.  Hemostasis was  controlled with electrocautery.  The RIGHT breast cyst was small.  It was aspirated, 1cc clear fluid.  The LEFT breast tissue was re-approximated with 3-0 vicryl sutures.  The dermis was closed with interrupted 3-0 vicryl sutures and the skin was closed with running 4-0 monocryl suture.  The wound was dressed with steristrips.  The patient was awakened and taken to the recovery room.   Sponge, needle and instrument counts were reported to be correct.      Electronically signed by Yuliana Bee MD on 4/30/2025 at 10:31 AM

## 2025-04-30 NOTE — H&P
Nneka Araujo is a 45 y.o. female      HPI ESTABLISHED Patient here for bilateral breast cysts.  Both have been drained in the past.LEFT breast pain x 1 week, dull ache with possible mass, and RIGHT breast intermittent pain x 1 week.       Family history-  3 maternal aunts had breast cancer, all in their 50's. 1 , 2 living.  2018 - lifetime risk calculated by Dr. Alvarez - 20.4%.     Mammogram Result (most recent):  [facility] DIGITAL SCREEN BILATERAL 2024     Narrative  STUDY: Bilateral digital screening mammogram with 3-D tomosynthesis     INDICATION:  Screening.     COMPARISON: , [id number]     BREAST COMPOSITION: The breasts are heterogeneously dense, which may obscure  small masses.     FINDINGS: Bilateral digital screening mammography was performed and is  interpreted in conjunction with a computer assisted detection (CAD) system.  Additionally, tomosynthesis of both breasts in the CC and MLO projections was  performed. The patient has a background multiple bilateral oval equal density  circumscribed masses which have waxed and waned. The dominant mass in the left  central to inferior breast has significantly increased since , measuring up  to 10 cm.     Impression  BI-RADS 0: Incomplete. Needs additional imaging evaluation. Dominant left breast  mass, significantly increased since .     RECOMMENDATIONS:  Left breast ultrasound     The patient will be notified of these results.     Breast imaging-   MRI Result (most recent):  MRI BREAST BILATERAL W WO CONTRAST 2024     Narrative  INDICATION: High risk screening.     COMPARISON: 2024 screening tomography.     TECHNIQUE:  Multisequence, multiplanar, bilateral breast MRI was performed in prone position  using a dedicated breast coil. Images were obtained without contrast and dynamic  postcontrast images were obtained in multiple phases. 15 mL IV gadoteridol  (ProHance) was administered. Subtraction images were reconstructed.

## 2025-04-30 NOTE — DISCHARGE INSTRUCTIONS
DISCHARGE INSTRUCTIONS FROM DR CELIA BEE    Activity:  No driving for 24 hours.  Tomorrow you may resume normal activities as tolerated.  No restrictions.  Wound care:  Okay to shower/bathe.  Remove steristrips in one week.  Swelling and bruising are normal.  Pain control: Ice pack to the breast 20 minutes/hour for the next few hours.  You may use a heating pad tomorrow if needed.  Take acetaminophen (Tylenol) or ibuprofen (Motrin, Advil) as needed.  If pain is not controlled with over-the-counter medication call me for a narcotic prescription.    Follow up:  I will call with results within one week.    If you have swelling and pain next week you should make an appointment to see me in the office.  Call [phone] to make the appointment.  Problems/questions:  Call me on my cell phone.  Dr Celia Bee.  [phone]      DISCHARGE SUMMARY from your Nurse      PATIENT INSTRUCTIONS    After general anesthesia or intravenous sedation, for 24 hours or while taking prescription Narcotics:  Limit your activities  Do not drive and operate hazardous machinery  Do not make important personal or business decisions  Do  not drink alcoholic beverages  If you have not urinated within 8 hours after discharge, please contact your surgeon on call.    Report the following to your surgeon:  Excessive pain, swelling, redness or odor of or around the surgical area  Temperature over 100.5  Nausea and vomiting lasting longer than 4 hours or if unable to take medications  Any signs of decreased circulation or nerve impairment to extremity: change in color, persistent  numbness, tingling, coldness or increase pain  Any questions      GOOD HELP TO FIGHT AN INFECTION  Here are a few tip to help reduce the chance of getting an infection after surgery:  Wash Your Hands  Good handwashing is the most important thing you and your caregiver can do.  Wash before and after caring for any wounds.  Dry your hand with a clean towel.  Wash  with soap and water for at least 20 seconds. A TIP: sing the \"Happy Birthday\" song through one time while washing to help with the timing.  Use a hand  in between washings.    Shower  When your surgeon says it is OK to take a shower, use a new bar of antibacterial soap (if that is what you use, and keep that bar of soap ONLY for your use), or antibacterial body wash.  Use a clean wash cloth or sponge when you bathe.  Dry off with a clean towel  after every bath - be careful around any wounds, skin staples, sutures or surgical glue over/on wounds.  Do not enter swimming pools, hot tubs, lakes, rivers and/or ocean until wounds are healed and your doctor/surgeon says it is OK.    Use Clean Sheets  Sleep on freshly laundered sheets after your surgery.  Keep the surgery site covered with a clean, dry bandage (if instructed to do so).  If the bandage becomes soiled, reapply a new, dry, clean bandage.   Do not allow pets to sleep with you while your wound is healing.    Lifestyle Modification and Controlling Your Blood Sugar  Smoking slows wound healing.  Stop smoking and limit exposure to second-hand smoke.  High blood sugar slows wound healing.  Eat a well-balanced diet to provide proper nutrition while healing  Monitor your blood sugar (if you are a diabetic) and take your medications as you are suppose to so you can control you blood sugar after surgery.      COUGH AND DEEP BREATHE    Breathing deeply and coughing are very important exercises to do after surgery.  Deep breathing and coughing open the little air tubes and air sacks in your lungs.       You take deep breaths every day.  You may not even notice - it is just something you do when you sigh or yawn.  It is a natural exercise you do to keep these air passages open.      After surgery, take deep breaths and cough, on purpose.    DIRECTIONS:  Take 10 to 15 slow deep breaths every hour while awake.  Breathe in deeply, and hold it for 2 seconds.  Exhale

## 2025-05-02 ENCOUNTER — OFFICE VISIT (OUTPATIENT)
Age: 45
End: 2025-05-02

## 2025-05-02 VITALS — WEIGHT: 178 LBS | BODY MASS INDEX: 31.54 KG/M2 | HEIGHT: 63 IN

## 2025-05-02 DIAGNOSIS — Z09 SURGERY FOLLOW-UP: Primary | ICD-10-CM

## 2025-05-02 PROCEDURE — 99024 POSTOP FOLLOW-UP VISIT: CPT | Performed by: SURGERY

## 2025-05-02 NOTE — PROGRESS NOTES
HISTORY OF PRESENT ILLNESS  Nneka Araujo is a 45 y.o. female     HPI ESTABLISHED patient is POD #2 s/p LEFT breast excisional biopsy, called this morning saying the LEFT breast  \"feels heavy and is warm to touch.\"   Also reports firmness and swelling.       Review of Systems       Physical Exam  Chest:   Breasts:     Left: Skin change (incision healing well, minimal ecchymosis) present. No swelling or tenderness.            No seroma    ASSESSMENT and PLAN   Diagnosis Orders   1. Surgery follow-up          No seroma  Normal post op swelling  Pt reassured.

## 2025-05-07 ENCOUNTER — TELEPHONE (OUTPATIENT)
Age: 45
End: 2025-05-07

## 2025-05-07 ENCOUNTER — OFFICE VISIT (OUTPATIENT)
Age: 45
End: 2025-05-07
Payer: COMMERCIAL

## 2025-05-07 VITALS
RESPIRATION RATE: 18 BRPM | WEIGHT: 176 LBS | DIASTOLIC BLOOD PRESSURE: 83 MMHG | HEART RATE: 83 BPM | TEMPERATURE: 97.4 F | OXYGEN SATURATION: 99 % | HEIGHT: 63 IN | BODY MASS INDEX: 31.18 KG/M2 | SYSTOLIC BLOOD PRESSURE: 123 MMHG

## 2025-05-07 DIAGNOSIS — E53.8 B12 DEFICIENCY: ICD-10-CM

## 2025-05-07 DIAGNOSIS — R82.90 ABNORMAL URINE ODOR: Primary | ICD-10-CM

## 2025-05-07 DIAGNOSIS — N89.8 VAGINAL DISCHARGE: ICD-10-CM

## 2025-05-07 PROCEDURE — 99213 OFFICE O/P EST LOW 20 MIN: CPT | Performed by: FAMILY MEDICINE

## 2025-05-07 PROCEDURE — 96372 THER/PROPH/DIAG INJ SC/IM: CPT | Performed by: FAMILY MEDICINE

## 2025-05-07 RX ORDER — METHOCARBAMOL 750 MG/1
750 TABLET, FILM COATED ORAL PRN
COMMUNITY

## 2025-05-07 RX ORDER — CYANOCOBALAMIN 1000 UG/ML
1000 INJECTION, SOLUTION INTRAMUSCULAR; SUBCUTANEOUS ONCE
Status: COMPLETED | OUTPATIENT
Start: 2025-05-07 | End: 2025-05-07

## 2025-05-07 RX ADMIN — CYANOCOBALAMIN 1000 MCG: 1000 INJECTION, SOLUTION INTRAMUSCULAR; SUBCUTANEOUS at 14:52

## 2025-05-07 ASSESSMENT — PATIENT HEALTH QUESTIONNAIRE - PHQ9
SUM OF ALL RESPONSES TO PHQ QUESTIONS 1-9: 0
2. FEELING DOWN, DEPRESSED OR HOPELESS: NOT AT ALL
SUM OF ALL RESPONSES TO PHQ QUESTIONS 1-9: 0
1. LITTLE INTEREST OR PLEASURE IN DOING THINGS: NOT AT ALL

## 2025-05-07 NOTE — PATIENT INSTRUCTIONS
fluttering in your chest, increased thirst or urination, numbness or tingling, muscle weakness or limp feeling.  Common side effects may include:  diarrhea; or  swelling anywhere in your body.  This is not a complete list of side effects and others may occur. Call your doctor for medical advice about side effects. You may report side effects to FDA at 5-334-HIH-3229.  What other drugs will affect cyanocobalamin injection?  Other drugs may affect cyanocobalamin, including prescription and over-the-counter medicines, vitamins, and herbal products. Tell your doctor about all your current medicines and any medicine you start or stop using.  Where can I get more information?  Your pharmacist can provide more information about cyanocobalamin injection.  Remember, keep this and all other medicines out of the reach of children, never share your medicines with others, and use this medication only for the indication prescribed.   Every effort has been made to ensure that the information provided by [employer]') is accurate, up-to-date, and complete, but no guarantee is made to that effect. Drug information contained herein may be time sensitive. [employer] information has been compiled for use by healthcare practitioners and consumers in the [place] and therefore [employer] does not warrant that uses outside of the [place] are appropriate, unless specifically indicated otherwise. [employer] drug information does not endorse drugs, diagnose patients or recommend therapy. [employer] drug information is an informational resource designed to assist licensed healthcare practitioners in caring for their patients and/or to serve consumers viewing this service as a supplement to, and not a substitute for, the expertise, skill, knowledge and judgment of healthcare practitioners. The absence of a warning for a given drug or drug combination in no way should be construed to indicate that the drug or drug combination is

## 2025-05-07 NOTE — PROGRESS NOTES
Chief Complaint   Patient presents with    Injections     B-12 injection    Discuss Medications    Urinary Tract Infection       \"Have you been to the ER, urgent care clinic since your last visit?  Hospitalized since your last visit?\"    NO    “Have you seen or consulted any other health care providers outside of [facility] since your last visit?”    NO     “Have you had a pap smear?”    NO    Date of last Cervical Cancer screen (HPV or PAP): 3/12/2019         “Have you had a colorectal cancer screening such as a colonoscopy/FIT/Cologuard?    NO    No colonoscopy on file  No cologuard on file  No FIT/FOBT on file   No flexible sigmoidoscopy on file     Click Here for Release of Records Request     No results found for this visit on 25.   Vitals:    25 1419   BP: 123/83   Pulse: 83   Resp: 18   Temp: 97.4 °F (36.3 °C)   TempSrc: Temporal   SpO2: 99%   Weight: 79.8 kg (176 lb)   Height: 1.6 m (5' 3\")        The patient, Nneka Araujo, identity was verified by name and .

## 2025-05-07 NOTE — TELEPHONE ENCOUNTER
FINAL PATHOLOGIC DIAGNOSIS     1. Breast, left, cyst, resection:        Compatible with benign simple cyst.        Fibroadenomatoid changes, usual ductal hyperplasia, columnar cell   change,   and cystic papillary apocrine metaplasia.   No malignancy identified.          2. Breast, left, cyst posterior margin, resection:        Features suggestive of duct ectasia with cyst formation.        Background benign fibrocystic changes with apocrine metaplasia and   usual ductal hyperplasia.        No malignancy identified.     POD#7  Path is benign  Pt is doing well  She will remove the steristrips  PRN follow up

## 2025-05-07 NOTE — PROGRESS NOTES
Nneka Araujo (:  1980) is a 45 y.o. female,Established patient, here for evaluation of the following chief complaint(s):  Injections (B-12 injection), Discuss Medications, and Urinary Tract Infection    Urinary Frequency   with vaginal discharge and foul odor  current history is provided by the patient, stating that this is a new but recurrent problem. The current episode started more than 1 week ago. no burning. 0/10. There has been no fever. is not sexually active. There is no history of pyelonephritis. Associated symptoms include frequency, hesitancy and urgency. Pertinent negatives include no chills, no sweats, no nausea, no vomiting, no discharge, no flank pain, , no abdominal pain and no back pain. has not tried acetaminophen and NSAIDs for the symptoms.      ROS:    Constitutional: no fever, nl energy levels, nl sleep patterns, nl appetite, and nl weight fluctuations,  - exercise habits,  nad     HENT: no ear pain or nosebleeds. No blurred vision  Respiratory: no shortness of breath, wheezing cough   Cardiovascular: Has no chest pain, ,and racing heart .   Gastrointestinal: No constipation, diarrhea, nausea and vomiting.   Genitourinary: No frequency.   Musculoskeletal: Negative for joint pain.   Skin: no itching, no rash.   Neurological: Negative for dizziness, no tremors  Psychiatric/Behavioral: no for depression  no nervous/anxious, nl stress levels, and overall emotional well-being .      PE:    Constitutional: Well developed, well nourished.  non-toxic in appearance, not diaphoretic.   HEENT: PERRL. EOMI. The left TM is unremarkable. The right TM is unremarkable. No nasal  erythema noted.  THROAT: Posterior pharynx has no erythema, no exudates.    Neck:  no cervical lymphadenopathy. Neck is supple   Cardiovascular: Regular rate and rhythm, no murmurs, rubs, or gallops.   Pulmonary: Clear to auscultation bilaterally. Has no wheezing, rales or rhonchi.,  speaking in full sentences, has no

## 2025-05-08 LAB
APPEARANCE UR: CLEAR
BACTERIA URNS QL MICRO: ABNORMAL /HPF
BILIRUB UR QL: NEGATIVE
COLOR UR: ABNORMAL
EPITH CASTS URNS QL MICRO: ABNORMAL /LPF
GLUCOSE UR STRIP.AUTO-MCNC: NEGATIVE MG/DL
HGB UR QL STRIP: NEGATIVE
HYALINE CASTS URNS QL MICRO: ABNORMAL /LPF (ref 0–5)
KETONES UR QL STRIP.AUTO: NEGATIVE MG/DL
LEUKOCYTE ESTERASE UR QL STRIP.AUTO: ABNORMAL
NITRITE UR QL STRIP.AUTO: POSITIVE
PH UR STRIP: 5.5 (ref 5–8)
PROT UR STRIP-MCNC: NEGATIVE MG/DL
RBC #/AREA URNS HPF: ABNORMAL /HPF (ref 0–5)
SP GR UR REFRACTOMETRY: 1.02 (ref 1–1.03)
UROBILINOGEN UR QL STRIP.AUTO: 1 EU/DL (ref 0.2–1)
WBC URNS QL MICRO: ABNORMAL /HPF (ref 0–4)

## 2025-05-10 LAB
BACTERIA SPEC CULT: ABNORMAL
CC UR VC: ABNORMAL
SERVICE CMNT-IMP: ABNORMAL

## 2025-05-11 LAB
A VAGINAE DNA VAG QL NAA+PROBE: NORMAL SCORE
BVAB2 DNA VAG QL NAA+PROBE: NORMAL SCORE
C ALBICANS DNA VAG QL NAA+PROBE: NEGATIVE
C GLABRATA DNA VAG QL NAA+PROBE: NEGATIVE
C TRACH RRNA SPEC QL NAA+PROBE: NEGATIVE
MEGA1 DNA VAG QL NAA+PROBE: NORMAL SCORE
N GONORRHOEA RRNA SPEC QL NAA+PROBE: NEGATIVE
SPECIMEN SOURCE: NORMAL
T VAGINALIS RRNA SPEC QL NAA+PROBE: NEGATIVE

## 2025-05-12 ENCOUNTER — RESULTS FOLLOW-UP (OUTPATIENT)
Age: 45
End: 2025-05-12

## 2025-05-12 RX ORDER — NITROFURANTOIN MACROCRYSTALS 100 MG/1
100 CAPSULE ORAL 2 TIMES DAILY
Qty: 14 CAPSULE | Refills: 0 | Status: SHIPPED | OUTPATIENT
Start: 2025-05-12 | End: 2025-05-19

## 2025-05-14 ENCOUNTER — TELEMEDICINE (OUTPATIENT)
Age: 45
End: 2025-05-14
Payer: COMMERCIAL

## 2025-05-14 VITALS — HEIGHT: 63 IN | WEIGHT: 173 LBS | BODY MASS INDEX: 30.65 KG/M2

## 2025-05-14 DIAGNOSIS — E55.9 VITAMIN D DEFICIENCY: ICD-10-CM

## 2025-05-14 DIAGNOSIS — E53.8 VITAMIN B12 DEFICIENCY: ICD-10-CM

## 2025-05-14 DIAGNOSIS — Z90.3 HISTORY OF SLEEVE GASTRECTOMY: ICD-10-CM

## 2025-05-14 DIAGNOSIS — E66.01 MORBID OBESITY (HCC): Primary | ICD-10-CM

## 2025-05-14 DIAGNOSIS — Z00.00 ENCOUNTER FOR PREVENTIVE CARE: ICD-10-CM

## 2025-05-14 DIAGNOSIS — D50.8 IRON DEFICIENCY ANEMIA SECONDARY TO INADEQUATE DIETARY IRON INTAKE: ICD-10-CM

## 2025-05-14 PROCEDURE — 99213 OFFICE O/P EST LOW 20 MIN: CPT | Performed by: NURSE PRACTITIONER

## 2025-05-14 ASSESSMENT — PATIENT HEALTH QUESTIONNAIRE - PHQ9
1. LITTLE INTEREST OR PLEASURE IN DOING THINGS: NOT AT ALL
2. FEELING DOWN, DEPRESSED OR HOPELESS: NOT AT ALL
SUM OF ALL RESPONSES TO PHQ QUESTIONS 1-9: 0

## 2025-05-14 ASSESSMENT — ENCOUNTER SYMPTOMS
CHEST TIGHTNESS: 0
VOMITING: 0
NAUSEA: 0
ABDOMINAL PAIN: 0
SHORTNESS OF BREATH: 0

## 2025-05-14 ASSESSMENT — PAIN SCALES - GENERAL: PAINLEVEL_OUTOF10: 0

## 2025-05-14 NOTE — PROGRESS NOTES
Identified patient with two patient identifiers (name and ). Reviewed chart in preparation for visit and have obtained necessary documentation.    Nneka Araujo is a 45 y.o. female  Chief Complaint   Patient presents with    Weight Management     1 year 11 months post Laparoscopic sleeve gastrectomy with esophagogastroduodenoscopy and paraesophageal hernia repair.     Ht 1.6 m (5' 3\")   Wt 78.5 kg (173 lb)   LMP 2025   BMI 30.65 kg/m²     1. Have you been to the ER, urgent care clinic since your last visit?  Hospitalized since your last visit?no    2. Have you seen or consulted any other health care providers outside of the [facility] since your last visit?  Include any pap smears or colon screening. no

## 2025-05-14 NOTE — PROGRESS NOTES
Nneka Araujo (:  1980) is a 45 y.o. female,Established patient, here for evaluation of the following chief complaint(s):  Weight Management        SUBJECTIVE/OBJECTIVE:    HPI:  Nneka Araujo is a 45 y.o. female with previous Sleeve gastrectomy surgery on 2 years.  . She has lost a total of 44 pounds since surgery. She  has gained 9 lbs since the last ov.  Body mass index is 30.65 kg/m².. no nausea and no vomiting . Some Acid reflux/heartburn with certain foods. . Drinking  32 ounces of water daily. She states that she had started slacking on her water intake.  Needs to refocus protein intake daily. She has been eating more carbs. . + BM's. Pt is going to the gym 2 times a week for exercise. She wants to get back on track.   Dietary recall -    Breakfast- yogurt and cheerios  Lunch-fast foods.   Dinner- may skip and just snack    She is  snacking between meals; chips.      Vitamins:  MVI : yes  Calcium : yes  B-Vit 12: yes  Vit D: Yes          Ms. Araujo has a reminder for a \"due or due soon\" health maintenance. I have asked that she contact her primary care provider for follow-up on this health maintenance.        COMORBIDITY     SLEEP APNEA                 YES        GERD  (req.meds)            YES  HYPERLIPIDEMIA            NO  HYPERTENSION              NO         DIABETES                         NO           Current Outpatient Medications:     nitrofurantoin (MACRODANTIN) 100 MG capsule, Take 1 capsule by mouth in the morning and at bedtime for 7 days, Disp: 14 capsule, Rfl: 0    methocarbamol (ROBAXIN) 750 MG tablet, Take 1 tablet by mouth as needed, Disp: , Rfl:     albuterol sulfate HFA (VENTOLIN HFA) 108 (90 Base) MCG/ACT inhaler, Inhale 2 puffs into the lungs 4 times daily as needed for Wheezing, Disp: 18 g, Rfl: 0    cetirizine (ZYRTEC) 10 MG tablet, Take 1 tablet by mouth daily, Disp: 30 tablet, Rfl: 0    esomeprazole (NEXIUM) 40 MG delayed release capsule, Take 1 capsule by mouth every

## 2025-05-20 ENCOUNTER — OFFICE VISIT (OUTPATIENT)
Age: 45
End: 2025-05-20

## 2025-05-20 VITALS — BODY MASS INDEX: 30.65 KG/M2 | HEIGHT: 63 IN | WEIGHT: 173 LBS

## 2025-05-20 DIAGNOSIS — Z09 SURGERY FOLLOW-UP: Primary | ICD-10-CM

## 2025-05-20 PROCEDURE — 99024 POSTOP FOLLOW-UP VISIT: CPT | Performed by: SURGERY

## 2025-05-20 NOTE — PROGRESS NOTES
HISTORY OF PRESENT ILLNESS  Nneka Araujo is a 45 y.o. female     HPI ESTABLISHED patient 3 weeks s/p LEFT breast excisional biopsy.  Some swelling.       Review of Systems      Physical Exam  Chest:   Breasts:     Right: No swelling, mass, skin change or tenderness.      Left: No swelling, mass (dense under scar), skin change or tenderness.       Lymphadenopathy:      Upper Body:      Right upper body: No axillary adenopathy.      Left upper body: No axillary adenopathy.        BREAST ULTRASOUND  Indication: LEFT breast dense at biopsy site  Technique:  The LEFT breast was using a high-frequency linear-array near-field transducer  Findings:   Inflammation at biopsy site.  No seroma.  Impression: Normal breast tissue      ASSESSMENT and PLAN   Diagnosis Orders   1. Surgery follow-up          Scar tissue/inflammation  No seroma  Should resolve in 3-4 weeks

## 2025-05-28 NOTE — PROGRESS NOTES
Verbal Order  give with Readback for b12 1000 mcg/ml per Jamie Srivastava MD   Itching    Bactrim [Sulfamethoxazole-Trimethoprim] Rash     unknown    Buspar [Buspirone] Rash     itching    Pcn [Penicillins] Rash    Vancomycin Rash       Review of Systems:  No new weakness, paresthesia, incontinence of bowel or bladder, saddle anesthesia, falls or gait dysfunction. Otherwise, per HPI.     Physical Exam:   Blood pressure (!) 145/90, height 1.676 m (5' 6\"), weight 100.2 kg (221 lb), not currently breastfeeding.  GENERAL: The patient is in no apparent distress.   HEENT: No rhinorrhea, sneezing. No scleral icterus or conjunctival injection.   SKIN: No piloerection. No rash.    PSYCH: Mood and affect are appropriate. Hygiene is appropriate.  CARDIOVASCULAR  Heart is regular rate and rhythm.  There is no edema.   RESPIRATORY: Respirations are regular and unlabored.  There is no cyanosis.   GASTROINTESTINAL: Nondistended    MUSCULOSKELETAL  Cervical Exam (last tested 10/23/24)  Inspection: Shoulder girdles were symmetric. The cervical lordotic curvature was decreased.   Palpatory exam: revealed mild tenderness along bilateral cervical paraspinals , no tenderness along spinous processes, mild tenderness of bilateral trapezius muscle. There were + trigger points.   ROM: ROM revealed  normal flexion, extension, left and right side bending  Special/Provocative tests:   Spurling's maneuver was negative.   +Tinel's at elbow on right    Lumbar  Inspection: Pelvis was symmetric. Lumbar lordotic curvature was within normal limits. There was no scoliosis.  No ecchymoses or erythema.   Palpation: Palpatory exam revealed mild tenderness along lumbosacral paraspinals, no TTP along midline spine, +TTP along b/l SI joint sulcus, +TTP greater trochanters and TFL on bilateral side. There were no paraspinal spasms.  +taut muscle bands bilateral lower lumbar paraspinals  ROM: ROM revealed painful flexion, extension, left and right side bending and rotation    Special/provocative testing:   Supine SLR negative   Seating  No 15

## 2025-08-12 ENCOUNTER — APPOINTMENT (OUTPATIENT)
Facility: HOSPITAL | Age: 45
End: 2025-08-12
Payer: COMMERCIAL

## 2025-08-12 ENCOUNTER — HOSPITAL ENCOUNTER (EMERGENCY)
Facility: HOSPITAL | Age: 45
Discharge: HOME | End: 2025-08-13
Payer: COMMERCIAL

## 2025-08-12 DIAGNOSIS — D25.9 UTERINE LEIOMYOMA, UNSPECIFIED LOCATION: ICD-10-CM

## 2025-08-12 DIAGNOSIS — R10.9 ABDOMINAL PAIN, UNSPECIFIED ABDOMINAL LOCATION: ICD-10-CM

## 2025-08-12 DIAGNOSIS — K52.9 ENTERITIS: Primary | ICD-10-CM

## 2025-08-12 DIAGNOSIS — N20.0 RENAL CALCULI: ICD-10-CM

## 2025-08-12 LAB
ALBUMIN SERPL-MCNC: 3.7 G/DL (ref 3.5–5.2)
ALBUMIN/GLOB SERPL: 1.1 (ref 1.1–2.2)
ALP SERPL-CCNC: NORMAL U/L (ref 35–104)
ALT SERPL-CCNC: NORMAL U/L (ref 10–35)
ANION GAP SERPL CALC-SCNC: 11 MMOL/L (ref 2–14)
AST SERPL-CCNC: NORMAL U/L (ref 10–35)
BASOPHILS # BLD: 0.03 K/UL (ref 0–0.1)
BASOPHILS NFR BLD: 0.4 % (ref 0–1)
BILIRUB SERPL-MCNC: 0.5 MG/DL (ref 0–1.2)
BUN SERPL-MCNC: 12 MG/DL (ref 6–20)
BUN/CREAT SERPL: 17 (ref 12–20)
CALCIUM SERPL-MCNC: 9.8 MG/DL (ref 8.6–10)
CHLORIDE SERPL-SCNC: 101 MMOL/L (ref 98–107)
CO2 SERPL-SCNC: 24 MMOL/L (ref 20–29)
CREAT SERPL-MCNC: 0.71 MG/DL (ref 0.6–1)
DIFFERENTIAL METHOD BLD: NORMAL
EOSINOPHIL # BLD: 0.21 K/UL (ref 0–0.4)
EOSINOPHIL NFR BLD: 2.6 % (ref 0–7)
ERYTHROCYTE [DISTWIDTH] IN BLOOD BY AUTOMATED COUNT: 12 % (ref 11.5–14.5)
GLOBULIN SER CALC-MCNC: 3.3 G/DL (ref 2–4)
GLUCOSE SERPL-MCNC: 95 MG/DL (ref 65–100)
HCG UR QL: NEGATIVE
HCT VFR BLD AUTO: 37.7 % (ref 35–47)
HGB BLD-MCNC: 11.9 G/DL (ref 11.5–16)
IMM GRANULOCYTES # BLD AUTO: 0.03 K/UL (ref 0–0.04)
IMM GRANULOCYTES NFR BLD AUTO: 0.4 % (ref 0–0.5)
LIPASE SERPL-CCNC: 38 U/L (ref 13–60)
LYMPHOCYTES # BLD: 2.66 K/UL (ref 0.8–3.5)
LYMPHOCYTES NFR BLD: 33.2 % (ref 12–49)
MCH RBC QN AUTO: 27.4 PG (ref 26–34)
MCHC RBC AUTO-ENTMCNC: 31.6 G/DL (ref 30–36.5)
MCV RBC AUTO: 86.9 FL (ref 80–99)
MONOCYTES # BLD: 0.53 K/UL (ref 0–1)
MONOCYTES NFR BLD: 6.6 % (ref 5–13)
NEUTS SEG # BLD: 4.55 K/UL (ref 1.8–8)
NEUTS SEG NFR BLD: 56.8 % (ref 32–75)
NRBC # BLD: 0 K/UL (ref 0–0.01)
NRBC BLD-RTO: 0 PER 100 WBC
PLATELET # BLD AUTO: 262 K/UL (ref 150–400)
POTASSIUM SERPL-SCNC: NORMAL MMOL/L (ref 3.5–5.1)
PROT SERPL-MCNC: 7 G/DL (ref 6.4–8.3)
RBC # BLD AUTO: 4.34 M/UL (ref 3.8–5.2)
SODIUM SERPL-SCNC: 136 MMOL/L (ref 136–145)
WBC # BLD AUTO: 8 K/UL (ref 3.6–11)

## 2025-08-12 PROCEDURE — 81025 URINE PREGNANCY TEST: CPT

## 2025-08-12 PROCEDURE — 80053 COMPREHEN METABOLIC PANEL: CPT

## 2025-08-12 PROCEDURE — 99285 EMERGENCY DEPT VISIT HI MDM: CPT

## 2025-08-12 PROCEDURE — 83690 ASSAY OF LIPASE: CPT

## 2025-08-12 PROCEDURE — 85025 COMPLETE CBC W/AUTO DIFF WBC: CPT

## 2025-08-12 PROCEDURE — 81001 URINALYSIS AUTO W/SCOPE: CPT

## 2025-08-12 PROCEDURE — 36415 COLL VENOUS BLD VENIPUNCTURE: CPT

## 2025-08-12 RX ORDER — IOPAMIDOL 755 MG/ML
100 INJECTION, SOLUTION INTRAVASCULAR
Status: COMPLETED | OUTPATIENT
Start: 2025-08-12 | End: 2025-08-13

## 2025-08-12 RX ORDER — DICYCLOMINE HCL 20 MG
20 TABLET ORAL
Status: COMPLETED | OUTPATIENT
Start: 2025-08-12 | End: 2025-08-13

## 2025-08-12 ASSESSMENT — PAIN SCALES - GENERAL
PAINLEVEL_OUTOF10: 6
PAINLEVEL_OUTOF10: 7

## 2025-08-12 ASSESSMENT — PAIN DESCRIPTION - PAIN TYPE: TYPE: ACUTE PAIN

## 2025-08-12 ASSESSMENT — PAIN DESCRIPTION - ORIENTATION
ORIENTATION: MID;LOWER
ORIENTATION: MID

## 2025-08-12 ASSESSMENT — LIFESTYLE VARIABLES
HOW MANY STANDARD DRINKS CONTAINING ALCOHOL DO YOU HAVE ON A TYPICAL DAY: 1 OR 2
HOW OFTEN DO YOU HAVE A DRINK CONTAINING ALCOHOL: 2-3 TIMES A WEEK

## 2025-08-12 ASSESSMENT — PAIN DESCRIPTION - LOCATION: LOCATION: ABDOMEN

## 2025-08-12 ASSESSMENT — PAIN DESCRIPTION - DESCRIPTORS
DESCRIPTORS: ACHING
DESCRIPTORS: ACHING;CRAMPING

## 2025-08-12 ASSESSMENT — PAIN - FUNCTIONAL ASSESSMENT: PAIN_FUNCTIONAL_ASSESSMENT: PREVENTS OR INTERFERES SOME ACTIVE ACTIVITIES AND ADLS

## 2025-08-13 ENCOUNTER — APPOINTMENT (OUTPATIENT)
Facility: HOSPITAL | Age: 45
End: 2025-08-13
Payer: COMMERCIAL

## 2025-08-13 VITALS
DIASTOLIC BLOOD PRESSURE: 75 MMHG | BODY MASS INDEX: 30.98 KG/M2 | SYSTOLIC BLOOD PRESSURE: 132 MMHG | HEIGHT: 63 IN | OXYGEN SATURATION: 97 % | TEMPERATURE: 98.1 F | HEART RATE: 73 BPM | WEIGHT: 174.82 LBS | RESPIRATION RATE: 17 BRPM

## 2025-08-13 LAB
AMORPH CRY URNS QL MICRO: ABNORMAL
APPEARANCE UR: CLEAR
BACTERIA URNS QL MICRO: ABNORMAL /HPF
BILIRUB UR QL: NEGATIVE
COLOR UR: ABNORMAL
EPITH CASTS URNS QL MICRO: ABNORMAL /LPF
GLUCOSE UR STRIP.AUTO-MCNC: NEGATIVE MG/DL
HGB UR QL STRIP: NEGATIVE
KETONES UR QL STRIP.AUTO: 15 MG/DL
LEUKOCYTE ESTERASE UR QL STRIP.AUTO: ABNORMAL
NITRITE UR QL STRIP.AUTO: NEGATIVE
OTHER: ABNORMAL
PH UR STRIP: 6 (ref 5–8)
PROT UR STRIP-MCNC: NEGATIVE MG/DL
RBC #/AREA URNS HPF: ABNORMAL /HPF (ref 0–5)
SP GR UR REFRACTOMETRY: 1
URINE CULTURE IF INDICATED: ABNORMAL
UROBILINOGEN UR QL STRIP.AUTO: 0.2 EU/DL (ref 0.2–1)
WBC URNS QL MICRO: ABNORMAL /HPF (ref 0–4)

## 2025-08-13 PROCEDURE — 74177 CT ABD & PELVIS W/CONTRAST: CPT

## 2025-08-13 PROCEDURE — 6370000000 HC RX 637 (ALT 250 FOR IP)

## 2025-08-13 PROCEDURE — 6360000004 HC RX CONTRAST MEDICATION: Performed by: RADIOLOGY

## 2025-08-13 RX ORDER — DICYCLOMINE HCL 20 MG
20 TABLET ORAL 4 TIMES DAILY PRN
Qty: 20 TABLET | Refills: 0 | Status: SHIPPED | OUTPATIENT
Start: 2025-08-13

## 2025-08-13 RX ORDER — DICYCLOMINE HCL 20 MG
20 TABLET ORAL 4 TIMES DAILY PRN
Qty: 20 TABLET | Refills: 0 | Status: SHIPPED | OUTPATIENT
Start: 2025-08-13 | End: 2025-08-13

## 2025-08-13 RX ADMIN — IOPAMIDOL 100 ML: 755 INJECTION, SOLUTION INTRAVENOUS at 00:12

## 2025-08-13 RX ADMIN — DICYCLOMINE HYDROCHLORIDE 20 MG: 20 TABLET ORAL at 00:44

## 2025-08-13 ASSESSMENT — PAIN SCALES - GENERAL: PAINLEVEL_OUTOF10: 7

## 2025-08-13 ASSESSMENT — PAIN DESCRIPTION - LOCATION: LOCATION: ABDOMEN

## 2025-08-13 ASSESSMENT — PAIN - FUNCTIONAL ASSESSMENT: PAIN_FUNCTIONAL_ASSESSMENT: 0-10

## 2025-08-13 ASSESSMENT — PAIN DESCRIPTION - DESCRIPTORS: DESCRIPTORS: ACHING;CRAMPING

## 2025-08-21 DIAGNOSIS — N89.8 VAGINAL DISCHARGE: ICD-10-CM

## 2025-08-21 DIAGNOSIS — E53.8 B12 DEFICIENCY: ICD-10-CM

## 2025-08-21 DIAGNOSIS — R82.90 ABNORMAL URINE ODOR: ICD-10-CM

## 2025-08-22 RX ORDER — NITROFURANTOIN MACROCRYSTALS 100 MG/1
100 CAPSULE ORAL 2 TIMES DAILY
Qty: 14 CAPSULE | Refills: 0 | Status: SHIPPED | OUTPATIENT
Start: 2025-08-22 | End: 2025-08-29

## 2025-08-29 ENCOUNTER — TELEPHONE (OUTPATIENT)
Age: 45
End: 2025-08-29

## 2025-09-05 ENCOUNTER — OFFICE VISIT (OUTPATIENT)
Age: 45
End: 2025-09-05
Payer: COMMERCIAL

## 2025-09-05 VITALS
WEIGHT: 177.8 LBS | BODY MASS INDEX: 31.5 KG/M2 | OXYGEN SATURATION: 96 % | SYSTOLIC BLOOD PRESSURE: 123 MMHG | TEMPERATURE: 98.1 F | HEIGHT: 63 IN | DIASTOLIC BLOOD PRESSURE: 80 MMHG | RESPIRATION RATE: 14 BRPM | HEART RATE: 80 BPM

## 2025-09-05 DIAGNOSIS — K59.01 SLOW TRANSIT CONSTIPATION: ICD-10-CM

## 2025-09-05 DIAGNOSIS — Z90.3 HISTORY OF SLEEVE GASTRECTOMY: ICD-10-CM

## 2025-09-05 DIAGNOSIS — E66.01 MORBID OBESITY (HCC): Primary | ICD-10-CM

## 2025-09-05 PROCEDURE — 99212 OFFICE O/P EST SF 10 MIN: CPT | Performed by: NURSE PRACTITIONER

## 2025-09-05 RX ORDER — NITROFURANTOIN 25; 75 MG/1; MG/1
CAPSULE ORAL
COMMUNITY

## 2025-09-05 ASSESSMENT — ENCOUNTER SYMPTOMS
ABDOMINAL PAIN: 0
VOMITING: 0
NAUSEA: 0
SHORTNESS OF BREATH: 0

## 2025-09-05 ASSESSMENT — PATIENT HEALTH QUESTIONNAIRE - PHQ9
SUM OF ALL RESPONSES TO PHQ QUESTIONS 1-9: 0
2. FEELING DOWN, DEPRESSED OR HOPELESS: NOT AT ALL
1. LITTLE INTEREST OR PLEASURE IN DOING THINGS: NOT AT ALL
SUM OF ALL RESPONSES TO PHQ QUESTIONS 1-9: 0

## (undated) DEVICE — GLOVE ORANGE PI 7 1/2   MSG9075

## (undated) DEVICE — BAG SPEC REM 224ML W4XL6IN DIA10MM 1 HND GYN DISP ENDOPCH

## (undated) DEVICE — SHEET TRNSF DISPOSABLE HOVERMATT 100 PER CA

## (undated) DEVICE — SOLUTION IRRIG 1000ML H2O PIC PLAS SHATTERPROOF CONTAINER

## (undated) DEVICE — SUTURE VICRYL + SZ 0 L27IN ABSRB VLT L26MM UR-6 5/8 CIR VCP603H

## (undated) DEVICE — SHELL STPL PWR FOR SIGNIA HNDL STPL SYS

## (undated) DEVICE — ENDO CARRY-ON PROCEDURE KIT INCLUDES ENZYMATIC SPONGE, GAUZE, BIOHAZARD LABEL, TRAY, LUBRICANT, DIRTY SCOPE LABEL, WATER LABEL, TRAY, DRAWSTRING PAD, AND DEFENDO 4-PIECE KIT.: Brand: ENDO CARRY-ON PROCEDURE KIT

## (undated) DEVICE — HYPODERMIC SAFETY NEEDLE: Brand: MAGELLAN

## (undated) DEVICE — ELECTRODE ES 36CM LAP FLAT L HK COAT DISP CLEANCOAT

## (undated) DEVICE — SOLUTION IRRIG 1000ML 0.9% SOD CHL USP POUR PLAS BTL

## (undated) DEVICE — COVER LT HNDL PLAS RIG 1 PER PK

## (undated) DEVICE — FORCEPS BX L240CM JAW DIA2.8MM L CAP W/ NDL MIC MESH TOOTH

## (undated) DEVICE — APPLIER CLP M/L SHFT DIA5MM 15 LIG LIGAMAX 5

## (undated) DEVICE — GOWN,SIRUS,FABRNF,XL,20/CS: Brand: MEDLINE

## (undated) DEVICE — CHEST PACK-SFMCASU: Brand: MEDLINE INDUSTRIES, INC.

## (undated) DEVICE — SUTURE SZ 0 27IN 5/8 CIR UR-6  TAPER PT VIOLET ABSRB VICRYL J603H

## (undated) DEVICE — DEVICE SUT FOR TRCR SITE INCIS ENDO CLOSE

## (undated) DEVICE — TROCAR: Brand: KII® SLEEVE

## (undated) DEVICE — AIR SHEET,LAT,COMFORT GLIDE, BLEND 40X80: Brand: MEDLINE

## (undated) DEVICE — GARMENT,MEDLINE,DVT,INT,CALF,MED, GEN2: Brand: MEDLINE

## (undated) DEVICE — VISIGI 3D®  CALIBRATION SYSTEM  SIZE 40FR STD W/ BULB: Brand: BOEHRINGER® VISIGI 3D™ SLEEVE GASTRECTOMY CALIBRATION SYSTEM, SIZE 40FR W/BULB

## (undated) DEVICE — RELOAD STPL L60MM EXTRA THCK REINF FOR SIGNIA STPLR

## (undated) DEVICE — 4-PORT MANIFOLD: Brand: NEPTUNE 2

## (undated) DEVICE — SOLUTION IV 1000ML 0.9% SOD CHL PH 5 INJ USP VIAFLX PLAS

## (undated) DEVICE — CLICKLINE SCISSORS INSERT: Brand: CLICKLINE

## (undated) DEVICE — SYSTEM EVAC SMOKE LAPARSCOPIC

## (undated) DEVICE — X-RAY DETECTABLE SPONGES,16 PLY: Brand: VISTEC

## (undated) DEVICE — STAPLE INT M SZ 30MM RELD VASC FOR SIGNIA STPL SYS TRI STPL

## (undated) DEVICE — LIQUIBAND RAPID ADHESIVE 36/CS 0.8ML: Brand: MEDLINE

## (undated) DEVICE — EVACUATOR SMOKE L 10 FT SMOKE MANAGEMENT EXTENDED EDGE ELECTRODE STERILE DISP

## (undated) DEVICE — SYRINGE MED 50ML LUERLOCK TIP

## (undated) DEVICE — APPLIER CLP L12.99IN W/ 16 TI CLP GRY PSTL GRP DISP ENDO

## (undated) DEVICE — TUBING, SUCTION, 1/4" X 12', STRAIGHT: Brand: MEDLINE

## (undated) DEVICE — SOLUTION ANTIFOG VIS SYS CLEARIFY LAPSCP

## (undated) DEVICE — SUTURE VICRYL + SZ 3-0 L27IN ABSRB UD L26MM SH 1/2 CIR VCP416H

## (undated) DEVICE — LAPAROSCOPIC TROCAR SLEEVE/SINGLE USE: Brand: KII® OPTICAL ACCESS SYSTEM

## (undated) DEVICE — STAPLES INT L60MM M THCK REINF INTELLIGENT RELD FOR SIGNIA

## (undated) DEVICE — SUTURE MONOCRYL + SZ 4-0 L27IN ABSRB UD L19MM PS-2 3/8 CIR MCP426H

## (undated) DEVICE — HYPODERMIC SAFETY NEEDLE: Brand: MONOJECT

## (undated) DEVICE — STRIP,CLOSURE,WOUND,MEDI-STRIP,1/2X4: Brand: MEDLINE

## (undated) DEVICE — SUTURE DEV SZ 2-0 WND CLSR ABSRB GS-22 VLOC COVIDIEN VLOCM2145

## (undated) DEVICE — TROCARS: Brand: KII® OPTICAL ACCESS SYSTEM

## (undated) DEVICE — GLOVE SURG SZ 65 THK91MIL LTX FREE SYN POLYISOPRENE

## (undated) DEVICE — SUTURE PERMA-HAND SZ 2-0 L30IN NONABSORBABLE BLK L26MM SH K833H

## (undated) DEVICE — SUTURE MCRYL SZ 4-0 L27IN ABSRB UD L19MM PS-2 1/2 CIR PRIM Y426H

## (undated) DEVICE — GLOVE SURG SZ 8 L12IN FNGR THK79MIL GRN LTX FREE

## (undated) DEVICE — COVER US PRB W15XL120CM W/ GEL RUBBERBAND TAPE STRP FLD GEN

## (undated) DEVICE — Device

## (undated) DEVICE — GENERAL LAPAROSCOPY - SMH: Brand: MEDLINE INDUSTRIES, INC.

## (undated) DEVICE — DRAIN SURG PENROSE 0.25X18 IN CLOSED WND DRAINAGE PREM SIL

## (undated) DEVICE — SONICISION CORDLESS ULTRALSONIC DISSECTOR  WITH CURVED JAW 48 CM

## (undated) DEVICE — TROCAR: Brand: KII® OPTICAL ACCESS SYSTEM